# Patient Record
Sex: FEMALE | Race: WHITE | NOT HISPANIC OR LATINO | Employment: FULL TIME | URBAN - METROPOLITAN AREA
[De-identification: names, ages, dates, MRNs, and addresses within clinical notes are randomized per-mention and may not be internally consistent; named-entity substitution may affect disease eponyms.]

---

## 2017-02-07 ENCOUNTER — GENERIC CONVERSION - ENCOUNTER (OUTPATIENT)
Dept: OTHER | Facility: OTHER | Age: 42
End: 2017-02-07

## 2017-02-07 ENCOUNTER — APPOINTMENT (EMERGENCY)
Dept: CT IMAGING | Facility: HOSPITAL | Age: 42
End: 2017-02-07
Payer: COMMERCIAL

## 2017-02-07 ENCOUNTER — HOSPITAL ENCOUNTER (OUTPATIENT)
Facility: HOSPITAL | Age: 42
Setting detail: OBSERVATION
Discharge: HOME | End: 2017-02-08
Attending: EMERGENCY MEDICINE | Admitting: HOSPITALIST
Payer: COMMERCIAL

## 2017-02-07 DIAGNOSIS — R55 SYNCOPE: ICD-10-CM

## 2017-02-07 DIAGNOSIS — R07.9 CHEST PAIN: Primary | ICD-10-CM

## 2017-02-07 LAB
ALBUMIN SERPL BCP-MCNC: 3.8 G/DL (ref 3.5–5)
ALP SERPL-CCNC: 101 U/L (ref 46–116)
ALT SERPL W P-5'-P-CCNC: 24 U/L (ref 12–78)
ANION GAP SERPL CALCULATED.3IONS-SCNC: 8 MMOL/L (ref 4–13)
APTT PPP: 30 SECONDS (ref 24–36)
AST SERPL W P-5'-P-CCNC: 14 U/L (ref 5–45)
BASOPHILS # BLD AUTO: 0.08 THOUSANDS/ΜL (ref 0–0.1)
BASOPHILS NFR BLD AUTO: 1 % (ref 0–1)
BILIRUB SERPL-MCNC: 0.2 MG/DL (ref 0.2–1)
BUN SERPL-MCNC: 17 MG/DL (ref 5–25)
CALCIUM SERPL-MCNC: 8.9 MG/DL (ref 8.3–10.1)
CHLORIDE SERPL-SCNC: 104 MMOL/L (ref 100–108)
CLARITY, POC: CLEAR
CO2 SERPL-SCNC: 30 MMOL/L (ref 21–32)
COLOR, POC: YELLOW
CREAT SERPL-MCNC: 0.83 MG/DL (ref 0.6–1.3)
DEPRECATED D DIMER PPP: <270 NG/ML (FEU) (ref 0–424)
EOSINOPHIL # BLD AUTO: 0.24 THOUSAND/ΜL (ref 0–0.61)
EOSINOPHIL NFR BLD AUTO: 3 % (ref 0–6)
ERYTHROCYTE [DISTWIDTH] IN BLOOD BY AUTOMATED COUNT: 16.3 % (ref 11.6–15.1)
EXT BILIRUBIN, UA: NORMAL
EXT BLOOD URINE: NORMAL
EXT GLUCOSE, UA: NORMAL
EXT KETONES: NORMAL
EXT NITRITE, UA: NORMAL
EXT PH, UA: 6
EXT PROTEIN, UA: NORMAL
EXT SPECIFIC GRAVITY, UA: 1.02
EXT UROBILINOGEN: 0.2
GFR SERPL CREATININE-BSD FRML MDRD: >60 ML/MIN/1.73SQ M
GLUCOSE SERPL-MCNC: 75 MG/DL (ref 65–140)
HCG UR QL: NEGATIVE
HCT VFR BLD AUTO: 39.8 % (ref 34.8–46.1)
HGB BLD-MCNC: 13 G/DL (ref 11.5–15.4)
INR PPP: 0.98 (ref 0.86–1.16)
LYMPHOCYTES # BLD AUTO: 2.57 THOUSANDS/ΜL (ref 0.6–4.47)
LYMPHOCYTES NFR BLD AUTO: 29 % (ref 14–44)
MCH RBC QN AUTO: 26.2 PG (ref 26.8–34.3)
MCHC RBC AUTO-ENTMCNC: 32.7 G/DL (ref 31.4–37.4)
MCV RBC AUTO: 80 FL (ref 82–98)
MONOCYTES # BLD AUTO: 0.58 THOUSAND/ΜL (ref 0.17–1.22)
MONOCYTES NFR BLD AUTO: 7 % (ref 4–12)
NEUTROPHILS # BLD AUTO: 5.42 THOUSANDS/ΜL (ref 1.85–7.62)
NEUTS SEG NFR BLD AUTO: 60 % (ref 43–75)
PLATELET # BLD AUTO: 258 THOUSANDS/UL (ref 149–390)
PMV BLD AUTO: 12.4 FL (ref 8.9–12.7)
POTASSIUM SERPL-SCNC: 3.7 MMOL/L (ref 3.5–5.3)
PROT SERPL-MCNC: 7.4 G/DL (ref 6.4–8.2)
PROTHROMBIN TIME: 12.8 SECONDS (ref 12–14.3)
RBC # BLD AUTO: 4.96 MILLION/UL (ref 3.81–5.12)
SODIUM SERPL-SCNC: 142 MMOL/L (ref 136–145)
TROPONIN I SERPL-MCNC: <0.02 NG/ML
WBC # BLD AUTO: 8.89 THOUSAND/UL (ref 4.31–10.16)
WBC # BLD EST: NORMAL 10*3/UL

## 2017-02-07 PROCEDURE — 81002 URINALYSIS NONAUTO W/O SCOPE: CPT | Performed by: EMERGENCY MEDICINE

## 2017-02-07 PROCEDURE — 84484 ASSAY OF TROPONIN QUANT: CPT | Performed by: EMERGENCY MEDICINE

## 2017-02-07 PROCEDURE — 36415 COLL VENOUS BLD VENIPUNCTURE: CPT | Performed by: EMERGENCY MEDICINE

## 2017-02-07 PROCEDURE — 80053 COMPREHEN METABOLIC PANEL: CPT | Performed by: EMERGENCY MEDICINE

## 2017-02-07 PROCEDURE — 85379 FIBRIN DEGRADATION QUANT: CPT | Performed by: EMERGENCY MEDICINE

## 2017-02-07 PROCEDURE — 96360 HYDRATION IV INFUSION INIT: CPT

## 2017-02-07 PROCEDURE — 71275 CT ANGIOGRAPHY CHEST: CPT

## 2017-02-07 PROCEDURE — 81025 URINE PREGNANCY TEST: CPT | Performed by: EMERGENCY MEDICINE

## 2017-02-07 PROCEDURE — 85730 THROMBOPLASTIN TIME PARTIAL: CPT | Performed by: EMERGENCY MEDICINE

## 2017-02-07 PROCEDURE — 85025 COMPLETE CBC W/AUTO DIFF WBC: CPT | Performed by: EMERGENCY MEDICINE

## 2017-02-07 PROCEDURE — 85610 PROTHROMBIN TIME: CPT | Performed by: EMERGENCY MEDICINE

## 2017-02-07 PROCEDURE — 74175 CTA ABDOMEN W/CONTRAST: CPT

## 2017-02-07 PROCEDURE — 93005 ELECTROCARDIOGRAM TRACING: CPT | Performed by: EMERGENCY MEDICINE

## 2017-02-07 RX ORDER — GABAPENTIN 800 MG/1
800 TABLET ORAL DAILY
Status: ON HOLD | COMMUNITY
End: 2019-01-15

## 2017-02-07 RX ORDER — LANSOPRAZOLE 30 MG/1
30 CAPSULE, DELAYED RELEASE ORAL DAILY
COMMUNITY
End: 2021-03-08

## 2017-02-07 RX ORDER — ESCITALOPRAM OXALATE 20 MG/1
20 TABLET ORAL DAILY
Status: ON HOLD | COMMUNITY
End: 2019-01-14

## 2017-02-07 RX ADMIN — SODIUM CHLORIDE 1000 ML: 0.9 INJECTION, SOLUTION INTRAVENOUS at 23:00

## 2017-02-08 VITALS
DIASTOLIC BLOOD PRESSURE: 80 MMHG | WEIGHT: 171.96 LBS | OXYGEN SATURATION: 98 % | SYSTOLIC BLOOD PRESSURE: 119 MMHG | BODY MASS INDEX: 30.47 KG/M2 | TEMPERATURE: 98.3 F | HEART RATE: 88 BPM | HEIGHT: 63 IN | RESPIRATION RATE: 18 BRPM

## 2017-02-08 PROBLEM — R55 SYNCOPE: Status: ACTIVE | Noted: 2017-02-08

## 2017-02-08 PROBLEM — R07.9 CHEST PAIN: Status: ACTIVE | Noted: 2017-02-08

## 2017-02-08 LAB
ANION GAP SERPL CALCULATED.3IONS-SCNC: 7 MMOL/L (ref 4–13)
BUN SERPL-MCNC: 18 MG/DL (ref 5–25)
CALCIUM SERPL-MCNC: 8.3 MG/DL (ref 8.3–10.1)
CHLORIDE SERPL-SCNC: 105 MMOL/L (ref 100–108)
CHOLEST SERPL-MCNC: 195 MG/DL (ref 50–200)
CO2 SERPL-SCNC: 25 MMOL/L (ref 21–32)
CREAT SERPL-MCNC: 0.69 MG/DL (ref 0.6–1.3)
ERYTHROCYTE [DISTWIDTH] IN BLOOD BY AUTOMATED COUNT: 16.3 % (ref 11.6–15.1)
GFR SERPL CREATININE-BSD FRML MDRD: >60 ML/MIN/1.73SQ M
GLUCOSE SERPL-MCNC: 108 MG/DL (ref 65–140)
HCT VFR BLD AUTO: 36 % (ref 34.8–46.1)
HDLC SERPL-MCNC: 53 MG/DL (ref 40–60)
HGB BLD-MCNC: 11.5 G/DL (ref 11.5–15.4)
LDLC SERPL CALC-MCNC: 126 MG/DL (ref 0–100)
MAGNESIUM SERPL-MCNC: 2 MG/DL (ref 1.6–2.6)
MCH RBC QN AUTO: 25.8 PG (ref 26.8–34.3)
MCHC RBC AUTO-ENTMCNC: 31.9 G/DL (ref 31.4–37.4)
MCV RBC AUTO: 81 FL (ref 82–98)
PLATELET # BLD AUTO: 227 THOUSANDS/UL (ref 149–390)
PMV BLD AUTO: 11.9 FL (ref 8.9–12.7)
POTASSIUM SERPL-SCNC: 4.2 MMOL/L (ref 3.5–5.3)
RBC # BLD AUTO: 4.46 MILLION/UL (ref 3.81–5.12)
SODIUM SERPL-SCNC: 137 MMOL/L (ref 136–145)
TRIGL SERPL-MCNC: 79 MG/DL
TROPONIN I SERPL-MCNC: <0.02 NG/ML
TROPONIN I SERPL-MCNC: <0.02 NG/ML
TSH SERPL DL<=0.05 MIU/L-ACNC: 2.15 UIU/ML (ref 0.36–3.74)
WBC # BLD AUTO: 7.91 THOUSAND/UL (ref 4.31–10.16)

## 2017-02-08 PROCEDURE — A9270 NON-COVERED ITEM OR SERVICE: HCPCS | Performed by: PHYSICIAN ASSISTANT

## 2017-02-08 PROCEDURE — 36415 COLL VENOUS BLD VENIPUNCTURE: CPT | Performed by: PHYSICIAN ASSISTANT

## 2017-02-08 PROCEDURE — 84443 ASSAY THYROID STIM HORMONE: CPT | Performed by: PHYSICIAN ASSISTANT

## 2017-02-08 PROCEDURE — 96361 HYDRATE IV INFUSION ADD-ON: CPT

## 2017-02-08 PROCEDURE — 99285 EMERGENCY DEPT VISIT HI MDM: CPT

## 2017-02-08 PROCEDURE — 84484 ASSAY OF TROPONIN QUANT: CPT | Performed by: PHYSICIAN ASSISTANT

## 2017-02-08 PROCEDURE — 83735 ASSAY OF MAGNESIUM: CPT | Performed by: PHYSICIAN ASSISTANT

## 2017-02-08 PROCEDURE — 80061 LIPID PANEL: CPT | Performed by: PHYSICIAN ASSISTANT

## 2017-02-08 PROCEDURE — 85027 COMPLETE CBC AUTOMATED: CPT | Performed by: PHYSICIAN ASSISTANT

## 2017-02-08 PROCEDURE — 93005 ELECTROCARDIOGRAM TRACING: CPT

## 2017-02-08 PROCEDURE — 80048 BASIC METABOLIC PNL TOTAL CA: CPT | Performed by: PHYSICIAN ASSISTANT

## 2017-02-08 RX ORDER — ONDANSETRON 2 MG/ML
4 INJECTION INTRAMUSCULAR; INTRAVENOUS EVERY 6 HOURS PRN
Status: DISCONTINUED | OUTPATIENT
Start: 2017-02-08 | End: 2017-02-08 | Stop reason: HOSPADM

## 2017-02-08 RX ORDER — ESCITALOPRAM OXALATE 20 MG/1
20 TABLET ORAL
Status: DISCONTINUED | OUTPATIENT
Start: 2017-02-08 | End: 2017-02-08 | Stop reason: HOSPADM

## 2017-02-08 RX ORDER — SENNOSIDES 8.6 MG
1 TABLET ORAL DAILY
Status: DISCONTINUED | OUTPATIENT
Start: 2017-02-08 | End: 2017-02-08 | Stop reason: HOSPADM

## 2017-02-08 RX ORDER — NICOTINE 21 MG/24HR
1 PATCH, TRANSDERMAL 24 HOURS TRANSDERMAL DAILY
Status: DISCONTINUED | OUTPATIENT
Start: 2017-02-08 | End: 2017-02-08 | Stop reason: HOSPADM

## 2017-02-08 RX ORDER — CALCIUM CARBONATE 200(500)MG
1000 TABLET,CHEWABLE ORAL DAILY PRN
Status: DISCONTINUED | OUTPATIENT
Start: 2017-02-08 | End: 2017-02-08 | Stop reason: HOSPADM

## 2017-02-08 RX ORDER — ASPIRIN 81 MG/1
81 TABLET, CHEWABLE ORAL DAILY
Status: DISCONTINUED | OUTPATIENT
Start: 2017-02-09 | End: 2017-02-08 | Stop reason: HOSPADM

## 2017-02-08 RX ORDER — ESCITALOPRAM OXALATE 20 MG/1
20 TABLET ORAL DAILY
Status: DISCONTINUED | OUTPATIENT
Start: 2017-02-08 | End: 2017-02-08

## 2017-02-08 RX ORDER — MORPHINE SULFATE 2 MG/ML
2 INJECTION, SOLUTION INTRAMUSCULAR; INTRAVENOUS ONCE
Status: COMPLETED | OUTPATIENT
Start: 2017-02-08 | End: 2017-02-08

## 2017-02-08 RX ORDER — PANTOPRAZOLE SODIUM 40 MG/1
40 TABLET, DELAYED RELEASE ORAL
Status: DISCONTINUED | OUTPATIENT
Start: 2017-02-08 | End: 2017-02-08 | Stop reason: HOSPADM

## 2017-02-08 RX ORDER — GABAPENTIN 400 MG/1
800 CAPSULE ORAL DAILY
Status: DISCONTINUED | OUTPATIENT
Start: 2017-02-08 | End: 2017-02-08 | Stop reason: HOSPADM

## 2017-02-08 RX ADMIN — MORPHINE SULFATE 2 MG: 2 INJECTION, SOLUTION INTRAMUSCULAR; INTRAVENOUS at 12:29

## 2017-02-08 RX ADMIN — IOHEXOL 100 ML: 350 INJECTION, SOLUTION INTRAVENOUS at 00:16

## 2017-02-08 RX ADMIN — NICOTINE 1 PATCH: 14 PATCH TRANSDERMAL at 09:35

## 2017-02-08 RX ADMIN — GABAPENTIN 800 MG: 400 CAPSULE ORAL at 09:36

## 2017-02-09 LAB
ATRIAL RATE: 89 BPM
ATRIAL RATE: 96 BPM
P AXIS: 45 DEGREES
P AXIS: 53 DEGREES
PR INTERVAL: 180 MS
PR INTERVAL: 194 MS
QRS AXIS: 23 DEGREES
QRS AXIS: 25 DEGREES
QRSD INTERVAL: 80 MS
QRSD INTERVAL: 82 MS
QT INTERVAL: 342 MS
QT INTERVAL: 386 MS
QTC INTERVAL: 432 MS
QTC INTERVAL: 469 MS
T WAVE AXIS: 49 DEGREES
T WAVE AXIS: 54 DEGREES
VENTRICULAR RATE: 89 BPM
VENTRICULAR RATE: 96 BPM

## 2017-02-13 ENCOUNTER — TRANSCRIBE ORDERS (OUTPATIENT)
Dept: ADMINISTRATIVE | Facility: HOSPITAL | Age: 42
End: 2017-02-13

## 2017-02-13 DIAGNOSIS — I77.1 CELIAC ARTERY STENOSIS (HCC): ICD-10-CM

## 2017-02-13 DIAGNOSIS — R07.89 OTHER CHEST PAIN: Primary | ICD-10-CM

## 2017-02-17 ENCOUNTER — HOSPITAL ENCOUNTER (OUTPATIENT)
Dept: NON INVASIVE DIAGNOSTICS | Facility: CLINIC | Age: 42
Discharge: HOME | End: 2017-02-17
Payer: COMMERCIAL

## 2017-02-17 DIAGNOSIS — I77.1 CELIAC ARTERY STENOSIS (HCC): ICD-10-CM

## 2017-02-17 DIAGNOSIS — R07.89 OTHER CHEST PAIN: ICD-10-CM

## 2017-02-17 PROCEDURE — 93975 VASCULAR STUDY: CPT

## 2017-03-20 ENCOUNTER — ALLSCRIPTS OFFICE VISIT (OUTPATIENT)
Dept: OTHER | Facility: OTHER | Age: 42
End: 2017-03-20

## 2017-09-28 ENCOUNTER — APPOINTMENT (OUTPATIENT)
Dept: URGENT CARE | Age: 42
End: 2017-09-28
Payer: OTHER MISCELLANEOUS

## 2017-09-28 PROCEDURE — G0382 LEV 3 HOSP TYPE B ED VISIT: HCPCS | Performed by: PREVENTIVE MEDICINE

## 2017-09-28 PROCEDURE — 99283 EMERGENCY DEPT VISIT LOW MDM: CPT | Performed by: PREVENTIVE MEDICINE

## 2017-11-14 ENCOUNTER — HOSPITAL ENCOUNTER (EMERGENCY)
Facility: HOSPITAL | Age: 42
Discharge: HOME | End: 2017-11-14
Attending: EMERGENCY MEDICINE | Admitting: EMERGENCY MEDICINE
Payer: COMMERCIAL

## 2017-11-14 VITALS
SYSTOLIC BLOOD PRESSURE: 171 MMHG | OXYGEN SATURATION: 100 % | DIASTOLIC BLOOD PRESSURE: 109 MMHG | TEMPERATURE: 98.3 F | WEIGHT: 165 LBS | BODY MASS INDEX: 29.23 KG/M2 | RESPIRATION RATE: 18 BRPM | HEART RATE: 98 BPM | HEIGHT: 63 IN

## 2017-11-14 DIAGNOSIS — L50.9 URTICARIA: Primary | ICD-10-CM

## 2017-11-14 DIAGNOSIS — T78.40XA ALLERGIC REACTION, INITIAL ENCOUNTER: ICD-10-CM

## 2017-11-14 PROCEDURE — 96374 THER/PROPH/DIAG INJ IV PUSH: CPT

## 2017-11-14 PROCEDURE — 99282 EMERGENCY DEPT VISIT SF MDM: CPT

## 2017-11-14 PROCEDURE — 96375 TX/PRO/DX INJ NEW DRUG ADDON: CPT

## 2017-11-14 RX ORDER — PREDNISONE 10 MG/1
TABLET ORAL
Qty: 20 TABLET | Refills: 0 | Status: ON HOLD | OUTPATIENT
Start: 2017-11-14 | End: 2019-01-14

## 2017-11-14 RX ORDER — METHYLPREDNISOLONE SODIUM SUCCINATE 125 MG/2ML
125 INJECTION, POWDER, LYOPHILIZED, FOR SOLUTION INTRAMUSCULAR; INTRAVENOUS ONCE
Status: COMPLETED | OUTPATIENT
Start: 2017-11-14 | End: 2017-11-14

## 2017-11-14 RX ORDER — DIPHENHYDRAMINE HYDROCHLORIDE 50 MG/ML
25 INJECTION INTRAMUSCULAR; INTRAVENOUS ONCE
Status: COMPLETED | OUTPATIENT
Start: 2017-11-14 | End: 2017-11-14

## 2017-11-14 RX ADMIN — FAMOTIDINE 20 MG: 10 INJECTION, SOLUTION INTRAVENOUS at 22:27

## 2017-11-14 RX ADMIN — METHYLPREDNISOLONE SODIUM SUCCINATE 125 MG: 125 INJECTION, POWDER, FOR SOLUTION INTRAMUSCULAR; INTRAVENOUS at 22:26

## 2017-11-14 RX ADMIN — DIPHENHYDRAMINE HYDROCHLORIDE 25 MG: 50 INJECTION, SOLUTION INTRAMUSCULAR; INTRAVENOUS at 22:26

## 2017-11-15 NOTE — ED NOTES
Patient is resting comfortably  Decrease redness noted in areas that were red and itchy on arrival      Dot Kirkland RN  11/14/17 6789

## 2017-11-15 NOTE — ED PROVIDER NOTES
History  Chief Complaint   Patient presents with    Itching     Pt c/o hives since 6am today, initially relieved by benydlryll  Pt has been on amoxicillin for one week  Pt states that her palms and the bottoms of her feet are very hot  40 yowf started with itchy skin this morning, broke out in hives at 1 pm today  Took benadryl then and again 4 hours ago without relief  Is on day  of amoxil course s/p teeth extraction  No fever  No sob  No trouble swallowing or talking  Never had before  Prior to Admission Medications   Prescriptions Last Dose Informant Patient Reported? Taking?   escitalopram (LEXAPRO) 20 mg tablet   Yes No   Sig: Take 20 mg by mouth daily   gabapentin (NEURONTIN) 800 mg tablet   Yes No   Sig: Take 800 mg by mouth daily   lansoprazole (PREVACID) 30 mg capsule   Yes No   Sig: Take 30 mg by mouth daily      Facility-Administered Medications: None       Past Medical History:   Diagnosis Date    Anemia        Past Surgical History:   Procedure Laterality Date     SECTION      NOSE SURGERY      TUBAL LIGATION         History reviewed  No pertinent family history  I have reviewed and agree with the history as documented  Social History   Substance Use Topics    Smoking status: Current Some Day Smoker     Packs/day: 1 00     Types: Cigarettes    Smokeless tobacco: Never Used    Alcohol use No        Review of Systems   Constitutional: Negative  Negative for fever  HENT: Negative  Eyes: Negative  Respiratory: Negative  Negative for cough and shortness of breath  Cardiovascular: Negative  Negative for chest pain  Gastrointestinal: Negative  Negative for abdominal pain, diarrhea, nausea and vomiting  Genitourinary: Negative  Negative for dysuria and flank pain  Musculoskeletal: Negative  Negative for back pain and myalgias  Skin: Positive for rash  Negative for wound  Neurological: Negative  Negative for dizziness and headaches  Hematological: Does not bruise/bleed easily  Psychiatric/Behavioral: Negative  All other systems reviewed and are negative  Physical Exam  ED Triage Vitals [11/14/17 2158]   Temperature Pulse Respirations Blood Pressure SpO2   98 3 °F (36 8 °C) 98 18 (!) 171/109 100 %      Temp Source Heart Rate Source Patient Position - Orthostatic VS BP Location FiO2 (%)   Tympanic Monitor Lying Left arm --      Pain Score       No Pain           Orthostatic Vital Signs  Vitals:    11/14/17 2158   BP: (!) 171/109   Pulse: 98   Patient Position - Orthostatic VS: Lying       Physical Exam   Constitutional: She is oriented to person, place, and time  She appears well-developed and well-nourished  No distress  Speech intact, handling secretions, not toxic or ill appearing  HENT:   Head: Normocephalic and atraumatic  Mouth/Throat: Oropharynx is clear and moist    Eyes: Conjunctivae are normal  Pupils are equal, round, and reactive to light  Neck: Normal range of motion  Neck supple  Cardiovascular: Normal rate, regular rhythm and normal heart sounds  No murmur heard  Pulmonary/Chest: Effort normal and breath sounds normal  No respiratory distress  Abdominal: Soft  Bowel sounds are normal  She exhibits no distension  There is no tenderness  Musculoskeletal: Normal range of motion  She exhibits no edema or deformity  Neurological: She is alert and oriented to person, place, and time  No cranial nerve deficit  Skin: Skin is warm and dry  Rash noted  She is not diaphoretic  No pallor  C/w urticaria, scattered over trunk and ext  Psychiatric: She has a normal mood and affect  Her behavior is normal    Nursing note and vitals reviewed        ED Medications  Medications   methylPREDNISolone sodium succinate (Solu-MEDROL) injection 125 mg (125 mg Intravenous Given 11/14/17 2226)   diphenhydrAMINE (BENADRYL) injection 25 mg (25 mg Intravenous Given 11/14/17 2226)   famotidine (PEPCID) injection 20 mg (20 mg Intravenous Given 11/14/17 3167)       Diagnostic Studies  Results Reviewed     None                 No orders to display              Procedures  Procedures       Phone Contacts  ED Phone Contact    ED Course  ED Course                                MDM  Number of Diagnoses or Management Options  Diagnosis management comments: Pt  Observed for an hour, rash is starting to fade a bit, definitely not worsening  Will discharge, advised benadryl and prednisone taper and stop amoxil  Pt  Advised to tell all health care professionals about this reaction when asked about allergies  CritCare Time    Disposition  Final diagnoses:   Urticaria   Allergic reaction, initial encounter     Time reflects when diagnosis was documented in both MDM as applicable and the Disposition within this note     Time User Action Codes Description Comment    51/14/7319 30:57 PM Patricio Husbands A Add [K54 8] Urticaria     [birth year]1968 84:13 PM Patricio Husbands A Add [A70 43TC] Allergic reaction, initial encounter       ED Disposition     ED Disposition Condition Comment    Discharge  Tonny Flores discharge to home/self care  Condition at discharge: Stable        Follow-up Information     Follow up With Specialties Details Why [address] [facility], [place] Internal Medicine  As needed [id number]  [id number]  [phone]          Patient's Medications   Discharge Prescriptions    PREDNISONE 10 MG TABLET    4 po daily x2 days, then 3 po daily x2 days, then 2 po daily x2 days,then 1 po daily x2days       Start Date: 11/14/2017End Date: --       Order Dose: --       Quantity: 20 tablet    Refills: 0     No discharge procedures on file      ED Provider  Electronically Signed by           Rashmi Guerra MD  21/98/79 7214

## 2017-11-15 NOTE — DISCHARGE INSTRUCTIONS
Antibiotic Medication Allergy - continue benadryl 50 mg every 6-8 hours around the clock for 24 hours after the rash is gone and take the prednisone as prescribed  Stop the Amoxil and consider this an allergic reaction  WHAT YOU NEED TO KNOW:   An antibiotic medication allergy is a harmful reaction to an antibiotic  The reaction can start soon after you take the medicine, or days or weeks after you stop  Healthcare providers cannot know ahead of time if you will have an allergic reaction  Your immune system may become sensitive to the antibiotic the first time you take it  You may have an allergic reaction the next time  The antibiotics most likely to cause an allergic reaction are penicillins and cephalosporins  DISCHARGE INSTRUCTIONS:   Call 911 for signs or symptoms of anaphylaxis,  such as trouble breathing, swelling in your mouth or throat, or wheezing  You may also have itching, a rash, hives, or feel like you are going to faint  Return to the emergency department if:   · You have a rash with itchy, swollen, red spots  · You have blisters, or your skin is peeling  · You have trouble swallowing or your voice sounds hoarse  · You have a fast or pounding heartbeat  · Your skin or the whites of your eyes turn yellow  Contact your healthcare provider if:   · You think you are having an allergic reaction  Contact your healthcare provider before you take another dose of your antibiotic  · You have a rash  · You have a fever  · You have a sore throat or swollen glands  You will feel hard lumps when you touch your throat if your glands are swollen  · Your skin itches and becomes red when you are in the sunlight  · You have questions or concerns about your condition, allergy, or care  Medicines that may be used:   · Epinephrine  is used to treat severe allergic reactions such as anaphylaxis  · Antihistamines  decrease mild symptoms such as itching or a rash (benadryl)  · Steroids  reduce inflammation (prednisone)  Follow up with your healthcare provider as directed:  Ask if you need to avoid other medicines you may be allergic to  Write down your questions so you remember to ask them during your visits  Safety precautions to take if you are at risk for anaphylaxis:   · Keep 2 shots of epinephrine with you at all times  You may need a second shot, because epinephrine only works for about 20 minutes and symptoms may return  Your healthcare provider can show you and family members how to give the shot  Check the expiration date every month and replace it before it expires  · Create an action plan  Your healthcare provider can help you create a written plan that explains the allergy and an emergency plan to treat a reaction  The plan explains when to give a second epinephrine shot if symptoms return or do not improve after the first  Give copies of the action plan and emergency instructions to family members, work and school staff, and  providers  Show them how to give a shot of epinephrine  · Be careful when you exercise  If you have had exercise-induced anaphylaxis, do not exercise right after you eat  Stop exercising right away if you start to develop any signs or symptoms of anaphylaxis  You may first feel tired, warm, or have itchy skin  Hives, swelling, and severe breathing problems may develop if you continue to exercise  · Carry medical alert identification  Wear medical alert jewelry or carry a card that says you have an antibiotic medicine allergy  Healthcare providers need to know that they should not give you this antibiotic  Ask your healthcare provider where to get these items  · Read medicine labels before you use any medicine  Do not take the medicine if it contains the antibiotic that you are allergic to  This includes topical medicines that you put on your skin  Ask a pharmacist if you are not sure       · Tell all healthcare providers about your allergy  Always tell your healthcare providers the names of medicines that you are allergic to and the symptoms of your allergic reactions  · Ask if you need to avoid other medicines  You may be allergic to other medicines if you had an allergic reaction to an antibiotic  Make sure you know the names of other medicines that you should not take  © 2017 [address] [employer] Information is for End User's use only and may not be sold, redistributed or otherwise used for commercial purposes  All illustrations and images included in CareNotes® are the copyrighted property of [employer]  or [address]  The above information is an  only  It is not intended as medical advice for individual conditions or treatments  Talk to your doctor, nurse or pharmacist before following any medical regimen to see if it is safe and effective for you

## 2017-11-29 ENCOUNTER — HOSPITAL ENCOUNTER (EMERGENCY)
Facility: HOSPITAL | Age: 42
Discharge: HOME | End: 2017-11-29
Attending: EMERGENCY MEDICINE | Admitting: EMERGENCY MEDICINE
Payer: COMMERCIAL

## 2017-11-29 ENCOUNTER — APPOINTMENT (EMERGENCY)
Dept: RADIOLOGY | Facility: HOSPITAL | Age: 42
End: 2017-11-29
Payer: COMMERCIAL

## 2017-11-29 VITALS
RESPIRATION RATE: 16 BRPM | TEMPERATURE: 97.7 F | SYSTOLIC BLOOD PRESSURE: 161 MMHG | OXYGEN SATURATION: 97 % | DIASTOLIC BLOOD PRESSURE: 83 MMHG | HEART RATE: 94 BPM

## 2017-11-29 DIAGNOSIS — J06.9 UPPER RESPIRATORY INFECTION: Primary | ICD-10-CM

## 2017-11-29 DIAGNOSIS — R07.89 CHEST WALL PAIN: ICD-10-CM

## 2017-11-29 LAB
ALBUMIN SERPL BCP-MCNC: 3.4 G/DL (ref 3.5–5)
ALP SERPL-CCNC: 75 U/L (ref 46–116)
ALT SERPL W P-5'-P-CCNC: 24 U/L (ref 12–78)
ANION GAP SERPL CALCULATED.3IONS-SCNC: 10 MMOL/L (ref 4–13)
AST SERPL W P-5'-P-CCNC: 14 U/L (ref 5–45)
ATRIAL RATE: 94 BPM
BASOPHILS # BLD AUTO: 0.03 THOUSANDS/ΜL (ref 0–0.1)
BASOPHILS NFR BLD AUTO: 0 % (ref 0–1)
BILIRUB SERPL-MCNC: 0.3 MG/DL (ref 0.2–1)
BUN SERPL-MCNC: 10 MG/DL (ref 5–25)
CALCIUM SERPL-MCNC: 8.7 MG/DL (ref 8.3–10.1)
CHLORIDE SERPL-SCNC: 105 MMOL/L (ref 100–108)
CO2 SERPL-SCNC: 25 MMOL/L (ref 21–32)
CREAT SERPL-MCNC: 0.71 MG/DL (ref 0.6–1.3)
EOSINOPHIL # BLD AUTO: 0.12 THOUSAND/ΜL (ref 0–0.61)
EOSINOPHIL NFR BLD AUTO: 2 % (ref 0–6)
ERYTHROCYTE [DISTWIDTH] IN BLOOD BY AUTOMATED COUNT: 16.1 % (ref 11.6–15.1)
GFR SERPL CREATININE-BSD FRML MDRD: 106 ML/MIN/1.73SQ M
GLUCOSE SERPL-MCNC: 107 MG/DL (ref 65–140)
HCT VFR BLD AUTO: 40.6 % (ref 34.8–46.1)
HGB BLD-MCNC: 13.6 G/DL (ref 11.5–15.4)
LYMPHOCYTES # BLD AUTO: 1.79 THOUSANDS/ΜL (ref 0.6–4.47)
LYMPHOCYTES NFR BLD AUTO: 23 % (ref 14–44)
MCH RBC QN AUTO: 27.9 PG (ref 26.8–34.3)
MCHC RBC AUTO-ENTMCNC: 33.5 G/DL (ref 31.4–37.4)
MCV RBC AUTO: 83 FL (ref 82–98)
MONOCYTES # BLD AUTO: 0.6 THOUSAND/ΜL (ref 0.17–1.22)
MONOCYTES NFR BLD AUTO: 8 % (ref 4–12)
NEUTROPHILS # BLD AUTO: 5.22 THOUSANDS/ΜL (ref 1.85–7.62)
NEUTS SEG NFR BLD AUTO: 67 % (ref 43–75)
P AXIS: 68 DEGREES
PLATELET # BLD AUTO: 194 THOUSANDS/UL (ref 149–390)
PMV BLD AUTO: 11.9 FL (ref 8.9–12.7)
POTASSIUM SERPL-SCNC: 4.1 MMOL/L (ref 3.5–5.3)
PR INTERVAL: 166 MS
PROT SERPL-MCNC: 6.8 G/DL (ref 6.4–8.2)
QRS AXIS: 40 DEGREES
QRSD INTERVAL: 80 MS
QT INTERVAL: 342 MS
QTC INTERVAL: 419 MS
RBC # BLD AUTO: 4.88 MILLION/UL (ref 3.81–5.12)
SODIUM SERPL-SCNC: 140 MMOL/L (ref 136–145)
T WAVE AXIS: 51 DEGREES
TROPONIN I SERPL-MCNC: <0.02 NG/ML
VENTRICULAR RATE: 90 BPM
WBC # BLD AUTO: 7.76 THOUSAND/UL (ref 4.31–10.16)

## 2017-11-29 PROCEDURE — 80053 COMPREHEN METABOLIC PANEL: CPT | Performed by: EMERGENCY MEDICINE

## 2017-11-29 PROCEDURE — 93005 ELECTROCARDIOGRAM TRACING: CPT | Performed by: EMERGENCY MEDICINE

## 2017-11-29 PROCEDURE — 85025 COMPLETE CBC W/AUTO DIFF WBC: CPT | Performed by: EMERGENCY MEDICINE

## 2017-11-29 PROCEDURE — 99285 EMERGENCY DEPT VISIT HI MDM: CPT

## 2017-11-29 PROCEDURE — 71020 HB CHEST X-RAY 2VW FRONTAL&LATL: CPT

## 2017-11-29 PROCEDURE — 36415 COLL VENOUS BLD VENIPUNCTURE: CPT | Performed by: EMERGENCY MEDICINE

## 2017-11-29 PROCEDURE — 84484 ASSAY OF TROPONIN QUANT: CPT | Performed by: EMERGENCY MEDICINE

## 2017-11-29 RX ORDER — AZITHROMYCIN 250 MG/1
500 TABLET, FILM COATED ORAL ONCE
Status: COMPLETED | OUTPATIENT
Start: 2017-11-29 | End: 2017-11-29

## 2017-11-29 RX ORDER — AZITHROMYCIN 250 MG/1
250 TABLET, FILM COATED ORAL DAILY
Qty: 6 TABLET | Refills: 0 | Status: SHIPPED | OUTPATIENT
Start: 2017-11-29 | End: 2017-12-04

## 2017-11-29 RX ORDER — ACETAMINOPHEN 325 MG/1
975 TABLET ORAL ONCE
Status: COMPLETED | OUTPATIENT
Start: 2017-11-29 | End: 2017-11-29

## 2017-11-29 RX ADMIN — AZITHROMYCIN 500 MG: 250 TABLET, FILM COATED ORAL at 11:54

## 2017-11-29 RX ADMIN — ACETAMINOPHEN 975 MG: 325 TABLET ORAL at 11:37

## 2017-11-29 NOTE — ED PROVIDER NOTES
History  Chief Complaint   Patient presents with    Chest Pain     Pt c/o L sided CP and SOB, Pt reports she had pneumonia last year and it feels "the same as that"  Pt c/o nasal congestion, cough, generalized weakness, L sided CP and BERGMAN  Patient presents to the emergency department for evaluation of central chest pain began 3 days ago  She states it feels like the discomfort she had when she had a pneumonia  She has had a nonproductive cough for 3 days without fever chills  She denies back or belly pain  Denies jaw arm or neck pain  Has been eating and drinking normally  Prior to Admission Medications   Prescriptions Last Dose Informant Patient Reported? Taking?   escitalopram (LEXAPRO) 20 mg tablet   Yes No   Sig: Take 20 mg by mouth daily   gabapentin (NEURONTIN) 800 mg tablet   Yes No   Sig: Take 800 mg by mouth daily   lansoprazole (PREVACID) 30 mg capsule   Yes No   Sig: Take 30 mg by mouth daily   predniSONE 10 mg tablet   No No   Si po daily x2 days, then 3 po daily x2 days, then 2 po daily x2 days,then 1 po daily x2days      Facility-Administered Medications: None       Past Medical History:   Diagnosis Date    Anemia        Past Surgical History:   Procedure Laterality Date     SECTION      NOSE SURGERY      TUBAL LIGATION         History reviewed  No pertinent family history  I have reviewed and agree with the history as documented  Social History   Substance Use Topics    Smoking status: Current Some Day Smoker     Packs/day: 1 00     Types: Cigarettes    Smokeless tobacco: Never Used    Alcohol use No        Review of Systems   Constitutional: Negative  Negative for activity change, appetite change, chills, diaphoresis, fatigue, fever and unexpected weight change  HENT: Negative  Eyes: Negative  Negative for photophobia and visual disturbance  Respiratory: Positive for cough  Negative for chest tightness, shortness of breath, wheezing and stridor  Cardiovascular: Positive for chest pain  Negative for palpitations and leg swelling  Gastrointestinal: Negative  Negative for abdominal pain, diarrhea, nausea and vomiting  Endocrine: Negative  Genitourinary: Negative  Negative for dysuria, frequency, hematuria, urgency, vaginal bleeding, vaginal discharge and vaginal pain  Musculoskeletal: Negative  Negative for back pain, neck pain and neck stiffness  Skin: Negative  Negative for rash and wound  Allergic/Immunologic: Negative  Neurological: Negative  Negative for dizziness, tremors, seizures, syncope, facial asymmetry, speech difficulty, weakness, light-headedness, numbness and headaches  Hematological: Negative  Does not bruise/bleed easily  Psychiatric/Behavioral: Negative  Physical Exam  ED Triage Vitals [11/29/17 1019]   Temperature Pulse Respirations Blood Pressure SpO2   97 7 °F (36 5 °C) 94 16 161/83 97 %      Temp Source Heart Rate Source Patient Position - Orthostatic VS BP Location FiO2 (%)   Oral Monitor Sitting Right arm --      Pain Score       6           Orthostatic Vital Signs  Vitals:    11/29/17 1019   BP: 161/83   Pulse: 94   Patient Position - Orthostatic VS: Sitting       Physical Exam   Constitutional: She is oriented to person, place, and time  She appears well-developed and well-nourished  HENT:   Head: Normocephalic and atraumatic  Right Ear: External ear normal    Left Ear: External ear normal    Mouth/Throat: Oropharynx is clear and moist    Eyes: Conjunctivae and EOM are normal  Pupils are equal, round, and reactive to light  Neck: Normal range of motion  Neck supple  Cardiovascular: Normal rate, regular rhythm, normal heart sounds and intact distal pulses  Pulmonary/Chest: Effort normal and breath sounds normal  No respiratory distress  She has no wheezes  She has no rales  She exhibits no tenderness  Abdominal: Soft  Bowel sounds are normal  She exhibits no distension and no mass   There is no tenderness  There is no rebound and no guarding  No hernia  Musculoskeletal: Normal range of motion  She exhibits no edema, tenderness or deformity  Neurological: She is alert and oriented to person, place, and time  She has normal reflexes  She displays normal reflexes  No cranial nerve deficit or sensory deficit  She exhibits normal muscle tone  Coordination normal    Skin: Skin is warm and dry  No rash noted  No erythema  No pallor  Psychiatric: She has a normal mood and affect  Her behavior is normal  Judgment and thought content normal    Nursing note and vitals reviewed  ED Medications  Medications   acetaminophen (TYLENOL) tablet 975 mg (975 mg Oral Given 11/29/17 1137)   azithromycin (ZITHROMAX) tablet 500 mg (500 mg Oral Given 11/29/17 1154)       Diagnostic Studies  Results Reviewed     Procedure Component Value Units Date/Time    Troponin I [[id number]]  (Normal) Collected:  11/29/17 1101    Lab Status:  Final result Specimen:  Blood from Arm, Right Updated:  11/29/17 1130     Troponin I <0 02 ng/mL     Narrative:         [employer] Chemistry analyzer 99% cutoff is > 0 04 ng/mL in network labs    o cTnI 99% cutoff is useful only when applied to patients in the clinical setting of myocardial ischemia  o cTnI 99% cutoff should be interpreted in the context of clinical history, ECG findings and possibly cardiac imaging to establish correct diagnosis  o cTnI 99% cutoff may be suggestive but clearly not indicative of a coronary event without the clinical setting of myocardial ischemia      Comprehensive metabolic panel [[id number]]  (Abnormal) Collected:  11/29/17 1100    Lab Status:  Final result Specimen:  Blood from Arm, Right Updated:  11/29/17 1127     Sodium 140 mmol/L      Potassium 4 1 mmol/L      Chloride 105 mmol/L      CO2 25 mmol/L      Anion Gap 10 mmol/L      BUN 10 mg/dL      Creatinine 0 71 mg/dL      Glucose 107 mg/dL      Calcium 8 7 mg/dL      AST 14 U/L      ALT 24 U/L Alkaline Phosphatase 75 U/L      Total Protein 6 8 g/dL      Albumin 3 4 (L) g/dL      Total Bilirubin 0 30 mg/dL      eGFR 106 ml/min/1 73sq m     Narrative:         National Kidney Disease Education Program recommendations are as follows:  GFR calculation is accurate only with a steady state creatinine  Chronic Kidney disease less than 60 ml/min/1 73 sq  meters  Kidney failure less than 15 ml/min/1 73 sq  meters  CBC and differential [[id number]]  (Abnormal) Collected:  11/29/17 1101    Lab Status:  Final result Specimen:  Blood from Arm, Right Updated:  11/29/17 1109     WBC 7 76 Thousand/uL      RBC 4 88 Million/uL      Hemoglobin 13 6 g/dL      Hematocrit 40 6 %      MCV 83 fL      MCH 27 9 pg      MCHC 33 5 g/dL      RDW 16 1 (H) %      MPV 11 9 fL      Platelets 452 Thousands/uL      Neutrophils Relative 67 %      Lymphocytes Relative 23 %      Monocytes Relative 8 %      Eosinophils Relative 2 %      Basophils Relative 0 %      Neutrophils Absolute 5 22 Thousands/µL      Lymphocytes Absolute 1 79 Thousands/µL      Monocytes Absolute 0 60 Thousand/µL      Eosinophils Absolute 0 12 Thousand/µL      Basophils Absolute 0 03 Thousands/µL                  X-ray chest 2 views   ED Interpretation by Melba Holter, MD (11/29 1141)   NAD      Final Result by Christin Jernigan MD (11/29 1305)      No radiographic evidence of acute intrathoracic process  Findings concur with the preliminary report by the referring clinician already in PACS and/or our electronic record EPIC            Workstation performed: [id number]                    Procedures  ECG 12 Lead Documentation  Date/Time: 11/29/2017 11:18 AM  Performed by: Ruby Orlando  Authorized by: Ruby Orlando     ECG reviewed by me, the ED Provider: yes    Patient location:  ED  Previous ECG:     Previous ECG:  Compared to current    Similarity:  No change  Interpretation:     Interpretation: normal    Rate:     ECG rate:  90    ECG rate assessment: normal    Rhythm:     Rhythm: sinus rhythm    Ectopy:     Ectopy: none    QRS:     QRS axis:  Normal    QRS intervals:  Normal  Conduction:     Conduction: normal    ST segments:     ST segments:  Normal  T waves:     T waves: normal             Phone Contacts  ED Phone Contact    ED Course  ED Course as of Nov 29 1658 Wed Nov 29, 2017   1141 Will treat patient with upper respiratory infection with an antibiotic  I clinically suspect the chest discomfort is chest wall pain secondary to cough  MDM  CritCare Time    Disposition  Final diagnoses:   Upper respiratory infection   Chest wall pain     Time reflects when diagnosis was documented in both MDM as applicable and the Disposition within this note     Time User Action Codes Description Comment    11/29/2017 11:43 AM Phillip Meliza Add [J06 9] Upper respiratory infection     11/29/2017 11:43 AM Phillip Meliza Add [R07 89] Chest wall pain       ED Disposition     ED Disposition Condition Comment    Discharge  [place] discharge to home/self care      Condition at discharge: Stable        Follow-up Information     Follow up With Specialties Details Why Contact Info    Private physician  Schedule an appointment as soon as possible for a visit          Discharge Medication List as of 11/29/2017 11:46 AM      START taking these medications    Details   azithromycin (ZITHROMAX) 250 mg tablet Take 1 tablet by mouth daily for 5 days Take first 2 tablets together, then 1 every day until finished , Starting Wed 11/29/2017, Until Mon 12/4/2017, Print         CONTINUE these medications which have NOT CHANGED    Details   escitalopram (LEXAPRO) 20 mg tablet Take 20 mg by mouth daily, Until Discontinued, Historical Med      gabapentin (NEURONTIN) 800 mg tablet Take 800 mg by mouth daily, Until Discontinued, Historical Med      lansoprazole (PREVACID) 30 mg capsule Take 30 mg by mouth daily, Until Discontinued, Historical Med predniSONE 10 mg tablet 4 po daily x2 days, then 3 po daily x2 days, then 2 po daily x2 days,then 1 po daily x2days, Print           No discharge procedures on file      ED Provider  Electronically Signed by           Earl Baca MD  11/29/17 Esther Clay MD  11/29/17 2215

## 2017-11-29 NOTE — DISCHARGE INSTRUCTIONS
Chest Wall Pain, Ambulatory Care   GENERAL INFORMATION:   Chest wall pain  may be caused by problems with the muscles, cartilage, or bones of the chest wall  Chest wall pain may also be caused by pain that spreads to your chest from another part of your body  The pain may be aching, severe, dull, or sharp  It may come and go, or it may be constant  The pain may be worse when you move in certain ways, breathe deeply, or cough  Seek immediate care for the following symptoms:   · Severe pain    · You have any of the following signs of a heart attack:      ¨ Squeezing, pressure, or pain in your chest that lasts longer than 5 minutes or returns    ¨ Discomfort or pain in your back, neck, jaw, stomach, or arm     ¨ Trouble breathing    ¨ Nausea or vomiting    ¨ Lightheadedness or a sudden cold sweat, especially with chest pain or trouble breathing  Treatment  depends on the cause of your chest wall pain  You may need any of the following:  · NSAIDs  help decrease swelling and pain or fever  This medicine is available with or without a doctor's order  NSAIDs can cause stomach bleeding or kidney problems in certain people  If you take blood thinner medicine, always ask your healthcare provider if NSAIDs are safe for you  Always read the medicine label and follow directions  · Acetaminophen  decreases pain  It is available without a doctor's order  Ask how much to take and how often to take it  Follow directions  Acetaminophen can cause liver damage if not taken correctly  · Apply heat  on your chest for 20 to 30 minutes every 2 hours for as many days as directed  Heat helps decrease pain and muscle spasms  · Apply ice  on your chest for 15 to 20 minutes every hour or as directed  Use an ice pack, or put crushed ice in a plastic bag  Cover it with a towel  Ice helps prevent tissue damage and decreases swelling and pain    Follow up with your healthcare provider as directed:  Write down your questions so you remember to ask them during your visits  CARE AGREEMENT:   You have the right to help plan your care  Learn about your health condition and how it may be treated  Discuss treatment options with your caregivers to decide what care you want to receive  You always have the right to refuse treatment  The above information is an  only  It is not intended as medical advice for individual conditions or treatments  Talk to your doctor, nurse or pharmacist before following any medical regimen to see if it is safe and effective for you  © 2014 [address] is for End User's use only and may not be sold, redistributed or otherwise used for commercial purposes  All illustrations and images included in CareNotes® are the copyrighted property of [employer]  or Karel Lucas  Upper Respiratory Infection, Ambulatory Care   [facility], Leslee Marques, and Torin Barbosa[de-identified] Complete Guide to Symptoms, Illness, and Surgery, 4th ed  Evangelista Borrego, [place], [place], , 2000  Jana MCLAUGHLIN, Noel YOUNGBLOOD[de-identified] The common cold  Juana Slater , 2003; 504(6783): 51-59  [place] for Clinical Systems Improvement (ICSI):: Viral upper respiratory infection (VURI) in adults and children    December 2002  [facility]): [place] for Clinical Systems Improvement (ICSI)  Available at: [employer] cy  aspx? view_id=1&doc_id=3658 , (cited 11/17/03)  Medline Plus Health Information[de-identified] Colds    August 7, 2002  Available at: Niraj vogel , (cited 11/17/03)  Nighat Ross : The [place] Manual of Nursing Practice, 7th ed  KELSEY Cheng , 2001  Annie Heller & Carmelita SEALS: Upper Respiratory Infection  In: Ganesh FJ, ed  The 5-Minute Clinical Consult 2012, 20th ed  [address] [facility] [place], [place], 2012 © 2014 [address] is for End User's use only and may not be sold, redistributed or otherwise used for commercial purposes  All illustrations and images included in CareNotes® are the copyrighted property of A D A M , Inc  or Karel Lucas  The above information is an  only  It is not intended as medical advice for individual conditions or treatments  Talk to your doctor, nurse or pharmacist before following any medical regimen to see if it is safe and effective for you

## 2018-01-13 VITALS
WEIGHT: 165 LBS | RESPIRATION RATE: 18 BRPM | HEIGHT: 63 IN | HEART RATE: 70 BPM | SYSTOLIC BLOOD PRESSURE: 136 MMHG | DIASTOLIC BLOOD PRESSURE: 82 MMHG | BODY MASS INDEX: 29.23 KG/M2

## 2019-01-13 ENCOUNTER — HOSPITAL ENCOUNTER (EMERGENCY)
Facility: HOSPITAL | Age: 44
Discharge: HOME | End: 2019-01-13
Attending: EMERGENCY MEDICINE
Payer: COMMERCIAL

## 2019-01-13 VITALS
WEIGHT: 177 LBS | RESPIRATION RATE: 17 BRPM | OXYGEN SATURATION: 98 % | SYSTOLIC BLOOD PRESSURE: 142 MMHG | DIASTOLIC BLOOD PRESSURE: 98 MMHG | HEART RATE: 97 BPM | TEMPERATURE: 98.8 F | BODY MASS INDEX: 31.35 KG/M2

## 2019-01-13 DIAGNOSIS — T14.8XXA HEMATOMA: Primary | ICD-10-CM

## 2019-01-13 DIAGNOSIS — L03.90 CELLULITIS: ICD-10-CM

## 2019-01-13 PROCEDURE — 99282 EMERGENCY DEPT VISIT SF MDM: CPT

## 2019-01-13 RX ORDER — DOXYCYCLINE 100 MG/1
100 TABLET ORAL 2 TIMES DAILY
Qty: 14 TABLET | Refills: 0 | Status: ON HOLD | OUTPATIENT
Start: 2019-01-13 | End: 2019-01-15

## 2019-01-13 NOTE — ED PROVIDER NOTES
History  Chief Complaint   Patient presents with    Finger Laceration     cut left middle finger with steak knife on Monday; woke up this a m  and knuckle is swollen & painful     Patient cut her rash left long finger approximately a week ago  She has Steri-Strips herself  She presents today because of increased redness and swelling over the dorsal aspect of the PIP joint  No fever chills home  No nausea or vomiting or diarrhea  She denies any upper respiratory symptoms  She denies urinary frequency, urgency, hematuria  History provided by:  Patient  Finger Laceration   Location:  Finger  Finger laceration location:  L middle finger  Length:  1 cm  Depth:  Cutaneous  Quality: straight    Pain details:     Quality:  Aching    Severity:  Mild  Worsened by: Movement  Ineffective treatments:  None tried  Tetanus status:  Up to date  Associated symptoms: redness and swelling    Associated symptoms: no fever, no focal weakness, no numbness, no rash and no streaking        Prior to Admission Medications   Prescriptions Last Dose Informant Patient Reported? Taking?   escitalopram (LEXAPRO) 20 mg tablet   Yes No   Sig: Take 20 mg by mouth daily   gabapentin (NEURONTIN) 800 mg tablet   Yes No   Sig: Take 800 mg by mouth daily   lansoprazole (PREVACID) 30 mg capsule   Yes No   Sig: Take 30 mg by mouth daily   predniSONE 10 mg tablet   No No   Si po daily x2 days, then 3 po daily x2 days, then 2 po daily x2 days,then 1 po daily x2days      Facility-Administered Medications: None       Past Medical History:   Diagnosis Date    Anemia        Past Surgical History:   Procedure Laterality Date     SECTION      NOSE SURGERY      TUBAL LIGATION         History reviewed  No pertinent family history  I have reviewed and agree with the history as documented      Social History   Substance Use Topics    Smoking status: Current Some Day Smoker     Packs/day: 1 00     Types: Cigarettes    Smokeless tobacco: Never Used    Alcohol use No        Review of Systems   Constitutional: Negative for activity change, appetite change, chills, fatigue and fever  Skin: Positive for color change and wound  Negative for rash  Neurological: Negative for focal weakness  All other systems reviewed and are negative  Physical Exam  Physical Exam   Constitutional: She is oriented to person, place, and time  She appears well-developed and well-nourished  No distress  HENT:   Head: Normocephalic and atraumatic  Right Ear: External ear normal    Left Ear: External ear normal    Nose: Nose normal    Eyes: Conjunctivae are normal  Right eye exhibits no discharge  Left eye exhibits no discharge  Pulmonary/Chest: Effort normal    Musculoskeletal:   Inspection of the left hand-there is swelling present over the dorsal aspect of the PIP joint  There is associated erythema  The area is tender palpation  There is decreased range of motion secondary to pain and guarding  There is no tenderness palpated over the flexor tendon  I am concerned there might be a central slip injury  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  Neurological: She is alert and oriented to person, place, and time  Skin: Capillary refill takes less than 2 seconds  She is not diaphoretic  There is erythema  Psychiatric: She has a normal mood and affect  Her behavior is normal  Judgment and thought content normal    Nursing note and vitals reviewed        Vital Signs  ED Triage Vitals [01/13/19 1149]   Temperature Pulse Respirations Blood Pressure SpO2   98 8 °F (37 1 °C) 97 17 142/98 98 %      Temp Source Heart Rate Source Patient Position - Orthostatic VS BP Location FiO2 (%)   Oral Monitor -- Right arm --      Pain Score       5           Vitals:    01/13/19 1149   BP: 142/98   Pulse: 97       Visual Acuity      ED Medications  Medications - No data to display    Diagnostic Studies  Results Reviewed     None                 No orders to display Procedures  Incision/Drainage  Date/Time: 1/13/2019 12:00 PM  Performed by: Candice Alvarado  Authorized by: Candice Alvarado     Patient location:  ED  Consent:     Consent obtained:  Verbal    Consent given by:  Patient    Risks discussed:  Bleeding, incomplete drainage, pain and infection    Alternatives discussed:  No treatment  Universal protocol:     Procedure explained and questions answered to patient or proxy's satisfaction: yes      Site/side marked: yes      Immediately prior to procedure a time out was called: yes      Patient identity confirmed:  Verbally with patient  Location:     Type:  Hematoma    Size:  1 cm    Location: left middle finger  Pre-procedure details:     Skin preparation:  Antiseptic wash  Anesthesia (see MAR for exact dosages): Anesthesia method:  None  Procedure details:     Complexity:  Simple    Needle aspiration: yes      Needle size:  18 G    Approach:  Puncture    Incision depth:  Skin and subcutaneous    Wound management:  Irrigated with saline    Irrigation with saline:  Copiously    Drainage:  Bloody    Drainage amount: 2 ml  Packing materials:  None  Post-procedure details:     Patient tolerance of procedure: Tolerated well, no immediate complications  Comments:      Bandaide applied           Phone Contacts  ED Phone Contact    ED Course                               MDM  Number of Diagnoses or Management Options  Cellulitis: new and does not require workup  Hematoma: new and does not require workup  Risk of Complications, Morbidity, and/or Mortality  Presenting problems: moderate  Diagnostic procedures: low  Management options: low  General comments: Patient presents to emergency room after a lacerating her left middle finger a week ago  She presents because she is complaining of swelling and increased redness over the dorsal aspect upper finger  She was seen and examined incision and drainage was performed which demonstrated a small hematoma    I am concerned that 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  She was given a prescription for doxycycline  She was given him referral for further evaluation and management  She was placed in a dressing and full extension  Patient Progress  Patient progress: stable    CritCare Time    Disposition  Final diagnoses:   Hematoma - left middle finger   Cellulitis     Time reflects when diagnosis was documented in both MDM as applicable and the Disposition within this note     Time User Action Codes Description Comment    1/13/2019 11:58 AM Reece Naqvi Add Leontine Raymundo  8XXA] Hematoma     1/13/2019 11:58 AM Perfecto Hoguet Modify Leontine Raymundo  8XXA] Hematoma left middle finger    1/13/2019 11:58 AM Perfecto Hoguet Add [J34 30] Cellulitis       ED Disposition     ED Disposition Condition Comment    Discharge  Crys Olivarez discharge to home/self care  Condition at discharge: Good        Follow-up Information     Follow up With Specialties Details Why [address], [place] Plastic Surgery, Hand Surgery   [address]  [address]            Discharge Medication List as of 1/13/2019 12:01 PM      CONTINUE these medications which have NOT CHANGED    Details   escitalopram (LEXAPRO) 20 mg tablet Take 20 mg by mouth daily, Until Discontinued, Historical Med      gabapentin (NEURONTIN) 800 mg tablet Take 800 mg by mouth daily, Until Discontinued, Historical Med      lansoprazole (PREVACID) 30 mg capsule Take 30 mg by mouth daily, Until Discontinued, Historical Med      predniSONE 10 mg tablet 4 po daily x2 days, then 3 po daily x2 days, then 2 po daily x2 days,then 1 po daily x2days, Print           No discharge procedures on file      ED Provider  Electronically Signed by           Kieran Carrera PA-C  01/13/19 9673

## 2019-01-13 NOTE — ED NOTES
Finger lanced and irrigated by LUCAS; bandage applied       Birtha Altamontkaterina Mcrae RN  01/13/19 5142

## 2019-01-13 NOTE — DISCHARGE INSTRUCTIONS
Cellulitis, Ambulatory Care   GENERAL INFORMATION:   Cellulitis  is a skin infection caused by bacteria  Common symptoms include the following:   · Fever    · A red, warm, swollen area on your skin    · Pain when the area is touched    · Bumps or blisters (abscess) that may drain pus    · Bumpy, raised skin that feels like an orange peel  Seek immediate care for the following symptoms:   · An increase in pain, redness, warmth, and size    · Red streaks coming from the infected area    · A thin, gray-brown discharge coming from your infected skin area    · A crackling under your skin when you touch it    · Purple dots or bumps on your skin, or bleeding under your skin    · New swelling and pain in your legs    · Sudden trouble breathing or chest pain  Treatment for cellulitis  may include medicines to treat the bacterial infection or decrease pain  The infection may need to be cleaned out  Damaged, dead, or infected tissue may need to be cut away to help your wound heal   Manage your symptoms:   · Elevate your wound above the level of your heart  as often as you can  This will help decrease swelling and pain  Prop your wound on pillows or blankets to keep it elevated comfortably  · Clean your wound as directed  You may need to wash the wound with soap and water  Look for signs of infection  · Wear pressure stockings as directed  The stockings are tight and put pressure on your legs  This improves blood flow and decreases swelling  Prevent cellulitis:   · Wash your hands often  Use soap and water  Wash your hands after you use the bathroom, change a child's diapers, or sneeze  Wash your hands before you prepare or eat food  Use lotion to prevent dry, cracked skin  · Do not share personal items, such as towels, clothing, and razors  · Clean exercise equipment  with germ-killing  before and after you use it    Follow up with your healthcare provider as directed:  Write down your questions so you remember to ask them during your visits  CARE AGREEMENT:   You have the right to help plan your care  Learn about your health condition and how it may be treated  Discuss treatment options with your caregivers to decide what care you want to receive  You always have the right to refuse treatment  The above information is an  only  It is not intended as medical advice for individual conditions or treatments  Talk to your doctor, nurse or pharmacist before following any medical regimen to see if it is safe and effective for you  © 2014 [address] is for End User's use only and may not be sold, redistributed or otherwise used for commercial purposes  All illustrations and images included in CareNotes® are the copyrighted property of A D A M , Inc  or [facility]  Cellulitis   WHAT YOU NEED TO KNOW:   Cellulitis is a skin infection caused by bacteria  Cellulitis may go away on its own or you may need treatment  Your healthcare provider may draw a Jena around the outside edges of your cellulitis  If your cellulitis spreads, your healthcare provider will see it outside of the Jena  DISCHARGE INSTRUCTIONS:   Call 911 if:   · You have sudden trouble breathing or chest pain  Return to the emergency department if:   · Your wound gets larger and more painful  · You feel a crackling under your skin when you touch it  · You have purple dots or bumps on your skin, or you see bleeding under your skin  · You have new swelling and pain in your legs  · The red, warm, swollen area gets larger  · You see red streaks coming from the infected area  Contact your healthcare provider if:   · You have a fever  · Your fever or pain does not go away or gets worse  · The area does not get smaller after 2 days of antibiotics  · Your skin is flaking or peeling off      · You have questions or concerns about your condition or care   Medicines:   · Antibiotics  help treat the bacterial infection  · NSAIDs , such as ibuprofen, help decrease swelling, pain, and fever  NSAIDs can cause stomach bleeding or kidney problems in certain people  If you take blood thinner medicine, always ask if NSAIDs are safe for you  Always read the medicine label and follow directions  Do not give these medicines to children under 10months of age without direction from your child's healthcare provider  · Acetaminophen  decreases pain and fever  It is available without a doctor's order  Ask how much to take and how often to take it  Follow directions  Read the labels of all other medicines you are using to see if they also contain acetaminophen, or ask your doctor or pharmacist  Acetaminophen can cause liver damage if not taken correctly  Do not use more than 4 grams (4,000 milligrams) total of acetaminophen in one day  · Take your medicine as directed  Contact your healthcare provider if you think your medicine is not helping or if you have side effects  Tell him or her if you are allergic to any medicine  Keep a list of the medicines, vitamins, and herbs you take  Include the amounts, and when and why you take them  Bring the list or the pill bottles to follow-up visits  Carry your medicine list with you in case of an emergency  Self-care:   · Elevate the area above the level of your heart  as often as you can  This will help decrease swelling and pain  Prop the area on pillows or blankets to keep it elevated comfortably  · Clean the area daily until the wound scabs over  Gently wash the area with soap and water  Pat dry  Use dressings as directed  · Place cool or warm, wet cloths on the area as directed  Use clean cloths and clean water  Leave it on the area until the cloth is room temperature  Pat the area dry with a clean, dry cloth  The cloths may help decrease pain    Prevent cellulitis:   · Do not scratch bug bites or areas of injury  You increase your risk for cellulitis by scratching these areas  · Do not share personal items, such as towels, clothing, and razors  · Clean exercise equipment  with germ-killing  before and after you use it  · Wash your hands often  Use soap and water  Wash your hands after you use the bathroom, change a child's diapers, or sneeze  Wash your hands before you prepare or eat food  Use lotion to prevent dry, cracked skin  · Wear pressure stockings as directed  You may be told to wear the stockings if you have peripheral edema  The stockings improve blood flow and decrease swelling  · Treat athlete's foot  This can help prevent the spread of a bacterial skin infection  Follow up with your healthcare provider within 3 days, or as directed: Your healthcare provider will check if your cellulitis is getting better  You may need different medicine  Write down your questions so you remember to ask them during your visits  © 2017 [address]  Information is for End User's use only and may not be sold, redistributed or otherwise used for commercial purposes  All illustrations and images included in CareNotes® are the copyrighted property of [employer] , Inc  or Karel Lucas  The above information is an  only  It is not intended as medical advice for individual conditions or treatments  Talk to your doctor, nurse or pharmacist before following any medical regimen to see if it is safe and effective for you  Hematoma   WHAT YOU NEED TO KNOW:   A hematoma is a collection of blood  A bruise is a type of hematoma  A hematoma may form in a muscle or in the tissues just under the skin  A hematoma that forms under the skin will feel like a bump or hard mass  Hematomas can happen anywhere in your body, including in your brain  Your body may break down and absorb a mild hematoma on its own  A more serious hematoma may need treatment     DISCHARGE INSTRUCTIONS:   Medicines: You may need any of the following:  · Prescription pain medicine  may be given  Ask how to take this medicine safely  · NSAIDs , such as ibuprofen, help decrease swelling, pain, and fever  This medicine is available with or without a doctor's order  NSAIDs can cause stomach bleeding or kidney problems in certain people  If you take blood thinner medicine, always ask your healthcare provider if NSAIDs are safe for you  Always read the medicine label and follow directions  · Antibiotics  prevent or treat a bacterial infection  · Take your medicine as directed  Contact your healthcare provider if you think your medicine is not helping or if you have side effects  Tell him of her if you are allergic to any medicine  Keep a list of the medicines, vitamins, and herbs you take  Include the amounts, and when and why you take them  Bring the list or the pill bottles to follow-up visits  Carry your medicine list with you in case of an emergency  Return to the emergency department if:   · You have new or worsening pain, or pain that does not get better with medicine  · You have a fever  · You have trouble moving the body part that has the hematoma  Contact your healthcare provider if:   · You have questions or concerns about your condition or care  Follow up with your healthcare provider as directed: You may need to have surgery if your hematoma is severe  You may also need other tests to make sure there is no other damage that needs to be treated  Write down your questions so you remember to ask them during your visits  Self-care:   · Rest the area  Rest will help your body heal and will also help prevent more damage  · Apply ice as directed  Ice helps reduce swelling  Ice may also help prevent tissue damage  Use an ice pack, or put crushed ice in a bag  Cover it with a towel  Place it on your hematoma for 20 minutes every hour, or as directed   Ask how many times each day to apply ice, and for how many days  · Compress the injury if possible  Lightly wrap the injury with an elastic or soft bandage  This may help control swelling  Ask your healthcare provider how to wrap your injury properly  · Elevate the area as directed  If possible, raise the area above the level of your heart as often as you can  This will help decrease swelling  · Keep the hematoma covered with a bandage  This will help protect the area while it heals  © 2017 [facility] Information is for End User's use only and may not be sold, redistributed or otherwise used for commercial purposes  All illustrations and images included in CareNotes® are the copyrighted property of A D A M , Inc  or Karel Lucas  The above information is an  only  It is not intended as medical advice for individual conditions or treatments  Talk to your doctor, nurse or pharmacist before following any medical regimen to see if it is safe and effective for you

## 2019-01-14 ENCOUNTER — HOSPITAL ENCOUNTER (INPATIENT)
Facility: HOSPITAL | Age: 44
LOS: 1 days | Discharge: HOME | DRG: 603 | End: 2019-01-15
Attending: EMERGENCY MEDICINE | Admitting: INTERNAL MEDICINE
Payer: COMMERCIAL

## 2019-01-14 DIAGNOSIS — R07.9 CHEST PAIN, UNSPECIFIED TYPE: ICD-10-CM

## 2019-01-14 DIAGNOSIS — L03.90 CELLULITIS: Primary | ICD-10-CM

## 2019-01-14 DIAGNOSIS — L03.90 CELLULITIS: ICD-10-CM

## 2019-01-14 PROBLEM — Z72.0 TOBACCO ABUSE: Status: ACTIVE | Noted: 2019-01-14

## 2019-01-14 PROBLEM — L03.114 CELLULITIS OF LEFT HAND: Status: ACTIVE | Noted: 2019-01-14

## 2019-01-14 LAB
ALBUMIN SERPL BCP-MCNC: 3.6 G/DL (ref 3.5–5)
ALP SERPL-CCNC: 94 U/L (ref 46–116)
ALT SERPL W P-5'-P-CCNC: 33 U/L (ref 12–78)
ANION GAP SERPL CALCULATED.3IONS-SCNC: 11 MMOL/L (ref 4–13)
APTT PPP: 31 SECONDS (ref 26–38)
AST SERPL W P-5'-P-CCNC: 16 U/L (ref 5–45)
BASOPHILS # BLD AUTO: 0.06 THOUSANDS/ΜL (ref 0–0.1)
BASOPHILS NFR BLD AUTO: 1 % (ref 0–1)
BILIRUB SERPL-MCNC: 0.3 MG/DL (ref 0.2–1)
BUN SERPL-MCNC: 13 MG/DL (ref 5–25)
CALCIUM SERPL-MCNC: 9 MG/DL (ref 8.3–10.1)
CHLORIDE SERPL-SCNC: 103 MMOL/L (ref 100–108)
CO2 SERPL-SCNC: 24 MMOL/L (ref 21–32)
CREAT SERPL-MCNC: 0.77 MG/DL (ref 0.6–1.3)
EOSINOPHIL # BLD AUTO: 0.1 THOUSAND/ΜL (ref 0–0.61)
EOSINOPHIL NFR BLD AUTO: 1 % (ref 0–6)
ERYTHROCYTE [DISTWIDTH] IN BLOOD BY AUTOMATED COUNT: 13.4 % (ref 11.6–15.1)
GFR SERPL CREATININE-BSD FRML MDRD: 96 ML/MIN/1.73SQ M
GLUCOSE SERPL-MCNC: 109 MG/DL (ref 65–140)
HCT VFR BLD AUTO: 43.3 % (ref 34.8–46.1)
HGB BLD-MCNC: 14.4 G/DL (ref 11.5–15.4)
IMM GRANULOCYTES # BLD AUTO: 0.07 THOUSAND/UL (ref 0–0.2)
IMM GRANULOCYTES NFR BLD AUTO: 1 % (ref 0–2)
INR PPP: 0.99 (ref 0.86–1.17)
LACTATE SERPL-SCNC: 1.4 MMOL/L (ref 0.5–2)
LYMPHOCYTES # BLD AUTO: 1.8 THOUSANDS/ΜL (ref 0.6–4.47)
LYMPHOCYTES NFR BLD AUTO: 21 % (ref 14–44)
MCH RBC QN AUTO: 28.8 PG (ref 26.8–34.3)
MCHC RBC AUTO-ENTMCNC: 33.3 G/DL (ref 31.4–37.4)
MCV RBC AUTO: 87 FL (ref 82–98)
MONOCYTES # BLD AUTO: 0.56 THOUSAND/ΜL (ref 0.17–1.22)
MONOCYTES NFR BLD AUTO: 6 % (ref 4–12)
NEUTROPHILS # BLD AUTO: 6.15 THOUSANDS/ΜL (ref 1.85–7.62)
NEUTS SEG NFR BLD AUTO: 70 % (ref 43–75)
NRBC BLD AUTO-RTO: 0 /100 WBCS
PLATELET # BLD AUTO: 279 THOUSANDS/UL (ref 149–390)
PMV BLD AUTO: 11.3 FL (ref 8.9–12.7)
POTASSIUM SERPL-SCNC: 3.6 MMOL/L (ref 3.5–5.3)
PROT SERPL-MCNC: 7.5 G/DL (ref 6.4–8.2)
PROTHROMBIN TIME: 12.8 SECONDS (ref 11.8–14.2)
RBC # BLD AUTO: 5 MILLION/UL (ref 3.81–5.12)
SODIUM SERPL-SCNC: 138 MMOL/L (ref 136–145)
WBC # BLD AUTO: 8.74 THOUSAND/UL (ref 4.31–10.16)

## 2019-01-14 PROCEDURE — 85730 THROMBOPLASTIN TIME PARTIAL: CPT | Performed by: EMERGENCY MEDICINE

## 2019-01-14 PROCEDURE — 85025 COMPLETE CBC W/AUTO DIFF WBC: CPT | Performed by: EMERGENCY MEDICINE

## 2019-01-14 PROCEDURE — 85610 PROTHROMBIN TIME: CPT | Performed by: EMERGENCY MEDICINE

## 2019-01-14 PROCEDURE — 80053 COMPREHEN METABOLIC PANEL: CPT | Performed by: EMERGENCY MEDICINE

## 2019-01-14 PROCEDURE — 99284 EMERGENCY DEPT VISIT MOD MDM: CPT

## 2019-01-14 PROCEDURE — 87040 BLOOD CULTURE FOR BACTERIA: CPT | Performed by: EMERGENCY MEDICINE

## 2019-01-14 PROCEDURE — 36415 COLL VENOUS BLD VENIPUNCTURE: CPT | Performed by: EMERGENCY MEDICINE

## 2019-01-14 PROCEDURE — 99223 1ST HOSP IP/OBS HIGH 75: CPT | Performed by: INTERNAL MEDICINE

## 2019-01-14 PROCEDURE — 83605 ASSAY OF LACTIC ACID: CPT | Performed by: EMERGENCY MEDICINE

## 2019-01-14 PROCEDURE — 96365 THER/PROPH/DIAG IV INF INIT: CPT

## 2019-01-14 RX ORDER — IBUPROFEN 400 MG/1
800 TABLET ORAL EVERY 6 HOURS PRN
Status: DISCONTINUED | OUTPATIENT
Start: 2019-01-14 | End: 2019-01-15 | Stop reason: HOSPADM

## 2019-01-14 RX ORDER — ACETAMINOPHEN 325 MG/1
650 TABLET ORAL EVERY 6 HOURS PRN
Status: DISCONTINUED | OUTPATIENT
Start: 2019-01-14 | End: 2019-01-15 | Stop reason: HOSPADM

## 2019-01-14 RX ORDER — NICOTINE 21 MG/24HR
1 PATCH, TRANSDERMAL 24 HOURS TRANSDERMAL DAILY
Status: DISCONTINUED | OUTPATIENT
Start: 2019-01-15 | End: 2019-01-14

## 2019-01-14 RX ORDER — NICOTINE 21 MG/24HR
1 PATCH, TRANSDERMAL 24 HOURS TRANSDERMAL DAILY
Status: DISCONTINUED | OUTPATIENT
Start: 2019-01-14 | End: 2019-01-15 | Stop reason: HOSPADM

## 2019-01-14 RX ORDER — ONDANSETRON 2 MG/ML
4 INJECTION INTRAMUSCULAR; INTRAVENOUS EVERY 6 HOURS PRN
Status: DISCONTINUED | OUTPATIENT
Start: 2019-01-14 | End: 2019-01-15 | Stop reason: HOSPADM

## 2019-01-14 RX ORDER — GABAPENTIN 400 MG/1
1600 CAPSULE ORAL DAILY
Status: DISCONTINUED | OUTPATIENT
Start: 2019-01-15 | End: 2019-01-15 | Stop reason: HOSPADM

## 2019-01-14 RX ADMIN — CEFTRIAXONE SODIUM 1000 MG: 10 INJECTION, POWDER, FOR SOLUTION INTRAVENOUS at 09:54

## 2019-01-14 RX ADMIN — NICOTINE 1 PATCH: 21 PATCH, EXTENDED RELEASE TRANSDERMAL at 17:55

## 2019-01-14 RX ADMIN — IBUPROFEN 800 MG: 400 TABLET ORAL at 19:49

## 2019-01-14 RX ADMIN — VANCOMYCIN HYDROCHLORIDE 1250 MG: 1 INJECTION, POWDER, LYOPHILIZED, FOR SOLUTION INTRAVENOUS at 10:51

## 2019-01-14 NOTE — H&P
H&P- Rayna Ramesh 1975, 44 y o  female MRN: [id number]    Unit/Bed#: -01 Encounter: [id number]    Primary Care Provider: Kim Vera MD   Date and time admitted to hospital: 1/14/2019  9:12 AM        * Cellulitis of left hand   Assessment & Plan    · Was seen in ED yesterday for left middle finger pain and swelling after accidently cutting it with a knife, and was sent home with doxycycline  Took 2 doses  Redness and swelling started to spread down into the hand, and patient returned to ED  · Patient was given Ceftriaxone and Vancomycin in ED  · Clinically improving since this morning  · Continue with IV Ancef and anticipate transition back to oral doxycyline in next 24-48 hours     Tobacco abuse   Assessment & Plan    · Nicotine patch       VTE Prophylaxis: Enoxaparin (Lovenox)  / sequential compression device   Code Status: Full code  POLST: POLST is not applicable to this patient  Discussion with family:     Anticipated Length of Stay:  Patient will be admitted on an Inpatient basis with an anticipated length of stay of  Greater than 2 midnights  Justification for Hospital Stay: IV antibiotics    Total Time for Visit, including Counseling / Coordination of Care: 1 hour  Greater than 50% of this total time spent on direct patient counseling and coordination of care  Chief Complaint:   Worsening left hand swelling and redness    History of Present Illness:    Rayna Ramesh is a 44 y o  female who presents with worsening left hand redness and swelling  Patient cut her finger with a knife  She developed redness in the finger and came to the ED yesterday  She was sent home on doxycycline, took 2 doses, and the redness and swelling started to spread into her hand  She returned to the ED again today  Patient recieved Ceftriaxone and Vancomycin in the ED  Patient feels her hand is looking better than this morning  Denies any fever      Review of Systems:    Review of Systems   Constitutional: Negative for activity change, appetite change, chills, diaphoresis, fatigue and fever  HENT: Negative for rhinorrhea, sinus pressure, sneezing and sore throat  Eyes: Negative for photophobia, redness and visual disturbance  Respiratory: Negative for cough, chest tightness, shortness of breath and wheezing  Cardiovascular: Negative for chest pain, palpitations and leg swelling  Gastrointestinal: Negative for abdominal distention, abdominal pain, anal bleeding, blood in stool, constipation, diarrhea, nausea and vomiting  Genitourinary: Negative for difficulty urinating, dysuria, flank pain, frequency, hematuria and urgency  Musculoskeletal: Negative for back pain and gait problem  Skin: Positive for wound (swollen, red left middle finger extending down into hand)  Negative for rash  Allergic/Immunologic: Negative for immunocompromised state  Neurological: Negative for dizziness, tremors, seizures, syncope, facial asymmetry, speech difficulty, weakness, light-headedness, numbness and headaches  Hematological: Negative for adenopathy  Psychiatric/Behavioral: Negative for agitation, confusion and hallucinations  The patient is not nervous/anxious  Past Medical and Surgical History:     Past Medical History:   Diagnosis Date    Anemia        Past Surgical History:   Procedure Laterality Date     SECTION      NOSE SURGERY      TUBAL LIGATION         Meds/Allergies:    Prior to Admission medications    Medication Sig Start Date End Date Taking?  Authorizing Provider   doxycycline (ADOXA) 100 MG tablet Take 1 tablet (100 mg total) by mouth 2 (two) times a day for 7 days 19 Yes Fernanda Moore PA-C   gabapentin (NEURONTIN) 800 mg tablet Take 800 mg by mouth daily   Yes Historical Provider, MD   lansoprazole (PREVACID) 30 mg capsule Take 30 mg by mouth daily   Yes Historical Provider, MD   escitalopram (LEXAPRO) 20 mg tablet Take 20 mg by mouth daily    Historical Provider, MD   predniSONE 10 mg tablet 4 po daily x2 days, then 3 po daily x2 days, then 2 po daily x2 days,then 1 po daily x2days  Patient not taking: Reported on 1/14/2019 41/05/68   Ike Burch MD     I have reviewed home medications with patient personally  Allergies: Allergies   Allergen Reactions    Amoxicillin     Ciprofloxacin        Social History:     Marital Status: /Civil Union   Occupation: Works at [employer]    Substance Use History:   History   Alcohol Use No     History   Smoking Status    Current Some Day Smoker    Packs/day: 1 00    Types: Cigarettes   Smokeless Tobacco    Never Used     History   Drug Use No       Family History:    History reviewed  No pertinent family history  Physical Exam:     Vitals:   Blood Pressure: 131/79 (01/14/19 1400)  Pulse: 92 (01/14/19 1400)  Temperature: 99 1 °F (37 3 °C) (01/14/19 1400)  Temp Source: Oral (01/14/19 1400)  Respirations: 18 (01/14/19 1400)  Height: 5' 3" (160 cm) (01/14/19 1400)  Weight - Scale: 81 1 kg (178 lb 12 7 oz) (01/14/19 1400)  SpO2: 98 % (01/14/19 1400)    Physical Exam   Constitutional: She is oriented to person, place, and time  She appears well-developed and well-nourished  No distress  HENT:   Head: Normocephalic and atraumatic  Cardiovascular: Normal rate and regular rhythm  Exam reveals no friction rub  No murmur heard  Pulmonary/Chest: Effort normal and breath sounds normal  No respiratory distress  She has no wheezes  Abdominal: Soft  Bowel sounds are normal  She exhibits no distension  There is no tenderness  There is no rebound and no guarding  Musculoskeletal: She exhibits no edema  Neurological: She is alert and oriented to person, place, and time  No cranial nerve deficit  Skin: Skin is warm and dry  No rash noted  There is erythema (left middle finger erythema and swelling worse over DIP  Small laceration over DIP noted without drainage   Erythema then extends into the mid palm and dorsum of hand  )  Psychiatric: She has a normal mood and affect  Nursing note and vitals reviewed  Additional Data:     Lab Results: I have personally reviewed pertinent reports  Results from last 7 days  Lab Units 01/14/19  0936   WBC Thousand/uL 8 74   HEMOGLOBIN g/dL 14 4   HEMATOCRIT % 43 3   PLATELETS Thousands/uL 279   NEUTROS PCT % 70   LYMPHS PCT % 21   MONOS PCT % 6   EOS PCT % 1       Results from last 7 days  Lab Units 01/14/19  0936   SODIUM mmol/L 138   POTASSIUM mmol/L 3 6   CHLORIDE mmol/L 103   CO2 mmol/L 24   BUN mg/dL 13   CREATININE mg/dL 0 77   ANION GAP mmol/L 11   CALCIUM mg/dL 9 0   ALBUMIN g/dL 3 6   TOTAL BILIRUBIN mg/dL 0 30   ALK PHOS U/L 94   ALT U/L 33   AST U/L 16   GLUCOSE RANDOM mg/dL 109       Results from last 7 days  Lab Units 01/14/19  0936   INR  0 99               Results from last 7 days  Lab Units 01/14/19  0936   LACTIC ACID mmol/L 1 4       Imaging: I have personally reviewed pertinent reports  No orders to display       EKG, Pathology, and Other Studies Reviewed on Admission:     [facility] Yes     ** Please Note: This note has been constructed using a voice recognition system   **

## 2019-01-14 NOTE — ASSESSMENT & PLAN NOTE
· Was seen in ED yesterday for left middle finger pain and swelling after accidently cutting it with a knife, and was sent home with doxycycline  Took 2 doses   Redness and swelling started to spread down into the hand, and patient returned to ED  · Patient was given Ceftriaxone and Vancomycin in ED  · Clinically improving since this morning  · Continue with IV Ancef and anticipate transition back to oral doxycyline in next 24-48 hours

## 2019-01-14 NOTE — LETTER
[facility] FLOOR MED SURG UNIT  [facility] [address]  Dept: [phone]    January 15, 2019     Patient: Gregorio Erazo   YOB: 1975   Date of Visit: 1/14/2019       To Whom it May Concern:    Francisco J Sanchez is under my professional care  She was seen in the hospital from 1/14/2019   to 01/15/19  She may return to work on 1/16/19  If you have any questions or concerns, please don't hesitate to call           Sincerely,          Elizabeth Osgood, PA-C none

## 2019-01-14 NOTE — ED PROVIDER NOTES
History  Chief Complaint   Patient presents with    Follow-Up Cellulitis     seen yesterday,dx with cellulitis L hand  Worse today     43-year-old female returns to the emergency department with worsening swelling and erythema to her left hand  Patient was seen here yesterday for pain and swelling over the left middle finger and patient reports this has worsened  Patient injured her finger while attempting to scrape some adhesive off of a mirror using a steak knife  Patient repaired laceration was [employer]i-[employer]ri[employer] at home  No fevers or chills  Patient does state that she feels a little bit off  Patient took her 1st dose of doxycycline last night and another dose this morning as prescribed yesterday  History provided by:  Patient   used: No    Medical Problem   Location:  Left middler finger and dorsum of hand  Quality:  Swelling, pain, erythema  Severity:  Moderate  Onset quality:  Gradual  Duration:  1 week  Timing:  Constant  Progression:  Worsening  Chronicity:  New  Context:  Cut finger with steak knife one week ago  Relieved by:  Nothing  Worsened by:  Nothing  Ineffective treatments:  Doxycycline (2 doses)  Associated symptoms: fatigue    Associated symptoms: no chest pain, no diarrhea, no fever, no nausea, no rash, no shortness of breath and no vomiting        Prior to Admission Medications   Prescriptions Last Dose Informant Patient Reported?  Taking?   doxycycline (ADOXA) 100 MG tablet   No Yes   Sig: Take 1 tablet (100 mg total) by mouth 2 (two) times a day for 7 days   escitalopram (LEXAPRO) 20 mg tablet   Yes Yes   Sig: Take 20 mg by mouth daily   gabapentin (NEURONTIN) 800 mg tablet   Yes Yes   Sig: Take 800 mg by mouth daily   lansoprazole (PREVACID) 30 mg capsule   Yes Yes   Sig: Take 30 mg by mouth daily   predniSONE 10 mg tablet   No Yes   Si po daily x2 days, then 3 po daily x2 days, then 2 po daily x2 days,then 1 po daily x2days      Facility-Administered Medications: None       Past Medical History:   Diagnosis Date    Anemia        Past Surgical History:   Procedure Laterality Date     SECTION      NOSE SURGERY      TUBAL LIGATION         History reviewed  No pertinent family history  I have reviewed and agree with the history as documented  Social History   Substance Use Topics    Smoking status: Current Some Day Smoker     Packs/day: 1 00     Types: Cigarettes    Smokeless tobacco: Never Used    Alcohol use No        Review of Systems   Constitutional: Positive for fatigue  Negative for chills, diaphoresis and fever  Respiratory: Negative for shortness of breath  Cardiovascular: Negative for chest pain and palpitations  Gastrointestinal: Negative for diarrhea, nausea and vomiting  Genitourinary: Negative for dysuria and frequency  Musculoskeletal: Positive for arthralgias (left hand)  Skin: Positive for color change (erythema to left middle finger and dorsum of hand) and wound (healing laceration to dorsum of left middle finger)  Negative for rash  All other systems reviewed and are negative  Physical Exam  Physical Exam   Constitutional: She is oriented to person, place, and time  She appears well-developed and well-nourished  She appears distressed  HENT:   Head: Normocephalic and atraumatic  Eyes: Pupils are equal, round, and reactive to light  EOM are normal    Neck: Normal range of motion  No JVD present  Cardiovascular: Normal rate, regular rhythm, normal heart sounds and intact distal pulses  Exam reveals no gallop and no friction rub  No murmur heard  Pulmonary/Chest: Effort normal and breath sounds normal  No respiratory distress  She has no wheezes  She has no rales  She exhibits no tenderness  Musculoskeletal: Normal range of motion  She exhibits no tenderness  Neurological: She is alert and oriented to person, place, and time  Skin: Skin is warm and dry   There is erythema (left middle finger and dorsum of hand)  Psychiatric: She has a normal mood and affect  Her behavior is normal  Judgment and thought content normal    Nursing note and vitals reviewed  Vital Signs  ED Triage Vitals [01/14/19 0913]   Temperature Pulse Respirations Blood Pressure SpO2   97 6 °F (36 4 °C) (!) 118 16 163/99 100 %      Temp Source Heart Rate Source Patient Position - Orthostatic VS BP Location FiO2 (%)   Oral Monitor Sitting Left arm --      Pain Score       Worst Possible Pain           Vitals:    01/14/19 0913 01/14/19 0930   BP: 163/99 163/99   Pulse: (!) 118    Patient Position - Orthostatic VS: Sitting        Visual Acuity      ED Medications  Medications   vancomycin (VANCOCIN) 1,250 mg in sodium chloride 0 9 % 250 mL IVPB (not administered)   ceftriaxone (ROCEPHIN) 1 g/50 mL in dextrose IVPB (1,000 mg Intravenous New Bag 1/14/19 0954)       Diagnostic Studies  Results Reviewed     Procedure Component Value Units Date/Time    Comprehensive metabolic panel [[id number]] Collected:  01/14/19 0936    Lab Status:  Final result Specimen:  Blood from Arm, Right Updated:  01/14/19 1010     Sodium 138 mmol/L      Potassium 3 6 mmol/L      Chloride 103 mmol/L      CO2 24 mmol/L      ANION GAP 11 mmol/L      BUN 13 mg/dL      Creatinine 0 77 mg/dL      Glucose 109 mg/dL      Calcium 9 0 mg/dL      AST 16 U/L      ALT 33 U/L      Alkaline Phosphatase 94 U/L      Total Protein 7 5 g/dL      Albumin 3 6 g/dL      Total Bilirubin 0 30 mg/dL      eGFR 96 ml/min/1 73sq m     Narrative:         National Kidney Disease Education Program recommendations are as follows:  GFR calculation is accurate only with a steady state creatinine  Chronic Kidney disease less than 60 ml/min/1 73 sq  meters  Kidney failure less than 15 ml/min/1 73 sq  meters      Lactic acid, plasma [[id number]]  (Normal) Collected:  01/14/19 0936    Lab Status:  Final result Specimen:  Blood from Arm, Right Updated:  01/14/19 1004     LACTIC ACID 1 4 mmol/L Narrative:         Result may be elevated if tourniquet was used during collection  Willard Gifford [[id number]]  (Normal) Collected:  01/14/19 0936    Lab Status:  Final result Specimen:  Blood from Arm, Right Updated:  01/14/19 0956     Protime 12 8 seconds      INR 0 99    APTT [[id number]]  (Normal) Collected:  01/14/19 0936    Lab Status:  Final result Specimen:  Blood from Arm, Right Updated:  01/14/19 0956     PTT 31 seconds     Blood culture #2 [[id number]] Collected:  01/14/19 0951    Lab Status: In process Specimen:  Blood from Arm, Left Updated:  01/14/19 0955    CBC and differential [[id number]] Collected:  01/14/19 0936    Lab Status:  Final result Specimen:  Blood from Arm, Right Updated:  01/14/19 0945     WBC 8 74 Thousand/uL      RBC 5 00 Million/uL      Hemoglobin 14 4 g/dL      Hematocrit 43 3 %      MCV 87 fL      MCH 28 8 pg      MCHC 33 3 g/dL      RDW 13 4 %      MPV 11 3 fL      Platelets 479 Thousands/uL      nRBC 0 /100 WBCs      Neutrophils Relative 70 %      Immat GRANS % 1 %      Lymphocytes Relative 21 %      Monocytes Relative 6 %      Eosinophils Relative 1 %      Basophils Relative 1 %      Neutrophils Absolute 6 15 Thousands/µL      Immature Grans Absolute 0 07 Thousand/uL      Lymphocytes Absolute 1 80 Thousands/µL      Monocytes Absolute 0 56 Thousand/µL      Eosinophils Absolute 0 10 Thousand/µL      Basophils Absolute 0 06 Thousands/µL     Blood culture #1 [[id number]] Collected:  01/14/19 0936    Lab Status:   In process Specimen:  Blood from Arm, Right Updated:  01/14/19 0941                 No orders to display              Procedures  Procedures       Phone Contacts  ED Phone Contact    ED Course                               MDM  Number of Diagnoses or Management Options  Cellulitis: new and requires workup  Diagnosis management comments: Background: 44 y o  female presents with s/s of cellulitis to left hand    Assessment: Cellulitis There is still very minimal tenderness over flexor tendon and the patient is able to tolerate both active extension and passive extension without significant distress  I believe that flexor tenosynovitis is unlikely  Plan: IV abx, workup including cultures, lactate, cbc, cmp, admission - possible         Amount and/or Complexity of Data Reviewed  Clinical lab tests: ordered and reviewed    Risk of Complications, Morbidity, and/or Mortality  Presenting problems: high  Diagnostic procedures: high  Management options: high  General comments: I spoke with Elise [facility] who saw the patient yesterday and she confirms that symptoms are worse today  Patient Progress  Patient progress: stable    CritCare Time    Disposition  Final diagnoses:   Cellulitis - acue with outpatient failure     Time reflects when diagnosis was documented in both MDM as applicable and the Disposition within this note     Time User Action Codes Description Comment    1/14/2019 10:29 AM Dariela PATE Add [L03 90] Cellulitis     1/14/2019 10:29 AM Dariela PATE Modify [L03 90] Cellulitis acue with outpatient failure      ED Disposition     ED Disposition Condition Comment    Admit  Case was discussed with Dr Jennifer Mckeon and the patient's admission status was agreed to be Admission Status: inpatient status to the service of Dr Dudley Jaeger   Follow-up Information    None         Patient's Medications   Discharge Prescriptions    No medications on file     No discharge procedures on file      ED Provider  Electronically Signed by           Devika Jolly MD  01/14/19 0041

## 2019-01-15 VITALS
RESPIRATION RATE: 18 BRPM | BODY MASS INDEX: 31.68 KG/M2 | SYSTOLIC BLOOD PRESSURE: 134 MMHG | TEMPERATURE: 97.9 F | DIASTOLIC BLOOD PRESSURE: 73 MMHG | OXYGEN SATURATION: 97 % | HEART RATE: 83 BPM | HEIGHT: 63 IN | WEIGHT: 178.79 LBS

## 2019-01-15 LAB
ERYTHROCYTE [DISTWIDTH] IN BLOOD BY AUTOMATED COUNT: 13.6 % (ref 11.6–15.1)
HCT VFR BLD AUTO: 41.9 % (ref 34.8–46.1)
HGB BLD-MCNC: 13.2 G/DL (ref 11.5–15.4)
MCH RBC QN AUTO: 28.4 PG (ref 26.8–34.3)
MCHC RBC AUTO-ENTMCNC: 31.5 G/DL (ref 31.4–37.4)
MCV RBC AUTO: 90 FL (ref 82–98)
PLATELET # BLD AUTO: 259 THOUSANDS/UL (ref 149–390)
PMV BLD AUTO: 11.6 FL (ref 8.9–12.7)
RBC # BLD AUTO: 4.65 MILLION/UL (ref 3.81–5.12)
WBC # BLD AUTO: 6.93 THOUSAND/UL (ref 4.31–10.16)

## 2019-01-15 PROCEDURE — 85027 COMPLETE CBC AUTOMATED: CPT | Performed by: PHYSICIAN ASSISTANT

## 2019-01-15 PROCEDURE — 99239 HOSP IP/OBS DSCHRG MGMT >30: CPT | Performed by: PHYSICIAN ASSISTANT

## 2019-01-15 RX ORDER — DOXYCYCLINE 100 MG/1
100 TABLET ORAL 2 TIMES DAILY
Refills: 0
Start: 2019-01-15 | End: 2019-01-20

## 2019-01-15 RX ORDER — GABAPENTIN 800 MG/1
1600 TABLET ORAL DAILY
Refills: 0
Start: 2019-01-15 | End: 2021-12-03 | Stop reason: SDUPTHER

## 2019-01-15 RX ADMIN — GABAPENTIN 1600 MG: 400 CAPSULE ORAL at 08:08

## 2019-01-15 RX ADMIN — CEFAZOLIN SODIUM 1000 MG: 10 INJECTION, POWDER, FOR SOLUTION INTRAVENOUS at 08:09

## 2019-01-15 RX ADMIN — NICOTINE 1 PATCH: 21 PATCH, EXTENDED RELEASE TRANSDERMAL at 08:09

## 2019-01-15 RX ADMIN — IBUPROFEN 800 MG: 400 TABLET ORAL at 08:16

## 2019-01-15 NOTE — UTILIZATION REVIEW
Initial Clinical Review    Admission: Date/Time/Statement: 1/14/19 @ 1030     Orders Placed This Encounter   Procedures    Inpatient Admission (expected length of stay for this patient is greater than two midnights)     Standing Status:   Standing     Number of Occurrences:   1     Order Specific Question:   Admitting Physician     Answer:   Damián Encarnacion [[id number]]     Order Specific Question:   Level of Care     Answer:   Med Surg [[id number]]     Order Specific Question:   Estimated length of stay     Answer:   More than 2 Midnights     Order Specific Question:   Certification     Answer:   I certify that inpatient services are medically necessary for this patient for a duration of greater than two midnights  See H&P and MD Progress Notes for additional information about the patient's course of treatment  ED: Date/Time/Mode of Arrival:   ED Arrival Information     Expected Arrival Acuity Means of Arrival Escorted By Service Admission Type    - 1/14/2019 09:07 Urgent Walk-In Family Member Hospitalist Urgent    Arrival Complaint    hand swelling          Chief Complaint:   Chief Complaint   Patient presents with    Follow-Up Cellulitis     seen yesterday,dx with cellulitis L hand  Worse today       History of Illness: 44 y o  female who presents with worsening left hand redness and swelling  Patient cut her finger with a knife  She developed redness in the finger and came to the ED yesterday  She was sent home on doxycycline, took 2 doses, and the redness and swelling started to spread into her hand  She returned to the ED again today  Patient recieved Ceftriaxone and Vancomycin in the ED      ED Vital Signs:   ED Triage Vitals [01/14/19 0913]   Temperature Pulse Respirations Blood Pressure SpO2   97 6 °F (36 4 °C) (!) 118 16 163/99 100 %      Temp Source Heart Rate Source Patient Position - Orthostatic VS BP Location FiO2 (%)   Oral Monitor Sitting Left arm --      Pain Score       Worst Possible Pain        Wt Readings from Last 1 Encounters:   01/14/19 81 1 kg (178 lb 12 7 oz)       Vital Signs (abnormal):   01/14/19 1400  99 1 °F (37 3 °C)  92  18  131/79  --  98 %  None (     Exam - erythema left middle finger and dorsum of hand    Abnormal Labs/Diagnostic Test Results:  Wbc 8 74    ED Treatment: blood cultures  Medication Administration from 01/14/2019 0907 to 01/14/2019 1338       Date/Time Order Dose Route Action Comments     01/14/2019 1051 ceftriaxone (ROCEPHIN) 1 g/50 mL in dextrose IVPB 0 mg Intravenous Stopped      01/14/2019 0954 ceftriaxone (ROCEPHIN) 1 g/50 mL in dextrose IVPB 1,000 mg Intravenous New Bag      01/14/2019 1051 vancomycin (VANCOCIN) 1,250 mg in sodium chloride 0 9 % 250 mL IVPB 1,250 mg Intravenous New Bag           Past Medical/Surgical History:   Past Medical History:   Diagnosis Date    Anemia        Admitting Diagnosis: Cellulitis [L03 90]    Age/Sex: 44 y o  female    Assessment/Plan: Left hand cellulitis - following a knife injury 1 week ago  Developed redness 2 days ago which worsened despite Doxycycline  Received Ceftriaxone 1 gm IV and Vancomycin 1,250 mg IV in the ED with improvement  Ct Cefazolin  Monitor local response  2  Tobacco abuse - nicotine patch    Admission Orders: 1/14/2019  1030 INPATIENT   Scheduled Meds:   Current Facility-Administered Medications:  acetaminophen 650 mg Oral Q6H PRN    cefazolin 1,000 mg Intravenous Q8H Last Rate: 1,000 mg (01/15/19 0809)   enoxaparin 40 mg Subcutaneous Daily    gabapentin 1,600 mg Oral Daily    ibuprofen 800 mg Oral Q6H PRN    nicotine 1 patch Transdermal Daily    ondansetron 4 mg Intravenous Q6H PRN      Continuous Infusions:    PRN Meds:     Ibuprofen - used x 1  [address] Utilization Review Department  Phone: [phone]; Fax [phone]  [email or web address]  ATTENTION: Please call with any questions or concerns to [phone]  and carefully listen to the prompts so that you are directed to the right person  Send all requests for admission clinical reviews, approved or denied determinations and any other requests to fax [phone]   All voicemails are confidential

## 2019-01-15 NOTE — DISCHARGE SUMMARY
Discharge- Rosales Aguilar 1975, 44 y o  female MRN: [id number]    Unit/Bed#: -01 Encounter: [id number]    Primary Care Provider: Gus Pacheco MD   Date and time admitted to hospital: 1/14/2019  9:12 AM        * Cellulitis of left hand   Assessment & Plan    · Was seen in ED 1/13 for left middle finger pain and swelling after accidently cutting it with a knife, and was sent home with doxycycline  Took 2 doses  Redness and swelling started to spread down into the hand, and patient returned to ED  · Patient was given Ceftriaxone and Vancomycin in ED 1/14  · Clinically- patient with rapid improvement  · Had 1 dose of IV Ancef this am, transition back to oral doxycyline  · Outpatient follow up with PCP     Tobacco abuse   Assessment & Plan    · Nicotine patch         Discharging Physician / Practitioner: Yinka Boucher PA-C  PCP: Gus Pacheco MD  Admission Date:   Admission Orders     Ordered        01/14/19 1030  Inpatient Admission (expected length of stay for this patient is greater than two midnights)  Once             Discharge Date: 01/15/19    Resolved Problems  Date Reviewed: 1/15/2019    None          Consultations During Hospital Stay:  · none    Procedures Performed:   · none    Significant Findings / Test Results:   · none    Incidental Findings:   · none     Test Results Pending at Discharge (will require follow up): · Blood cultures     Outpatient Tests Requested:  · none    Complications:  none    Reason for Admission: redness and erythema of left middle finger and hand    Hospital Course:     Rosales Aguilar is a 44 y o  female patient who originally presented to the hospital on 1/14/2019 due to worsening left middle finger and hand redness  Patient had accidentally cut her finger with a knife earlier in the week  She developed worsening redness and swelling in the finger and presented to the ED  She was discharged home with doxycycline    She took 2 doses, but the redness and swelling worsened extending down into her hand, therefore she returned to the ED  Patient was given IV ceftriaxone and vancomycin in the ED  The redness and swelling continued to improve over the course of the day  On exam today, the redness and swelling has all but improved except for over the D IP joint where the original cut has occurred  This appears to be more of a hematoma  Will discharge the patient home to complete her course of doxycycline  Suggested ice for any additional swelling  Patient was instructed to follow up with her family doctor as an outpatient  Patient remained afebrile, nontoxic appearing, without any leukocytosis during the entire course of her stay  Patient improved more rapidly than anticipated and did not require greater than 2 midnight stay  Please see above list of diagnoses and related plan for additional information  Condition at Discharge: good     Discharge Day Visit / Exam:     Subjective:  Redness and swelling improved  Vitals: Blood Pressure: 134/73 (01/15/19 0729)  Pulse: 83 (01/15/19 0729)  Temperature: 97 9 °F (36 6 °C) (01/15/19 0729)  Temp Source: Oral (01/15/19 0729)  Respirations: 18 (01/15/19 0729)  Height: 5' 3" (160 cm) (01/14/19 1400)  Weight - Scale: 81 1 kg (178 lb 12 7 oz) (01/14/19 1400)  SpO2: 97 % (01/15/19 0729)  Exam:   Physical Exam   Constitutional: She is oriented to person, place, and time  She appears well-developed and well-nourished  No distress  HENT:   Head: Normocephalic and atraumatic  Cardiovascular: Normal rate and regular rhythm  Exam reveals no friction rub  No murmur heard  Pulmonary/Chest: Effort normal and breath sounds normal  No respiratory distress  She has no wheezes  Abdominal: Soft  Bowel sounds are normal  She exhibits no distension  There is no tenderness  There is no rebound and no guarding  Musculoskeletal: She exhibits no edema  Neurological: She is alert and oriented to person, place, and time   No cranial nerve deficit  Skin: Skin is warm and dry  No rash noted  Swelling and ecchymosis over left middle finger DIP  Small superficial laceration noted without drainage  Psychiatric: She has a normal mood and affect  Nursing note and vitals reviewed  Discussion with Family:     Discharge instructions/Information to patient and family:   See after visit summary for information provided to patient and family  Provisions for Follow-Up Care:  See after visit summary for information related to follow-up care and any pertinent home health orders  Disposition:     Home    For Discharges to   [address] [facility]:   · Not Applicable to this Patient - Not Applicable to this Patient    Planned Readmission: none     Discharge Statement:  I spent 40 minutes discharging the patient  This time was spent on the day of discharge  I had direct contact with the patient on the day of discharge  Greater than 50% of the total time was spent examining patient, answering all patient questions, arranging and discussing plan of care with patient as well as directly providing post-discharge instructions  Additional time then spent on discharge activities  Discharge Medications:  See after visit summary for reconciled discharge medications provided to patient and family        ** Please Note: This note has been constructed using a voice recognition system **

## 2019-01-15 NOTE — ASSESSMENT & PLAN NOTE
· Was seen in ED 1/13 for left middle finger pain and swelling after accidently cutting it with a knife, and was sent home with doxycycline  Took 2 doses   Redness and swelling started to spread down into the hand, and patient returned to ED  · Patient was given Ceftriaxone and Vancomycin in ED 1/14  · Clinically- patient with rapid improvement  · Had 1 dose of IV Ancef this am, transition back to oral doxycyline  · Outpatient follow up with PCP

## 2019-01-19 LAB
BACTERIA BLD CULT: NORMAL
BACTERIA BLD CULT: NORMAL

## 2019-03-05 ENCOUNTER — RX ONLY (RX ONLY)
Age: 44
End: 2019-03-05

## 2019-03-05 ENCOUNTER — OPTICAL OFFICE (OUTPATIENT)
Dept: URBAN - METROPOLITAN AREA CLINIC 146 | Facility: CLINIC | Age: 44
Setting detail: OPHTHALMOLOGY
End: 2019-03-05
Payer: COMMERCIAL

## 2019-03-05 ENCOUNTER — DOCTOR'S OFFICE (OUTPATIENT)
Dept: URBAN - METROPOLITAN AREA CLINIC 137 | Facility: CLINIC | Age: 44
Setting detail: OPHTHALMOLOGY
End: 2019-03-05
Payer: COMMERCIAL

## 2019-03-05 DIAGNOSIS — H52.03: ICD-10-CM

## 2019-03-05 DIAGNOSIS — H52.4: ICD-10-CM

## 2019-03-05 PROCEDURE — V2020 VISION SVCS FRAMES PURCHASES: HCPCS | Performed by: OPTOMETRIST

## 2019-03-05 PROCEDURE — V2799 MISC VISION ITEM OR SERVICE: HCPCS | Performed by: OPTOMETRIST

## 2019-03-05 PROCEDURE — V2744 TINT PHOTOCHROMATIC LENS/ES: HCPCS | Performed by: OPTOMETRIST

## 2019-03-05 PROCEDURE — V2200 LENS SPHER BIFOC PLANO 4.00D: HCPCS | Performed by: OPTOMETRIST

## 2019-03-05 PROCEDURE — 92004 COMPRE OPH EXAM NEW PT 1/>: CPT | Performed by: OPTOMETRIST

## 2019-03-05 PROCEDURE — V2781 PROGRESSIVE LENS PER LENS: HCPCS | Performed by: OPTOMETRIST

## 2019-03-05 ASSESSMENT — CONFRONTATIONAL VISUAL FIELD TEST (CVF)
OS_FINDINGS: FULL
OD_FINDINGS: FULL

## 2019-03-05 ASSESSMENT — REFRACTION_AUTOREFRACTION
OD_AXIS: 175
OS_AXIS: 004
OD_CYLINDER: -0.50
OD_SPHERE: +1.25
OS_SPHERE: +1.25
OS_CYLINDER: -0.25

## 2019-03-05 ASSESSMENT — REFRACTION_MANIFEST
OS_VA3: 20/
OS_SPHERE: +1.00
OD_VA2: 20/
OS_CYLINDER: SPH
OD_CYLINDER: SPH
OD_VA3: 20/
OD_VA1: 20/20
OD_SPHERE: +1.00
OS_VA1: 20/
OD_VA2: 20/
OU_VA: 20/
OD_VA1: 20/
OS_VA1: 20/20
OU_VA: 20/
OS_VA2: 20/
OD_VA3: 20/
OS_VA3: 20/
OS_ADD: +1.50
OD_ADD: +1.50
OS_VA2: 20/

## 2019-03-05 ASSESSMENT — VISUAL ACUITY
OD_BCVA: 20/25+1
OS_BCVA: 20/25

## 2019-03-05 ASSESSMENT — SPHEQUIV_DERIVED
OS_SPHEQUIV: 1.125
OD_SPHEQUIV: 1

## 2019-03-05 ASSESSMENT — REFRACTION_CURRENTRX
OD_OVR_VA: 20/
OS_OVR_VA: 20/
OD_OVR_VA: 20/
OD_OVR_VA: 20/

## 2019-04-02 ENCOUNTER — TRANSCRIBE ORDERS (OUTPATIENT)
Dept: ADMINISTRATIVE | Facility: HOSPITAL | Age: 44
End: 2019-04-02

## 2019-04-02 ENCOUNTER — HOSPITAL ENCOUNTER (OUTPATIENT)
Dept: RADIOLOGY | Facility: HOSPITAL | Age: 44
Discharge: HOME | End: 2019-04-02
Payer: COMMERCIAL

## 2019-04-02 DIAGNOSIS — M25.551 BILATERAL HIP PAIN: ICD-10-CM

## 2019-04-02 DIAGNOSIS — M25.552 BILATERAL HIP PAIN: Primary | ICD-10-CM

## 2019-04-02 DIAGNOSIS — M25.552 BILATERAL HIP PAIN: ICD-10-CM

## 2019-04-02 DIAGNOSIS — M25.551 BILATERAL HIP PAIN: Primary | ICD-10-CM

## 2019-04-02 PROCEDURE — 73523 X-RAY EXAM HIPS BI 5/> VIEWS: CPT

## 2019-10-28 ENCOUNTER — TRANSCRIBE ORDERS (OUTPATIENT)
Dept: ADMINISTRATIVE | Facility: HOSPITAL | Age: 44
End: 2019-10-28

## 2019-10-28 DIAGNOSIS — G35 MULTIPLE SCLEROSIS (HCC): Primary | ICD-10-CM

## 2019-11-26 ENCOUNTER — HOSPITAL ENCOUNTER (OUTPATIENT)
Dept: MRI IMAGING | Facility: HOSPITAL | Age: 44
Discharge: HOME | End: 2019-11-26
Payer: COMMERCIAL

## 2019-11-26 DIAGNOSIS — G35 MULTIPLE SCLEROSIS (HCC): ICD-10-CM

## 2019-11-26 PROCEDURE — 72157 MRI CHEST SPINE W/O & W/DYE: CPT

## 2019-11-26 PROCEDURE — A9585 GADOBUTROL INJECTION: HCPCS | Performed by: PSYCHIATRY & NEUROLOGY

## 2019-11-26 RX ADMIN — GADOBUTROL 8 ML: 604.72 INJECTION INTRAVENOUS at 10:18

## 2019-12-04 ENCOUNTER — CONSULT (OUTPATIENT)
Dept: PAIN MEDICINE | Facility: CLINIC | Age: 44
End: 2019-12-04
Payer: COMMERCIAL

## 2019-12-04 VITALS
BODY MASS INDEX: 28.52 KG/M2 | DIASTOLIC BLOOD PRESSURE: 96 MMHG | TEMPERATURE: 98.8 F | SYSTOLIC BLOOD PRESSURE: 129 MMHG | HEART RATE: 91 BPM | WEIGHT: 161 LBS

## 2019-12-04 DIAGNOSIS — M50.120 CERVICAL DISC DISORDER WITH RADICULOPATHY OF MID-CERVICAL REGION: ICD-10-CM

## 2019-12-04 DIAGNOSIS — M79.18 MYOFASCIAL PAIN SYNDROME: ICD-10-CM

## 2019-12-04 DIAGNOSIS — G89.4 CHRONIC PAIN SYNDROME: Primary | ICD-10-CM

## 2019-12-04 DIAGNOSIS — M47.816 LUMBAR SPONDYLOSIS: ICD-10-CM

## 2019-12-04 PROCEDURE — 99204 OFFICE O/P NEW MOD 45 MIN: CPT | Performed by: ANESTHESIOLOGY

## 2019-12-04 RX ORDER — METHOCARBAMOL 500 MG/1
500 TABLET, FILM COATED ORAL
Qty: 30 TABLET | Refills: 1 | Status: SHIPPED | OUTPATIENT
Start: 2019-12-04 | End: 2020-01-27 | Stop reason: SDUPTHER

## 2019-12-04 NOTE — PROGRESS NOTES
Assessment  1  Chronic pain syndrome    2  Cervical disc disorder with radiculopathy of mid-cervical region    3  Myofascial pain syndrome    4  Lumbar spondylosis        Plan  The patient's symptoms, history/physical are consistent with a chronic pain syndrome secondary to underlying C6-7 disc herniation underlying lumbar spondylosis as well as widespread muscle spasms  At this time I discussed treatment that will be multimodal approach  Due to ongoing pain despite physical therapy and worsening left arm weakness, I will order an updated MRI of the cervical spine to evaluate for progression of the C6-7 disc herniation  I advised her I will call with the results and discuss treatment moving forward  In the past she has responded well to cervical medial branch radiofrequency and lumbar medial branch radiofrequency ablation  We will obtain the records from Dr Jen Osuna office to possibly repeat these procedures  She has significant spasm on exam so I will start her on methocarbamol 500 mg at bedtime to help with pain  She was apprised of the most common side effects including sleepiness and dizziness  My impressions and treatment recommendations were discussed in detail with the patient who verbalized understanding and had no further questions  Discharge instructions were provided  I personally saw and examined the patient and I agree with the above discussed plan of care  Orders Placed This Encounter   Procedures    MRI cervical spine without contrast     Standing Status:   Future     Standing Expiration Date:   12/4/2023     Scheduling Instructions: There is no preparation for this test  Please leave your jewelry and valuables at home, wedding rings are the exception  Please bring your insurance cards, a form of photo ID and a list of your medications with you  Arrive 15 minutes prior to your appointment time in order to register   Please bring any prior CT or MRI studies of this area that were not performed at a [address]  To schedule this appointment, please contact Central Scheduling at [phone]  Order Specific Question:   Is the patient pregnant? Answer:   No     Order Specific Question:   What is the patient's sedation requirement? Answer:   No Sedation     New Medications Ordered This Visit   Medications    methocarbamol (ROBAXIN) 500 mg tablet     Sig: Take 1 tablet (500 mg total) by mouth daily at bedtime as needed for muscle spasms     Dispense:  30 tablet     Refill:  1       History of Present Illness    Manoj Khan is a 38 y o  female who presents for consultation in regards to neck and low back pain as well as right shoulder pain and bilateral leg pain  Symptoms have been present for chronically for many years but have gotten worse in the last year  She denies any precipitating injury or trauma  Pain is moderate rated 6/10 on numeric rating scale and felt nearly constantly  Pain is described to be cramping, dull, aching with pins and needles felt in her legs and arm  She feels weakness of the arms and legs  Pain symptoms are aggravated with turning her head, standing, bending, sitting, walking  There is no change with coughing, sneezing or bowel movements  Treatment history has included injections performed by Dr Mary Alba including cervical epidural steroid injections and lumbar facet injections which have provided moderate relief  She reports undergoing radiofrequency ablation in the cervical and lumbar spine with him 8 years ago which provided significant relief and would like to have that repeated  Physical therapy and exercise on her own has provided no relief  Heat/ice and use of a TENS unit has provided moderate relief  I have personally reviewed and/or updated the patient's past medical history, past surgical history, family history, social history, current medications, allergies, and vital signs today       Review of Systems Constitutional: Negative for fever and unexpected weight change  HENT: Negative for trouble swallowing  Eyes: Negative for visual disturbance  Respiratory: Negative for shortness of breath and wheezing  Cardiovascular: Negative for chest pain and palpitations  Gastrointestinal: Positive for abdominal pain  Negative for constipation, diarrhea, nausea and vomiting  Endocrine: Negative for cold intolerance, heat intolerance and polydipsia  Polyphagia:      Genitourinary: Positive for frequency  Negative for difficulty urinating  Musculoskeletal: Positive for joint swelling  Negative for arthralgias, gait problem and myalgias  Skin: Negative for rash  Neurological: Negative for dizziness, seizures, syncope, weakness and headaches  Hematological: Does not bruise/bleed easily  Psychiatric/Behavioral: Negative for dysphoric mood  Confusion:      All other systems reviewed and are negative  Patient Active Problem List   Diagnosis    Chest pain    Syncope    Cellulitis of left hand    Tobacco abuse       Past Medical History:   Diagnosis Date    Anemia        Past Surgical History:   Procedure Laterality Date     SECTION      NOSE SURGERY      TUBAL LIGATION         No family history on file  Social History     Occupational History    Not on file   Tobacco Use    Smoking status: Current Some Day Smoker     Packs/day: 1 00     Types: Cigarettes    Smokeless tobacco: Never Used   Substance and Sexual Activity    Alcohol use: No    Drug use: No    Sexual activity: Not on file       Current Outpatient Medications on File Prior to Visit   Medication Sig    gabapentin (NEURONTIN) 800 mg tablet Take 2 tablets (1,600 mg total) by mouth daily    lansoprazole (PREVACID) 30 mg capsule Take 30 mg by mouth daily     No current facility-administered medications on file prior to visit          Allergies   Allergen Reactions    Amoxicillin     Ciprofloxacin        Physical Exam    /96   Pulse 91   Temp 98 8 °F (37 1 °C) (Oral)   Wt 73 kg (161 lb)   BMI 28 52 kg/m²     Constitutional: normal, well developed, well nourished, alert, in no distress and non-toxic and no overt pain behavior    Eyes: anicteric  HEENT: grossly intact  Neck: supple, symmetric, trachea midline and no masses   Pulmonary:even and unlabored  Cardiovascular:No edema or pitting edema present  Skin:Normal without rashes or lesions and well hydrated  Psychiatric:Mood and affect appropriate  Neurologic:Cranial Nerves II-XII grossly intact  Musculoskeletal:normal     Cervical Spine Exam  Appearance:  Normal lordosis  Palpation/Tenderness:  left cervical paraspinal tenderness  right cervical paraspinal tenderness  left trapezium tenderness  right trapezium tenderness  Bilateral cervical facet tenderness at C3-4, C4-5, C5-6 with positive facet loading  Sensory:  no sensory deficits noted  Range of Motion:  Flexion:  Minimally limited  with pain  Extension:  Moderately limited  with pain  Lateral Flexion - Left:  Moderately limited  with pain  Lateral Flexion - Right:  Moderately limited  with pain  Rotation - Left:  Moderately limited  with pain  Rotation - Right:  Moderately limited  with pain  Motor Strength:  Left Arm Flexion  4/5  Left Arm Extension  4/5  Right Arm Flexion  5/5  Right Arm Extension  5/5  Left Wrist Flexion  4/5  Left Wrist Extension  4/5  Left Finger Abduction  5/5  Right Finger Abduction  5/5  Reflexes:  Left Biceps:  2+   Right Biceps:  2+   Left Triceps:  2+   Right Triceps:  2+   Special Tests:  Left Spurlings:  positive  Right Spurlings  negative      Lumbar Spine Exam    Appearance:  Normal lordosis  Palpation/Tenderness:  left lumbar paraspinal tenderness  right lumbar paraspinal tenderness  Bilateral lumbar facet tenderness at L4-5, L5-S1 with positive facet loading  Sensory:  no sensory deficits noted  Range of Motion:  Flexion:  Minimally limited  with pain  Extension:  Minimally limited  with pain  Lateral Flexion - Left:  Minimally limited  with pain  Lateral Flexion - Right:  Minimally limited  with pain  Rotation - Left:  Minimally limited  with pain  Rotation - Right:  Minimally limited  with pain  Motor Strength:  Left hip flexion:  5/5  Left hip extension:  5/5  Right hip flexion:  5/5  Right hip extension:  5/5  Left knee flexion:  5/5  Left knee extension:  5/5  Right knee flexion:  5/5  Right knee extension:  5/5  Left foot dorsiflexion:  5/5  Left foot plantar flexion:  5/5  Right foot dorsiflexion:  5/5  Right foot plantar flexion:  5/5  Reflexes:  Left Patellar:  1+   Right Patellar:  1+   Left Achilles:  1+   Right Achilles:  1+   Special Tests:  Left Straight Leg Test:  negative  Right Straight Leg Test:  negative      Imaging    MRI C-spine (5/1/2019)    C6-7:  Broad-based disc protrusion with marginal osteophytosis eccentric towards the right

## 2019-12-05 ENCOUNTER — TELEPHONE (OUTPATIENT)
Dept: PAIN MEDICINE | Facility: MEDICAL CENTER | Age: 44
End: 2019-12-05

## 2019-12-05 NOTE — TELEPHONE ENCOUNTER
Pt called stating the  from the mri place told pt to call and let us know when the mri is     12/24/2019 @11:30 @ [facility]  That was the first appt he could give her      Pt can be reached at [phone]

## 2019-12-06 NOTE — TELEPHONE ENCOUNTER
FYI only:  Pt said his MRI is scheduled for 12/24 at [facility], they were booked and couldn't get him any sooner  I told pt that he will be called with his MRI results once FQ receives them  Pt said he also has an ov for 1/8/20 w/ FQ  I told pt he will be called with results and then FQ can review the images at the ov with him  Told pt I would make FQ aware when MRI is scheduled for

## 2019-12-18 ENCOUNTER — HOSPITAL ENCOUNTER (OUTPATIENT)
Dept: MRI IMAGING | Facility: HOSPITAL | Age: 44
Discharge: HOME | End: 2019-12-18
Attending: ANESTHESIOLOGY
Payer: COMMERCIAL

## 2019-12-18 DIAGNOSIS — M50.120 CERVICAL DISC DISORDER WITH RADICULOPATHY OF MID-CERVICAL REGION: ICD-10-CM

## 2019-12-18 PROCEDURE — 72141 MRI NECK SPINE W/O DYE: CPT

## 2019-12-19 ENCOUNTER — TELEPHONE (OUTPATIENT)
Dept: PAIN MEDICINE | Facility: CLINIC | Age: 44
End: 2019-12-19

## 2019-12-19 NOTE — TELEPHONE ENCOUNTER
Left VM to go over MRI C-spine results which shows multilevel spondylosis and C6-7 HNP with severe right and moderate to severe left foraminal stenosis at that level      Also seen were some very small thyroid nodules on the left

## 2019-12-20 NOTE — TELEPHONE ENCOUNTER
[place]  Pt informed of cervical MRI results as per task  I told pt to f/u with her PCP regarding the small thyroid nodules  Pt thought she had another ov on 1/8/20 w/ FQ but I explained that was the [facility] consult appt she was given but we then brought her in sooner for consult earlier this month  Pt asked for another ov to review MRI and discuss treatment options  Pt given ov for 1/27/20 at 9:30 w/ FQ

## 2019-12-30 ENCOUNTER — TELEPHONE (OUTPATIENT)
Dept: PAIN MEDICINE | Facility: MEDICAL CENTER | Age: 44
End: 2019-12-30

## 2019-12-30 NOTE — TELEPHONE ENCOUNTER
Pt PCP left a vm requesting report of pt MRI of cervical spine  C/b# [phone]    No fax# was left

## 2020-01-02 ENCOUNTER — TELEPHONE (OUTPATIENT)
Dept: PAIN MEDICINE | Facility: CLINIC | Age: 45
End: 2020-01-02

## 2020-01-02 NOTE — TELEPHONE ENCOUNTER
Pt called on 1/2 at 1:26PM to see if any earlier appts opened up     Verified none were available at this time and left a vm message at 4:56PM

## 2020-01-27 ENCOUNTER — TELEPHONE (OUTPATIENT)
Dept: PAIN MEDICINE | Facility: CLINIC | Age: 45
End: 2020-01-27

## 2020-01-27 ENCOUNTER — TELEPHONE (OUTPATIENT)
Dept: RADIOLOGY | Facility: CLINIC | Age: 45
End: 2020-01-27

## 2020-01-27 DIAGNOSIS — M79.18 MYOFASCIAL PAIN SYNDROME: ICD-10-CM

## 2020-01-27 RX ORDER — METHOCARBAMOL 500 MG/1
500 TABLET, FILM COATED ORAL
Qty: 30 TABLET | Refills: 5 | Status: SHIPPED | OUTPATIENT
Start: 2020-01-27 | End: 2020-02-26 | Stop reason: SDUPTHER

## 2020-01-27 NOTE — TELEPHONE ENCOUNTER
----- Message from Amie Zee sent at 1/27/2020  9:03 AM EST -----  Patient's appointment was cancelled today, she was rescheduled for the end of February, she needed a morning appointment    She needs a refill of Robaxin called into [facility]

## 2020-02-26 ENCOUNTER — OFFICE VISIT (OUTPATIENT)
Dept: PAIN MEDICINE | Facility: CLINIC | Age: 45
End: 2020-02-26
Payer: COMMERCIAL

## 2020-02-26 VITALS
HEART RATE: 100 BPM | BODY MASS INDEX: 29.41 KG/M2 | SYSTOLIC BLOOD PRESSURE: 134 MMHG | WEIGHT: 166 LBS | TEMPERATURE: 98.1 F | DIASTOLIC BLOOD PRESSURE: 91 MMHG

## 2020-02-26 DIAGNOSIS — M79.18 MYOFASCIAL PAIN SYNDROME: ICD-10-CM

## 2020-02-26 DIAGNOSIS — M50.120 CERVICAL DISC DISORDER WITH RADICULOPATHY OF MID-CERVICAL REGION: Primary | ICD-10-CM

## 2020-02-26 DIAGNOSIS — M70.61 TROCHANTERIC BURSITIS OF BOTH HIPS: ICD-10-CM

## 2020-02-26 DIAGNOSIS — M70.62 TROCHANTERIC BURSITIS OF BOTH HIPS: ICD-10-CM

## 2020-02-26 PROCEDURE — 99214 OFFICE O/P EST MOD 30 MIN: CPT | Performed by: ANESTHESIOLOGY

## 2020-02-26 RX ORDER — METHOCARBAMOL 750 MG/1
750 TABLET, FILM COATED ORAL
Qty: 30 TABLET | Refills: 5 | Status: SHIPPED | OUTPATIENT
Start: 2020-02-26 | End: 2020-09-01 | Stop reason: SDUPTHER

## 2020-02-26 NOTE — PROGRESS NOTES
Assessment:  1  Cervical disc disorder with radiculopathy of mid-cervical region    2  Trochanteric bursitis of both hips    3  Myofascial pain syndrome        Plan:  The patient experiencing ongoing pain in the neck radiating into the left arm  At this time, I discussed performing a cervical epidural steroid injection to help reduce swelling and inflammation which is leading to her pain symptoms  She was apprised of the most common risks and would like to proceed  She will be scheduled for an upcoming Friday under fluoroscopic guidance  In addition, she experiences pain from trochanteric bursitis so I discussed performing bilateral trochanteric bursa injections about 2 weeks later to help reduce swelling inflammation in her hips  For now, since the methocarbamol is helping somewhat but tends to wear off, I will increase the dose to 750 mg at bedtime  She was apprised of this may make her initially more tired in the daytime  Complete risks and benefits including bleeding, infection, tissue reaction, nerve injury and allergic reaction were discussed  The approach was demonstrated using models and literature was provided  Verbal and written consent was obtained  My impressions and treatment recommendations were discussed in detail with the patient who verbalized understanding and had no further questions  Discharge instructions were provided  I personally saw and examined the patient and I agree with the above discussed plan of care  Orders Placed This Encounter   Procedures    FL spine and pain procedure     Standing Status:   Future     Standing Expiration Date:   2/26/2024     Order Specific Question:   Reason for Exam:     Answer:   TAYE     Order Specific Question:   Is the patient pregnant? Answer:   No     Order Specific Question:   Anticoagulant hold needed?      Answer:   No    FL spine and pain procedure     No  needed  2 weeks after cervical injection     Standing Status: Future     Standing Expiration Date:   2/26/2024     Order Specific Question:   Reason for Exam:     Answer:   Bilateral troch bursa injections     Order Specific Question:   Is the patient pregnant? Answer:   No     Order Specific Question:   Anticoagulant hold needed? Answer:   No     New Medications Ordered This Visit   Medications    methocarbamol (ROBAXIN) 750 mg tablet     Sig: Take 1 tablet (750 mg total) by mouth daily at bedtime as needed for muscle spasms     Dispense:  30 tablet     Refill:  5       History of Present Illness:  Moiz Ham is a 41 y o  female who presents for a follow up office visit in regards to Hip Pain; Knee Pain; and Neck Pain  The patient has history of chronic pain syndrome and returns for follow-up  She was last seen in December at which time I had ordered an updated MRI of the cervical spine  She reports ongoing pain in the neck on both sides that is constant described to be throbbing, cramping, pressure-like that radiates into the left arm with numbness and tingling  She also reports pain in her low back and hips that is constant described to be dull, aching, throbbing, cramping and pressure-like  She gets moderate relief with the methocarbamol but it tends to wear off later in afternoon and evening  I have personally reviewed and/or updated the patient's past medical history, past surgical history, family history, social history, current medications, allergies, and vital signs today  Review of Systems   Respiratory: Negative for shortness of breath  Cardiovascular: Negative for chest pain  Gastrointestinal: Negative for constipation, diarrhea, nausea and vomiting  Musculoskeletal: Positive for joint swelling  Negative for arthralgias, gait problem and myalgias  Skin: Negative for rash  Neurological: Positive for weakness  Negative for dizziness and seizures  All other systems reviewed and are negative        Patient Active Problem List Diagnosis    Chest pain    Syncope    Cellulitis of left hand    Tobacco abuse       Past Medical History:   Diagnosis Date    Anemia        Past Surgical History:   Procedure Laterality Date     SECTION      NOSE SURGERY      TUBAL LIGATION         No family history on file  Social History     Occupational History    Not on file   Tobacco Use    Smoking status: Current Some Day Smoker     Packs/day:  00     Types: Cigarettes    Smokeless tobacco: Never Used   Substance and Sexual Activity    Alcohol use: No    Drug use: No    Sexual activity: Not on file       Current Outpatient Medications on File Prior to Visit   Medication Sig    gabapentin (NEURONTIN) 800 mg tablet Take 2 tablets (1,600 mg total) by mouth daily    lansoprazole (PREVACID) 30 mg capsule Take 30 mg by mouth daily    [DISCONTINUED] methocarbamol (ROBAXIN) 500 mg tablet Take 1 tablet (500 mg total) by mouth daily at bedtime as needed for muscle spasms     No current facility-administered medications on file prior to visit  Allergies   Allergen Reactions    Amoxicillin     Ciprofloxacin        Physical Exam:    /91   Pulse 100   Temp 98 1 °F (36 7 °C) (Oral)   Wt 75 3 kg (166 lb)   BMI 29 41 kg/m²     Constitutional:normal, well developed, well nourished, alert, in no distress and non-toxic and no overt pain behavior    Eyes:anicteric  HEENT:grossly intact  Neck:supple, symmetric, trachea midline and no masses   Pulmonary:even and unlabored  Cardiovascular:No edema or pitting edema present  Skin:Normal without rashes or lesions and well hydrated  Psychiatric:Mood and affect appropriate  Neurologic:Cranial Nerves II-XII grossly intact  Musculoskeletal:normal and Bilateral trochanteric bursa tenderness     Cervical Spine Exam  Appearance:  Normal lordosis  Palpation/Tenderness:  left cervical paraspinal tenderness  right cervical paraspinal tenderness  Range of Motion:  Flexion:  Minimally limited  with pain  Extension:  No limitation  with pain  Lateral Flexion - Left:  Minimally limited  with pain  Lateral Flexion - Right:  No limitation  without pain  Rotation - Left:  Minimally limited  with pain  Rotation - Right:  No limitation  without pain  Motor Strength:  Left Arm Flexion  5/5  Left Arm Extension  4/5  Right Arm Flexion  5/5  Right Arm Extension  5/5  Left Wrist Flexion  5/5  Left Wrist Extension  4/5  Left Finger Abduction  5/5  Right Finger Abduction  5/5  Special Tests:  Left Spurlings:  positive  Right Spurlings  negative        Imaging    MRI CERVICAL SPINE WITHOUT CONTRAST (12/18/2019)     INDICATION: Neck pain radiating into the left arm  Evaluate for pinched nerve      COMPARISON: Outside MRI study of the cervical spine from May 1, 2019      TECHNIQUE:  Sagittal T1, sagittal T2, sagittal inversion recovery, axial T2, axial  2D merge       Imaging performed on 1 5T MRI       IMAGE QUALITY:  Diagnostic     FINDINGS:     ALIGNMENT:  A mild levoscoliotic curvature to the cervical spine is noted with a compensatory dextroscoliotic curvature in the visualized upper thoracic spine  There is congenital fusion of the C5 and C6 anterior and posterior vertebral elements   suggesting Klippel-Feil anomaly  The remaining cervical vertebral bodies demonstrate normal segmentation and preserved height      MARROW SIGNAL:  Normal marrow signal is identified within the visualized bony structures  No discrete marrow lesion      CERVICAL AND VISUALIZED THORACIC CORD:  Normal signal within the visualized cord      PREVERTEBRAL AND PARASPINAL SOFT TISSUES:  Subcentimeter T2 hyperintense nodules are identified in the left thyroid lobe      VISUALIZED POSTERIOR FOSSA:  Mild volume loss in the superior cerebellar vermis      CERVICAL DISC SPACES:  Partial fusion across the C5-6 disc space  Remaining disc spaces are preserved      C2-C3:  Left facet arthropathy with moderate left foraminal stenosis   Broad central protrusion at C2-C3 with mild spinal stenosis      C3-C4:  Mild right facet hypertrophy  No spinal canal or foraminal stenosis      C4-C5:  Right greater than left facet arthropathy with mild right foraminal stenosis  Patent spinal canal and left neural foramina      C5-C6:  Patent spinal canal and neural foramina      C6-C7:  Posterior disc osteophyte complex and right greater than left uncovertebral hypertrophy with mild spinal stenosis  Prominent foraminal disc herniations noted resulting in severe right and moderate left foraminal stenosis      C7-T1:  Normal      UPPER THORACIC DISC SPACES:  Normal      IMPRESSION:     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 with mild cervicothoracic scoliosis      Disc osteophyte complex at C6-C7 with prominent foraminal protrusions and right greater than left uncovertebral hypertrophy resulting in severe right and moderate left foraminal stenosis  There is mild spinal stenosis  These findings are unchanged when   compared to the prior study        Multilevel facet arthropathy as discussed above      No cord signal abnormality      Subcentimeter left thyroid nodules  According to guidelines published in the February 2015 white paper on incidental thyroid nodules in the Journal of the [employer] Maribel Mcadams), because the nodule(s) are less than 1 5 cm in size and   without suspicious feature, no further evaluation is recommended            MRI THORACIC SPINE WITH AND WITHOUT CONTRAST (11/26/2019)     INDICATION: G35: Multiple sclerosis      COMPARISON:  None      TECHNIQUE:  Sagittal T1, sagittal T2, sagittal inversion recovery, axial T2,  axial 2D MERGE  Sagittal and axial T1 postcontrast      IV Contrast:  8 mL of gadobutrol injection (MULTI-DOSE)      IMAGE QUALITY:  Diagnostic      FINDINGS:     ALIGNMENT:  Normal alignment of the thoracic spine  No compression fracture  No subluxation    No evidence of scoliosis      MARROW SIGNAL:  Normal marrow signal is identified within the visualized bony structures  No discrete marrow lesion      THORACIC CORD:  Normal signal within the thoracic cord      PREVERTEBRAL AND PARASPINAL SOFT TISSUES: 7 mm left lobe thyroid nodule  Incidental discovery of one or more thyroid nodule(s) measuring less than 1 5 cm and without suspicious features is noted in this patient who is above 28years old; according to   guidelines published in the February 2015 white paper on incidental thyroid nodules in the Journal of the [employer] [facility]), no further evaluation is recommended  Small bilateral renal cysts      THORACIC DEGENERATIVE CHANGE: Minimal multilevel degenerative spondylosis  No evidence of disc herniation, central canal or foraminal stenosis      POSTCONTRAST:  No abnormal enhancement      IMPRESSION:  No spinal cord pathology identified    Specifically, no evidence to suggest MS     Minimal multilevel degenerative spondylosis     No evidence of disc herniation, central canal or foraminal stenosis     No pathologic enhancement     Small incidentally noted left lobe thyroid nodule for which no further follow-up is recommended     Small bilateral renal cysts

## 2020-03-02 ENCOUNTER — TELEPHONE (OUTPATIENT)
Dept: PAIN MEDICINE | Facility: MEDICAL CENTER | Age: 45
End: 2020-03-02

## 2020-07-17 ENCOUNTER — HOSPITAL ENCOUNTER (OUTPATIENT)
Dept: RADIOLOGY | Facility: CLINIC | Age: 45
Discharge: HOME | End: 2020-07-17
Attending: ANESTHESIOLOGY | Admitting: ANESTHESIOLOGY
Payer: COMMERCIAL

## 2020-07-17 VITALS
DIASTOLIC BLOOD PRESSURE: 94 MMHG | SYSTOLIC BLOOD PRESSURE: 146 MMHG | HEART RATE: 91 BPM | TEMPERATURE: 98.5 F | RESPIRATION RATE: 16 BRPM | OXYGEN SATURATION: 99 %

## 2020-07-17 DIAGNOSIS — M50.120 CERVICAL DISC DISORDER WITH RADICULOPATHY OF MID-CERVICAL REGION: ICD-10-CM

## 2020-07-17 PROCEDURE — 62321 NJX INTERLAMINAR CRV/THRC: CPT | Performed by: ANESTHESIOLOGY

## 2020-07-17 RX ORDER — LIDOCAINE HYDROCHLORIDE 10 MG/ML
5 INJECTION, SOLUTION EPIDURAL; INFILTRATION; INTRACAUDAL; PERINEURAL ONCE
Status: COMPLETED | OUTPATIENT
Start: 2020-07-17 | End: 2020-07-17

## 2020-07-17 RX ORDER — METHYLPREDNISOLONE ACETATE 80 MG/ML
80 INJECTION, SUSPENSION INTRA-ARTICULAR; INTRALESIONAL; INTRAMUSCULAR; PARENTERAL; SOFT TISSUE ONCE
Status: COMPLETED | OUTPATIENT
Start: 2020-07-17 | End: 2020-07-17

## 2020-07-17 RX ADMIN — IOHEXOL 1 ML: 300 INJECTION, SOLUTION INTRAVENOUS at 11:50

## 2020-07-17 RX ADMIN — METHYLPREDNISOLONE ACETATE 80 MG: 80 INJECTION, SUSPENSION INTRA-ARTICULAR; INTRALESIONAL; INTRAMUSCULAR; PARENTERAL; SOFT TISSUE at 11:51

## 2020-07-17 RX ADMIN — LIDOCAINE HYDROCHLORIDE 4 ML: 10 INJECTION, SOLUTION EPIDURAL; INFILTRATION; INTRACAUDAL; PERINEURAL at 11:47

## 2020-07-17 NOTE — DISCHARGE INSTR - LAB
Epidural Steroid Injection   WHAT YOU NEED TO KNOW:   An epidural steroid injection (FIORELLA) is a procedure to inject steroid medicine into the epidural space  The epidural space is between your spinal cord and vertebrae  Steroids reduce inflammation and fluid buildup in your spine that may be causing pain  You may be given pain medicine along with the steroids  ACTIVITY  · Do not drive or operate machinery today  · No strenuous activity today - bending, lifting, etc   · You may resume normal activites starting tomorrow - start slowly and as tolerated  · You may shower today, but no tub baths or hot tubs  · You may have numbness for several hours from the local anesthetic  Please use caution and common sense, especially with weight-bearing activities  CARE OF THE INJECTION SITE  · If you have soreness or pain, apply ice to the area today (20 minutes on/20 minutes off)  · Starting tomorrow, you may use warm, moist heat or ice if needed  · You may have an increase or change in your discomfort for 36-48 hours after your treatment  · Apply ice and continue with any pain medication you have been prescribed  · [facility] if you have any of the following: redness, drainage, swelling, headache, stiff neck or fever above 100°F     SPECIAL INSTRUCTIONS  · Our office will contact you in approximately 7 days for a progress report  MEDICATIONS  · Continue to take all routine medications  · Our office may have instructed you to hold some medications  If you have a problem specifically related to your procedure, please call our office at [phone]  Problems not related to your procedure should be directed to your primary care physician

## 2020-07-17 NOTE — H&P
History of Present Illness: The patient is a 41 y o  female who presents with complaints of neck and arm pain secondary cervical disc bulging is here today for cervical epidural steroid injection  Patient Active Problem List   Diagnosis    Chest pain    Syncope    Cellulitis of left hand    Tobacco abuse       Past Medical History:   Diagnosis Date    Anemia        Past Surgical History:   Procedure Laterality Date     SECTION      NOSE SURGERY      TUBAL LIGATION           Current Outpatient Medications:     gabapentin (NEURONTIN) 800 mg tablet, Take 2 tablets (1,600 mg total) by mouth daily, Disp: , Rfl: 0    lansoprazole (PREVACID) 30 mg capsule, Take 30 mg by mouth daily, Disp: , Rfl:     methocarbamol (ROBAXIN) 750 mg tablet, Take 1 tablet (750 mg total) by mouth daily at bedtime as needed for muscle spasms, Disp: 30 tablet, Rfl: 5    Current Facility-Administered Medications:     iohexol (OMNIPAQUE) 300 mg/mL injection 50 mL, 50 mL, Epidural, Once, Taya Owens MD    lidocaine (PF) (XYLOCAINE-MPF) 1 % injection 5 mL, 5 mL, Infiltration, Once, Taya Owens MD    methylPREDNISolone acetate (DEPO-MEDROL) injection 80 mg, 80 mg, Epidural, Once, Taya Owens MD    Allergies   Allergen Reactions    Amoxicillin     Ciprofloxacin        Physical Exam:   Vitals:    20 1133   BP: 122/85   Pulse: 91   Resp: 16   Temp: 98 5 °F (36 9 °C)   SpO2: 98%     General: Awake, Alert, Oriented x 3, Mood and affect appropriate  Respiratory: Respirations even and unlabored  Cardiovascular: Peripheral pulses intact; no edema  Musculoskeletal Exam:   Neck and arm pain    ASA Score: 2    Patient/Chart Verification  Patient ID Verified: Verbal  ID Band Applied: No  Consents Confirmed: Procedural, To be obtained in the Pre-Procedure area  H&P( within 30 days) Verified: To be obtained in the Pre-Procedure area  Allergies Reviewed:  Yes  Anticoag/NSAID held?: No  Currently on antibiotics?: No  Pregnancy denied?: Yes    Assessment:   1   Cervical disc disorder with radiculopathy of mid-cervical region        Plan: TAYE

## 2020-07-24 ENCOUNTER — TELEPHONE (OUTPATIENT)
Dept: PAIN MEDICINE | Facility: CLINIC | Age: 45
End: 2020-07-24

## 2020-07-28 NOTE — TELEPHONE ENCOUNTER
Pt reports 40% improvement post inj   Pain level 4-5/10   She said she has better range of motion    Patient wants to know when she can schedule another TAYE

## 2020-07-30 DIAGNOSIS — M50.120 CERVICAL DISC DISORDER WITH RADICULOPATHY OF MID-CERVICAL REGION: Primary | ICD-10-CM

## 2020-07-30 NOTE — TELEPHONE ENCOUNTER
Patient returning the nurses  call  Please be advise thank you        Please call patient back @ [phone] before 2:30 pm

## 2020-07-30 NOTE — TELEPHONE ENCOUNTER
S/w pt, she would be interested in trying the repeat TAYE in 2-3 weeks (back injection is 7/31)  RN told pt FQ will place order then our  will call her to set up

## 2020-07-31 ENCOUNTER — HOSPITAL ENCOUNTER (OUTPATIENT)
Dept: RADIOLOGY | Facility: CLINIC | Age: 45
Discharge: HOME | End: 2020-07-31
Attending: ANESTHESIOLOGY | Admitting: ANESTHESIOLOGY
Payer: COMMERCIAL

## 2020-07-31 VITALS
DIASTOLIC BLOOD PRESSURE: 80 MMHG | RESPIRATION RATE: 16 BRPM | HEART RATE: 72 BPM | TEMPERATURE: 98.5 F | OXYGEN SATURATION: 100 % | SYSTOLIC BLOOD PRESSURE: 129 MMHG

## 2020-07-31 DIAGNOSIS — M70.62 GREATER TROCHANTERIC BURSITIS OF BOTH HIPS: ICD-10-CM

## 2020-07-31 DIAGNOSIS — M70.61 GREATER TROCHANTERIC BURSITIS OF BOTH HIPS: ICD-10-CM

## 2020-07-31 PROCEDURE — 77002 NEEDLE LOCALIZATION BY XRAY: CPT

## 2020-07-31 PROCEDURE — 20610 DRAIN/INJ JOINT/BURSA W/O US: CPT | Performed by: ANESTHESIOLOGY

## 2020-07-31 PROCEDURE — 77002 NEEDLE LOCALIZATION BY XRAY: CPT | Performed by: ANESTHESIOLOGY

## 2020-07-31 RX ORDER — 0.9 % SODIUM CHLORIDE 0.9 %
10 VIAL (ML) INJECTION ONCE
Status: COMPLETED | OUTPATIENT
Start: 2020-07-31 | End: 2020-07-31

## 2020-07-31 RX ORDER — METHYLPREDNISOLONE ACETATE 80 MG/ML
80 INJECTION, SUSPENSION INTRA-ARTICULAR; INTRALESIONAL; INTRAMUSCULAR; PARENTERAL; SOFT TISSUE ONCE
Status: COMPLETED | OUTPATIENT
Start: 2020-07-31 | End: 2020-07-31

## 2020-07-31 RX ORDER — BUPIVACAINE HCL/PF 2.5 MG/ML
10 VIAL (ML) INJECTION ONCE
Status: COMPLETED | OUTPATIENT
Start: 2020-07-31 | End: 2020-07-31

## 2020-07-31 RX ADMIN — Medication 5 ML: at 11:45

## 2020-07-31 RX ADMIN — BUPIVACAINE HYDROCHLORIDE 7 ML: 2.5 INJECTION, SOLUTION EPIDURAL; INFILTRATION; INTRACAUDAL at 11:46

## 2020-07-31 RX ADMIN — METHYLPREDNISOLONE ACETATE 80 MG: 80 INJECTION, SUSPENSION INTRA-ARTICULAR; INTRALESIONAL; INTRAMUSCULAR; PARENTERAL; SOFT TISSUE at 11:46

## 2020-07-31 RX ADMIN — SODIUM CHLORIDE 5 ML: 9 INJECTION, SOLUTION INTRAMUSCULAR; INTRAVENOUS; SUBCUTANEOUS at 11:45

## 2020-07-31 NOTE — DISCHARGE INSTR - LAB
1  Do not apply heat to any area that is numb  If you have discomfort or soreness at the injection site, you may apply ice today, 20 minutes on and 20 minutes off  Tomorrow you may use ice or warm, moist heat  Do not apply ice or heat directly to the skin  2  If you experience severe shortness of breath, go to the Emergency Room  3  You may have numbness for several hours from the local anesthetic  Please use caution and common sense, especially with weight-bearing activities  4  You may have an increase or change in the discomfort for 36-48 hours after your treatment  Apply ice and continue with any pain medicine you have been prescribed  5  Do not do anything strenuous today  You may shower, but no tub baths or hot tubs today  You may resume your normal activities tomorrow, but do not overdo it  Resume normal activities slowly when you are feeling better  6  If you experience redness, drainage or swelling at the injection site, or if you develop a fever above 100 degrees, please call [facility] at [phone] or go to the Emergency Room  7  Continue to take all routine medicines prescribed by your primary care physician unless otherwise instructed by our staff  Most blood thinners should be started again according to your regularly scheduled dosing  If you have any questions, please give our office a call  If you have a problem specifically related to your procedure, please call our office at [phone]  Problems not related to your procedure should be directed to your primary care physician

## 2020-07-31 NOTE — H&P
History of Present Illness: The patient is a 41 y o  female who presents with complaints of bilateral hip pain secondary trochanteric bursitis and is here today for bilateral trochanteric bursa injections      Patient Active Problem List   Diagnosis    Chest pain    Syncope    Cellulitis of left hand    Tobacco abuse    Cervical disc disorder with radiculopathy of mid-cervical region       Past Medical History:   Diagnosis Date    Anemia        Past Surgical History:   Procedure Laterality Date     SECTION      NOSE SURGERY      TUBAL LIGATION           Current Outpatient Medications:     gabapentin (NEURONTIN) 800 mg tablet, Take 2 tablets (1,600 mg total) by mouth daily, Disp: , Rfl: 0    lansoprazole (PREVACID) 30 mg capsule, Take 30 mg by mouth daily, Disp: , Rfl:     methocarbamol (ROBAXIN) 750 mg tablet, Take 1 tablet (750 mg total) by mouth daily at bedtime as needed for muscle spasms, Disp: 30 tablet, Rfl: 5    Current Facility-Administered Medications:     bupivacaine (PF) (MARCAINE) 0 25 % injection 10 mL, 10 mL, Intra-articular, Once, Howard Andres MD    lidocaine (PF) (XYLOCAINE-MPF) 2 % injection 5 mL, 5 mL, Infiltration, Once, Howard Andres MD    methylPREDNISolone acetate (DEPO-MEDROL) injection 80 mg, 80 mg, Intra-articular, Once, Howard Andres MD    sodium chloride (PF) 0 9 % injection 10 mL, 10 mL, Infiltration, Once, Howard Andres MD    Allergies   Allergen Reactions    Amoxicillin     Ciprofloxacin        Physical Exam:   Vitals:    20 1128   BP: 135/89   Pulse: 77   Resp: 16   Temp: 98 5 °F (36 9 °C)   SpO2: 99%     General: Awake, Alert, Oriented x 3, Mood and affect appropriate  Respiratory: Respirations even and unlabored  Cardiovascular: Peripheral pulses intact; no edema  Musculoskeletal Exam:   Bilateral hip pain    ASA Score: 2    Patient/Chart Verification  Patient ID Verified: Verbal  ID Band Applied: No  Consents Confirmed: Procedural, To be obtained in the Pre-Procedure area  H&P( within 30 days) Verified: To be obtained in the Pre-Procedure area  Allergies Reviewed: Yes  Anticoag/NSAID held?: No  Currently on antibiotics?: No    Assessment:   1   Greater trochanteric bursitis of both hips        Plan: B/L Trochanteric Bursa Injections

## 2020-08-07 ENCOUNTER — TELEPHONE (OUTPATIENT)
Dept: PAIN MEDICINE | Facility: CLINIC | Age: 45
End: 2020-08-07

## 2020-08-12 ENCOUNTER — TRANSCRIBE ORDERS (OUTPATIENT)
Dept: PAIN MEDICINE | Facility: CLINIC | Age: 45
End: 2020-08-12

## 2020-08-28 ENCOUNTER — HOSPITAL ENCOUNTER (OUTPATIENT)
Dept: RADIOLOGY | Facility: CLINIC | Age: 45
Discharge: HOME | End: 2020-08-28
Attending: ANESTHESIOLOGY | Admitting: ANESTHESIOLOGY
Payer: COMMERCIAL

## 2020-08-28 VITALS
HEART RATE: 84 BPM | TEMPERATURE: 98.6 F | OXYGEN SATURATION: 98 % | DIASTOLIC BLOOD PRESSURE: 87 MMHG | RESPIRATION RATE: 16 BRPM | SYSTOLIC BLOOD PRESSURE: 124 MMHG

## 2020-08-28 DIAGNOSIS — M50.120 CERVICAL DISC DISORDER WITH RADICULOPATHY OF MID-CERVICAL REGION: ICD-10-CM

## 2020-08-28 PROCEDURE — 62321 NJX INTERLAMINAR CRV/THRC: CPT | Performed by: ANESTHESIOLOGY

## 2020-08-28 RX ORDER — LIDOCAINE HYDROCHLORIDE 10 MG/ML
5 INJECTION, SOLUTION EPIDURAL; INFILTRATION; INTRACAUDAL; PERINEURAL ONCE
Status: COMPLETED | OUTPATIENT
Start: 2020-08-28 | End: 2020-08-28

## 2020-08-28 RX ORDER — METHYLPREDNISOLONE ACETATE 80 MG/ML
80 INJECTION, SUSPENSION INTRA-ARTICULAR; INTRALESIONAL; INTRAMUSCULAR; PARENTERAL; SOFT TISSUE ONCE
Status: COMPLETED | OUTPATIENT
Start: 2020-08-28 | End: 2020-08-28

## 2020-08-28 RX ADMIN — METHYLPREDNISOLONE ACETATE 80 MG: 80 INJECTION, SUSPENSION INTRA-ARTICULAR; INTRALESIONAL; INTRAMUSCULAR; PARENTERAL; SOFT TISSUE at 11:57

## 2020-08-28 RX ADMIN — LIDOCAINE HYDROCHLORIDE 5 ML: 10 INJECTION, SOLUTION EPIDURAL; INFILTRATION; INTRACAUDAL; PERINEURAL at 11:51

## 2020-08-28 RX ADMIN — IOHEXOL 1 ML: 300 INJECTION, SOLUTION INTRAVENOUS at 11:57

## 2020-08-28 NOTE — H&P
History of Present Illness: The patient is a 41 y o  female who presents with complaints of neck and arm pain secondary cervical disc bulging is here today for cervical epidural steroid injection  Patient Active Problem List   Diagnosis    Chest pain    Syncope    Cellulitis of left hand    Tobacco abuse    Cervical disc disorder with radiculopathy of mid-cervical region    Greater trochanteric bursitis of both hips       Past Medical History:   Diagnosis Date    Anemia        Past Surgical History:   Procedure Laterality Date     SECTION      NOSE SURGERY      TUBAL LIGATION           Current Outpatient Medications:     gabapentin (NEURONTIN) 800 mg tablet, Take 2 tablets (1,600 mg total) by mouth daily, Disp: , Rfl: 0    lansoprazole (PREVACID) 30 mg capsule, Take 30 mg by mouth daily, Disp: , Rfl:     methocarbamol (ROBAXIN) 750 mg tablet, Take 1 tablet (750 mg total) by mouth daily at bedtime as needed for muscle spasms, Disp: 30 tablet, Rfl: 5    Current Facility-Administered Medications:     iohexol (OMNIPAQUE) 300 mg/mL injection 50 mL, 50 mL, Epidural, Once, Maco Davis MD    lidocaine (PF) (XYLOCAINE-MPF) 1 % injection 5 mL, 5 mL, Infiltration, Once, Maco Davis MD    methylPREDNISolone acetate (DEPO-MEDROL) injection 80 mg, 80 mg, Epidural, Once, Maco Davis MD    Allergies   Allergen Reactions    Amoxicillin     Ciprofloxacin        Physical Exam:   Vitals:    20 1131   BP: 122/87   Pulse: 87   Resp: 16   Temp: 98 6 °F (37 °C)   SpO2: 97%     General: Awake, Alert, Oriented x 3, Mood and affect appropriate  Respiratory: Respirations even and unlabored  Cardiovascular: Peripheral pulses intact; no edema  Musculoskeletal Exam:   Neck and arm pain    ASA Score: 2    Patient/Chart Verification  Patient ID Verified: Verbal  ID Band Applied: No  Consents Confirmed: Procedural, To be obtained in the Pre-Procedure area  H&P( within 30 days) Verified:  To be obtained in the Pre-Procedure area  Allergies Reviewed: Yes  Anticoag/NSAID held?: No  Currently on antibiotics?: No  Pregnancy denied?: Yes    Assessment:   1   Cervical disc disorder with radiculopathy of mid-cervical region        Plan: TAYE

## 2020-08-28 NOTE — DISCHARGE INSTR - LAB
Epidural Steroid Injection   WHAT YOU NEED TO KNOW:   An epidural steroid injection (FIORELLA) is a procedure to inject steroid medicine into the epidural space  The epidural space is between your spinal cord and vertebrae  Steroids reduce inflammation and fluid buildup in your spine that may be causing pain  You may be given pain medicine along with the steroids  ACTIVITY  · Do not drive or operate machinery today  · No strenuous activity today - bending, lifting, etc   · You may resume normal activites starting tomorrow - start slowly and as tolerated  · You may shower today, but no tub baths or hot tubs  · You may have numbness for several hours from the local anesthetic  Please use caution and common sense, especially with weight-bearing activities  CARE OF THE INJECTION SITE  · If you have soreness or pain, apply ice to the area today (20 minutes on/20 minutes off)  · Starting tomorrow, you may use warm, moist heat or ice if needed  · You may have an increase or change in your discomfort for 36-48 hours after your treatment  · Apply ice and continue with any pain medication you have been prescribed  · [facility]ify [facility] if you have any of the following: redness, drainage, swelling, headache, stiff neck or fever above 100°F     SPECIAL INSTRUCTIONS  · Our office will contact you in approximately 7 days for a progress report  MEDICATIONS  · Continue to take all routine medications  · Our office may have instructed you to hold some medications  If you have a problem specifically related to your procedure, please call our office at [phone]  Problems not related to your procedure should be directed to your primary care physician  Zunilda Edmond

## 2020-08-29 DIAGNOSIS — M79.18 MYOFASCIAL PAIN SYNDROME: ICD-10-CM

## 2020-08-31 RX ORDER — METHOCARBAMOL 750 MG/1
TABLET, FILM COATED ORAL
Qty: 30 TABLET | Refills: 5 | OUTPATIENT
Start: 2020-08-31

## 2020-09-01 RX ORDER — METHOCARBAMOL 750 MG/1
TABLET, FILM COATED ORAL
Qty: 60 TABLET | Refills: 5 | Status: SHIPPED | OUTPATIENT
Start: 2020-09-01 | End: 2022-06-28

## 2020-09-01 NOTE — TELEPHONE ENCOUNTER
S/w pt regarding refill request  Pt confirmed she is taking one tablet (750mg) at bedtime as needed for muscle spasms and this dosage is working well, denies s/e  Pt asked if she could try breaking the tablet in half and taking half in the morning and half in the evening as she gets muscle cramps throughout the day  Pt would like refill sent to pharmacy on file  Last prescribed 2/26/2020 #30 with 5 refills  Please advise, thank you

## 2020-09-01 NOTE — TELEPHONE ENCOUNTER
Pt contacted Call Center requested refill of their medication          Medication Name:  METHOCARBAMOL    Dosage of Med:  750mg     Frequency of Med:  1 tab at HS PRN    Remaining Medication:  4 tabs     Pharmacy and Location:    [facility]

## 2020-09-04 ENCOUNTER — TELEPHONE (OUTPATIENT)
Dept: PAIN MEDICINE | Facility: CLINIC | Age: 45
End: 2020-09-04

## 2020-10-01 ENCOUNTER — OFFICE VISIT (OUTPATIENT)
Dept: PAIN MEDICINE | Facility: CLINIC | Age: 45
End: 2020-10-01
Payer: COMMERCIAL

## 2020-10-01 ENCOUNTER — TELEPHONE (OUTPATIENT)
Dept: PAIN MEDICINE | Facility: CLINIC | Age: 45
End: 2020-10-01

## 2020-10-01 ENCOUNTER — TRANSCRIBE ORDERS (OUTPATIENT)
Dept: PAIN MEDICINE | Facility: CLINIC | Age: 45
End: 2020-10-01

## 2020-10-01 VITALS
BODY MASS INDEX: 28.35 KG/M2 | WEIGHT: 160 LBS | TEMPERATURE: 98.7 F | HEIGHT: 63 IN | DIASTOLIC BLOOD PRESSURE: 99 MMHG | HEART RATE: 87 BPM | RESPIRATION RATE: 16 BRPM | SYSTOLIC BLOOD PRESSURE: 142 MMHG

## 2020-10-01 DIAGNOSIS — M47.812 CERVICAL SPONDYLOSIS: ICD-10-CM

## 2020-10-01 DIAGNOSIS — M54.2 NECK PAIN: ICD-10-CM

## 2020-10-01 DIAGNOSIS — G89.29 CHRONIC PAIN OF BOTH HIPS: ICD-10-CM

## 2020-10-01 DIAGNOSIS — M25.552 CHRONIC PAIN OF BOTH HIPS: ICD-10-CM

## 2020-10-01 DIAGNOSIS — M47.812 ARTHROPATHY OF CERVICAL FACET JOINT: Primary | ICD-10-CM

## 2020-10-01 DIAGNOSIS — G89.4 CHRONIC PAIN SYNDROME: ICD-10-CM

## 2020-10-01 DIAGNOSIS — M70.62 TROCHANTERIC BURSITIS OF BOTH HIPS: ICD-10-CM

## 2020-10-01 DIAGNOSIS — M70.61 TROCHANTERIC BURSITIS OF BOTH HIPS: ICD-10-CM

## 2020-10-01 DIAGNOSIS — M25.551 CHRONIC PAIN OF BOTH HIPS: ICD-10-CM

## 2020-10-01 DIAGNOSIS — M50.120 CERVICAL DISC DISORDER WITH RADICULOPATHY OF MID-CERVICAL REGION: ICD-10-CM

## 2020-10-01 PROCEDURE — 99214 OFFICE O/P EST MOD 30 MIN: CPT | Performed by: NURSE PRACTITIONER

## 2020-10-01 RX ORDER — DICLOFENAC SODIUM 75 MG/1
75 TABLET, DELAYED RELEASE ORAL 2 TIMES DAILY
Qty: 60 TABLET | Refills: 1 | Status: SHIPPED | OUTPATIENT
Start: 2020-10-01 | End: 2021-05-26 | Stop reason: HOSPADM

## 2020-10-05 ENCOUNTER — TELEPHONE (OUTPATIENT)
Dept: RADIOLOGY | Facility: CLINIC | Age: 45
End: 2020-10-05

## 2020-10-23 ENCOUNTER — HOSPITAL ENCOUNTER (OUTPATIENT)
Dept: RADIOLOGY | Facility: CLINIC | Age: 45
Discharge: HOME | End: 2020-10-23
Payer: COMMERCIAL

## 2020-10-23 VITALS
DIASTOLIC BLOOD PRESSURE: 93 MMHG | HEART RATE: 79 BPM | RESPIRATION RATE: 18 BRPM | TEMPERATURE: 97.5 F | SYSTOLIC BLOOD PRESSURE: 134 MMHG | OXYGEN SATURATION: 98 %

## 2020-10-23 DIAGNOSIS — M47.812 ARTHROPATHY OF CERVICAL FACET JOINT: ICD-10-CM

## 2020-10-23 PROCEDURE — 64492 INJ PARAVERT F JNT C/T 3 LEV: CPT | Performed by: ANESTHESIOLOGY

## 2020-10-23 PROCEDURE — 64491 INJ PARAVERT F JNT C/T 2 LEV: CPT | Performed by: ANESTHESIOLOGY

## 2020-10-23 PROCEDURE — 64490 INJ PARAVERT F JNT C/T 1 LEV: CPT | Performed by: ANESTHESIOLOGY

## 2020-10-23 RX ORDER — BUPIVACAINE HCL/PF 2.5 MG/ML
10 VIAL (ML) INJECTION ONCE
Status: COMPLETED | OUTPATIENT
Start: 2020-10-23 | End: 2020-10-23

## 2020-10-23 RX ADMIN — BUPIVACAINE HYDROCHLORIDE 2 ML: 2.5 INJECTION, SOLUTION EPIDURAL; INFILTRATION; INTRACAUDAL at 10:45

## 2020-11-04 ENCOUNTER — TELEPHONE (OUTPATIENT)
Dept: PAIN MEDICINE | Facility: CLINIC | Age: 45
End: 2020-11-04

## 2020-11-04 ENCOUNTER — TRANSCRIBE ORDERS (OUTPATIENT)
Dept: PAIN MEDICINE | Facility: CLINIC | Age: 45
End: 2020-11-04

## 2020-11-16 ENCOUNTER — APPOINTMENT (EMERGENCY)
Dept: RADIOLOGY | Facility: HOSPITAL | Age: 45
End: 2020-11-16
Payer: COMMERCIAL

## 2020-11-16 ENCOUNTER — HOSPITAL ENCOUNTER (EMERGENCY)
Facility: HOSPITAL | Age: 45
Discharge: HOME | End: 2020-11-16
Attending: EMERGENCY MEDICINE | Admitting: EMERGENCY MEDICINE
Payer: COMMERCIAL

## 2020-11-16 VITALS
OXYGEN SATURATION: 98 % | DIASTOLIC BLOOD PRESSURE: 85 MMHG | SYSTOLIC BLOOD PRESSURE: 152 MMHG | TEMPERATURE: 97.9 F | RESPIRATION RATE: 18 BRPM | HEART RATE: 88 BPM

## 2020-11-16 DIAGNOSIS — R05.9 COUGH: ICD-10-CM

## 2020-11-16 DIAGNOSIS — U07.1 COVID-19: Primary | ICD-10-CM

## 2020-11-16 DIAGNOSIS — J40 BRONCHITIS: ICD-10-CM

## 2020-11-16 PROCEDURE — 99285 EMERGENCY DEPT VISIT HI MDM: CPT

## 2020-11-16 PROCEDURE — 99284 EMERGENCY DEPT VISIT MOD MDM: CPT | Performed by: EMERGENCY MEDICINE

## 2020-11-16 PROCEDURE — 71045 X-RAY EXAM CHEST 1 VIEW: CPT

## 2020-11-16 RX ORDER — ALBUTEROL SULFATE 90 UG/1
2 AEROSOL, METERED RESPIRATORY (INHALATION) EVERY 4 HOURS PRN
Qty: 1 INHALER | Refills: 0 | Status: SHIPPED | OUTPATIENT
Start: 2020-11-16 | End: 2020-11-23

## 2020-11-16 RX ORDER — DOXYCYCLINE HYCLATE 100 MG/1
100 CAPSULE ORAL 2 TIMES DAILY
Qty: 14 CAPSULE | Refills: 0 | Status: SHIPPED | OUTPATIENT
Start: 2020-11-16 | End: 2020-11-23

## 2020-11-16 RX ORDER — ALBUTEROL SULFATE 90 UG/1
2 AEROSOL, METERED RESPIRATORY (INHALATION) ONCE
Status: COMPLETED | OUTPATIENT
Start: 2020-11-16 | End: 2020-11-16

## 2020-11-16 RX ORDER — DOXYCYCLINE HYCLATE 100 MG/1
100 CAPSULE ORAL ONCE
Status: COMPLETED | OUTPATIENT
Start: 2020-11-16 | End: 2020-11-16

## 2020-11-16 RX ADMIN — DOXYCYCLINE 100 MG: 100 CAPSULE ORAL at 15:44

## 2020-11-16 RX ADMIN — ALBUTEROL SULFATE 2 PUFF: 90 AEROSOL, METERED RESPIRATORY (INHALATION) at 15:45

## 2020-12-19 DIAGNOSIS — M47.812 CERVICAL SPONDYLOSIS: ICD-10-CM

## 2020-12-21 RX ORDER — DICLOFENAC SODIUM 75 MG/1
75 TABLET, DELAYED RELEASE ORAL 2 TIMES DAILY
Qty: 60 TABLET | Refills: 1 | OUTPATIENT
Start: 2020-12-21

## 2021-01-05 DIAGNOSIS — M47.812 CERVICAL SPONDYLOSIS: ICD-10-CM

## 2021-01-07 RX ORDER — DICLOFENAC SODIUM 75 MG/1
75 TABLET, DELAYED RELEASE ORAL 2 TIMES DAILY
Qty: 60 TABLET | Refills: 1 | OUTPATIENT
Start: 2021-01-07

## 2021-02-23 ENCOUNTER — TRANSCRIBE ORDERS (OUTPATIENT)
Dept: ADMINISTRATIVE | Facility: HOSPITAL | Age: 46
End: 2021-02-23

## 2021-02-23 DIAGNOSIS — E04.1 NONTOXIC UNINODULAR GOITER: Primary | ICD-10-CM

## 2021-03-01 ENCOUNTER — HOSPITAL ENCOUNTER (OUTPATIENT)
Dept: ULTRASOUND IMAGING | Facility: HOSPITAL | Age: 46
Discharge: HOME | End: 2021-03-01
Payer: COMMERCIAL

## 2021-03-01 DIAGNOSIS — E04.1 NONTOXIC UNINODULAR GOITER: ICD-10-CM

## 2021-03-01 PROCEDURE — 76536 US EXAM OF HEAD AND NECK: CPT

## 2021-03-08 DIAGNOSIS — K21.9 GASTROESOPHAGEAL REFLUX DISEASE WITHOUT ESOPHAGITIS: Primary | ICD-10-CM

## 2021-03-08 RX ORDER — LANSOPRAZOLE 30 MG/1
CAPSULE, DELAYED RELEASE ORAL
Qty: 90 CAPSULE | Refills: 2 | Status: SHIPPED | OUTPATIENT
Start: 2021-03-08 | End: 2021-03-09 | Stop reason: SDUPTHER

## 2021-03-09 ENCOUNTER — TELEPHONE (OUTPATIENT)
Dept: GASTROENTEROLOGY | Facility: CLINIC | Age: 46
End: 2021-03-09

## 2021-03-09 DIAGNOSIS — K21.9 GASTROESOPHAGEAL REFLUX DISEASE WITHOUT ESOPHAGITIS: ICD-10-CM

## 2021-03-09 RX ORDER — LANSOPRAZOLE 30 MG/1
30 CAPSULE, DELAYED RELEASE ORAL DAILY
Qty: 90 CAPSULE | Refills: 2 | Status: SHIPPED | OUTPATIENT
Start: 2021-03-09 | End: 2022-01-05 | Stop reason: SDUPTHER

## 2021-03-09 NOTE — TELEPHONE ENCOUNTER
Pt called asking for a refill on her lansoprazole 30mg  She is scheduled for a f/u lynne with Dr Farhad Dee  Please and thank you

## 2021-05-09 PROCEDURE — 99284 EMERGENCY DEPT VISIT MOD MDM: CPT

## 2021-05-10 ENCOUNTER — HOSPITAL ENCOUNTER (EMERGENCY)
Facility: HOSPITAL | Age: 46
Discharge: HOME | End: 2021-05-10
Attending: EMERGENCY MEDICINE
Payer: COMMERCIAL

## 2021-05-10 ENCOUNTER — APPOINTMENT (EMERGENCY)
Dept: RADIOLOGY | Facility: HOSPITAL | Age: 46
End: 2021-05-10
Payer: COMMERCIAL

## 2021-05-10 VITALS
HEIGHT: 63 IN | OXYGEN SATURATION: 98 % | DIASTOLIC BLOOD PRESSURE: 88 MMHG | BODY MASS INDEX: 26.93 KG/M2 | SYSTOLIC BLOOD PRESSURE: 176 MMHG | HEART RATE: 88 BPM | RESPIRATION RATE: 16 BRPM | WEIGHT: 152 LBS | TEMPERATURE: 97.2 F

## 2021-05-10 DIAGNOSIS — R09.1 PLEURISY: Primary | ICD-10-CM

## 2021-05-10 DIAGNOSIS — R07.89 CHEST WALL PAIN: ICD-10-CM

## 2021-05-10 DIAGNOSIS — J18.9 PNEUMONIA OF RIGHT MIDDLE LOBE DUE TO INFECTIOUS ORGANISM: ICD-10-CM

## 2021-05-10 LAB
ALBUMIN SERPL BCP-MCNC: 3.9 G/DL (ref 3.5–5)
ALP SERPL-CCNC: 100 U/L (ref 46–116)
ALT SERPL W P-5'-P-CCNC: 18 U/L (ref 12–78)
ANION GAP SERPL CALCULATED.3IONS-SCNC: 7 MMOL/L (ref 4–13)
AST SERPL W P-5'-P-CCNC: 11 U/L (ref 5–45)
BASOPHILS # BLD AUTO: 0.08 THOUSANDS/ΜL (ref 0–0.1)
BASOPHILS NFR BLD AUTO: 1 % (ref 0–1)
BILIRUB SERPL-MCNC: 0.14 MG/DL (ref 0.2–1)
BUN SERPL-MCNC: 10 MG/DL (ref 5–25)
CALCIUM SERPL-MCNC: 8.9 MG/DL (ref 8.3–10.1)
CHLORIDE SERPL-SCNC: 103 MMOL/L (ref 100–108)
CO2 SERPL-SCNC: 28 MMOL/L (ref 21–32)
CREAT SERPL-MCNC: 0.76 MG/DL (ref 0.6–1.3)
D DIMER PPP FEU-MCNC: 0.27 UG/ML FEU
EOSINOPHIL # BLD AUTO: 0.12 THOUSAND/ΜL (ref 0–0.61)
EOSINOPHIL NFR BLD AUTO: 1 % (ref 0–6)
ERYTHROCYTE [DISTWIDTH] IN BLOOD BY AUTOMATED COUNT: 18.6 % (ref 11.6–15.1)
GFR SERPL CREATININE-BSD FRML MDRD: 95 ML/MIN/1.73SQ M
GLUCOSE SERPL-MCNC: 106 MG/DL (ref 65–140)
HCG SERPL QL: NEGATIVE
HCT VFR BLD AUTO: 38 % (ref 34.8–46.1)
HGB BLD-MCNC: 12.1 G/DL (ref 11.5–15.4)
IMM GRANULOCYTES # BLD AUTO: 0.03 THOUSAND/UL (ref 0–0.2)
IMM GRANULOCYTES NFR BLD AUTO: 0 % (ref 0–2)
LYMPHOCYTES # BLD AUTO: 2.59 THOUSANDS/ΜL (ref 0.6–4.47)
LYMPHOCYTES NFR BLD AUTO: 30 % (ref 14–44)
MCH RBC QN AUTO: 24.6 PG (ref 26.8–34.3)
MCHC RBC AUTO-ENTMCNC: 31.8 G/DL (ref 31.4–37.4)
MCV RBC AUTO: 77 FL (ref 82–98)
MONOCYTES # BLD AUTO: 0.63 THOUSAND/ΜL (ref 0.17–1.22)
MONOCYTES NFR BLD AUTO: 7 % (ref 4–12)
NEUTROPHILS # BLD AUTO: 5.06 THOUSANDS/ΜL (ref 1.85–7.62)
NEUTS SEG NFR BLD AUTO: 61 % (ref 43–75)
NRBC BLD AUTO-RTO: 0 /100 WBCS
NT-PROBNP SERPL-MCNC: 81 PG/ML
PLATELET # BLD AUTO: 228 THOUSANDS/UL (ref 149–390)
PMV BLD AUTO: 11.6 FL (ref 8.9–12.7)
POTASSIUM SERPL-SCNC: 3.9 MMOL/L (ref 3.5–5.3)
PROT SERPL-MCNC: 7.5 G/DL (ref 6.4–8.2)
RBC # BLD AUTO: 4.91 MILLION/UL (ref 3.81–5.12)
SODIUM SERPL-SCNC: 138 MMOL/L (ref 136–145)
TROPONIN I SERPL-MCNC: <0.02 NG/ML
WBC # BLD AUTO: 8.51 THOUSAND/UL (ref 4.31–10.16)

## 2021-05-10 PROCEDURE — 85025 COMPLETE CBC W/AUTO DIFF WBC: CPT | Performed by: PHYSICIAN ASSISTANT

## 2021-05-10 PROCEDURE — 71046 X-RAY EXAM CHEST 2 VIEWS: CPT

## 2021-05-10 PROCEDURE — 93005 ELECTROCARDIOGRAM TRACING: CPT

## 2021-05-10 PROCEDURE — 99285 EMERGENCY DEPT VISIT HI MDM: CPT | Performed by: PHYSICIAN ASSISTANT

## 2021-05-10 PROCEDURE — 80053 COMPREHEN METABOLIC PANEL: CPT | Performed by: PHYSICIAN ASSISTANT

## 2021-05-10 PROCEDURE — 84703 CHORIONIC GONADOTROPIN ASSAY: CPT | Performed by: PHYSICIAN ASSISTANT

## 2021-05-10 PROCEDURE — 83880 ASSAY OF NATRIURETIC PEPTIDE: CPT | Performed by: PHYSICIAN ASSISTANT

## 2021-05-10 PROCEDURE — 36415 COLL VENOUS BLD VENIPUNCTURE: CPT | Performed by: PHYSICIAN ASSISTANT

## 2021-05-10 PROCEDURE — 85379 FIBRIN DEGRADATION QUANT: CPT | Performed by: PHYSICIAN ASSISTANT

## 2021-05-10 PROCEDURE — 84484 ASSAY OF TROPONIN QUANT: CPT | Performed by: PHYSICIAN ASSISTANT

## 2021-05-10 RX ORDER — ALBUTEROL SULFATE 90 UG/1
2 AEROSOL, METERED RESPIRATORY (INHALATION) ONCE
Status: COMPLETED | OUTPATIENT
Start: 2021-05-10 | End: 2021-05-10

## 2021-05-10 RX ORDER — PREDNISONE 20 MG/1
60 TABLET ORAL ONCE
Status: COMPLETED | OUTPATIENT
Start: 2021-05-10 | End: 2021-05-10

## 2021-05-10 RX ORDER — CAPSAICIN 0.07 G/100G
CREAM TOPICAL 3 TIMES DAILY
Qty: 28.3 G | Refills: 0 | Status: SHIPPED | OUTPATIENT
Start: 2021-05-10 | End: 2021-05-26 | Stop reason: HOSPADM

## 2021-05-10 RX ORDER — LIDOCAINE 50 MG/G
1 PATCH TOPICAL ONCE
Status: DISCONTINUED | OUTPATIENT
Start: 2021-05-10 | End: 2021-05-10 | Stop reason: HOSPADM

## 2021-05-10 RX ORDER — PREDNISONE 10 MG/1
TABLET ORAL
Qty: 21 TABLET | Refills: 0 | Status: SHIPPED | OUTPATIENT
Start: 2021-05-10 | End: 2021-05-26 | Stop reason: HOSPADM

## 2021-05-10 RX ORDER — ACETAMINOPHEN 325 MG/1
650 TABLET ORAL ONCE
Status: COMPLETED | OUTPATIENT
Start: 2021-05-10 | End: 2021-05-10

## 2021-05-10 RX ADMIN — ACETAMINOPHEN 650 MG: 325 TABLET, FILM COATED ORAL at 02:24

## 2021-05-10 RX ADMIN — LIDOCAINE 1 PATCH: 50 PATCH CUTANEOUS at 02:24

## 2021-05-10 RX ADMIN — PREDNISONE 60 MG: 20 TABLET ORAL at 02:24

## 2021-05-10 RX ADMIN — ALBUTEROL SULFATE 2 PUFF: 90 AEROSOL, METERED RESPIRATORY (INHALATION) at 02:24

## 2021-05-10 NOTE — ED NOTES
Pt in hallway bed, therefore continuous pulse ox not possible at this time  Will spot check as needed       Etienne Ray RN  05/10/21 [id number]

## 2021-05-11 LAB
ATRIAL RATE: 81 BPM
P AXIS: 48 DEGREES
PR INTERVAL: 176 MS
QRS AXIS: 6 DEGREES
QRSD INTERVAL: 72 MS
QT INTERVAL: 374 MS
QTC INTERVAL: 426 MS
T WAVE AXIS: 43 DEGREES
VENTRICULAR RATE: 78 BPM

## 2021-05-11 PROCEDURE — 93010 ELECTROCARDIOGRAM REPORT: CPT | Performed by: INTERNAL MEDICINE

## 2021-05-11 RX ORDER — DOXYCYCLINE HYCLATE 100 MG/1
100 CAPSULE ORAL 2 TIMES DAILY
Qty: 20 CAPSULE | Refills: 0 | Status: SHIPPED | OUTPATIENT
Start: 2021-05-11 | End: 2021-05-21

## 2021-05-16 NOTE — ED PROVIDER NOTES
EMERGENCY MEDICINE NOTE        PATIENT IDENTIFICATION PHYSICIAN/SERVICE INFORMATION   Name: Rosales Aguilar  MRN: [id number]  YOB: 1975  Age/Sex: 40 y o  female  Preferred Language: English  Code Status: Prior  Encounter Date: 2021  Attending Physician: Naveed Varela MD  Admitting Physician: No admitting provider for patient encounter  Primary Care Physician: Gus Pacheco MD         Primary Care Phone: Sirena Pack [phone]     Chief Complaint   Patient presents with    Back Pain     left inferior scapula pain  pain started this morning and is now radiating around to abd  HISTORY OF PRESENT ILLNESS     Rosales Aguilar is a 40 y o  female who presents due to Left chest/back pain  Pt reports left sided chest/back pain onset earlier this AM with sharp, reproducible pain with compression, deep breaths  Denies specific injury/trauma, hx DVT/PE, abdominal pain, n/v/d, hx kidney stones, hematuria, dysuria, cough, congestion, and no other complaints at this time  History provided by:  Patient   used: No          PAST MEDICAL AND SURGICAL HISTORY     Past Medical History:   Diagnosis Date    Anemia     Asthma     Ear problems     GERD (gastroesophageal reflux disease)     Neck pain        Past Surgical History:   Procedure Laterality Date     SECTION      NOSE SURGERY      SINUS SURGERY      TUBAL LIGATION         History reviewed  No pertinent family history      E-Cigarette/Vaping    E-Cigarette Use Never User      E-Cigarette/Vaping Substances    Nicotine No     THC No     CBD No     Flavoring No     Other No     Unknown No      Social History     Tobacco Use    Smoking status: Current Some Day Smoker     Packs/day: 1 00     Types: Cigarettes    Smokeless tobacco: Never Used   Substance Use Topics    Alcohol use: No    Drug use: No         ALLERGIES     Allergies   Allergen Reactions    Amoxicillin     Ciprofloxacin          HOME MEDICATIONS     Prior to Admission Medications   Prescriptions Last Dose Informant Patient Reported? Taking? ALPRAZolam (XANAX) 0 25 mg tablet   Yes No   Sig: alprazolam 0 25 mg tablet   Antipyrine-Benzocaine (AURALGAN OT)   Yes No   Sig: Auralgan ear drops   Instill by otic route 4 drops t i d  in both ears     DULoxetine (Cymbalta) 30 mg delayed release capsule   Yes No   Sig: Cymbalta 30 mg capsule,delayed release   PARoxetine (PAXIL-CR) 12 5 mg 24 hr tablet   Yes No   Sig: paroxetine ER 12 5 mg tablet,extended release 24 hr   RABEprazole (Aciphex) 20 MG tablet   Yes No   Sig: AcipHex 20 mg tablet,delayed release   albuterol (PROVENTIL HFA,VENTOLIN HFA) 90 mcg/act inhaler   Yes No   Sig: albuterol sulfate HFA 90 mcg/actuation aerosol inhaler   INHALE 2 PUFFS EVERY 4 HOURS AS NEEDED FOR WHEEZING OR SHORTNESS OF BREATH   cefdinir (OMNICEF) 300 mg capsule   Yes No   Sig: cefdinir 300 mg capsule   ciprofloxacin (CIPRO) 500 mg tablet   Yes No   Sig: ciprofloxacin 500 mg tablet   darifenacin (Enablex) 15 MG 24 hr tablet   Yes No   Sig: Enablex 15 mg tablet,extended release   diclofenac (VOLTAREN) 75 mg EC tablet   No No   Sig: Take 1 tablet (75 mg total) by mouth 2 (two) times a day Take with Food   Patient not taking: Reported on 2021   econazole nitrate 1 % cream   Yes No   Sig: econazole 1 % topical cream   ergocalciferol (VITAMIN D2) 50,000 units   Yes No   Sig: Take 50,000 Units by mouth once a week   ferrous sulfate 324 (65 Fe) mg   Yes No   Sig: Take 324 mg by mouth daily   fluocinolone acetonide (DERMOTIC) 0 01 % otic oil   No No   Sig: Administer 5 drops into both ears 2 (two) times a day for 7 days Then prn   fluticasone (FLONASE) 50 mcg/act nasal spray   No No   Si sprays into each nostril daily   gabapentin (NEURONTIN) 300 mg capsule   Yes No   Sig: gabapentin 300 mg capsule   gabapentin (NEURONTIN) 800 mg tablet  Self No No   Sig: Take 2 tablets (1,600 mg total) by mouth daily hydrocodone-chlorpheniramine polistirex (TUSSIONEX) 10-8 mg/5 mL ER suspension   Yes No   Sig: hydrocodone 10 mg-chlorpheniramine 8 mg/5 mL oral susp extend  rel 12hr   lansoprazole (PREVACID) 30 mg capsule   No No   Sig: Take 1 capsule (30 mg total) by mouth daily   methocarbamol (ROBAXIN) 750 mg tablet  Self No No   Sig: Take 0 5 tablet in the AM, 0 5 tablet in PM and full tablet at bedtime as needed for spasms   naproxen (NAPROSYN) 500 mg tablet   Yes No   Sig: naproxen 500 mg tablet   ondansetron (ZOFRAN-ODT) 4 mg disintegrating tablet   Yes No   Sig: ondansetron 4 mg disintegrating tablet   sulfamethoxazole-trimethoprim (BACTRIM DS) 800-160 mg per tablet   Yes No   Sig: Take 1 tablet by mouth 2 (two) times a day      Facility-Administered Medications: None         REVIEW OF SYSTEMS     Review of Systems   Constitutional: Negative for activity change, appetite change, chills, diaphoresis, fatigue and fever  HENT: Negative for sore throat and trouble swallowing  Eyes: Negative for visual disturbance  Respiratory: Negative for apnea, cough, choking, chest tightness, shortness of breath, wheezing and stridor  Cardiovascular: Positive for chest pain  Negative for palpitations and leg swelling  Gastrointestinal: Negative for abdominal distention, abdominal pain, constipation, diarrhea, nausea and vomiting  Genitourinary: Negative for decreased urine volume, difficulty urinating, dysuria, flank pain, frequency, genital sores, hematuria and urgency  Musculoskeletal: Negative for neck pain  Skin: Negative for rash and wound  Neurological: Negative for dizziness, weakness, light-headedness, numbness and headaches  Hematological: Negative for adenopathy  Psychiatric/Behavioral: The patient is not nervous/anxious            PHYSICAL EXAMINATION     ED Triage Vitals [05/09/21 2356]   Temperature Pulse Respirations Blood Pressure SpO2   (!) 97 2 °F (36 2 °C) 93 16 (!) 196/95 98 %      Temp Source Heart Rate Source Patient Position - Orthostatic VS BP Location FiO2 (%)   Oral Monitor Sitting Left arm --      Pain Score       --         Wt Readings from Last 3 Encounters:   05/09/21 68 9 kg (152 lb)   04/26/21 68 9 kg (152 lb)   04/13/21 68 9 kg (152 lb)         Physical Exam  Vitals signs and nursing note reviewed  Constitutional:       General: She is not in acute distress  Appearance: She is well-developed  She is not ill-appearing, toxic-appearing or diaphoretic  HENT:      Head: Normocephalic and atraumatic  Jaw: No trismus  Right Ear: Hearing, tympanic membrane, ear canal and external ear normal  No decreased hearing noted  No laceration, drainage, swelling or tenderness  No middle ear effusion  No foreign body  No mastoid tenderness  No hemotympanum  Tympanic membrane is not injected, scarred, perforated, erythematous, retracted or bulging  Left Ear: Hearing, tympanic membrane, ear canal and external ear normal  No decreased hearing noted  No laceration, drainage, swelling or tenderness  No middle ear effusion  No foreign body  No mastoid tenderness  No hemotympanum  Tympanic membrane is not injected, scarred, perforated, erythematous, retracted or bulging  Nose: Nose normal       Right Sinus: No maxillary sinus tenderness or frontal sinus tenderness  Left Sinus: No maxillary sinus tenderness or frontal sinus tenderness  Mouth/Throat:      Pharynx: Uvula midline  No oropharyngeal exudate, posterior oropharyngeal erythema or uvula swelling  Tonsils: No tonsillar exudate or tonsillar abscesses  1+ on the right  1+ on the left  Eyes:      General:         Right eye: No discharge  Left eye: No discharge  Conjunctiva/sclera: Conjunctivae normal       Pupils: Pupils are equal, round, and reactive to light  Neck:      Musculoskeletal: Normal range of motion and neck supple  No muscular tenderness        Vascular: No carotid bruit, hepatojugular reflux or JVD  Cardiovascular:      Rate and Rhythm: Normal rate and regular rhythm  No extrasystoles are present  Chest Wall: PMI is not displaced  Pulses: Normal pulses  Radial pulses are 2+ on the right side and 2+ on the left side  Dorsalis pedis pulses are 2+ on the right side and 2+ on the left side  Posterior tibial pulses are 2+ on the right side and 2+ on the left side  Heart sounds: Normal heart sounds  No murmur  No friction rub  No gallop  Pulmonary:      Effort: Pulmonary effort is normal  No respiratory distress  Breath sounds: Normal breath sounds  No stridor  No wheezing, rhonchi or rales  Chest:      Chest wall: Tenderness (also reproduced with deep breaths) present  Abdominal:      General: Bowel sounds are normal  There is no distension  Palpations: Abdomen is soft  Abdomen is not rigid  There is no mass  Tenderness: There is no abdominal tenderness  There is no right CVA tenderness, left CVA tenderness, guarding or rebound  Negative signs include Garcia's sign and McBurney's sign  Hernia: No hernia is present  Comments: Negative Garcia's  Negative Appendiceal signs (Psoas, Rovsing's, Obturator)  Negative Peritoneal Signs   Musculoskeletal: Normal range of motion  Lymphadenopathy:      Cervical: No cervical adenopathy  Skin:     General: Skin is warm and dry  Capillary Refill: Capillary refill takes less than 2 seconds  Neurological:      Mental Status: She is alert and oriented to person, place, and time             DIAGNOSTIC RESULTS     Laboratory results:    Labs Reviewed   CBC AND DIFFERENTIAL - Abnormal       Result Value Ref Range Status    WBC 8 51  4 31 - 10 16 Thousand/uL Final    RBC 4 91  3 81 - 5 12 Million/uL Final    Hemoglobin 12 1  11 5 - 15 4 g/dL Final    Hematocrit 38 0  34 8 - 46 1 % Final    MCV 77 (*) 82 - 98 fL Final    MCH 24 6 (*) 26 8 - 34 3 pg Final    MCHC 31 8  31 4 - 37 4 g/dL Final    RDW 18 6 (*) 11 6 - 15 1 % Final    MPV 11 6  8 9 - 12 7 fL Final    Platelets 962  354 - 390 Thousands/uL Final    nRBC 0  /100 WBCs Final    Neutrophils Relative 61  43 - 75 % Final    Immat GRANS % 0  0 - 2 % Final    Lymphocytes Relative 30  14 - 44 % Final    Monocytes Relative 7  4 - 12 % Final    Eosinophils Relative 1  0 - 6 % Final    Basophils Relative 1  0 - 1 % Final    Neutrophils Absolute 5 06  1 85 - 7 62 Thousands/µL Final    Immature Grans Absolute 0 03  0 00 - 0 20 Thousand/uL Final    Lymphocytes Absolute 2 59  0 60 - 4 47 Thousands/µL Final    Monocytes Absolute 0 63  0 17 - 1 22 Thousand/µL Final    Eosinophils Absolute 0 12  0 00 - 0 61 Thousand/µL Final    Basophils Absolute 0 08  0 00 - 0 10 Thousands/µL Final   COMPREHENSIVE METABOLIC PANEL - Abnormal    Sodium 138  136 - 145 mmol/L Final    Potassium 3 9  3 5 - 5 3 mmol/L Final    Chloride 103  100 - 108 mmol/L Final    CO2 28  21 - 32 mmol/L Final    ANION GAP 7  4 - 13 mmol/L Final    BUN 10  5 - 25 mg/dL Final    Creatinine 0 76  0 60 - 1 30 mg/dL Final    Comment: Standardized to IDMS reference method    Glucose 106  65 - 140 mg/dL Final    Comment: If the patient is fasting, the ADA then defines impaired fasting glucose as > 100 mg/dL and diabetes as > or equal to 123 mg/dL  Specimen collection should occur prior to Sulfasalazine administration due to the potential for falsely depressed results  Specimen collection should occur prior to Sulfapyridine administration due to the potential for falsely elevated results  Calcium 8 9  8 3 - 10 1 mg/dL Final    AST 11  5 - 45 U/L Final    Comment: Specimen collection should occur prior to Sulfasalazine administration due to the potential for falsely depressed results  ALT 18  12 - 78 U/L Final    Comment: Specimen collection should occur prior to Sulfasalazine administration due to the potential for falsely depressed results       Alkaline Phosphatase 100  46 - 116 U/L Final    Total Protein 7 5  6 4 - 8 2 g/dL Final    Albumin 3 9  3 5 - 5 0 g/dL Final    Total Bilirubin 0 14 (*) 0 20 - 1 00 mg/dL Final    Comment: Use of this assay is not recommended for patients undergoing treatment with eltrombopag due to the potential for falsely elevated results  eGFR 95  ml/min/1 73sq m Final    Narrative:     National Kidney Disease Foundation guidelines for Chronic Kidney Disease (CKD):     Stage 1 with normal or high GFR (GFR > 90 mL/min/1 73 square meters)    Stage 2 Mild CKD (GFR = 60-89 mL/min/1 73 square meters)    Stage 3A Moderate CKD (GFR = 45-59 mL/min/1 73 square meters)    Stage 3B Moderate CKD (GFR = 30-44 mL/min/1 73 square meters)    Stage 4 Severe CKD (GFR = 15-29 mL/min/1 73 square meters)    Stage 5 End Stage CKD (GFR <15 mL/min/1 73 square meters)  Note: GFR calculation is accurate only with a steady state creatinine   TROPONIN I - Normal    Troponin I <0 02  <=0 04 ng/mL Final    Comment: Siemens Chemistry analyzer 99% cutoff is > 0 04 ng/mL in network labs     o cTnI 99% cutoff is useful only when applied to patients in the clinical setting of myocardial ischemia   o cTnI 99% cutoff should be interpreted in the context of clinical history, ECG findings and possibly cardiac imaging to establish correct diagnosis  o cTnI 99% cutoff may be suggestive but clearly not indicative of a coronary event without the clinical setting of myocardial ischemia  NT-BNP PRO (BRAIN NATRIURETIC PEPTIDE) - Normal    NT-proBNP 81  <125 pg/mL Final   D-DIMER, QUANTITATIVE - Normal    D-Dimer, Quant 0 27  <0 50 ug/ml FEU Final    Comment: Reference and upper limits to exclude DVT and PE are the same  Do not use to exclude if clinical symptoms are present    Pregnant women:  1st trimester:  <0 22 - 1 06 ug/ml FEU  2nd trimester:  <0 22 - 1 88 ug/ml FEU  3rd trimester:   0 24 - 3 28 ug/ml FEU    Note: Normal ranges may not apply to patients who are transgender, non-binary, or whose legal sex, sex at birth, and gender identity differ  PREGNANCY TEST (HCG QUALITATIVE) - Normal    Preg, Serum Negative  Negative Final       All labs reviewed and utilized in the medical decision making process    Radiology results:    XR chest pa & lateral   ED Interpretation   No acute cardiopulmonary disease      Final Result      Patchy right middle lobe airspace opacity may represent atelectasis versus pneumonia  Clinical and laboratory correlation are recommended  The study was marked in EPIC for significant notification  Workstation performed: [id number]             All radiology studies independently viewed by me and interpreted by the radiologist       PROCEDURES     ECG 12 Lead Documentation Only    Date/Time: 5/10/2021 2:34 AM  Performed by: Alondra East PA-C  Authorized by:  Alondra East PA-C     Indications / Diagnosis:  Chest wall pain  ECG reviewed by me, the ED Provider: yes    Patient location:  ED  Previous ECG:     Previous ECG:  Compared to current    Comparison ECG info:  PACs    Similarity:  Changes noted    Comparison to cardiac monitor: Yes    Interpretation:     Interpretation: non-specific    Rate:     ECG rate assessment: normal    Rhythm:     Rhythm: sinus rhythm    Ectopy:     Ectopy: PAC    QRS:     QRS axis:  Normal    QRS intervals:  Normal  Conduction:     Conduction: normal    ST segments:     ST segments:  Normal  T waves:     T waves: normal            Invasive Devices     None                 ASSESSMENT AND PLAN     MDM  Number of Diagnoses or Management Options  Chest wall pain: new, needed workup  Pleurisy: new, needed workup  Pneumonia of right middle lobe due to infectious organism:      Amount and/or Complexity of Data Reviewed  Clinical lab tests: ordered and reviewed  Tests in the radiology section of CPT®: ordered and reviewed  Tests in the medicine section of CPT®: reviewed and ordered  Review and summarize past medical records: yes  Independent visualization of images, tracings, or specimens: yes    Risk of Complications, Morbidity, and/or Mortality  Presenting problems: moderate  Diagnostic procedures: moderate  Management options: moderate    Patient Progress  Patient progress: improved      Initial ED assessment:  Michelle Hfufman is a 40 y o  female who presents with chest pain  Vitals signs reviewed  Physical examination is remarkable for reproducible chest wall pain    Initial Ddx  includes but is not limited to:   chest wall pain, costochondritis, pleurisy, PTX, pneumonia, doubt pericarditis, myocarditis, GI etiology;ACS or MI or dissection or PE or rhabdomyolysis  Initial ED plan:   Plan will be to perform diagnostic testing of Blood labs, Urinalysis, EKG and XR of chest and treat symptomatically   Final ED summary/disposition: Discussed results of diagnostic testing with Patient in detail  Greater than 10 minutes of education regarding diagnosis, testing, and ample opportunity for questions  Home care recommendations given with discharge paperwork  Return to ED instructions given if new/worsening sxs  Verbalized understanding      MDM  Reviewed: previous chart, nursing note and vitals  Interpretation: x-ray and labs          ED COURSE OF CARE AND REASSESSMENT                                          Medications   predniSONE tablet 60 mg (60 mg Oral Given 5/10/21 0224)   acetaminophen (TYLENOL) tablet 650 mg (650 mg Oral Given 5/10/21 0224)   albuterol (PROVENTIL HFA,VENTOLIN HFA) inhaler 2 puff (2 puffs Inhalation Given 5/10/21 0224)         FINAL IMPRESSION     Final diagnoses:   Pleurisy   Chest wall pain   Pneumonia of right middle lobe due to infectious organism         DISPOSITION AND PLANNING     Time reflects when diagnosis was documented in both MDM as applicable and the Disposition within this note     Time User Action Codes Description Comment    5/10/2021  1:56 AM Emelia Whitney Add [R09 1] Pleurisy     5/10/2021  1:57 AM Torin Nima Add [R07 89] Chest wall pain     5/11/2021 12:28 PM Brian Kaur Add [J18 9] Pneumonia of right middle lobe due to infectious organism       ED Disposition     ED Disposition Condition Date/Time Comment    Discharge Stable Mon May 10, 2021  1:56 AM Dolores Fraire discharge to home/self care  Follow-up Information     Follow up With Specialties Details Why Contact Info Additional Information    Ya Andrade MD Internal Medicine Call in 1 day For follow up [address] [facility] Emergency Department Emergency Medicine Go to  If symptoms worsen [address] [facility] [address] [facility] Emergency Department, [address], [place], [place], [address] [facility] [address] [place] [address]  [phone]    Call in 1 day  For follow up    [facility] Emergency [facility] [address] [id number]  [phone]  Go to   If symptoms worsen        DISCHARGE MEDICATIONS     Discharge Medication List as of 5/10/2021  2:12 AM      START taking these medications    Details   capsicum (ZOSTRIX) 0 075 % topical cream Apply topically 3 (three) times a day, Starting Mon 5/10/2021, Normal      predniSONE 10 mg tablet 2 tab twice a day for 3 days, 1 tab twice a day for 3 days, 1 tab a day for three days, Normal         CONTINUE these medications which have NOT CHANGED    Details   albuterol (PROVENTIL HFA,VENTOLIN HFA) 90 mcg/act inhaler albuterol sulfate HFA 90 mcg/actuation aerosol inhaler   INHALE 2 PUFFS EVERY 4 HOURS AS NEEDED FOR WHEEZING OR SHORTNESS OF BREATH, Historical Med      ALPRAZolam (XANAX) 0 25 mg tablet alprazolam 0 25 mg tablet, Historical Med      Antipyrine-Benzocaine (AURALGAN OT) Auralgan ear drops   Instill by otic route 4 drops t i d  in both ears  , Historical Med cefdinir (OMNICEF) 300 mg capsule cefdinir 300 mg capsule, Historical Med      ciprofloxacin (CIPRO) 500 mg tablet ciprofloxacin 500 mg tablet, Historical Med      darifenacin (Enablex) 15 MG 24 hr tablet Enablex 15 mg tablet,extended release, Historical Med      diclofenac (VOLTAREN) 75 mg EC tablet Take 1 tablet (75 mg total) by mouth 2 (two) times a day Take with Food, Starting Thu 10/1/2020, Normal      DULoxetine (Cymbalta) 30 mg delayed release capsule Cymbalta 30 mg capsule,delayed release, Historical Med      econazole nitrate 1 % cream econazole 1 % topical cream, Historical Med      ergocalciferol (VITAMIN D2) 50,000 units Take 50,000 Units by mouth once a week, Starting Fri 2/26/2021, Historical Med      ferrous sulfate 324 (65 Fe) mg Take 324 mg by mouth daily, Starting Fri 2/26/2021, Historical Med      fluocinolone acetonide (DERMOTIC) 0 01 % otic oil Administer 5 drops into both ears 2 (two) times a day for 7 days Then prn, Starting Mon 4/26/2021, Until Mon 5/3/2021, Normal      fluticasone (FLONASE) 50 mcg/act nasal spray 2 sprays into each nostril daily, Starting Tue 4/13/2021, Normal      gabapentin (NEURONTIN) 300 mg capsule gabapentin 300 mg capsule, Historical Med      gabapentin (NEURONTIN) 800 mg tablet Take 2 tablets (1,600 mg total) by mouth daily, Starting Tue 1/15/2019, No Print      hydrocodone-chlorpheniramine polistirex (TUSSIONEX) 10-8 mg/5 mL ER suspension hydrocodone 10 mg-chlorpheniramine 8 mg/5 mL oral susp extend  rel 12hr, Historical Med      lansoprazole (PREVACID) 30 mg capsule Take 1 capsule (30 mg total) by mouth daily, Starting Tue 3/9/2021, Normal      methocarbamol (ROBAXIN) 750 mg tablet Take 0 5 tablet in the AM, 0 5 tablet in PM and full tablet at bedtime as needed for spasms, Normal      naproxen (NAPROSYN) 500 mg tablet naproxen 500 mg tablet, Historical Med      ondansetron (ZOFRAN-ODT) 4 mg disintegrating tablet ondansetron 4 mg disintegrating tablet, Historical Med      PARoxetine (PAXIL-CR) 12 5 mg 24 hr tablet paroxetine ER 12 5 mg tablet,extended release 24 hr, Historical Med      RABEprazole (Aciphex) 20 MG tablet AcipHex 20 mg tablet,delayed release, Historical Med      sulfamethoxazole-trimethoprim (BACTRIM DS) 800-160 mg per tablet Take 1 tablet by mouth 2 (two) times a day, Starting Fri 2/26/2021, Historical Med             No discharge procedures on file      PDMP Review       Value Time User    PDMP Reviewed  Yes 12/4/2019  1:33 PM MD Luis Miguel Charles PA-C Veleria Gerald, PA-C  05/16/21 1145

## 2021-05-21 ENCOUNTER — TRANSCRIBE ORDERS (OUTPATIENT)
Dept: ADMINISTRATIVE | Facility: HOSPITAL | Age: 46
End: 2021-05-21

## 2021-05-21 ENCOUNTER — HOSPITAL ENCOUNTER (OUTPATIENT)
Dept: RADIOLOGY | Facility: HOSPITAL | Age: 46
Discharge: HOME | End: 2021-05-21
Payer: COMMERCIAL

## 2021-05-21 DIAGNOSIS — J18.9 UNRESOLVED PNEUMONIA: Primary | ICD-10-CM

## 2021-05-21 DIAGNOSIS — J18.9 UNRESOLVED PNEUMONIA: ICD-10-CM

## 2021-05-21 DIAGNOSIS — R05.9 COUGH: ICD-10-CM

## 2021-05-21 PROCEDURE — 71046 X-RAY EXAM CHEST 2 VIEWS: CPT

## 2021-05-25 ENCOUNTER — APPOINTMENT (EMERGENCY)
Dept: CT IMAGING | Facility: HOSPITAL | Age: 46
End: 2021-05-25
Payer: COMMERCIAL

## 2021-05-25 ENCOUNTER — HOSPITAL ENCOUNTER (OUTPATIENT)
Facility: HOSPITAL | Age: 46
Setting detail: OBSERVATION
Discharge: HOME | End: 2021-05-26
Attending: EMERGENCY MEDICINE | Admitting: INTERNAL MEDICINE
Payer: COMMERCIAL

## 2021-05-25 DIAGNOSIS — R07.9 CHEST PAIN: Primary | ICD-10-CM

## 2021-05-25 DIAGNOSIS — I10 HYPERTENSION: ICD-10-CM

## 2021-05-25 PROBLEM — M54.9 BACK PAIN: Status: ACTIVE | Noted: 2021-05-25

## 2021-05-25 PROBLEM — D72.829 LEUKOCYTOSIS: Status: ACTIVE | Noted: 2021-05-25

## 2021-05-25 LAB
ANION GAP SERPL CALCULATED.3IONS-SCNC: 8 MMOL/L (ref 4–13)
APTT PPP: 29 SECONDS (ref 23–37)
ATRIAL RATE: 120 BPM
BASOPHILS # BLD AUTO: 0.06 THOUSANDS/ΜL (ref 0–0.1)
BASOPHILS NFR BLD AUTO: 1 % (ref 0–1)
BUN SERPL-MCNC: 9 MG/DL (ref 5–25)
CALCIUM SERPL-MCNC: 8.5 MG/DL (ref 8.3–10.1)
CHLORIDE SERPL-SCNC: 106 MMOL/L (ref 100–108)
CK SERPL-CCNC: 91 U/L (ref 26–192)
CO2 SERPL-SCNC: 26 MMOL/L (ref 21–32)
CREAT SERPL-MCNC: 0.72 MG/DL (ref 0.6–1.3)
CRP SERPL QL: 6.7 MG/L
EOSINOPHIL # BLD AUTO: 0.06 THOUSAND/ΜL (ref 0–0.61)
EOSINOPHIL NFR BLD AUTO: 1 % (ref 0–6)
ERYTHROCYTE [DISTWIDTH] IN BLOOD BY AUTOMATED COUNT: 19.8 % (ref 11.6–15.1)
EXT PREG TEST URINE: NEGATIVE
EXT. CONTROL ED NAV: NORMAL
GFR SERPL CREATININE-BSD FRML MDRD: 101 ML/MIN/1.73SQ M
GLUCOSE SERPL-MCNC: 103 MG/DL (ref 65–140)
HCT VFR BLD AUTO: 40.7 % (ref 34.8–46.1)
HGB BLD-MCNC: 13.1 G/DL (ref 11.5–15.4)
IMM GRANULOCYTES # BLD AUTO: 0.06 THOUSAND/UL (ref 0–0.2)
IMM GRANULOCYTES NFR BLD AUTO: 1 % (ref 0–2)
INR PPP: 0.96 (ref 0.84–1.19)
LACTATE SERPL-SCNC: 1 MMOL/L (ref 0.5–2)
LYMPHOCYTES # BLD AUTO: 1.8 THOUSANDS/ΜL (ref 0.6–4.47)
LYMPHOCYTES NFR BLD AUTO: 16 % (ref 14–44)
MCH RBC QN AUTO: 25.2 PG (ref 26.8–34.3)
MCHC RBC AUTO-ENTMCNC: 32.2 G/DL (ref 31.4–37.4)
MCV RBC AUTO: 78 FL (ref 82–98)
MONOCYTES # BLD AUTO: 0.82 THOUSAND/ΜL (ref 0.17–1.22)
MONOCYTES NFR BLD AUTO: 7 % (ref 4–12)
NEUTROPHILS # BLD AUTO: 8.55 THOUSANDS/ΜL (ref 1.85–7.62)
NEUTS SEG NFR BLD AUTO: 74 % (ref 43–75)
NRBC BLD AUTO-RTO: 0 /100 WBCS
P AXIS: 49 DEGREES
PLATELET # BLD AUTO: 260 THOUSANDS/UL (ref 149–390)
PMV BLD AUTO: 11.4 FL (ref 8.9–12.7)
POTASSIUM SERPL-SCNC: 4.1 MMOL/L (ref 3.5–5.3)
PR INTERVAL: 162 MS
PROTHROMBIN TIME: 12.8 SECONDS (ref 11.6–14.5)
QRS AXIS: 16 DEGREES
QRSD INTERVAL: 76 MS
QT INTERVAL: 310 MS
QTC INTERVAL: 427 MS
RBC # BLD AUTO: 5.2 MILLION/UL (ref 3.81–5.12)
SODIUM SERPL-SCNC: 140 MMOL/L (ref 136–145)
T WAVE AXIS: 52 DEGREES
TROPONIN I SERPL-MCNC: <0.02 NG/ML
VENTRICULAR RATE: 114 BPM
WBC # BLD AUTO: 11.35 THOUSAND/UL (ref 4.31–10.16)

## 2021-05-25 PROCEDURE — 99285 EMERGENCY DEPT VISIT HI MDM: CPT | Performed by: EMERGENCY MEDICINE

## 2021-05-25 PROCEDURE — 96375 TX/PRO/DX INJ NEW DRUG ADDON: CPT

## 2021-05-25 PROCEDURE — 83605 ASSAY OF LACTIC ACID: CPT | Performed by: EMERGENCY MEDICINE

## 2021-05-25 PROCEDURE — G1004 CDSM NDSC: HCPCS

## 2021-05-25 PROCEDURE — 71275 CT ANGIOGRAPHY CHEST: CPT

## 2021-05-25 PROCEDURE — 84484 ASSAY OF TROPONIN QUANT: CPT | Performed by: EMERGENCY MEDICINE

## 2021-05-25 PROCEDURE — 93010 ELECTROCARDIOGRAM REPORT: CPT | Performed by: INTERNAL MEDICINE

## 2021-05-25 PROCEDURE — 81025 URINE PREGNANCY TEST: CPT | Performed by: EMERGENCY MEDICINE

## 2021-05-25 PROCEDURE — 85610 PROTHROMBIN TIME: CPT | Performed by: EMERGENCY MEDICINE

## 2021-05-25 PROCEDURE — 82550 ASSAY OF CK (CPK): CPT | Performed by: EMERGENCY MEDICINE

## 2021-05-25 PROCEDURE — 99285 EMERGENCY DEPT VISIT HI MDM: CPT

## 2021-05-25 PROCEDURE — 80048 BASIC METABOLIC PNL TOTAL CA: CPT | Performed by: EMERGENCY MEDICINE

## 2021-05-25 PROCEDURE — 85730 THROMBOPLASTIN TIME PARTIAL: CPT | Performed by: EMERGENCY MEDICINE

## 2021-05-25 PROCEDURE — 36415 COLL VENOUS BLD VENIPUNCTURE: CPT | Performed by: EMERGENCY MEDICINE

## 2021-05-25 PROCEDURE — 96374 THER/PROPH/DIAG INJ IV PUSH: CPT

## 2021-05-25 PROCEDURE — 85025 COMPLETE CBC W/AUTO DIFF WBC: CPT | Performed by: EMERGENCY MEDICINE

## 2021-05-25 PROCEDURE — 86140 C-REACTIVE PROTEIN: CPT | Performed by: EMERGENCY MEDICINE

## 2021-05-25 PROCEDURE — 96361 HYDRATE IV INFUSION ADD-ON: CPT

## 2021-05-25 PROCEDURE — 87040 BLOOD CULTURE FOR BACTERIA: CPT | Performed by: EMERGENCY MEDICINE

## 2021-05-25 PROCEDURE — 93005 ELECTROCARDIOGRAM TRACING: CPT

## 2021-05-25 PROCEDURE — 84484 ASSAY OF TROPONIN QUANT: CPT | Performed by: INTERNAL MEDICINE

## 2021-05-25 PROCEDURE — 99220 PR INITIAL OBSERVATION CARE/DAY 70 MINUTES: CPT | Performed by: INTERNAL MEDICINE

## 2021-05-25 RX ORDER — DULOXETIN HYDROCHLORIDE 30 MG/1
30 CAPSULE, DELAYED RELEASE ORAL DAILY
Status: DISCONTINUED | OUTPATIENT
Start: 2021-05-26 | End: 2021-05-26

## 2021-05-25 RX ORDER — NITROGLYCERIN 0.4 MG/1
0.4 TABLET SUBLINGUAL
Status: DISCONTINUED | OUTPATIENT
Start: 2021-05-25 | End: 2021-05-26 | Stop reason: HOSPADM

## 2021-05-25 RX ORDER — ASPIRIN 325 MG
325 TABLET ORAL ONCE
Status: COMPLETED | OUTPATIENT
Start: 2021-05-25 | End: 2021-05-25

## 2021-05-25 RX ORDER — NITROGLYCERIN 0.4 MG/1
0.4 TABLET SUBLINGUAL ONCE
Status: COMPLETED | OUTPATIENT
Start: 2021-05-25 | End: 2021-05-25

## 2021-05-25 RX ORDER — GABAPENTIN 800 MG/1
800 TABLET ORAL DAILY
Status: DISCONTINUED | OUTPATIENT
Start: 2021-05-26 | End: 2021-05-26

## 2021-05-25 RX ORDER — ALPRAZOLAM 0.25 MG/1
0.25 TABLET ORAL 3 TIMES DAILY PRN
Status: DISCONTINUED | OUTPATIENT
Start: 2021-05-25 | End: 2021-05-26 | Stop reason: HOSPADM

## 2021-05-25 RX ORDER — FERROUS SULFATE 325(65) MG
325 TABLET ORAL
Status: DISCONTINUED | OUTPATIENT
Start: 2021-05-26 | End: 2021-05-26 | Stop reason: HOSPADM

## 2021-05-25 RX ORDER — ALBUTEROL SULFATE 90 UG/1
1 AEROSOL, METERED RESPIRATORY (INHALATION) EVERY 4 HOURS PRN
Status: DISCONTINUED | OUTPATIENT
Start: 2021-05-25 | End: 2021-05-26 | Stop reason: HOSPADM

## 2021-05-25 RX ORDER — PAROXETINE HYDROCHLORIDE 12.5 MG/1
12.5 TABLET, FILM COATED, EXTENDED RELEASE ORAL DAILY
Status: DISCONTINUED | OUTPATIENT
Start: 2021-05-26 | End: 2021-05-26

## 2021-05-25 RX ORDER — ONDANSETRON 4 MG/1
4 TABLET, ORALLY DISINTEGRATING ORAL EVERY 8 HOURS PRN
Status: DISCONTINUED | OUTPATIENT
Start: 2021-05-25 | End: 2021-05-26 | Stop reason: HOSPADM

## 2021-05-25 RX ORDER — PANTOPRAZOLE SODIUM 40 MG/1
40 TABLET, DELAYED RELEASE ORAL
Status: DISCONTINUED | OUTPATIENT
Start: 2021-05-26 | End: 2021-05-25

## 2021-05-25 RX ORDER — FLUTICASONE PROPIONATE 50 MCG
2 SPRAY, SUSPENSION (ML) NASAL DAILY
Status: DISCONTINUED | OUTPATIENT
Start: 2021-05-26 | End: 2021-05-26 | Stop reason: HOSPADM

## 2021-05-25 RX ORDER — SODIUM CHLORIDE 9 MG/ML
3 INJECTION INTRAVENOUS
Status: DISCONTINUED | OUTPATIENT
Start: 2021-05-25 | End: 2021-05-26 | Stop reason: HOSPADM

## 2021-05-25 RX ORDER — METHOCARBAMOL 500 MG/1
500 TABLET, FILM COATED ORAL EVERY 6 HOURS PRN
Status: DISCONTINUED | OUTPATIENT
Start: 2021-05-25 | End: 2021-05-26 | Stop reason: HOSPADM

## 2021-05-25 RX ORDER — DULOXETIN HYDROCHLORIDE 20 MG/1
20 CAPSULE, DELAYED RELEASE ORAL DAILY
Status: DISCONTINUED | OUTPATIENT
Start: 2021-05-26 | End: 2021-05-25

## 2021-05-25 RX ORDER — ERGOCALCIFEROL 1.25 MG/1
50000 CAPSULE ORAL WEEKLY
Status: DISCONTINUED | OUTPATIENT
Start: 2021-05-25 | End: 2021-05-26 | Stop reason: HOSPADM

## 2021-05-25 RX ORDER — GABAPENTIN 300 MG/1
300 CAPSULE ORAL
Status: DISCONTINUED | OUTPATIENT
Start: 2021-05-25 | End: 2021-05-26

## 2021-05-25 RX ORDER — NAPROXEN 250 MG/1
250 TABLET ORAL 2 TIMES DAILY WITH MEALS
Status: DISCONTINUED | OUTPATIENT
Start: 2021-05-25 | End: 2021-05-25

## 2021-05-25 RX ORDER — HEPARIN SODIUM 5000 [USP'U]/ML
5000 INJECTION, SOLUTION INTRAVENOUS; SUBCUTANEOUS EVERY 8 HOURS SCHEDULED
Status: DISCONTINUED | OUTPATIENT
Start: 2021-05-25 | End: 2021-05-26 | Stop reason: HOSPADM

## 2021-05-25 RX ORDER — PANTOPRAZOLE SODIUM 40 MG/1
40 TABLET, DELAYED RELEASE ORAL
Status: DISCONTINUED | OUTPATIENT
Start: 2021-05-26 | End: 2021-05-26 | Stop reason: HOSPADM

## 2021-05-25 RX ORDER — KETOROLAC TROMETHAMINE 30 MG/ML
15 INJECTION, SOLUTION INTRAMUSCULAR; INTRAVENOUS ONCE
Status: COMPLETED | OUTPATIENT
Start: 2021-05-25 | End: 2021-05-25

## 2021-05-25 RX ADMIN — KETOROLAC TROMETHAMINE 15 MG: 30 INJECTION, SOLUTION INTRAMUSCULAR at 09:50

## 2021-05-25 RX ADMIN — ASPIRIN 325 MG ORAL TABLET 325 MG: 325 PILL ORAL at 11:35

## 2021-05-25 RX ADMIN — NITROGLYCERIN 0.4 MG: 0.4 TABLET SUBLINGUAL at 16:54

## 2021-05-25 RX ADMIN — IOHEXOL 90 ML: 350 INJECTION, SOLUTION INTRAVENOUS at 11:04

## 2021-05-25 RX ADMIN — NITROGLYCERIN 0.4 MG: 0.4 TABLET SUBLINGUAL at 16:46

## 2021-05-25 RX ADMIN — NITROGLYCERIN 0.4 MG: 0.4 TABLET SUBLINGUAL at 11:35

## 2021-05-25 RX ADMIN — SODIUM CHLORIDE 1000 ML: 0.9 INJECTION, SOLUTION INTRAVENOUS at 09:35

## 2021-05-25 RX ADMIN — MORPHINE SULFATE 2 MG: 2 INJECTION, SOLUTION INTRAMUSCULAR; INTRAVENOUS at 18:03

## 2021-05-25 RX ADMIN — MORPHINE SULFATE 2 MG: 2 INJECTION, SOLUTION INTRAMUSCULAR; INTRAVENOUS at 09:55

## 2021-05-25 RX ADMIN — NITROGLYCERIN 0.4 MG: 0.4 TABLET SUBLINGUAL at 19:42

## 2021-05-25 RX ADMIN — NITROGLYCERIN 0.4 MG: 0.4 TABLET SUBLINGUAL at 17:01

## 2021-05-25 NOTE — ASSESSMENT & PLAN NOTE
Patient complaining of chest pain initially 10/10 radiating to her back  Now pain is back again and 5/10  It is relieved by nitroglycerin and morphine  CONCHA score is 3  Stress test tomorrow

## 2021-05-25 NOTE — LETTER
[address] [facility] [address]  Dept: [phone]    May 26, 2021     Patient: Moisés Guy   YOB: 1975   Date of Visit: 5/25/2021       To Whom it May Concern:    Eleazar Nestorfabian is under my professional care  She was seen in the hospital from 5/25/2021   to 05/26/21  She may return to work on 5/28/2021 without limitations  If you have any questions or concerns, please don't hesitate to call           Sincerely,          Mikal Sharma MD

## 2021-05-25 NOTE — ASSESSMENT & PLAN NOTE
White cell count is 11 35  Also with tachycardia  Technically not SIRS criteria  No clear signs of infection

## 2021-05-25 NOTE — ASSESSMENT & PLAN NOTE
No back pain as such more described as chest pain radiating to the back dissection study negative  Morphine and nitroglycerin for pain

## 2021-05-25 NOTE — ED PROVIDER NOTES
History  Chief Complaint   Patient presents with    Back Pain     c/o mid back pain, right lower neck pain, RUE pain, and B/L lower anterior rib pain  Pt seen for similar symptoms in ER 2 weeks ago  Pt denies SOB     This is a 56-year-old female with a history of hypertension and tobacco use presenting to the ED for evaluation of pain in her chest and upper back with associated cough  She states is worse when she breathes  She came to the hospital about 2 weeks ago was diagnosed with pneumonia, started on antibiotics in the form of doxycycline, which he took twice a day for 10 days  She states that initially she felt she was getting better, but over the past 24 hours she feels like she is getting worse again  She denies any leg swelling or discomfort  His she complains of pain additionally to her right shoulder area  No abdominal pain, no vomiting or diarrhea  She had COVID-19 about 6 months ago and that was I vaccinated against it about 1 month ago  History provided by:  Patient   used: No    Back Pain  Associated symptoms: chest pain        Prior to Admission Medications   Prescriptions Last Dose Informant Patient Reported? Taking? ALPRAZolam (XANAX) 0 25 mg tablet Not Taking at Unknown time  Yes No   Sig: alprazolam 0 25 mg tablet   Antipyrine-Benzocaine (AURALGAN OT) Not Taking at Unknown time  Yes No   Sig: Auralgan ear drops   Instill by otic route 4 drops t i d  in both ears     DULoxetine (Cymbalta) 30 mg delayed release capsule Not Taking at Unknown time  Yes No   Sig: Cymbalta 30 mg capsule,delayed release   PARoxetine (PAXIL-CR) 12 5 mg 24 hr tablet Not Taking at Unknown time  Yes No   Sig: paroxetine ER 12 5 mg tablet,extended release 24 hr   RABEprazole (Aciphex) 20 MG tablet Not Taking at Unknown time  Yes No   Sig: AcipHex 20 mg tablet,delayed release   albuterol (PROVENTIL HFA,VENTOLIN HFA) 90 mcg/act inhaler Past Month at Unknown time  Yes Yes   Sig: albuterol sulfate HFA 90 mcg/actuation aerosol inhaler   INHALE 2 PUFFS EVERY 4 HOURS AS NEEDED FOR WHEEZING OR SHORTNESS OF BREATH   capsicum (ZOSTRIX) 0 075 % topical cream Not Taking at Unknown time  No No   Sig: Apply topically 3 (three) times a day   Patient not taking: Reported on 2021   cefdinir (OMNICEF) 300 mg capsule Not Taking at Unknown time  Yes No   Sig: cefdinir 300 mg capsule   ciprofloxacin (CIPRO) 500 mg tablet Not Taking at Unknown time  Yes No   Sig: ciprofloxacin 500 mg tablet   darifenacin (Enablex) 15 MG 24 hr tablet Not Taking at Unknown time  Yes No   Sig: Enablex 15 mg tablet,extended release   diclofenac (VOLTAREN) 75 mg EC tablet   No No   Sig: Take 1 tablet (75 mg total) by mouth 2 (two) times a day Take with Food   Patient not taking: Reported on 2021   econazole nitrate 1 % cream Not Taking at Unknown time  Yes No   Sig: econazole 1 % topical cream   ergocalciferol (VITAMIN D2) 50,000 units Past Week at Unknown time  Yes Yes   Sig: Take 50,000 Units by mouth once a week   ferrous sulfate 324 (65 Fe) mg 2021 at Unknown time  Yes Yes   Sig: Take 324 mg by mouth daily   fluocinolone acetonide (DERMOTIC) 0 01 % otic oil   No No   Sig: Administer 5 drops into both ears 2 (two) times a day for 7 days Then prn   fluticasone (FLONASE) 50 mcg/act nasal spray 2021 at Unknown time  No Yes   Si sprays into each nostril daily   gabapentin (NEURONTIN) 300 mg capsule Not Taking at Unknown time  Yes No   Sig: gabapentin 300 mg capsule   gabapentin (NEURONTIN) 800 mg tablet 2021 at Unknown time Self No Yes   Sig: Take 2 tablets (1,600 mg total) by mouth daily   hydrocodone-chlorpheniramine polistirex (TUSSIONEX) 10-8 mg/5 mL ER suspension Not Taking at Unknown time  Yes No   Sig: hydrocodone 10 mg-chlorpheniramine 8 mg/5 mL oral susp extend  rel 12hr   lansoprazole (PREVACID) 30 mg capsule 2021 at Unknown time  No Yes   Sig: Take 1 capsule (30 mg total) by mouth daily methocarbamol (ROBAXIN) 750 mg tablet 2021 at Unknown time Self No Yes   Sig: Take 0 5 tablet in the AM, 0 5 tablet in PM and full tablet at bedtime as needed for spasms   naproxen (NAPROSYN) 500 mg tablet Not Taking at Unknown time  Yes No   Sig: naproxen 500 mg tablet   ondansetron (ZOFRAN-ODT) 4 mg disintegrating tablet Not Taking at Unknown time  Yes No   Sig: ondansetron 4 mg disintegrating tablet   predniSONE 10 mg tablet Not Taking at Unknown time  No No   Si tab twice a day for 3 days, 1 tab twice a day for 3 days, 1 tab a day for three days   Patient not taking: Reported on 2021   sulfamethoxazole-trimethoprim (BACTRIM DS) 800-160 mg per tablet Not Taking at Unknown time  Yes No   Sig: Take 1 tablet by mouth 2 (two) times a day      Facility-Administered Medications: None       Past Medical History:   Diagnosis Date    Anemia     Asthma     Ear problems     GERD (gastroesophageal reflux disease)     Neck pain        Past Surgical History:   Procedure Laterality Date     SECTION      NOSE SURGERY      SINUS SURGERY      TUBAL LIGATION         History reviewed  No pertinent family history  I have reviewed and agree with the history as documented  E-Cigarette/Vaping    E-Cigarette Use Never User      E-Cigarette/Vaping Substances    Nicotine No     THC No     CBD No     Flavoring No     Other No     Unknown No      Social History     Tobacco Use    Smoking status: Current Some Day Smoker     Packs/day: 1 00     Types: Cigarettes    Smokeless tobacco: Never Used   Substance Use Topics    Alcohol use: No    Drug use: No       Review of Systems   Cardiovascular: Positive for chest pain  Musculoskeletal: Positive for back pain  All other systems reviewed and are negative  Physical Exam  Physical Exam  Vitals signs and nursing note reviewed  Constitutional:       Appearance: Normal appearance  She is well-developed and normal weight     HENT:      Head: Normocephalic and atraumatic  Right Ear: External ear normal       Left Ear: External ear normal       Nose: Nose normal       Mouth/Throat:      Mouth: Mucous membranes are moist       Pharynx: Oropharynx is clear  Eyes:      Extraocular Movements: Extraocular movements intact  Conjunctiva/sclera: Conjunctivae normal       Pupils: Pupils are equal, round, and reactive to light  Neck:      Musculoskeletal: Normal range of motion and neck supple  Cardiovascular:      Rate and Rhythm: Regular rhythm  Tachycardia present  Pulses: Normal pulses  Heart sounds: Normal heart sounds  Pulmonary:      Effort: Pulmonary effort is normal  No respiratory distress  Breath sounds: Normal breath sounds  No wheezing, rhonchi or rales  Abdominal:      General: Abdomen is flat  Bowel sounds are normal  There is no distension  Palpations: Abdomen is soft  Tenderness: There is no abdominal tenderness  Musculoskeletal: Normal range of motion  General: No swelling or tenderness  Right lower leg: No edema  Left lower leg: No edema  Skin:     General: Skin is warm and dry  Capillary Refill: Capillary refill takes less than 2 seconds  Neurological:      General: No focal deficit present  Mental Status: She is alert and oriented to person, place, and time  Mental status is at baseline     Psychiatric:         Mood and Affect: Mood normal          Behavior: Behavior normal          Vital Signs  ED Triage Vitals [05/25/21 0830]   Temperature Pulse Respirations Blood Pressure SpO2   98 7 °F (37 1 °C) (!) 116 20 (!) 171/108 98 %      Temp Source Heart Rate Source Patient Position - Orthostatic VS BP Location FiO2 (%)   Oral Monitor Lying Left arm --      Pain Score       8           Vitals:    05/25/21 1556 05/25/21 1633 05/25/21 1653 05/25/21 1701   BP: 165/97 139/79 129/84 132/79   Pulse: 80 95 (!) 106 98   Patient Position - Orthostatic VS: Sitting Sitting Sitting Sitting         Visual Acuity  Visual Acuity      Most Recent Value   L Pupil Size (mm)  3   R Pupil Size (mm)  3          ED Medications  Medications   sodium chloride (PF) 0 9 % injection 3 mL (has no administration in time range)   albuterol (PROVENTIL HFA,VENTOLIN HFA) inhaler 1 puff (has no administration in time range)   ALPRAZolam (XANAX) tablet 0 25 mg (has no administration in time range)   DULoxetine (CYMBALTA) delayed release capsule 20 mg (has no administration in time range)   ergocalciferol (VITAMIN D2) capsule 50,000 Units (50,000 Units Oral Not Given 5/25/21 1650)   ferrous sulfate tablet 325 mg (has no administration in time range)   fluticasone (FLONASE) 50 mcg/act nasal spray 2 spray (has no administration in time range)   gabapentin (NEURONTIN) capsule 300 mg (has no administration in time range)   gabapentin (NEURONTIN) tablet 800 mg (has no administration in time range)   pantoprazole (PROTONIX) EC tablet 40 mg (has no administration in time range)   methocarbamol (ROBAXIN) tablet 500 mg (has no administration in time range)   naproxen (NAPROSYN) tablet 250 mg (250 mg Oral Refused 5/25/21 1651)   ondansetron (ZOFRAN-ODT) dispersible tablet 4 mg (has no administration in time range)   PARoxetine (PAXIL-CR) 24 hr tablet 12 5 mg (has no administration in time range)   nicotine (NICODERM CQ) 7 mg/24hr TD 24 hr patch 1 patch (has no administration in time range)   heparin (porcine) subcutaneous injection 5,000 Units ( Subcutaneous Canceled Entry 5/25/21 1650)   morphine injection 2 mg (has no administration in time range)   nitroglycerin (NITROSTAT) SL tablet 0 4 mg (0 4 mg Sublingual Given 5/25/21 1701)   sodium chloride 0 9 % bolus 1,000 mL (0 mL Intravenous Stopped 5/25/21 1137)   ketorolac (TORADOL) injection 15 mg (15 mg Intravenous Given 5/25/21 0950)   morphine injection 2 mg (2 mg Intravenous Given 5/25/21 0955)   iohexol (OMNIPAQUE) 350 MG/ML injection (SINGLE-DOSE) 90 mL (90 mL Intravenous Given 5/25/21 1104)   aspirin tablet 325 mg (325 mg Oral Given 5/25/21 1135)   nitroglycerin (NITROSTAT) SL tablet 0 4 mg (0 4 mg Sublingual Given 5/25/21 1135)       Diagnostic Studies  Results Reviewed     Procedure Component Value Units Date/Time    Troponin I [[id number]]  (Normal) Collected: 05/25/21 1639    Lab Status: Final result Specimen: Blood from Arm, Left Updated: 05/25/21 1715     Troponin I <0 02 ng/mL     Blood culture #2 [[id number]] Collected: 05/25/21 1000    Lab Status: Preliminary result Specimen: Blood from Arm, Left Updated: 05/25/21 1603     Blood Culture Received in Microbiology Lab  Culture in Progress  Troponin I [[id number]]     Lab Status: No result Specimen: Blood     Blood culture #1 [[id number]] Collected: 05/25/21 0920    Lab Status: Preliminary result Specimen: Blood from Arm, Right Updated: 05/25/21 1301     Blood Culture Received in Microbiology Lab  Culture in Progress  Lactic acid [[id number]]  (Normal) Collected: 05/25/21 0920    Lab Status: Final result Specimen: Blood from Arm, Right Updated: 05/25/21 1011     LACTIC ACID 1 0 mmol/L     Narrative:      Result may be elevated if tourniquet was used during collection      POCT pregnancy, urine [[id number]]  (Normal) Resulted: 05/25/21 0950    Lab Status: Final result Updated: 05/25/21 1004     EXT PREG TEST UR (Ref: Negative) negative     Control valid    Basic metabolic panel [[id number]] Collected: 05/25/21 0920    Lab Status: Final result Specimen: Blood from Arm, Right Updated: 05/25/21 1004     Sodium 140 mmol/L      Potassium 4 1 mmol/L      Chloride 106 mmol/L      CO2 26 mmol/L      ANION GAP 8 mmol/L      BUN 9 mg/dL      Creatinine 0 72 mg/dL      Glucose 103 mg/dL      Calcium 8 5 mg/dL      eGFR 101 ml/min/1 73sq m     Narrative:      [place] guidelines for Chronic Kidney Disease (CKD):     Stage 1 with normal or high GFR (GFR > 90 mL/min/1 73 square meters)    Stage 2 Mild CKD (GFR = 60-89 mL/min/1 73 square meters)    Stage 3A Moderate CKD (GFR = 45-59 mL/min/1 73 square meters)    Stage 3B Moderate CKD (GFR = 30-44 mL/min/1 73 square meters)    Stage 4 Severe CKD (GFR = 15-29 mL/min/1 73 square meters)    Stage 5 End Stage CKD (GFR <15 mL/min/1 73 square meters)  Note: GFR calculation is accurate only with a steady state creatinine    C-reactive protein [[id number]]  (Abnormal) Collected: 05/25/21 0920    Lab Status: Final result Specimen: Blood from Arm, Right Updated: 05/25/21 1004     CRP 6 7 mg/L     CK Total with Reflex CKMB [[id number]]  (Normal) Collected: 05/25/21 0920    Lab Status: Final result Specimen: Blood from Arm, Right Updated: 05/25/21 1001     Total CK 91 U/L     Troponin I [[id number]]  (Normal) Collected: 05/25/21 0920    Lab Status: Final result Specimen: Blood from Arm, Right Updated: 05/25/21 0958     Troponin I <0 02 ng/mL     Protime-INR [[id number]]  (Normal) Collected: 05/25/21 0920    Lab Status: Final result Specimen: Blood from Arm, Right Updated: 05/25/21 0949     Protime 12 8 seconds      INR 0 96    APTT [[id number]]  (Normal) Collected: 05/25/21 0920    Lab Status: Final result Specimen: Blood from Arm, Right Updated: 05/25/21 0949     PTT 29 seconds     CBC and differential [[id number]]  (Abnormal) Collected: 05/25/21 0920    Lab Status: Final result Specimen: Blood from Arm, Right Updated: 05/25/21 0939     WBC 11 35 Thousand/uL      RBC 5 20 Million/uL      Hemoglobin 13 1 g/dL      Hematocrit 40 7 %      MCV 78 fL      MCH 25 2 pg      MCHC 32 2 g/dL      RDW 19 8 %      MPV 11 4 fL      Platelets 830 Thousands/uL      nRBC 0 /100 WBCs      Neutrophils Relative 74 %      Immat GRANS % 1 %      Lymphocytes Relative 16 %      Monocytes Relative 7 %      Eosinophils Relative 1 %      Basophils Relative 1 %      Neutrophils Absolute 8 55 Thousands/µL      Immature Grans Absolute 0 06 Thousand/uL      Lymphocytes Absolute 1 80 Thousands/µL      Monocytes Absolute 0 82 Thousand/µL      Eosinophils Absolute 0 06 Thousand/µL      Basophils Absolute 0 06 Thousands/µL                  CTA ED chest PE study   Final Result by Jolie Tolbert DO (05/25 1120)      No pulmonary embolus or other acute abnormality identified  Workstation performed: [id number]                    Procedures  ECG 12 Lead Documentation Only    Date/Time: 5/25/2021 9:05 AM  Performed by: Diego Wright DO  Authorized by: Diego Wright DO     Indications / Diagnosis:  Chest pain  ECG reviewed by me, the ED Provider: yes    Patient location:  ED  Previous ECG:     Previous ECG:  Compared to current    Similarity:  No change    Comparison to cardiac monitor: Yes    Interpretation:     Interpretation: non-specific    Rate:     ECG rate:  114    ECG rate assessment: tachycardic    Rhythm:     Rhythm: sinus tachycardia    Ectopy:     Ectopy: none    QRS:     QRS axis:  Normal    QRS intervals:  Normal  Conduction:     Conduction: normal    ST segments:     ST segments:  Normal  T waves:     T waves: normal               ED Course  ED Course as of May 25 1739   Tue May 25, 2021   1128 Workup negative including normal troponin, ekg, CTA chest for PE  However still hypertensive and having chest pain, will give nitro/aspirin and reeval       1233 Symptoms improving w/ nitro/asa  Will admit for obs, d/w Dr Mulu Mendoza who accepts to service obs/tele  HEART Risk Score      Most Recent Value   Heart Score Risk Calculator   History  2 Filed at: 05/25/2021 1130   ECG  0 Filed at: 05/25/2021 1130   Age  0 Filed at: 05/25/2021 1130   Risk Factors  2 Filed at: 05/25/2021 1130   Troponin  0 Filed at: 05/25/2021 1130   HEART Score  4 Filed at: 05/25/2021 1130                  Initial Sepsis Screening     Row Name 05/25/21 4590                Is the patient's history suggestive of a new or worsening infection?   No  -EP        Suspected source of infection  --        Are two or more of the following signs & symptoms of infection both present and new to the patient? No  -EP        Indicate SIRS criteria  --        If the answer is yes to both questions, suspicion of sepsis is present  --        If severe sepsis is present AND tissue hypoperfusion perists in the hour after fluid resuscitation or lactate > 4, the patient meets criteria for SEPTIC SHOCK  --        Are any of the following organ dysfunction criteria present within 6 hours of suspected infection and SIRS criteria that are NOT considered to be chronic conditions?   --        Organ dysfunction  --        Date of presentation of severe sepsis  --        Time of presentation of severe sepsis  --        Tissue hypoperfusion persists in the hour after crystalloid fluid administration, evidenced, by either:  --        Was hypotension present within one hour of the conclusion of crystalloid fluid administration?  --        Date of presentation of septic shock  --        Time of presentation of septic shock  --          User Key  (r) = Recorded By, (t) = Taken By, (c) = Cosigned By    [address] Name Provider Type    EP Charlie Lozano MD Physician            CONCHA Risk Score      Most Recent Value   Age >= 72  0 Filed at: 05/25/2021 1645   Known CAD (stenosis >= 50%)  0 Filed at: 05/25/2021 1645   Recent (<=24 hrs) Service Angina  1 Filed at: 05/25/2021 1645   ST Deviation >= 0 5 mm  0 Filed at: 05/25/2021 1645   3+ CAD Risk Factors (FHx, HTN, HLP, DM, Smoker)  1 Filed at: 05/25/2021 1645   Aspirin Use Past 7 Days  1 Filed at: 05/25/2021 1645   Elevated Cardiac Markers  0 Filed at: 05/25/2021 1645   CONCHA Risk Score (Calculated)  3 Filed at: 05/25/2021 1645        Earl' Criteria for PE      Most Recent Value   Wells' Criteria for PE   Clinical signs and symptoms of DVT  0 Filed at: 05/25/2021 7668   PE is primary diagnosis or equally likely  3 Filed at: 05/25/2021 0916   HR >100  1 5 Filed at: 05/25/2021 0916   Immobilization at least 3 days or Surgery in the previous 4 weeks  0 Filed at: 05/25/2021 9365   Previous, objectively diagnosed PE or DVT  0 Filed at: 05/25/2021 0916   Hemoptysis  0 Filed at: 05/25/2021 8219   Malignancy with treatment within 6 months or palliative  0 Filed at: 05/25/2021 2874   Wells' Criteria Total  4 5 Filed at: 05/25/2021 2121                [facility]  Number of Diagnoses or Management Options  Chest pain: new and requires workup  Diagnosis management comments: Heart score 4, required admission to hospital for further evaluation of chest pain rule out ACS  Initial workup negative patient did have relief of pain with nitro and aspirin  Amount and/or Complexity of Data Reviewed  Clinical lab tests: ordered and reviewed  Tests in the radiology section of CPT®: ordered and reviewed  Tests in the medicine section of CPT®: ordered and reviewed  Decide to obtain previous medical records or to obtain history from someone other than the patient: yes  Discuss the patient with other providers: yes    Risk of Complications, Morbidity, and/or Mortality  Presenting problems: moderate  Diagnostic procedures: moderate  Management options: moderate    Patient Progress  Patient progress: stable      Disposition  Final diagnoses:   Chest pain     Time reflects when diagnosis was documented in both MDM as applicable and the Disposition within this note     Time User Action Codes Description Comment    5/25/2021 12:34 PM Bang Norborne Add [R07 9] Chest pain       ED Disposition     ED Disposition Condition Date/Time Comment    Admit Stable Tue May 25, 2021 12:34 PM Case was discussed with Dr Lindsey Adam and the patient's admission status was agreed to be Admission Status: observation status to the service of Dr Natalee Vasquez           Follow-up Information    None         Current Discharge Medication List      CONTINUE these medications which have NOT CHANGED    Details   albuterol (PROVENTIL HFA,VENTOLIN HFA) 90 mcg/act inhaler albuterol sulfate HFA 90 mcg/actuation aerosol inhaler   INHALE 2 PUFFS EVERY 4 HOURS AS NEEDED FOR WHEEZING OR SHORTNESS OF BREATH    Comments: Substitution to a formulary equivalent within the same pharmaceutical class is authorized       ergocalciferol (VITAMIN D2) 50,000 units Take 50,000 Units by mouth once a week      ferrous sulfate 324 (65 Fe) mg Take 324 mg by mouth daily      fluticasone (FLONASE) 50 mcg/act nasal spray 2 sprays into each nostril daily  Qty: 16 g, Refills: 6    Associated Diagnoses: Allergic rhinitis, unspecified seasonality, unspecified trigger      gabapentin (NEURONTIN) 800 mg tablet Take 2 tablets (1,600 mg total) by mouth daily  Refills: 0    Associated Diagnoses: Chest pain, unspecified type      lansoprazole (PREVACID) 30 mg capsule Take 1 capsule (30 mg total) by mouth daily  Qty: 90 capsule, Refills: 2    Associated Diagnoses: Gastroesophageal reflux disease without esophagitis      methocarbamol (ROBAXIN) 750 mg tablet Take 0 5 tablet in the AM, 0 5 tablet in PM and full tablet at bedtime as needed for spasms  Qty: 60 tablet, Refills: 5    Associated Diagnoses: Myofascial pain syndrome      ALPRAZolam (XANAX) 0 25 mg tablet alprazolam 0 25 mg tablet      Antipyrine-Benzocaine (AURALGAN OT) Auralgan ear drops   Instill by otic route 4 drops t i d  in both ears        capsicum (ZOSTRIX) 0 075 % topical cream Apply topically 3 (three) times a day  Qty: 28 3 g, Refills: 0    Associated Diagnoses: Pleurisy      cefdinir (OMNICEF) 300 mg capsule cefdinir 300 mg capsule      ciprofloxacin (CIPRO) 500 mg tablet ciprofloxacin 500 mg tablet      darifenacin (Enablex) 15 MG 24 hr tablet Enablex 15 mg tablet,extended release      diclofenac (VOLTAREN) 75 mg EC tablet Take 1 tablet (75 mg total) by mouth 2 (two) times a day Take with Food  Qty: 60 tablet, Refills: 1    Associated Diagnoses: Cervical spondylosis      DULoxetine (Cymbalta) 30 mg delayed release capsule Cymbalta 30 mg capsule,delayed release econazole nitrate 1 % cream econazole 1 % topical cream      fluocinolone acetonide (DERMOTIC) 0 01 % otic oil Administer 5 drops into both ears 2 (two) times a day for 7 days Then prn  Qty: 1 Bottle, Refills: 2    Associated Diagnoses: Dermatitis of both ear canals      gabapentin (NEURONTIN) 300 mg capsule gabapentin 300 mg capsule      hydrocodone-chlorpheniramine polistirex (TUSSIONEX) 10-8 mg/5 mL ER suspension hydrocodone 10 mg-chlorpheniramine 8 mg/5 mL oral susp extend  rel 12hr      naproxen (NAPROSYN) 500 mg tablet naproxen 500 mg tablet      ondansetron (ZOFRAN-ODT) 4 mg disintegrating tablet ondansetron 4 mg disintegrating tablet      PARoxetine (PAXIL-CR) 12 5 mg 24 hr tablet paroxetine ER 12 5 mg tablet,extended release 24 hr      predniSONE 10 mg tablet 2 tab twice a day for 3 days, 1 tab twice a day for 3 days, 1 tab a day for three days  Qty: 21 tablet, Refills: 0    Associated Diagnoses: Pleurisy      RABEprazole (Aciphex) 20 MG tablet AcipHex 20 mg tablet,delayed release      sulfamethoxazole-trimethoprim (BACTRIM DS) 800-160 mg per tablet Take 1 tablet by mouth 2 (two) times a day           No discharge procedures on file      PDMP Review       Value Time User    PDMP Reviewed  Yes 12/4/2019  1:33 PM Arelis Huerta MD          ED Provider  Electronically Signed by           Marta Almeida DO  05/25/21 6595

## 2021-05-25 NOTE — H&P
[facility]  H&P- Celinda Riedel 1975, 40 y o  female MRN: [id number]  Unit/Bed#: S -01 Encounter: [id number]  Primary Care Provider: Chadwick Acuña MD   Date and time admitted to hospital: 5/25/2021  8:25 AM    Back pain  Assessment & Plan  No back pain as such more described as chest pain radiating to the back dissection study negative  Morphine and nitroglycerin for pain    Leukocytosis  Assessment & Plan  White cell count is 11 35  Also with tachycardia  Technically not SIRS criteria  No clear signs of infection      Chest pain  Assessment & Plan  Patient complaining of chest pain initially 10/10 radiating to her back  Now pain is back again and 5/10  It is relieved by nitroglycerin and morphine  CONCHA score is 3  Stress test tomorrow      VTE Prophylaxis: Heparin  / sequential compression device   Code Status:  Full code  POLST: There is no POLST form on file for this patient (pre-hospital)  Discussion with family:  Discussed with the patient she will update family    Anticipated Length of Stay:  Patient will be admitted on an Observation basis with an anticipated length of stay of  chest pain less than 2 midnights  Justification for Hospital Stay:  Chest pain    Total Time for Visit, including Counseling / Coordination of Care: 45 minutes  Greater than 50% of this total time spent on direct patient counseling and coordination of care  Chief Complaint:   Chest pain    History of Present Illness:    Celinda Riedel is a 40 y o  female, current smoker, who presents with chest pain which came on this morning at 6:30 a m  Radiating to her back it then subsided and then started again later in the morning radiating to her right arm and up to the jaw associated with nausea diaphoresis and shortness of breath  Patient's initial workup is negative although her chest pain did resolve with nitroglycerin  EKGs negative for any acute ischemia as well      Patient notes that she did have pneumonia approximately 2 weeks ago and was prescribed doxycycline and steroids for this as she was suffering from some pleuritic chest pain  Initially she felt better up until this morning when again she has chest pain which is different than the pleuritic chest pain that she had 2 weeks ago  CT with contrast in the ED is negative for PE or dissection  Review of Systems:    Review of Systems    Past Medical and Surgical History:     Past Medical History:   Diagnosis Date    Anemia     Asthma     Ear problems     GERD (gastroesophageal reflux disease)     Neck pain        Past Surgical History:   Procedure Laterality Date     SECTION      NOSE SURGERY      SINUS SURGERY      TUBAL LIGATION         Meds/Allergies:    Prior to Admission medications    Medication Sig Start Date End Date Taking?  Authorizing Provider   albuterol (PROVENTIL HFA,VENTOLIN HFA) 90 mcg/act inhaler albuterol sulfate HFA 90 mcg/actuation aerosol inhaler   INHALE 2 PUFFS EVERY 4 HOURS AS NEEDED FOR WHEEZING OR SHORTNESS OF BREATH   Yes Historical Provider, MD   ergocalciferol (VITAMIN D2) 50,000 units Take 50,000 Units by mouth once a week 21  Yes Historical Provider, MD   ferrous sulfate 324 (65 Fe) mg Take 324 mg by mouth daily 21  Yes Historical Provider, MD   fluticasone (FLONASE) 50 mcg/act nasal spray 2 sprays into each nostril daily 21  Yes Veronica Mcfarland MD   gabapentin (NEURONTIN) 800 mg tablet Take 2 tablets (1,600 mg total) by mouth daily 1/15/19  Yes Иван Panchal PA-C   lansoprazole (PREVACID) 30 mg capsule Take 1 capsule (30 mg total) by mouth daily 3/9/21  Yes Libia Singer PA-C   methocarbamol (ROBAXIN) 750 mg tablet Take 0 5 tablet in the AM, 0 5 tablet in PM and full tablet at bedtime as needed for spasms 20  Yes Taya Owens MD   ALPRAZolam Dalbert Nini) 0 25 mg tablet alprazolam 0 25 mg tablet    Historical Provider, MD   Antipyrine-Benzocaine Chetna Morgan OT) Auralgan ear drops   Instill by otic route 4 drops t i d  in both ears  Historical Provider, MD   capsicum (ZOSTRIX) 0 075 % topical cream Apply topically 3 (three) times a day  Patient not taking: Reported on 5/25/2021 5/10/21   Sondra Borrego PA-C   cefdinir (OMNICEF) 300 mg capsule cefdinir 300 mg capsule    Historical Provider, MD   ciprofloxacin (CIPRO) 500 mg tablet ciprofloxacin 500 mg tablet    Historical Provider, MD   darifenacin (Enablex) 15 MG 24 hr tablet Enablex 15 mg tablet,extended release    Historical Provider, MD   diclofenac (VOLTAREN) 75 mg EC tablet Take 1 tablet (75 mg total) by mouth 2 (two) times a day Take with Food  Patient not taking: Reported on 4/13/2021 10/1/20   KEVIN Hernandez   DULoxetine (Cymbalta) 30 mg delayed release capsule Cymbalta 30 mg capsule,delayed release    Historical Provider, MD   econazole nitrate 1 % cream econazole 1 % topical cream    Historical Provider, MD   fluocinolone acetonide (DERMOTIC) 0 01 % otic oil Administer 5 drops into both ears 2 (two) times a day for 7 days Then prn 4/26/21 5/3/21  Akin Medina MD   gabapentin (NEURONTIN) 300 mg capsule gabapentin 300 mg capsule    Historical Provider, MD   hydrocodone-chlorpheniramine polistirex (TUSSIONEX) 10-8 mg/5 mL ER suspension hydrocodone 10 mg-chlorpheniramine 8 mg/5 mL oral susp extend  rel 12hr    Historical Provider, MD   naproxen (NAPROSYN) 500 mg tablet naproxen 500 mg tablet    Historical Provider, MD   ondansetron (ZOFRAN-ODT) 4 mg disintegrating tablet ondansetron 4 mg disintegrating tablet    Historical Provider, MD   PARoxetine (PAXIL-CR) 12 5 mg 24 hr tablet paroxetine ER 12 5 mg tablet,extended release 24 hr    Historical Provider, MD   predniSONE 10 mg tablet 2 tab twice a day for 3 days, 1 tab twice a day for 3 days, 1 tab a day for three days  Patient not taking: Reported on 5/25/2021 5/10/21   Crys Harkins PA-C   RABEprazole (Aciphex) 20 MG tablet AcipHex 20 mg tablet,delayed release Historical Provider, MD   sulfamethoxazole-trimethoprim (BACTRIM DS) 800-160 mg per tablet Take 1 tablet by mouth 2 (two) times a day 2/26/21   Historical Provider, MD     I have reviewed home medications with patient personally  Allergies: Allergies   Allergen Reactions    Amoxicillin     Ciprofloxacin        Social History:     Marital Status: /Civil Union   Occupation:  Works full-time  Patient Pre-hospital Living Situation:  Lives with   Patient Pre-hospital Level of Mobility:  Fully mobile  Patient Pre-hospital Diet Restrictions:  None  Substance Use History:   Social History     Substance and Sexual Activity   Alcohol Use No     Social History     Tobacco Use   Smoking Status Current Some Day Smoker    Packs/day: 1 00    Types: Cigarettes   Smokeless Tobacco Never Used     Social History     Substance and Sexual Activity   Drug Use No       Family History:    History reviewed  No pertinent family history  Physical Exam:     Vitals:   Blood Pressure: 132/79 (05/25/21 1701)  Pulse: 98 (05/25/21 1701)  Temperature: 98 3 °F (36 8 °C) (05/25/21 1633)  Temp Source: Oral (05/25/21 1633)  Respirations: 15 (05/25/21 1633)  Height: 5' 3" (160 cm) (05/25/21 1633)  Weight - Scale: 69 9 kg (154 lb) (05/25/21 1633)  SpO2: 96 % (05/25/21 1633)    Physical Exam        Additional Data:     Lab Results: I have personally reviewed pertinent reports        Results from last 7 days   Lab Units 05/25/21  0920   WBC Thousand/uL 11 35*   HEMOGLOBIN g/dL 13 1   HEMATOCRIT % 40 7   PLATELETS Thousands/uL 260   NEUTROS PCT % 74   LYMPHS PCT % 16   MONOS PCT % 7   EOS PCT % 1     Results from last 7 days   Lab Units 05/25/21  0920   SODIUM mmol/L 140   POTASSIUM mmol/L 4 1   CHLORIDE mmol/L 106   CO2 mmol/L 26   BUN mg/dL 9   CREATININE mg/dL 0 72   ANION GAP mmol/L 8   CALCIUM mg/dL 8 5   GLUCOSE RANDOM mg/dL 103     Results from last 7 days   Lab Units 05/25/21  0920   INR  0 96             Results from last 7 days   Lab Units 05/25/21  0920   LACTIC ACID mmol/L 1 0       Imaging: I have personally reviewed pertinent reports  CTA ED chest PE study   Final Result by Joann Ramirez DO (05/25 1120)      No pulmonary embolus or other acute abnormality identified  Workstation performed: [id number]             EKG, Pathology, and Other Studies Reviewed on Admission:   EKG:  Sinus tachycardia  Allscripts / Epic Records Reviewed: Yes     ** Please Note: This note has been constructed using a voice recognition system   **

## 2021-05-25 NOTE — PLAN OF CARE
Problem: Nutrition/Hydration-ADULT  Goal: Nutrient/Hydration intake appropriate for improving, restoring or maintaining nutritional needs  Description: Monitor and assess patient's nutrition/hydration status for malnutrition  Collaborate with interdisciplinary team and initiate plan and interventions as ordered  Monitor patient's weight and dietary intake as ordered or per policy  Utilize nutrition screening tool and intervene as necessary  Determine patient's food preferences and provide high-protein, high-caloric foods as appropriate       INTERVENTIONS:  - Monitor oral intake, urinary output, labs, and treatment plans  - Assess nutrition and hydration status and recommend course of action  - Evaluate amount of meals eaten  - Assist patient with eating if necessary   - Allow adequate time for meals  - Recommend/ encourage appropriate diets, oral nutritional supplements, and vitamin/mineral supplements  - Order, calculate, and assess calorie counts as needed  - Recommend, monitor, and adjust tube feedings and TPN/PPN based on assessed needs  - Assess need for intravenous fluids  - Provide specific nutrition/hydration education as appropriate  - Include patient/family/caregiver in decisions related to nutrition  Outcome: Progressing     Problem: CARDIOVASCULAR - ADULT  Goal: Maintains optimal cardiac output and hemodynamic stability  Description: INTERVENTIONS:  - Monitor I/O, vital signs and rhythm  - Monitor for S/S and trends of decreased cardiac output  - Administer and titrate ordered vasoactive medications to optimize hemodynamic stability  - Assess quality of pulses, skin color and temperature  - Assess for signs of decreased coronary artery perfusion  - Instruct patient to report change in severity of symptoms  Outcome: Progressing  Goal: Absence of cardiac dysrhythmias or at baseline rhythm  Description: INTERVENTIONS:  - Continuous cardiac monitoring, vital signs, obtain 12 lead EKG if ordered  - Administer antiarrhythmic and heart rate control medications as ordered  - Monitor electrolytes and administer replacement therapy as ordered  Outcome: Progressing     Problem: RESPIRATORY - ADULT  Goal: Achieves optimal ventilation and oxygenation  Description: INTERVENTIONS:  - Assess for changes in respiratory status  - Assess for changes in mentation and behavior  - Position to facilitate oxygenation and minimize respiratory effort  - Oxygen administered by appropriate delivery if ordered  - Initiate smoking cessation education as indicated  - Encourage broncho-pulmonary hygiene including cough, deep breathe, Incentive Spirometry  - Assess the need for suctioning and aspirate as needed  - Assess and instruct to report SOB or any respiratory difficulty  - Respiratory Therapy support as indicated  Outcome: Progressing     Problem: METABOLIC, FLUID AND ELECTROLYTES - ADULT  Goal: Electrolytes maintained within normal limits  Description: INTERVENTIONS:  - Monitor labs and assess patient for signs and symptoms of electrolyte imbalances  - Administer electrolyte replacement as ordered  - Monitor response to electrolyte replacements, including repeat lab results as appropriate  - Instruct patient on fluid and nutrition as appropriate  Outcome: Progressing  Goal: Fluid balance maintained  Description: INTERVENTIONS:  - Monitor labs   - Monitor I/O and WT  - Instruct patient on fluid and nutrition as appropriate  - Assess for signs & symptoms of volume excess or deficit  Outcome: Progressing  Goal: Glucose maintained within target range  Description: INTERVENTIONS:  - Monitor Blood Glucose as ordered  - Assess for signs and symptoms of hyperglycemia and hypoglycemia  - Administer ordered medications to maintain glucose within target range  - Assess nutritional intake and initiate nutrition service referral as needed  Outcome: Progressing     Problem: PAIN - ADULT  Goal: Verbalizes/displays adequate comfort level or baseline comfort level  Description: Interventions:  - Encourage patient to monitor pain and request assistance  - Assess pain using appropriate pain scale  - Administer analgesics based on type and severity of pain and evaluate response  - Implement non-pharmacological measures as appropriate and evaluate response  - Consider cultural and social influences on pain and pain management  - Notify physician/advanced practitioner if interventions unsuccessful or patient reports new pain  Outcome: Progressing     Problem: INFECTION - ADULT  Goal: Absence or prevention of progression during hospitalization  Description: INTERVENTIONS:  - Assess and monitor for signs and symptoms of infection  - Monitor lab/diagnostic results  - Monitor all insertion sites, i e  indwelling lines, tubes, and drains  - Monitor endotracheal if appropriate and nasal secretions for changes in amount and color  - [place] appropriate cooling/warming therapies per order  - Administer medications as ordered  - Instruct and encourage patient and family to use good hand hygiene technique  - Identify and instruct in appropriate isolation precautions for identified infection/condition  Outcome: Progressing     Problem: SAFETY ADULT  Goal: Patient will remain free of falls  Description: INTERVENTIONS:  - Assess patient frequently for physical needs  -  Identify cognitive and physical deficits and behaviors that affect risk of falls    -  [place] fall precautions as indicated by assessment   - Educate patient/family on patient safety including physical limitations  - Instruct patient to call for assistance with activity based on assessment  - Modify environment to reduce risk of injury  - Consider OT/PT consult to assist with strengthening/mobility  Outcome: Progressing  Goal: Maintain or return to baseline ADL function  Description: INTERVENTIONS:  -  Assess patient's ability to carry out ADLs; assess patient's baseline for ADL function and identify physical deficits which impact ability to perform ADLs (bathing, care of mouth/teeth, toileting, grooming, dressing, etc )  - Assess/evaluate cause of self-care deficits   - Assess range of motion  - Assess patient's mobility; develop plan if impaired  - Assess patient's need for assistive devices and provide as appropriate  - Encourage maximum independence but intervene and supervise when necessary  - Involve family in performance of ADLs  - Assess for home care needs following discharge   - Consider OT consult to assist with ADL evaluation and planning for discharge  - Provide patient education as appropriate  Outcome: Progressing  Goal: Maintain or return mobility status to optimal level  Description: INTERVENTIONS:  - Assess patient's baseline mobility status (ambulation, transfers, stairs, etc )    - Identify cognitive and physical deficits and behaviors that affect mobility  - Identify mobility aids required to assist with transfers and/or ambulation (gait belt, sit-to-stand, lift, walker, cane, etc )  - [place] fall precautions as indicated by assessment  - Record patient progress and toleration of activity level on Mobility SBAR; progress patient to next Phase/Stage  - Instruct patient to call for assistance with activity based on assessment  - Consider rehabilitation consult to assist with strengthening/weightbearing, etc   Outcome: Progressing     Problem: DISCHARGE PLANNING  Goal: Discharge to home or other facility with appropriate resources  Description: INTERVENTIONS:  - Identify barriers to discharge w/patient and caregiver  - Arrange for needed discharge resources and transportation as appropriate  - Identify discharge learning needs (meds, wound care, etc )  - Arrange for interpretive services to assist at discharge as needed  - Refer to Case Management Department for coordinating discharge planning if the patient needs post-hospital services based on physician/advanced practitioner order or complex needs related to functional status, cognitive ability, or social support system  Outcome: Progressing     Problem: Knowledge Deficit  Goal: Patient/family/caregiver demonstrates understanding of disease process, treatment plan, medications, and discharge instructions  Description: Complete learning assessment and assess knowledge base    Interventions:  - Provide teaching at level of understanding  - Provide teaching via preferred learning methods  Outcome: Progressing

## 2021-05-26 ENCOUNTER — APPOINTMENT (OUTPATIENT)
Dept: NUCLEAR MEDICINE | Facility: HOSPITAL | Age: 46
End: 2021-05-26
Payer: COMMERCIAL

## 2021-05-26 ENCOUNTER — APPOINTMENT (OUTPATIENT)
Dept: NON INVASIVE DIAGNOSTICS | Facility: HOSPITAL | Age: 46
End: 2021-05-26
Payer: COMMERCIAL

## 2021-05-26 VITALS
WEIGHT: 155.87 LBS | HEART RATE: 90 BPM | RESPIRATION RATE: 16 BRPM | BODY MASS INDEX: 27.62 KG/M2 | OXYGEN SATURATION: 96 % | DIASTOLIC BLOOD PRESSURE: 79 MMHG | SYSTOLIC BLOOD PRESSURE: 122 MMHG | TEMPERATURE: 97.8 F | HEIGHT: 63 IN

## 2021-05-26 PROBLEM — R07.9 CHEST PAIN: Status: RESOLVED | Noted: 2017-02-08 | Resolved: 2021-05-26

## 2021-05-26 PROCEDURE — 93016 CV STRESS TEST SUPVJ ONLY: CPT | Performed by: INTERNAL MEDICINE

## 2021-05-26 PROCEDURE — 93017 CV STRESS TEST TRACING ONLY: CPT

## 2021-05-26 PROCEDURE — A9502 TC99M TETROFOSMIN: HCPCS

## 2021-05-26 PROCEDURE — 93018 CV STRESS TEST I&R ONLY: CPT | Performed by: INTERNAL MEDICINE

## 2021-05-26 PROCEDURE — 78452 HT MUSCLE IMAGE SPECT MULT: CPT | Performed by: INTERNAL MEDICINE

## 2021-05-26 PROCEDURE — 99217 PR OBSERVATION CARE DISCHARGE MANAGEMENT: CPT | Performed by: FAMILY MEDICINE

## 2021-05-26 PROCEDURE — 78452 HT MUSCLE IMAGE SPECT MULT: CPT

## 2021-05-26 PROCEDURE — G1004 CDSM NDSC: HCPCS

## 2021-05-26 RX ORDER — GABAPENTIN 400 MG/1
1600 CAPSULE ORAL DAILY
Status: DISCONTINUED | OUTPATIENT
Start: 2021-05-27 | End: 2021-05-26

## 2021-05-26 RX ORDER — AMLODIPINE BESYLATE 5 MG/1
5 TABLET ORAL DAILY
Qty: 30 TABLET | Refills: 0 | Status: SHIPPED | OUTPATIENT
Start: 2021-05-27 | End: 2021-06-08 | Stop reason: CLARIF

## 2021-05-26 RX ORDER — GABAPENTIN 400 MG/1
1600 CAPSULE ORAL DAILY
Status: DISCONTINUED | OUTPATIENT
Start: 2021-05-26 | End: 2021-05-26 | Stop reason: HOSPADM

## 2021-05-26 RX ORDER — AMLODIPINE BESYLATE 5 MG/1
5 TABLET ORAL DAILY
Status: DISCONTINUED | OUTPATIENT
Start: 2021-05-26 | End: 2021-05-26 | Stop reason: HOSPADM

## 2021-05-26 RX ADMIN — FLUTICASONE PROPIONATE 2 SPRAY: 50 SPRAY, METERED NASAL at 10:02

## 2021-05-26 RX ADMIN — MORPHINE SULFATE 2 MG: 2 INJECTION, SOLUTION INTRAMUSCULAR; INTRAVENOUS at 06:20

## 2021-05-26 RX ADMIN — FERROUS SULFATE TAB 325 MG (65 MG ELEMENTAL FE) 325 MG: 325 (65 FE) TAB at 10:02

## 2021-05-26 RX ADMIN — PANTOPRAZOLE SODIUM 40 MG: 40 TABLET, DELAYED RELEASE ORAL at 05:37

## 2021-05-26 RX ADMIN — METHOCARBAMOL 500 MG: 500 TABLET ORAL at 13:37

## 2021-05-26 RX ADMIN — AMLODIPINE BESYLATE 5 MG: 5 TABLET ORAL at 10:49

## 2021-05-26 RX ADMIN — GABAPENTIN 1600 MG: 400 CAPSULE ORAL at 10:49

## 2021-05-26 RX ADMIN — NICOTINE 1 PATCH: 7 PATCH, EXTENDED RELEASE TRANSDERMAL at 10:02

## 2021-05-26 RX ADMIN — REGADENOSON 0.4 MG: 0.08 INJECTION, SOLUTION INTRAVENOUS at 08:54

## 2021-05-26 NOTE — PLAN OF CARE
Problem: Nutrition/Hydration-ADULT  Goal: Nutrient/Hydration intake appropriate for improving, restoring or maintaining nutritional needs  Description: Monitor and assess patient's nutrition/hydration status for malnutrition  Collaborate with interdisciplinary team and initiate plan and interventions as ordered  Monitor patient's weight and dietary intake as ordered or per policy  Utilize nutrition screening tool and intervene as necessary  Determine patient's food preferences and provide high-protein, high-caloric foods as appropriate       INTERVENTIONS:  - Monitor oral intake, urinary output, labs, and treatment plans  - Assess nutrition and hydration status and recommend course of action  - Evaluate amount of meals eaten  - Assist patient with eating if necessary   - Allow adequate time for meals  - Recommend/ encourage appropriate diets, oral nutritional supplements, and vitamin/mineral supplements  - Order, calculate, and assess calorie counts as needed  - Recommend, monitor, and adjust tube feedings and TPN/PPN based on assessed needs  - Assess need for intravenous fluids  - Provide specific nutrition/hydration education as appropriate  - Include patient/family/caregiver in decisions related to nutrition  Outcome: Progressing     Problem: CARDIOVASCULAR - ADULT  Goal: Maintains optimal cardiac output and hemodynamic stability  Description: INTERVENTIONS:  - Monitor I/O, vital signs and rhythm  - Monitor for S/S and trends of decreased cardiac output  - Administer and titrate ordered vasoactive medications to optimize hemodynamic stability  - Assess quality of pulses, skin color and temperature  - Assess for signs of decreased coronary artery perfusion  - Instruct patient to report change in severity of symptoms  Outcome: Progressing  Goal: Absence of cardiac dysrhythmias or at baseline rhythm  Description: INTERVENTIONS:  - Continuous cardiac monitoring, vital signs, obtain 12 lead EKG if ordered  - Administer antiarrhythmic and heart rate control medications as ordered  - Monitor electrolytes and administer replacement therapy as ordered  Outcome: Progressing     Problem: RESPIRATORY - ADULT  Goal: Achieves optimal ventilation and oxygenation  Description: INTERVENTIONS:  - Assess for changes in respiratory status  - Assess for changes in mentation and behavior  - Position to facilitate oxygenation and minimize respiratory effort  - Oxygen administered by appropriate delivery if ordered  - Initiate smoking cessation education as indicated  - Encourage broncho-pulmonary hygiene including cough, deep breathe, Incentive Spirometry  - Assess the need for suctioning and aspirate as needed  - Assess and instruct to report SOB or any respiratory difficulty  - Respiratory Therapy support as indicated  Outcome: Progressing     Problem: METABOLIC, FLUID AND ELECTROLYTES - ADULT  Goal: Electrolytes maintained within normal limits  Description: INTERVENTIONS:  - Monitor labs and assess patient for signs and symptoms of electrolyte imbalances  - Administer electrolyte replacement as ordered  - Monitor response to electrolyte replacements, including repeat lab results as appropriate  - Instruct patient on fluid and nutrition as appropriate  Outcome: Progressing  Goal: Fluid balance maintained  Description: INTERVENTIONS:  - Monitor labs   - Monitor I/O and WT  - Instruct patient on fluid and nutrition as appropriate  - Assess for signs & symptoms of volume excess or deficit  Outcome: Progressing  Goal: Glucose maintained within target range  Description: INTERVENTIONS:  - Monitor Blood Glucose as ordered  - Assess for signs and symptoms of hyperglycemia and hypoglycemia  - Administer ordered medications to maintain glucose within target range  - Assess nutritional intake and initiate nutrition service referral as needed  Outcome: Progressing     Problem: PAIN - ADULT  Goal: Verbalizes/displays adequate comfort level or baseline comfort level  Description: Interventions:  - Encourage patient to monitor pain and request assistance  - Assess pain using appropriate pain scale  - Administer analgesics based on type and severity of pain and evaluate response  - Implement non-pharmacological measures as appropriate and evaluate response  - Consider cultural and social influences on pain and pain management  - Notify physician/advanced practitioner if interventions unsuccessful or patient reports new pain  Outcome: Progressing     Problem: INFECTION - ADULT  Goal: Absence or prevention of progression during hospitalization  Description: INTERVENTIONS:  - Assess and monitor for signs and symptoms of infection  - Monitor lab/diagnostic results  - Monitor all insertion sites, i e  indwelling lines, tubes, and drains  - Monitor endotracheal if appropriate and nasal secretions for changes in amount and color  - [place] appropriate cooling/warming therapies per order  - Administer medications as ordered  - Instruct and encourage patient and family to use good hand hygiene technique  - Identify and instruct in appropriate isolation precautions for identified infection/condition  Outcome: Progressing     Problem: SAFETY ADULT  Goal: Patient will remain free of falls  Description: INTERVENTIONS:  - Assess patient frequently for physical needs  -  Identify cognitive and physical deficits and behaviors that affect risk of falls    -  [place] fall precautions as indicated by assessment   - Educate patient/family on patient safety including physical limitations  - Instruct patient to call for assistance with activity based on assessment  - Modify environment to reduce risk of injury  - Consider OT/PT consult to assist with strengthening/mobility  Outcome: Progressing  Goal: Maintain or return to baseline ADL function  Description: INTERVENTIONS:  -  Assess patient's ability to carry out ADLs; assess patient's baseline for ADL function and identify physical deficits which impact ability to perform ADLs (bathing, care of mouth/teeth, toileting, grooming, dressing, etc )  - Assess/evaluate cause of self-care deficits   - Assess range of motion  - Assess patient's mobility; develop plan if impaired  - Assess patient's need for assistive devices and provide as appropriate  - Encourage maximum independence but intervene and supervise when necessary  - Involve family in performance of ADLs  - Assess for home care needs following discharge   - Consider OT consult to assist with ADL evaluation and planning for discharge  - Provide patient education as appropriate  Outcome: Progressing  Goal: Maintain or return mobility status to optimal level  Description: INTERVENTIONS:  - Assess patient's baseline mobility status (ambulation, transfers, stairs, etc )    - Identify cognitive and physical deficits and behaviors that affect mobility  - Identify mobility aids required to assist with transfers and/or ambulation (gait belt, sit-to-stand, lift, walker, cane, etc )  - [place] fall precautions as indicated by assessment  - Record patient progress and toleration of activity level on Mobility SBAR; progress patient to next Phase/Stage  - Instruct patient to call for assistance with activity based on assessment  - Consider rehabilitation consult to assist with strengthening/weightbearing, etc   Outcome: Progressing     Problem: DISCHARGE PLANNING  Goal: Discharge to home or other facility with appropriate resources  Description: INTERVENTIONS:  - Identify barriers to discharge w/patient and caregiver  - Arrange for needed discharge resources and transportation as appropriate  - Identify discharge learning needs (meds, wound care, etc )  - Arrange for interpretive services to assist at discharge as needed  - Refer to Case Management Department for coordinating discharge planning if the patient needs post-hospital services based on physician/advanced practitioner order or complex needs related to functional status, cognitive ability, or social support system  Outcome: Progressing     Problem: Knowledge Deficit  Goal: Patient/family/caregiver demonstrates understanding of disease process, treatment plan, medications, and discharge instructions  Description: Complete learning assessment and assess knowledge base    Interventions:  - Provide teaching at level of understanding  - Provide teaching via preferred learning methods  Outcome: Progressing

## 2021-05-26 NOTE — DISCHARGE SUMMARY
[facility]  Discharge- Natasha Motta 1975, 40 y o  female MRN: [id number]  Unit/Bed#: S -01 Encounter: [id number]  Primary Care Provider: Portia Contreras MD   Date and time admitted to hospital: 5/25/2021  8:25 AM    Back pain  Assessment & Plan  No back pain as such more described as chest pain radiating to the back dissection study negative  This appears to be more musculoskeletal in nature  Leukocytosis  Assessment & Plan  White cell count is 11 35  Also with tachycardia  Technically not SIRS criteria  No clear signs of infection  Patient was recently on steroids  She could follow up with family doctor and get a CBC in 1 week      * Chest pain-resolved as of 5/26/2021  Assessment & Plan  Patient complaining of chest pain initially 10/10 radiating to her back  Now pain is back again and 5/10  It is relieved by nitroglycerin and morphine  Stress test is negative  Okay for discharge  Appears to be more musculoskeletal pain  It could been secondary to hypertensive crisis but that is improved with Norvasc      Discharging Physician / Practitioner: Evonne Ellis MD  PCP: Portia Contreras MD  Admission Date:   Admission Orders (From admission, onward)     Ordered        05/25/21 1236  Place in Observation  Once                   Discharge Date: 05/26/21    Resolved Problems  Date Reviewed: 5/26/2021          Resolved    * (Principal) Chest pain 5/26/2021     Resolved by  Evonne Ellis MD          Consultations During Hospital Stay:  · None    Procedures Performed:   · Normal stress test  · CT scan of the chest showing no evidence of pulmonary embolism    Significant Findings / Test Results:   · None    Incidental Findings:   · None     Test Results Pending at Discharge (will require follow up):    · None     Outpatient Tests Requested:  · May need to follow-up with GI as an outpatient for an EGD    Complications:  None    Reason for Admission:  Chest    Hospital Course:     Anahi Madrid Jay Carlson is a 40 y o  female patient who originally presented to the hospital on 5/25/2021 due to chest pain    History presenting Nicho Tan is a 40 y o  female, current smoker, who presents with chest pain which came on this morning at 6:30 a m  Radiating to her back it then subsided and then started again later in the morning radiating to her right arm and up to the jaw associated with nausea diaphoresis and shortness of breath      Patient's initial workup is negative although her chest pain did resolve with nitroglycerin      EKGs negative for any acute ischemia as well      Patient notes that she did have pneumonia approximately 2 weeks ago and was prescribed doxycycline and steroids for this as she was suffering from some pleuritic chest pain      Initially she felt better up until this morning when again she has chest pain which is different than the pleuritic chest pain that she had 2 weeks ago      CT with contrast in the ED is negative for PE or dissection      Hospital course:  Patient was admitted for above reasons  The patient over here did not have any chest pain  Got better with her blood pressure improving  Patient was put on Norvasc and doing okay from that standpoint  The patient recently on steroids and antibiotics for an upper respiratory/pneumonia  Patient had no evidence of acute coronary syndrome here with normal stress test   EKG was otherwise unremarkable  The back pain appeared to be more musculoskeletal   The patient had no urinary complaints  Patient okay to go home  The patient does have a history GI issues for which she gets an EGD and colonoscopy every 3 years  Patient is okay for discharge from my standpoint with close outpatient follow-up      Please see above list of diagnoses and related plan for additional information  Condition at Discharge: fair     Discharge Day Visit / Exam:     Subjective:  Patient seen examined    No acute events reported  Vitals: Blood Pressure: 122/79 (05/26/21 0700)  Pulse: 90 (05/26/21 0700)  Temperature: 97 8 °F (36 6 °C) (05/26/21 0700)  Temp Source: Oral (05/26/21 0700)  Respirations: 16 (05/26/21 0700)  Height: 5' 3" (160 cm) (05/25/21 1633)  Weight - Scale: 70 7 kg (155 lb 13 8 oz) (05/26/21 0600)  SpO2: 96 % (05/26/21 0700)  Exam:   Physical Exam  (   General Appearance:    Alert, cooperative, no distress, appears stated age                               Lungs:     Clear to auscultation bilaterally, respirations unlabored       Heart:    Regular rate and rhythm, S1 and S2 normal, no murmur, rub    or gallop   Abdomen:     Soft, non-tender, bowel sounds active all four quadrants,     no masses, no organomegaly           Extremities:   Extremities normal, atraumatic, no cyanosis or edema         Discharge instructions/Information to patient and family:   See after visit summary for information provided to patient and family  Provisions for Follow-Up Care:  See after visit summary for information related to follow-up care and any pertinent home health orders  Disposition:     Home    For Discharges to [facility] SNF:   · Not Applicable to this Patient - Not Applicable to this Patient    Planned Readmission:  Not anticipated     Discharge Statement:  I spent 25 minutes discharging the patient  This time was spent on the day of discharge  I had direct contact with the patient on the day of discharge  Greater than 50% of the total time was spent examining patient, answering all patient questions, arranging and discussing plan of care with patient as well as directly providing post-discharge instructions  Additional time then spent on discharge activities  Discharge Medications:  See after visit summary for reconciled discharge medications provided to patient and family        ** Please Note: This note has been constructed using a voice recognition system **

## 2021-05-26 NOTE — DISCHARGE INSTRUCTIONS
Chest Pain   WHAT YOU NEED TO KNOW:   Chest pain can be caused by a range of conditions, from not serious to life-threatening  Chest pain can be a symptom of a digestive problem, such as acid reflux or a stomach ulcer  An anxiety attack or a strong emotion, such as anger, can also cause chest pain  Infection, inflammation, or a fracture in the bones or cartilage in your chest can cause pain or discomfort  Sometimes chest pain or pressure is caused by poor blood flow to your heart (angina)  Chest pain may also be caused by life-threatening conditions such as a heart attack or blood clot in your lungs  DISCHARGE INSTRUCTIONS:   Call your local emergency number (911 in the [address] [facility]) or have someone call if:   · You have any of the following signs of a heart attack:      ? Squeezing, pressure, or pain in your chest    ? You may  also have any of the following:     § Discomfort or pain in your back, neck, jaw, stomach, or arm    § Shortness of breath    § Nausea or vomiting    § Lightheadedness or a sudden cold sweat      Seek care immediately if:   · You have chest discomfort that gets worse, even with medicine  · You cough or vomit blood  · Your bowel movements are black or bloody  · You cannot stop vomiting, or it hurts to swallow  Call your doctor if:   · You have questions or concerns about your condition or care  Medicines:   · Medicines  may be given to treat the cause of your chest pain  Examples include pain medicine, anxiety medicine, or medicines to increase blood flow to your heart  · Do not take certain medicines without asking your healthcare provider first   These include NSAIDs, herbal or vitamin supplements, or hormones (estrogen or progestin)  · Take your medicine as directed  Contact your healthcare provider if you think your medicine is not helping or if you have side effects  Tell him or her if you are allergic to any medicine   Keep a list of the medicines, vitamins, and herbs you take  Include the amounts, and when and why you take them  Bring the list or the pill bottles to follow-up visits  Carry your medicine list with you in case of an emergency  Healthy living tips: The following are general healthy guidelines  If the cause of your chest pain is known, your healthcare provider will give you specific guidelines to follow  · Do not smoke  Nicotine and other chemicals in cigarettes and cigars can cause lung and heart damage  Ask your healthcare provider for information if you currently smoke and need help to quit  E-cigarettes or smokeless tobacco still contain nicotine  Talk to your healthcare provider before you use these products  · Choose a variety of healthy foods as often as possible  Include fresh, frozen, or canned fruits and vegetables  Also include low-fat dairy products, fish, chicken (without skin), and lean meats  Your healthcare provider or a dietitian can help you create meal plans  You may need to avoid certain foods or drinks if your pain is caused by a digestion problem  · Lower your sodium (salt) intake  Limit foods that are high in sodium, such as canned foods, salty snacks, and cold cuts  If you add salt when you cook food, do not add more at the table  Choose low-sodium canned foods as much as possible  · Drink plenty of water every day  Water helps your body to control temperature and blood pressure  Ask your healthcare provider how much water you should drink every day  · Ask about activity  Your healthcare provider will tell you which activities to limit or avoid  Ask when you can drive, return to work, and have sex  Ask about the best exercise plan for you  · Maintain a healthy weight  Ask your healthcare provider what a healthy weight is for you  Ask him or her to help you create a safe weight loss plan if you are overweight  · Ask about vaccines you may need    Get the influenza (flu) vaccine every year as soon as recommended, usually in September or October  You may also need a pneumococcal vaccine to prevent pneumonia  The vaccine is usually given every 5 years, starting at age 72  Your healthcare provider can tell you if should get other vaccines, and when to get them  Follow up with your healthcare provider within 72 hours, or as directed: You may need to return for more tests to find the cause of your chest pain  You may be referred to a specialist, such as a cardiologist or gastroenterologist  Write down your questions so you remember to ask them during your visits  © Copyright [employer] 2021 Information is for End User's use only and may not be sold, redistributed or otherwise used for commercial purposes  All illustrations and images included in CareNotes® are the copyrighted property of [employer]  or [employer] Encarnacion  The above information is an  only  It is not intended as medical advice for individual conditions or treatments  Talk to your doctor, nurse or pharmacist before following any medical regimen to see if it is safe and effective for you

## 2021-05-26 NOTE — ASSESSMENT & PLAN NOTE
White cell count is 11 35  Also with tachycardia  Technically not SIRS criteria  No clear signs of infection  Patient was recently on steroids    She could follow up with family doctor and get a CBC in 1 week

## 2021-05-26 NOTE — ASSESSMENT & PLAN NOTE
Patient complaining of chest pain initially 10/10 radiating to her back  Now pain is back again and 5/10  It is relieved by nitroglycerin and morphine  Stress test is negative  Okay for discharge  Appears to be more musculoskeletal pain    It could been secondary to hypertensive crisis but that is improved with Norvasc

## 2021-05-26 NOTE — ASSESSMENT & PLAN NOTE
No back pain as such more described as chest pain radiating to the back dissection study negative  This appears to be more musculoskeletal in nature

## 2021-05-26 NOTE — UTILIZATION REVIEW
Initial Clinical Review    Admission: Date/Time/Statement:   Admission Orders (From admission, onward)     Ordered        05/25/21 1236  Place in Observation  Once                   Orders Placed This Encounter   Procedures    Place in Observation     Standing Status:   Standing     Number of Occurrences:   1     Order Specific Question:   Level of Care     Answer:   Med Surg [16]     Order Specific Question:   Bed request comments     Answer:   tele     ED Arrival Information     Expected Arrival Acuity Means of Arrival Escorted By Service Admission Type    - 5/25/2021 08:18 Urgent Walk-In Self General Medicine Urgent    Arrival Complaint    Back pain        Chief Complaint   Patient presents with    Back Pain     c/o mid back pain, right lower neck pain, RUE pain, and B/L lower anterior rib pain  Pt seen for similar symptoms in ER 2 weeks ago  Pt denies SOB       Initial Presentation: 41 yo PMHx current smoker to ED from home Observation admission due to chest pain   CP began this am radiating to back, subsiding & reoccuring in am to right arm up the jaw associa w diaphoresis & SOB  Chest pain  Assessment & Plan  Patient complaining of chest pain initially 10/10 radiating to her back  Now pain is back again and 5/10  It is relieved by nitroglycerin and morphine  CONCHA score is 3  Stress test tomorrow  Back pain  Assessment & Plan  No back pain as such more described as chest pain radiating to the back dissection study negative  Morphine and nitroglycerin for pain  Leukocytosis  Assessment & Plan  White cell count is 11 35  Also with tachycardia  Technically not SIRS criteria  No clear signs of infection           Date: 5/26   Day 2:     ED Triage Vitals [05/25/21 0830]   Temperature Pulse Respirations Blood Pressure SpO2   98 7 °F (37 1 °C) (!) 116 20 (!) 171/108 98 %      Temp Source Heart Rate Source Patient Position - Orthostatic VS BP Location FiO2 (%)   Oral Monitor Lying Left arm --      Pain Score       8 Wt Readings from Last 1 Encounters:   05/26/21 70 7 kg (155 lb 13 8 oz)     Additional Vital Signs:   Date/Time  Temp  Pulse  Resp  BP  MAP (mmHg)  SpO2  O2 Device  Patient Position - Orthostatic VS   05/26/21 0700  97 8 °F (36 6 °C)  90  16  122/79  --  96 %  None (Room air)  Lying   05/25/21 2155  98 1 °F (36 7 °C)  89  15  117/67  84  95 %  None (Room air)  Lying   05/25/21 1944  --  --  --  --  --  96 %  None (Room air)  --   05/25/21 1940  --  87  --  140/81  102  --  --  Lying   05/25/21 1701  --  98  --  132/79  --  --  --  Sitting   05/25/21 1653  --  106Abnormal   --  129/84  --  --  --  Sitting   05/25/21 1633  98 3 °F (36 8 °C)  95  15  139/79  102  96 %  None (Room air)  Sitting   05/25/21 1619  --  --  --  --  --  --  None (Room air)  --   05/25/21 1556  --  80  18  165/97  --  99 %  None (Room air)  Sitting   05/25/21 1345  --  84  18  140/96  114  99 %  --  --   05/25/21 1300  --  86  18  130/84  102  97 %  --  --   05/25/21 1141  --  92  18  142/87  107  98 %  None (Room air)  --   05/25/21 1130  --  88  18  184/87Abnormal   124  100 %  --  --   05/25/21 1015  --  94  18  157/95  120  98 %  None (Room air)  Lying   05/25/21 0945  --  98  19  158/96  --  99 %  None (Room air)  Lying   05/25/21 0900  --  106Abnormal   20  151/95  117  98 %  None (Room air)  Lying   05/25/21 0830  98 7 °F (37 1 °C)  116Abnormal   20  171/108Abnormal   132  98 %  None (Room air)  Lying      Weights (last 14 days)    Date/Time  Weight  Weight Method  Height   05/26/21 0600  70 7 kg (155 lb 13 8 oz)  Standing scale  --   05/25/21 1633  69 9 kg (154 lb)  Standing scale  5' 3" (1 6 m)       Pertinent Labs/Diagnostic Test Results:       Results from last 7 days   Lab Units 05/25/21  0920   WBC Thousand/uL 11 35*   HEMOGLOBIN g/dL 13 1   HEMATOCRIT % 40 7   PLATELETS Thousands/uL 260   NEUTROS ABS Thousands/µL 8 55*         Results from last 7 days   Lab Units 05/25/21  0920   SODIUM mmol/L 140   POTASSIUM mmol/L 4 1 CHLORIDE mmol/L 106   CO2 mmol/L 26   ANION GAP mmol/L 8   BUN mg/dL 9   CREATININE mg/dL 0 72   EGFR ml/min/1 73sq m 101   CALCIUM mg/dL 8 5             Results from last 7 days   Lab Units 05/25/21  0920   GLUCOSE RANDOM mg/dL 103             No results found for: BETA-HYDROXYBUTYRATE               Results from last 7 days   Lab Units 05/25/21  0920   CK TOTAL U/L 91     Results from last 7 days   Lab Units 05/25/21  1949 05/25/21  1639 05/25/21  0920   TROPONIN I ng/mL <0 02 <0 02 <0 02         Results from last 7 days   Lab Units 05/25/21  0920   PROTIME seconds 12 8   INR  0 96   PTT seconds 29             Results from last 7 days   Lab Units 05/25/21  0920   LACTIC ACID mmol/L 1 0                             Results from last 7 days   Lab Units 05/25/21  0920   CRP mg/L 6 7*                                         Results from last 7 days   Lab Units 05/25/21  1000 05/25/21  0920   BLOOD CULTURE  Received in Microbiology Lab  Culture in Progress  Received in Microbiology Lab  Culture in Progress  CTA ED chest PE study   Final Result by Joan Johnson DO (05/25 1120)      No pulmonary embolus or other acute abnormality identified       5/25 Sinus tachycardia  ED Treatment:   Medication Administration from 05/25/2021 0817 to 05/25/2021 1606       Date/Time Order Dose Route Action     05/25/2021 0935 sodium chloride 0 9 % bolus 1,000 mL 1,000 mL Intravenous New Bag     05/25/2021 0950 ketorolac (TORADOL) injection 15 mg 15 mg Intravenous Given     05/25/2021 0955 morphine injection 2 mg 2 mg Intravenous Given     05/25/2021 1135 aspirin tablet 325 mg 325 mg Oral Given     05/25/2021 1135 nitroglycerin (NITROSTAT) SL tablet 0 4 mg 0 4 mg Sublingual Given        Past Medical History:   Diagnosis Date    Anemia     Asthma     Ear problems     GERD (gastroesophageal reflux disease)     Neck pain      Present on Admission:   Chest pain      Admitting Diagnosis: Back pain [M54 9]  Chest pain [R07 9]  Age/Sex: 40 y o  female  Admission Orders:  Telemetry  NM stress test   scd  Scheduled Medications:  DULoxetine, 30 mg, Oral, Daily  ergocalciferol, 50,000 Units, Oral, Weekly  ferrous sulfate, 325 mg, Oral, Daily With Breakfast  fluticasone, 2 spray, Nasal, Daily  gabapentin, 300 mg, Oral, HS  gabapentin, 800 mg, Oral, Daily  heparin (porcine), 5,000 Units, Subcutaneous, Q8H SIMÓN  nicotine, 1 patch, Transdermal, Daily  pantoprazole, 40 mg, Oral, Early Morning  PARoxetine, 12 5 mg, Oral, Daily    Continuous IV Infusions:     PRN Meds:  albuterol, 1 puff, Inhalation, Q4H PRN  ALPRAZolam, 0 25 mg, Oral, TID PRN  methocarbamol, 500 mg, Oral, Q6H PRN  morphine injection, 2 mg, Intravenous, Q4H PRN  nitroglycerin, 0 4 mg, Sublingual, Q5 Min PRN  ondansetron, 4 mg, Oral, Q8H PRN  sodium chloride (PF), 3 mL, Intravenous, Q1H PRN  Network Utilization Review Department  ATTENTION: Please call with any questions or concerns to [phone] and carefully listen to the prompts so that you are directed to the right person  All voicemails are confidential   Almaz Luna all requests for admission clinical reviews, approved or denied determinations and any other requests to dedicated fax number below belonging to the campus where the patient is receiving treatment   List of dedicated fax numbers for the Facilities:  [address] [place] [address] DENIALS (Administrative/Medical Necessity) [phone]   [address] (Maternity/NICU/Pediatrics) [phone]   [address]   [address] [address] [facility] [address] [phone] [address]maribelSelect Specialty Hospital [facility] [phone]   [place] [address] [phone]

## 2021-05-27 LAB
CHEST PAIN STATEMENT: NORMAL
MAX DIASTOLIC BP: 78 MMHG
MAX HEART RATE: 130 BPM
MAX PREDICTED HEART RATE: 175 BPM
MAX. SYSTOLIC BP: 146 MMHG
PROTOCOL NAME: NORMAL
REASON FOR TERMINATION: NORMAL
TARGET HR FORMULA: NORMAL
TEST INDICATION: NORMAL
TIME IN EXERCISE PHASE: NORMAL

## 2021-05-30 LAB
BACTERIA BLD CULT: NORMAL
BACTERIA BLD CULT: NORMAL

## 2021-06-08 ENCOUNTER — CONSULT (OUTPATIENT)
Dept: CARDIOLOGY CLINIC | Facility: CLINIC | Age: 46
End: 2021-06-08
Payer: COMMERCIAL

## 2021-06-08 VITALS
DIASTOLIC BLOOD PRESSURE: 88 MMHG | TEMPERATURE: 97.4 F | HEIGHT: 63 IN | OXYGEN SATURATION: 97 % | SYSTOLIC BLOOD PRESSURE: 130 MMHG | WEIGHT: 156 LBS | BODY MASS INDEX: 27.64 KG/M2 | HEART RATE: 98 BPM

## 2021-06-08 DIAGNOSIS — I10 HYPERTENSION, UNSPECIFIED TYPE: Primary | ICD-10-CM

## 2021-06-08 PROCEDURE — 93000 ELECTROCARDIOGRAM COMPLETE: CPT | Performed by: INTERNAL MEDICINE

## 2021-06-08 PROCEDURE — 99243 OFF/OP CNSLTJ NEW/EST LOW 30: CPT | Performed by: INTERNAL MEDICINE

## 2021-06-08 RX ORDER — CARVEDILOL 3.12 MG/1
3.12 TABLET ORAL 2 TIMES DAILY WITH MEALS
Qty: 90 TABLET | Refills: 3 | Status: SHIPPED | OUTPATIENT
Start: 2021-06-08 | End: 2021-10-26

## 2021-06-08 NOTE — PROGRESS NOTES
Consultation - Cardiology Office  [facility]  Capo Ford 40 y o  female MRN: [id number]  : 1975  Unit/Bed#:  Encounter: [id number]      ASSESSMENT:  Chest pain  Was seen in [facility] and had a normal stress test, therefore seem to be musculoskeletal  Also had sinus tachycardia, and her resting heart rate currently is 98  Troponin< 0 02 X 2  , triglycerides 80, HDL 49  CTA was negative for PE    Pharmacologic stress, 2021:  Normal, EF 67%    Hypertension  BP today is 130/88 mmHg with a heart rate of 98/Min   On amlodipine 5 mg     History of celiac artery stenosis/compression  Abdominal ultrasound, 2017:  Impression  The aorta is patent and normal in caliber  The celiac, splenic and hepatic arteries are patent  There is evidence of celiac artery compression, velocities are within normal  range with deep inspiration (140cm/s) and are in stenotic range with expiration  (349 cm/s)  The superior and inferior mesenteric arteries are normal and patent in a  fasting state  RECOMMENDATIONS:  Stop amlodipine and start Coreg 3 125 mg b i d  for better control of blood pressure and heart rate  Echocardiogram to evaluate for hypertensive heart disease            Thank you for your consultation  If you have any question please call me at [phone]      Primary Care Physician Requesting Consult: Angélica Gómez MD      Reason for Consult / Principal Problem:  Tachycardia        HPI :     Capo Ford is a 40y o  year old female who was referred by primary care doctor for evaluation of hypertension and tachycardia  Apparently patient was in [facility] ED which chest pain and elevated blood pressure and sinus tachycardia  MI and PE were ruled out and she had a normal pharmacologic stress test   She also has frequent sinus tachycardia and a resting heart rate today is 98 per minute  She is currently on amlodipine for her blood pressure control    I discussed with her and advised her to switch to Coreg for better blood pressure and heart rate control and also advised her on low-salt diet and regular cardiovascular exercise    REVIEW OF SYSTEMS:      Constitutional: Negative for activity change, appetite change, chills, fatigue, fever and unexpected weight change  HENT: Negative for congestion, sore throat and trouble swallowing  Eyes: Negative for discharge and redness  Respiratory: Negative for apnea, cough, chest tightness, shortness of breath and wheezing  Cardiovascular: Negative for chest pain, shortness of breath, palpitations and leg swelling  Gastrointestinal: Negative for abdominal distention, abdominal pain, anal bleeding, blood in stool, constipation, diarrhea, nausea and vomiting  Endocrine: Negative for polydipsia, polyphagia and polyuria  Genitourinary: Negative for difficulty urinating, dysuria, flank pain and hematuria  Musculoskeletal: Negative for arthralgias, myalgias and neck stiffness  Skin: Negative for pallor and rash  Allergic/Immunologic: Negative for environmental allergies  Neurological: Negative for dizziness, syncope, light-headedness, numbness and headaches  Hematological: Negative for adenopathy  Does not bruise/bleed easily  Psychiatric/Behavioral: Negative for confusion and hallucinations  The patient is not nervous/anxious        Historical Information   Past Medical History:   Diagnosis Date    Anemia     Asthma     Ear problems     GERD (gastroesophageal reflux disease)     Neck pain      Past Surgical History:   Procedure Laterality Date     SECTION      NOSE SURGERY      SINUS SURGERY      TUBAL LIGATION       Social History     Substance and Sexual Activity   Alcohol Use No     Social History     Substance and Sexual Activity   Drug Use No     Social History     Tobacco Use   Smoking Status Current Some Day Smoker    Packs/day: 1 00    Types: Cigarettes   Smokeless Tobacco Never Used     Family History: No family history on file  Meds/Allergies     Allergies   Allergen Reactions    Amoxicillin     Ciprofloxacin        Current Outpatient Medications:     albuterol (PROVENTIL HFA,VENTOLIN HFA) 90 mcg/act inhaler, albuterol sulfate HFA 90 mcg/actuation aerosol inhaler  INHALE 2 PUFFS EVERY 4 HOURS AS NEEDED FOR WHEEZING OR SHORTNESS OF BREATH, Disp: , Rfl:     ALPRAZolam (XANAX) 0 25 mg tablet, alprazolam 0 25 mg tablet, Disp: , Rfl:     amLODIPine (NORVASC) 5 mg tablet, Take 1 tablet (5 mg total) by mouth daily, Disp: 30 tablet, Rfl: 0    Antipyrine-Benzocaine (AURALGAN OT), Auralgan ear drops  Instill by otic route 4 drops t i d  in both ears  , Disp: , Rfl:     darifenacin (Enablex) 15 MG 24 hr tablet, Enablex 15 mg tablet,extended release, Disp: , Rfl:     econazole nitrate 1 % cream, econazole 1 % topical cream, Disp: , Rfl:     ergocalciferol (VITAMIN D2) 50,000 units, Take 50,000 Units by mouth once a week, Disp: , Rfl:     ferrous sulfate 324 (65 Fe) mg, Take 324 mg by mouth daily, Disp: , Rfl:     fluticasone (FLONASE) 50 mcg/act nasal spray, 2 sprays into each nostril daily, Disp: 16 g, Rfl: 6    gabapentin (NEURONTIN) 800 mg tablet, Take 2 tablets (1,600 mg total) by mouth daily, Disp: , Rfl: 0    lansoprazole (PREVACID) 30 mg capsule, Take 1 capsule (30 mg total) by mouth daily, Disp: 90 capsule, Rfl: 2    methocarbamol (ROBAXIN) 750 mg tablet, Take 0 5 tablet in the AM, 0 5 tablet in PM and full tablet at bedtime as needed for spasms, Disp: 60 tablet, Rfl: 5    ondansetron (ZOFRAN-ODT) 4 mg disintegrating tablet, ondansetron 4 mg disintegrating tablet, Disp: , Rfl:     RABEprazole (Aciphex) 20 MG tablet, AcipHex 20 mg tablet,delayed release, Disp: , Rfl:     Vitals: Blood pressure 130/88, pulse 98, temperature (!) 97 4 °F (36 3 °C), height 5' 3" (1 6 m), weight 70 8 kg (156 lb), SpO2 97 %  Body mass index is 27 63 kg/m²    Vitals:    06/08/21 0803   Weight: 70 8 kg (156 lb)     BP Readings from Last 3 Encounters:   21 130/88   21 122/79   05/10/21 (!) 176/88         PHYSICAL EXAMINATION:  Neurologic:  Alert & oriented x 3, no new focal deficits, Not in any acute distress,  Constitutional:  Well developed, well nourished, non-toxic appearance   Eyes:  Pupil equal and reacting to light, conjunctiva normal, No JVP, No LNP   HENT:  Atraumatic, oropharynx moist, Neck- normal range of motion, no tenderness,  Neck supple   Respiratory:  Bilateral air entry, mostly clear to auscultation  Cardiovascular:  Loud S1-normal S2 regular with a I/VI systolic murmur   GI:  Soft, nondistended, normal bowel sounds, nontender, no hepatosplenomegaly appreciated  Musculoskeletal:  No edema, no tenderness, no deformities  Skin:  Well hydrated, no rash   Lymphatic:  No lymphadenopathy noted   Extremities:  No edema and distal pulses are present    Diagnostic Studies Review Cardio:      EKG:  Normal sinus rhythm, heart rate 98 per minute    Cardiac testing:   No results found for this or any previous visit      Results for orders placed during the hospital encounter of 21   NM Myocardial Perfusion Spect (Pharmacological Induced Stress and/or Rest)    Narrative [address] [place] [address]  [phone]    Rest/Stress Gated SPECT Myocardial Perfusion Imaging After Regadenoson    Patient: Marinda Cogan  MR number: [id number]  Account number: [de-identified]  : 1975  Age: 40 years  Gender: Female  Status: Outpatient  Location: Stress lab  Height: 63 in  Weight: 155 lb  BP: 130/ 82 mmHg    Allergies: AMOXICILLIN, CIPROFLOXACIN    Diagnosis: R07 9 - Chest pain, unspecified    Primary Physician:  Bridgett Robbins MD  RN:  Bayron Mcclain, RN  Interpreting Physician:  Patricia Sloan MD  Referring Physician:  Gail Gurrola MD  Technician:  Viet Dumont  Group:  [facility]  Report Prepared By[de-identified]  Bayron Mcclain, RN    INDICATIONS: Coronary artery disease  HISTORY: The patient is a 40year old  female  Chest pain status: chest pain  Coronary artery disease risk factors: smoking  Cardiovascular history: arrhythmia/tachycardia Co-morbidity: history of lung  disease/COVID,asthma,pneumonia  Medications: no cardiac drugs  Acute coronary syndrome: acute myocardial infarction recently ruled out  PHYSICAL EXAM: Baseline physical exam screening: no wheezes audible  REST ECG: Normal sinus rhythm  Normal baseline ECG  PROCEDURE: The study was performed in the the Stress lab  A regadenoson infusion pharmacologic stress test was performed  Gated SPECT myocardial perfusion imaging was performed after stress  Systolic blood pressure was 130 mmHg, at the  start of the study  Diastolic blood pressure was 82 mmHg, at the start of the study  The heart rate was 85 bpm, at the start of the study  IV double checked  Regadenoson protocol:  HR bpm SBP mmHg DBP mmHg Symptoms  Baseline 85 130 82 none  Immediate 130 146 78 mild dyspnea, dizziness, nausea  1 min 112 126 74 subsiding  2 min -- -- -- none  No medications or fluids given  The patient also performed low level exercise  STRESS SUMMARY: Duration of pharmacologic stress was 3 min  Maximal heart rate during stress was 130 bpm  The rate-pressure product for the peak heart rate and blood pressure was 93199  There was no chest pain during stress  The stress  test was terminated due to protocol completion  Pre oxygen saturation: 100 %  Peak oxygen saturation: 100 %  The stress ECG was negative for ischemia and normal  There were no stress arrhythmias or conduction abnormalities      ISOTOPE ADMINISTRATION:  Resting isotope administration Stress isotope administration  Agent Tetrofosmin Tetrofosmin  Dose 11 mCi 32 1 mCi  Date 05/26/2021 05/26/2021  Injection time 07:58 09:00  Imaging time 08:34 09:47  Injection-image interval 36 min 47 min    The radiopharmaceutical was injected at the peak effect of pharmacologic stress  MYOCARDIAL PERFUSION IMAGING:  The image quality was good  Left ventricular size was normal  The TID ratio was 0 98  PERFUSION DEFECTS:  -  There were no perfusion defects  GATED SPECT:  The calculated left ventricular ejection fraction was 67 %  Left ventricular ejection fraction was within normal limits by visual estimate  There was no left ventricular regional abnormality  SUMMARY:  -  Stress results: There was no chest pain during stress  -  ECG conclusions: The stress ECG was negative for ischemia and normal  There were no stress arrhythmias or conduction abnormalities  -  Perfusion imaging: There were no perfusion defects   -  Gated SPECT: The calculated left ventricular ejection fraction was 67 %  Left ventricular ejection fraction was within normal limits by visual estimate  There was no left ventricular regional abnormality  IMPRESSIONS: Normal study after vasodilation and low level exercise without reproduction of symptoms  The cardiovascular risk is low  Myocardial perfusion imaging was normal at rest and with stress  Prepared and signed by    Dayo Gamez MD  Signed 05/26/2021 13:32:39           Imaging:  Chest X-Ray:   Xr Chest Pa & Lateral    Result Date: 5/28/2021  Impression No acute cardiopulmonary disease  Workstation performed: [id number]     Xr Chest Pa & Lateral    Result Date: 5/10/2021  Impression Patchy right middle lobe airspace opacity may represent atelectasis versus pneumonia  Clinical and laboratory correlation are recommended  The study was marked in EPIC for significant notification  Workstation performed: [id number]       CT-scan of the chest:     No CTA results available for this patient    Lab Review   Lab Results   Component Value Date    WBC 11 35 (H) 05/25/2021    HGB 13 1 05/25/2021    HCT 40 7 05/25/2021    MCV 78 (L) 05/25/2021    RDW 19 8 (H) 05/25/2021     05/25/2021     BMP:  Lab Results Component Value Date    SODIUM 140 05/25/2021    K 4 1 05/25/2021     05/25/2021    CO2 26 05/25/2021    ANIONGAP 11 10/05/2015    BUN 9 05/25/2021    CREATININE 0 72 05/25/2021    GLUC 103 05/25/2021    CALCIUM 8 5 05/25/2021    EGFR 101 05/25/2021    MG 2 0 02/08/2017     LFT:  Lab Results   Component Value Date    AST 11 05/10/2021    ALT 18 05/10/2021    ALKPHOS 100 05/10/2021    TP 7 5 05/10/2021    ALB 3 9 05/10/2021      Lab Results   Component Value Date    HIA8TDKJJRLF 2 146 02/08/2017     No components found for: TSH3  No results found for: HGBA1C  Lipid Profile:   Lab Results   Component Value Date    CHOLESTEROL 195 02/08/2017    HDL 53 02/08/2017    LDLCALC 126 (H) 02/08/2017    TRIG 79 02/08/2017     Lab Results   Component Value Date    CHOLESTEROL 195 02/08/2017     Lab Results   Component Value Date    CKTOTAL 91 05/25/2021    TROPONINI <0 02 05/25/2021     Lab Results   Component Value Date    NTBNP 81 05/10/2021      Recent Results (from the past 672 hour(s))   ECG 12 lead    Collection Time: 05/25/21  8:38 AM   Result Value Ref Range    Ventricular Rate 114 BPM    Atrial Rate 120 BPM    TN Interval 162 ms    QRSD Interval 76 ms    QT Interval 310 ms    QTC Interval 427 ms    P Axis 49 degrees    QRS Axis 16 degrees    T Wave Axis 52 degrees   CBC and differential    Collection Time: 05/25/21  9:20 AM   Result Value Ref Range    WBC 11 35 (H) 4 31 - 10 16 Thousand/uL    RBC 5 20 (H) 3 81 - 5 12 Million/uL    Hemoglobin 13 1 11 5 - 15 4 g/dL    Hematocrit 40 7 34 8 - 46 1 %    MCV 78 (L) 82 - 98 fL    MCH 25 2 (L) 26 8 - 34 3 pg    MCHC 32 2 31 4 - 37 4 g/dL    RDW 19 8 (H) 11 6 - 15 1 %    MPV 11 4 8 9 - 12 7 fL    Platelets 088 663 - 519 Thousands/uL    nRBC 0 /100 WBCs    Neutrophils Relative 74 43 - 75 %    Immat GRANS % 1 0 - 2 %    Lymphocytes Relative 16 14 - 44 %    Monocytes Relative 7 4 - 12 %    Eosinophils Relative 1 0 - 6 %    Basophils Relative 1 0 - 1 %    Neutrophils Absolute 8 55 (H) 1 85 - 7 62 Thousands/µL    Immature Grans Absolute 0 06 0 00 - 0 20 Thousand/uL    Lymphocytes Absolute 1 80 0 60 - 4 47 Thousands/µL    Monocytes Absolute 0 82 0 17 - 1 22 Thousand/µL    Eosinophils Absolute 0 06 0 00 - 0 61 Thousand/µL    Basophils Absolute 0 06 0 00 - 0 10 Thousands/µL   Basic metabolic panel    Collection Time: 05/25/21  9:20 AM   Result Value Ref Range    Sodium 140 136 - 145 mmol/L    Potassium 4 1 3 5 - 5 3 mmol/L    Chloride 106 100 - 108 mmol/L    CO2 26 21 - 32 mmol/L    ANION GAP 8 4 - 13 mmol/L    BUN 9 5 - 25 mg/dL    Creatinine 0 72 0 60 - 1 30 mg/dL    Glucose 103 65 - 140 mg/dL    Calcium 8 5 8 3 - 10 1 mg/dL    eGFR 101 ml/min/1 73sq m   Troponin I    Collection Time: 05/25/21  9:20 AM   Result Value Ref Range    Troponin I <0 02 <=0 04 ng/mL   Protime-INR    Collection Time: 05/25/21  9:20 AM   Result Value Ref Range    Protime 12 8 11 6 - 14 5 seconds    INR 0 96 0 84 - 1 19   APTT    Collection Time: 05/25/21  9:20 AM   Result Value Ref Range    PTT 29 23 - 37 seconds   C-reactive protein    Collection Time: 05/25/21  9:20 AM   Result Value Ref Range    CRP 6 7 (H) <3 0 mg/L   CK Total with Reflex CKMB    Collection Time: 05/25/21  9:20 AM   Result Value Ref Range    Total CK 91 26 - 192 U/L   Lactic acid    Collection Time: 05/25/21  9:20 AM   Result Value Ref Range    LACTIC ACID 1 0 0 5 - 2 0 mmol/L   Blood culture #1    Collection Time: 05/25/21  9:20 AM    Specimen: Arm, Right; Blood   Result Value Ref Range    Blood Culture No Growth After 5 Days  POCT pregnancy, urine    Collection Time: 05/25/21  9:50 AM   Result Value Ref Range    EXT PREG TEST UR (Ref: Negative) negative     Control valid    Blood culture #2    Collection Time: 05/25/21 10:00 AM    Specimen: Arm, Left; Blood   Result Value Ref Range    Blood Culture No Growth After 5 Days      Troponin I    Collection Time: 05/25/21  4:39 PM   Result Value Ref Range    Troponin I <0 02 <=0 04 ng/mL Troponin I    Collection Time: 05/25/21  7:49 PM   Result Value Ref Range    Troponin I <0 02 <=0 04 ng/mL   Stress strip    Collection Time: 05/26/21  8:47 AM   Result Value Ref Range    Protocol Name [address]     Time In Exercise Phase 00:03:00     MAX  SYSTOLIC  mmHg    Max Diastolic Bp 78 mmHg    Max Heart Rate 130 BPM    Max Predicted Heart Rate 175 BPM    Reason for Termination Protocol Complete     Test Indication cp     Target Hr Formular (220 - Age)*100%     Arrhy During Ex      ECG Interp Before Ex      ECG Interp during Ex      Ex Summary Comment      Chest Pain Statement none     Overall Hr Response To Exercise      Overall BP Response To Exercise             Dr John Diamond MD, [facility]      "This note has been constructed using a voice recognition system  Therefore there may be syntax, spelling, and/or grammatical errors   Please call if you have any questions  "

## 2021-07-30 ENCOUNTER — HOSPITAL ENCOUNTER (OUTPATIENT)
Dept: CT IMAGING | Facility: HOSPITAL | Age: 46
Discharge: HOME | End: 2021-07-30
Attending: OTOLARYNGOLOGY
Payer: COMMERCIAL

## 2021-07-30 DIAGNOSIS — H92.03 OTALGIA OF BOTH EARS: ICD-10-CM

## 2021-07-30 PROCEDURE — G1004 CDSM NDSC: HCPCS

## 2021-07-30 PROCEDURE — 70480 CT ORBIT/EAR/FOSSA W/O DYE: CPT

## 2021-08-06 ENCOUNTER — TELEPHONE (OUTPATIENT)
Dept: CARDIOLOGY CLINIC | Facility: CLINIC | Age: 46
End: 2021-08-06

## 2021-08-06 ENCOUNTER — HOSPITAL ENCOUNTER (OUTPATIENT)
Dept: NON INVASIVE DIAGNOSTICS | Facility: HOSPITAL | Age: 46
Discharge: HOME | End: 2021-08-06
Attending: INTERNAL MEDICINE
Payer: COMMERCIAL

## 2021-08-06 DIAGNOSIS — I10 HYPERTENSION, UNSPECIFIED TYPE: ICD-10-CM

## 2021-08-06 PROCEDURE — 93306 TTE W/DOPPLER COMPLETE: CPT

## 2021-08-06 PROCEDURE — 93306 TTE W/DOPPLER COMPLETE: CPT | Performed by: INTERNAL MEDICINE

## 2021-08-06 NOTE — TELEPHONE ENCOUNTER
----- Message from Mariam Mendoza MD sent at 8/6/2021  2:18 PM EDT -----   Please call and inform patient that the Echocardiogram showed normal pumping function of the heart  No significant valve abnormality was seen

## 2021-09-01 ENCOUNTER — OFFICE VISIT (OUTPATIENT)
Dept: CARDIOLOGY CLINIC | Facility: CLINIC | Age: 46
End: 2021-09-01
Payer: COMMERCIAL

## 2021-09-01 VITALS
SYSTOLIC BLOOD PRESSURE: 124 MMHG | OXYGEN SATURATION: 97 % | TEMPERATURE: 98.9 F | DIASTOLIC BLOOD PRESSURE: 90 MMHG | WEIGHT: 160 LBS | RESPIRATION RATE: 18 BRPM | HEIGHT: 63 IN | HEART RATE: 88 BPM | BODY MASS INDEX: 28.35 KG/M2

## 2021-09-01 DIAGNOSIS — R06.02 SHORTNESS OF BREATH ON EXERTION: ICD-10-CM

## 2021-09-01 DIAGNOSIS — R07.9 CHEST PAIN IN ADULT: Primary | ICD-10-CM

## 2021-09-01 PROCEDURE — 3008F BODY MASS INDEX DOCD: CPT | Performed by: INTERNAL MEDICINE

## 2021-09-01 PROCEDURE — 99214 OFFICE O/P EST MOD 30 MIN: CPT | Performed by: INTERNAL MEDICINE

## 2021-09-01 RX ORDER — ROSUVASTATIN CALCIUM 5 MG/1
TABLET, COATED ORAL
COMMUNITY
Start: 2021-06-10 | End: 2021-12-01 | Stop reason: SDUPTHER

## 2021-09-01 NOTE — PROGRESS NOTES
Progress Note - Cardiology Office  [facility]    Lou Guerrier 40 y o  female MRN: [id number]  : 1975  Encounter: [id number]      ASSESSMENT:  History of Chest pain    Pharmacologic stress, 2021:  Normal, EF 67%     Hypertension  BP today is 124/9 mmHg with a heart rate of 88/Min    TTE, 2021  EF 50%, mild LVH, mild MR, trace TR     History of celiac artery stenosis/compression  Abdominal ultrasound, 2017:  Impression  The aorta is patent and normal in caliber  The celiac, splenic and hepatic arteries are patent  There is evidence of celiac artery compression, velocities are within normal  range with deep inspiration (140cm/s) and are in stenotic range with expiration  (349 cm/s)  The superior and inferior mesenteric arteries are normal and patent in a  fasting state       Hyperlipidemia  Currently on Crestor 5 mg daily     RECOMMENDATIONS:  Repeat lipid profile is scheduled for October, will review and advise  Continue Coreg and Crestor  Low-salt diet  Regular cardiovascular exercise         Please call [phone] if any questions  HPI :     Lou Guerrier is a 40y o  year old female who came for follow up  She was previously seen with symptoms of chest pain and dyspnea  She underwent cardiovascular testing with a pharmacologic stress test which was normal and an echocardiogram which showed normal LV systolic function with EF of about 50% and mild LVH  She is also being referred to a pulmonologist by her PCP        Review of Systems   Respiratory: Positive for shortness of breath  All other systems reviewed and are negative          Historical Information   Past Medical History:   Diagnosis Date    Anemia     Asthma     Ear problems     GERD (gastroesophageal reflux disease)     Neck pain      Past Surgical History:   Procedure Laterality Date     SECTION      NOSE SURGERY      SINUS SURGERY      TUBAL LIGATION       Social History     Substance and Sexual Activity   Alcohol Use No     Social History     Substance and Sexual Activity   Drug Use No     Social History     Tobacco Use   Smoking Status Current Some Day Smoker    Packs/day: 1 00    Types: Cigarettes   Smokeless Tobacco Never Used     Family History:   Family History   Problem Relation Age of Onset    Hypertension Father     Hypertension Maternal Aunt        Meds/Allergies     Allergies   Allergen Reactions    Amoxicillin     Ciprofloxacin        Current Outpatient Medications:     albuterol (PROVENTIL HFA,VENTOLIN HFA) 90 mcg/act inhaler, albuterol sulfate HFA 90 mcg/actuation aerosol inhaler  INHALE 2 PUFFS EVERY 4 HOURS AS NEEDED FOR WHEEZING OR SHORTNESS OF BREATH, Disp: , Rfl:     ALPRAZolam (XANAX) 0 25 mg tablet, alprazolam 0 25 mg tablet, Disp: , Rfl:     Antipyrine-Benzocaine (AURALGAN OT), Auralgan ear drops  Instill by otic route 4 drops t i d  in both ears  , Disp: , Rfl:     carvedilol (COREG) 3 125 mg tablet, Take 1 tablet (3 125 mg total) by mouth 2 (two) times a day with meals, Disp: 90 tablet, Rfl: 3    darifenacin (Enablex) 15 MG 24 hr tablet, Enablex 15 mg tablet,extended release, Disp: , Rfl:     econazole nitrate 1 % cream, econazole 1 % topical cream, Disp: , Rfl:     ergocalciferol (VITAMIN D2) 50,000 units, Take 50,000 Units by mouth once a week, Disp: , Rfl:     ferrous sulfate 324 (65 Fe) mg, Take 324 mg by mouth daily, Disp: , Rfl:     fluticasone (FLONASE) 50 mcg/act nasal spray, 2 sprays into each nostril daily, Disp: 16 g, Rfl: 6    gabapentin (NEURONTIN) 800 mg tablet, Take 2 tablets (1,600 mg total) by mouth daily (Patient taking differently: Take 800 mg by mouth 2 (two) times a day ), Disp: , Rfl: 0    lansoprazole (PREVACID) 30 mg capsule, Take 1 capsule (30 mg total) by mouth daily, Disp: 90 capsule, Rfl: 2    methocarbamol (ROBAXIN) 750 mg tablet, Take 0 5 tablet in the AM, 0 5 tablet in PM and full tablet at bedtime as needed for spasms, Disp: 60 tablet, Rfl: 5    ondansetron (ZOFRAN-ODT) 4 mg disintegrating tablet, ondansetron 4 mg disintegrating tablet, Disp: , Rfl:     RABEprazole (Aciphex) 20 MG tablet, AcipHex 20 mg tablet,delayed release, Disp: , Rfl:     Vitals:   /90 mmHg  HR 88/Min  BP Readings from Last 3 Encounters:   21 130/88   21 122/79   05/10/21 (!) 176/88       Physical Exam:  Physical Exam    Neurologic:  Alert & oriented x 3, no new focal deficits, Not in any acute distress,  Constitutional:  Well developed, well nourished, non-toxic appearance   Eyes:  Pupil equal and reacting to light, conjunctiva normal,   HENT:  Atraumatic, oropharynx moist, Neck- normal range of motion, no tenderness,  Neck supple, No JVP, No LNP   Respiratory:  Bilateral air entry, mostly clear to auscultation  Cardiovascular: S1-S2 regular with a I/VI ejection systolic murmur   GI:  Soft, nondistended, normal bowel sounds, nontender, no hepatosplenomegaly appreciated  Musculoskeletal:  No edema, no tenderness, no deformities     Skin:  Well hydrated, no rash   Lymphatic:  No lymphadenopathy noted   Extremities:  No edema and distal pulses are present        Cardiac testing:   Results for orders placed during the hospital encounter of 21    Echo complete with contrast if indicated    Nia Palma 40  [address] [address]  [phone]    Transthoracic Echocardiogram  2D, M-mode, Doppler, and Color Doppler    Study date:  06-Aug-2021    Patient: Benjamín Rice  MR number: [id number]  Account number: [de-identified]  : 1975  Age: 40 years  Gender: Female  Status: Outpatient  Location: Echo lab  Height: 63 in  Weight: 155 8 lb  BP: 130/ 88 mmHg    Indications: HTN    Diagnoses: I11 9 - Hypertensive heart disease without heart failure    Sonographer:  IRLANDA Orantes  Primary Physician:  Tucker Stewart MD  Referring Physician:  Aleksandr Heck MD  Group:    [facility]  Interpreting Physician:  Monica Olivas MD    SUMMARY    PROCEDURE INFORMATION:  This was a technically difficult study  Echocardiographic views were limited due to poor acoustic window availability, decreased penetration, and lung interference  LEFT VENTRICLE:  Systolic function was at the lower limits of normal  Ejection fraction was estimated to be around 50 %  This study was inadequate for the evaluation of regional wall motion  Wall thickness was mildly increased  There was mild concentric hypertrophy  MITRAL VALVE:  There was mild regurgitation  TRICUSPID VALVE:  There was trace regurgitation  HISTORY: PRIOR HISTORY: Tobacco abuse,HTN  PROCEDURE: The procedure was performed in the echo lab  This was a routine study  The transthoracic approach was used  The study included complete 2D imaging, M-mode, complete spectral Doppler, and color Doppler  The heart rate was 73 bpm,  at the start of the study  Images were obtained from the parasternal, apical, subcostal, and suprasternal notch acoustic windows  Echocardiographic views were limited due to poor acoustic window availability, decreased penetration, and  lung interference  This was a technically difficult study  LEFT VENTRICLE: Size was normal  Systolic function was at the lower limits of normal  Ejection fraction was estimated to be around 50 %  This study was inadequate for the evaluation of regional wall motion  Wall thickness was mildly  increased  There was mild concentric hypertrophy  DOPPLER: Left ventricular diastolic function parameters were normal for the patient's age  RIGHT VENTRICLE: The size was normal  Systolic function was normal  Wall thickness was normal     LEFT ATRIUM: Size was at the upper limits of normal     RIGHT ATRIUM: Size was normal     MITRAL VALVE: There was normal leaflet separation  DOPPLER: The transmitral velocity was within the normal range   There was no evidence for stenosis  There was mild regurgitation  AORTIC VALVE: The valve was not well visualized  DOPPLER: There was no evidence for stenosis  There was no significant regurgitation  TRICUSPID VALVE: DOPPLER: There was trace regurgitation  The tricuspid jet envelope definition was inadequate for estimation of RV systolic pressure  PULMONIC VALVE: DOPPLER: There was no significant regurgitation  PERICARDIUM: There was no thickening or calcification  There was no pericardial effusion  AORTA: The root exhibited normal size  SYSTEMIC VEINS: IVC: The inferior vena cava was not well visualized      SYSTEM MEASUREMENT TABLES    2D  EF (Teich): 48 51 %  %FS: 24 12 %  EDV(Teich): 72 77 ml  ESV(Teich): 37 47 ml  IVSd: 0 97 cm  LA Area: 18 06 cm2  LA Diam: 3 61 cm  LVEDV MOD A4C: 114 35 ml  LVEF MOD A4C: 43 41 %  LVESV MOD A4C: 64 71 ml  LVIDd: 4 07 cm  LVIDs: 3 08 cm  LVLd A4C: 8 63 cm  LVLs A4C: 7 27 cm  LVOT Diam: 1 95 cm  LVPWd: 0 95 cm  RA Area: 15 53 cm2  RVIDd: 3 21 cm  SV (Teich): 35 29 ml  SV MOD A4C: 49 63 ml    CW  AV Vmax: 0 81 m/s  AV maxP 63 mmHg    MM  TAPSE: 2 24 cm    PW  MV E/A Ratio: 1 23  E' Sept: 0 11 m/s  E/E' Sept: 5 88  LVOT Vmax: 0 63 m/s  LVOT maxP 57 mmHg  MV A Ortega: 0 53 m/s  MV [place]: 3 56 m/s2  MV DecT: 183 96 ms  MV E Ortega: 0 66 m/s  MV PHT: 53 35 ms  MVA By PHT: 4 12 cm2    [employer] Accredited Echocardiography Laboratory    Prepared and electronically signed by    Jostin Stubbs MD  Signed 06-Aug-2021 13:15:27      Results for orders placed during the hospital encounter of 21    NM Myocardial Perfusion Spect (Pharmacological Induced Stress and/or Rest)    Narrative  [address] [place] [address]  [phone]    Rest/Stress Gated SPECT Myocardial Perfusion Imaging After Regadenoson    Patient: Justo Bronson  MR number: [id number]  Account number: [de-identified]  : 1975  Age: 40 years  Gender: Female  Status: Outpatient  Location: Stress lab  Height: 63 in  Weight: 155 lb  BP: 130/ 82 mmHg    Allergies: AMOXICILLIN, CIPROFLOXACIN    Diagnosis: R07 9 - Chest pain, unspecified    Primary Physician:  Yesenia Farris MD  RN:  Jorje Frias RN  Interpreting Physician:  Kamryn Puri MD  Referring Physician:  Khoi Bowman MD  Technician:  Cristel Brice  Group:  [facility]  Report Prepared By[de-identified]  Jorje Frias RN    INDICATIONS: Coronary artery disease  HISTORY: The patient is a 40year old  female  Chest pain status: chest pain  Coronary artery disease risk factors: smoking  Cardiovascular history: arrhythmia/tachycardia Co-morbidity: history of lung  disease/COVID,asthma,pneumonia  Medications: no cardiac drugs  Acute coronary syndrome: acute myocardial infarction recently ruled out  PHYSICAL EXAM: Baseline physical exam screening: no wheezes audible  REST ECG: Normal sinus rhythm  Normal baseline ECG  PROCEDURE: The study was performed in the the Stress lab  A regadenoson infusion pharmacologic stress test was performed  Gated SPECT myocardial perfusion imaging was performed after stress  Systolic blood pressure was 130 mmHg, at the  start of the study  Diastolic blood pressure was 82 mmHg, at the start of the study  The heart rate was 85 bpm, at the start of the study  IV double checked  Regadenoson protocol:  HR bpm SBP mmHg DBP mmHg Symptoms  Baseline 85 130 82 none  Immediate 130 146 78 mild dyspnea, dizziness, nausea  1 min 112 126 74 subsiding  2 min -- -- -- none  No medications or fluids given  The patient also performed low level exercise  STRESS SUMMARY: Duration of pharmacologic stress was 3 min  Maximal heart rate during stress was 130 bpm  The rate-pressure product for the peak heart rate and blood pressure was 83370  There was no chest pain during stress  The stress  test was terminated due to protocol completion  Pre oxygen saturation: 100 %   Peak oxygen saturation: 100 %  The stress ECG was negative for ischemia and normal  There were no stress arrhythmias or conduction abnormalities  ISOTOPE ADMINISTRATION:  Resting isotope administration Stress isotope administration  Agent Tetrofosmin Tetrofosmin  Dose 11 mCi 32 1 mCi  Date 05/26/2021 05/26/2021  Injection time 07:58 09:00  Imaging time 08:34 09:47  Injection-image interval 36 min 47 min    The radiopharmaceutical was injected at the peak effect of pharmacologic stress  MYOCARDIAL PERFUSION IMAGING:  The image quality was good  Left ventricular size was normal  The TID ratio was 0 98  PERFUSION DEFECTS:  -  There were no perfusion defects  GATED SPECT:  The calculated left ventricular ejection fraction was 67 %  Left ventricular ejection fraction was within normal limits by visual estimate  There was no left ventricular regional abnormality  SUMMARY:  -  Stress results: There was no chest pain during stress  -  ECG conclusions: The stress ECG was negative for ischemia and normal  There were no stress arrhythmias or conduction abnormalities  -  Perfusion imaging: There were no perfusion defects   -  Gated SPECT: The calculated left ventricular ejection fraction was 67 %  Left ventricular ejection fraction was within normal limits by visual estimate  There was no left ventricular regional abnormality  IMPRESSIONS: Normal study after vasodilation and low level exercise without reproduction of symptoms  The cardiovascular risk is low  Myocardial perfusion imaging was normal at rest and with stress  Prepared and signed by    Angelica Kaplan MD  Signed 05/26/2021 13:32:39        Imaging:  Chest X-Ray:   XR chest pa & lateral    Result Date: 5/28/2021  Impression No acute cardiopulmonary disease  Workstation performed: [id number]     XR chest pa & lateral    Result Date: 5/10/2021  Impression Patchy right middle lobe airspace opacity may represent atelectasis versus pneumonia    Clinical and laboratory correlation are recommended  The study was marked in EPIC for significant notification  Workstation performed: [id number]       CT-scan of the chest:     No CTA results available for this patient  Lab Review   Lab Results   Component Value Date    WBC 11 35 (H) 05/25/2021    HGB 13 1 05/25/2021    HCT 40 7 05/25/2021    MCV 78 (L) 05/25/2021    RDW 19 8 (H) 05/25/2021     05/25/2021     BMP:  Lab Results   Component Value Date    SODIUM 140 05/25/2021    K 4 1 05/25/2021     05/25/2021    CO2 26 05/25/2021    ANIONGAP 11 10/05/2015    BUN 9 05/25/2021    CREATININE 0 72 05/25/2021    GLUC 103 05/25/2021    CALCIUM 8 5 05/25/2021    EGFR 101 05/25/2021    MG 2 0 02/08/2017     LFT:  Lab Results   Component Value Date    AST 11 05/10/2021    ALT 18 05/10/2021    ALKPHOS 100 05/10/2021    TP 7 5 05/10/2021    ALB 3 9 05/10/2021      No components found for: [employer] [facility]  Lab Results   Component Value Date    ROX0SLDQMVHR 2 146 02/08/2017     No results found for: HGBA1C  Lipid Profile:   Lab Results   Component Value Date    CHOLESTEROL 195 02/08/2017    HDL 53 02/08/2017    LDLCALC 126 (H) 02/08/2017    TRIG 79 02/08/2017     Lab Results   Component Value Date    CHOLESTEROL 195 02/08/2017     Lab Results   Component Value Date    CKTOTAL 91 05/25/2021    TROPONINI <0 02 05/25/2021     Lab Results   Component Value Date    NTBNP 81 05/10/2021      No results found for this or any previous visit (from the past 672 hour(s))  Dr Kaylyn Mckeon MD, [facility]      "This note has been constructed using a voice recognition system  Therefore there may be syntax, spelling, and/or grammatical errors   Please call if you have any questions  "

## 2021-09-24 ENCOUNTER — OFFICE VISIT (OUTPATIENT)
Dept: FAMILY MEDICINE CLINIC | Facility: CLINIC | Age: 46
End: 2021-09-24
Payer: COMMERCIAL

## 2021-09-24 VITALS
WEIGHT: 158 LBS | BODY MASS INDEX: 28 KG/M2 | OXYGEN SATURATION: 98 % | DIASTOLIC BLOOD PRESSURE: 90 MMHG | RESPIRATION RATE: 16 BRPM | SYSTOLIC BLOOD PRESSURE: 120 MMHG | HEART RATE: 84 BPM | HEIGHT: 63 IN

## 2021-09-24 DIAGNOSIS — Z00.00 ANNUAL PHYSICAL EXAM: ICD-10-CM

## 2021-09-24 DIAGNOSIS — Z12.31 BREAST CANCER SCREENING BY MAMMOGRAM: ICD-10-CM

## 2021-09-24 DIAGNOSIS — R73.01 ELEVATED FASTING BLOOD SUGAR: ICD-10-CM

## 2021-09-24 DIAGNOSIS — Z12.11 COLON CANCER SCREENING: ICD-10-CM

## 2021-09-24 DIAGNOSIS — F17.200 CURRENT SMOKER: ICD-10-CM

## 2021-09-24 DIAGNOSIS — Z76.89 ENCOUNTER TO ESTABLISH CARE: Primary | ICD-10-CM

## 2021-09-24 DIAGNOSIS — I10 ESSENTIAL HYPERTENSION: ICD-10-CM

## 2021-09-24 DIAGNOSIS — Z12.4 CERVICAL CANCER SCREENING: ICD-10-CM

## 2021-09-24 DIAGNOSIS — E78.2 MIXED HYPERLIPIDEMIA: ICD-10-CM

## 2021-09-24 DIAGNOSIS — E66.3 OVERWEIGHT (BMI 25.0-29.9): ICD-10-CM

## 2021-09-24 PROBLEM — R26.81 GENERAL UNSTEADINESS: Status: ACTIVE | Noted: 2019-10-28

## 2021-09-24 PROBLEM — D63.8 ANEMIA OF CHRONIC DISEASE: Status: ACTIVE | Noted: 2021-09-24

## 2021-09-24 PROBLEM — M51.369 DEGENERATION OF LUMBAR INTERVERTEBRAL DISC: Status: ACTIVE | Noted: 2021-09-24

## 2021-09-24 PROBLEM — E78.00 HYPERCHOLESTEROLEMIA: Status: ACTIVE | Noted: 2021-09-24

## 2021-09-24 PROBLEM — E55.9 VITAMIN D DEFICIENCY: Status: ACTIVE | Noted: 2017-03-07

## 2021-09-24 PROBLEM — K21.9 GASTROESOPHAGEAL REFLUX DISEASE: Status: ACTIVE | Noted: 2017-03-07

## 2021-09-24 PROBLEM — F11.20 OPIOID DEPENDENCE (HCC): Status: ACTIVE | Noted: 2019-10-28

## 2021-09-24 PROBLEM — F41.9 ANXIETY: Status: ACTIVE | Noted: 2021-09-24

## 2021-09-24 PROBLEM — M79.2 NEUROPATHIC PAIN: Status: ACTIVE | Noted: 2019-10-28

## 2021-09-24 PROBLEM — M51.36 DEGENERATION OF LUMBAR INTERVERTEBRAL DISC: Status: ACTIVE | Noted: 2021-09-24

## 2021-09-24 PROCEDURE — 3008F BODY MASS INDEX DOCD: CPT | Performed by: FAMILY MEDICINE

## 2021-09-24 PROCEDURE — 3725F SCREEN DEPRESSION PERFORMED: CPT | Performed by: FAMILY MEDICINE

## 2021-09-24 PROCEDURE — 99213 OFFICE O/P EST LOW 20 MIN: CPT | Performed by: FAMILY MEDICINE

## 2021-09-24 PROCEDURE — 4004F PT TOBACCO SCREEN RCVD TLK: CPT | Performed by: FAMILY MEDICINE

## 2021-09-24 PROCEDURE — 99396 PREV VISIT EST AGE 40-64: CPT | Performed by: FAMILY MEDICINE

## 2021-09-24 NOTE — PROGRESS NOTES
Assessment/Plan:   Diagnoses and all orders for this visit:    Encounter to establish care  Elevated fasting blood sugar  -     HEMOGLOBIN A1C W/ EAG ESTIMATION; Future  - noted to have FBS of 108 - will check A1c   Overweight (BMI 25 0-29 9)  - BMI 28  - discussed diet and encouraged exercise  - educated that it takes 3500 keanu to lose 1lb  - advised to cut down on carbs, 5 servings of fruits/veggies/day, increase water intake (65-80oz/water/day)   - appropriate weight loss goal for women = 0 5-1lb/week  - per the Heart Association - 150mins/exercise/week, but to lose and maintain weight 200-300mins/exercise/week   - RTO in 6months for f/u    Current smoker  -     Ambulatory referral to Smoking Cessation Program; Future  - smokes 1PPD/33yrs (contemplating quitting)  - has tried Nicotine Patch w/o relief   - due to start a new job at [employer] - for now, advised to cut back 1cig/week and referral given to Smoking Cessation Program     Essential hypertension  - BP stable in the office   - follows with Cardio and had an OV on 9/1/2021 - f/u in 6months   - cont Coreg     Mixed hyperlipidemia  - Lipids (9/2/2021): , TG 79, HDL 60, LDL 67  - cont Crestor         Subjective:    Patient ID: Júnior Carey is a 40 y o  female    Júnior Carey is a 40 y o  female who presents to the office to establish care and for an annual exam  - prior PCP: Dr Maldonado [facility]   - Specialists: Cardio, GI (last OV was 2/2020 - advised to schedule a Cscope), Rheum, Pain Management   - PMHx: GERD, colon polyps, DJD, Tobacco abuse, Anemia of chronic disease, HL, Vit D deficiency, Neuropathic pain  - allergies: Amox and Cipro   - Meds: see med rec   - PSHx: nose/sinus surgery, Csection and tubal ligation   - FHx: F (HTN), MA (Breast Ca), MGM (Bone Ca), PGM (Stomach Ca), denies FHx of Breast/Colon Ca   - Immunizations: J&J COVID IMM, Tdap in 2013, gets Flu vaccine at work ([employer])   - GYN Hx: does not have an Ob/Gyn  - Hm: last Mammo was in 2016, per pt, last Cscope was within the past 3yrs - last OV with GI was 2/2020 - advised to schedule a Cscope  - diet/exercise: walks, diet: balanced   - social: (+) tob - 1PPD/33yrs (contemplating quitting), denies tob/illicts   - sexual Hx: active with spouse, s/p tubal ligation, denied STD screening in the office today   - last vision: reading glasses, goes annually   - last dental: last OV was >3yrs ago   - ROS: today in the office pt denies F/C/N/V/HA/visual changes/CP/palpitations/SOB/wheezing/abd pain/D/LE edema      The following portions of the patient's history were reviewed and updated as appropriate: allergies, current medications, past family history, past medical history, past social history, past surgical history and problem list     Review of Systems  as per HPI    Objective:  /90   Pulse 84   Resp 16   Ht 5' 3" (1 6 m)   Wt 71 7 kg (158 lb)   SpO2 98%   BMI 27 99 kg/m²    Physical Exam  Vitals reviewed  Constitutional:       General: She is not in acute distress  Appearance: Normal appearance  She is not ill-appearing, toxic-appearing or diaphoretic  HENT:      Head: Normocephalic and atraumatic  Right Ear: External ear normal       Left Ear: External ear normal    Eyes:      General: No scleral icterus  Right eye: No discharge  Left eye: No discharge  Extraocular Movements: Extraocular movements intact  Conjunctiva/sclera: Conjunctivae normal    Cardiovascular:      Rate and Rhythm: Normal rate and regular rhythm  Heart sounds: Normal heart sounds  No murmur heard  No friction rub  No gallop  Pulmonary:      Effort: Pulmonary effort is normal       Breath sounds: Normal breath sounds  Abdominal:      General: Bowel sounds are normal       Palpations: Abdomen is soft  Musculoskeletal:         General: Normal range of motion  Cervical back: Normal range of motion and neck supple  Right lower leg: No edema  Left lower leg: No edema  Skin:     General: Skin is warm  Neurological:      General: No focal deficit present  Mental Status: She is alert and oriented to person, place, and time  Psychiatric:         Mood and Affect: Mood normal          Behavior: Behavior normal        BMI Counseling: Body mass index is 27 99 kg/m²  The BMI is above normal  Nutrition recommendations include reducing portion sizes and decreasing overall calorie intake  Exercise recommendations include moderate aerobic physical activity for 150 minutes/week, exercising 3-5 times per week, joining a gym and strength training exercises  Depression Screening Follow-up Plan: Patient's depression screening was positive with a PHQ-2 score of 0  Their PHQ-9 score was   Clinically patient does not have depression  No treatment is required

## 2021-09-24 NOTE — PROGRESS NOTES
Assessment/Plan:   Diagnoses and all orders for this visit:    Encounter to establish care  Annual physical exam  - reviewed PMHx, PSHx, meds, allergies, FHx, Soc Hx and Sexual Hx  - received J&J COVID IMM  - last Tdap was in 2013  - will get Flu vaccine at work - [employer] [facility]   - overdue for NoFlo and annual GYN exam - referral and script given   - follows with GI - h/o colon polyps -  last OV with GI was 2/2020 - advised to schedule a Cscope  - reviewed labs   - declined STD screening in the office today   - UTD with Optho   - overdue for Dental - advised to schedule   - RTO in 1yr for annual exam or sooner if needed - pt aware and agreeable     Breast cancer screening by mammogram  -     Mammo screening bilateral w 3d & cad; Future  Cervical cancer screening  -     Ambulatory referral to Gynecology; Future    Colon cancer screening  - follows with GI - h/o colon polyps -  last OV with GI was 2/2020 - advised to schedule a Cscope  - per pt, last Cscope was within the past 3yrs    Elevated fasting blood sugar  -     HEMOGLOBIN A1C W/ EAG ESTIMATION;  Future  - noted to have FBS of 108 - will check A1c   Overweight (BMI 25 0-29 9)  - BMI 28  - discussed diet and encouraged exercise  - educated that it takes 3500 keanu to lose 1lb  - advised to cut down on carbs, 5 servings of fruits/veggies/day, increase water intake (65-80oz/water/day)   - appropriate weight loss goal for women = 0 5-1lb/week  - per the Heart Association - 150mins/exercise/week, but to lose and maintain weight 200-300mins/exercise/week   - RTO in 6months for f/u    Current smoker  -     Ambulatory referral to Smoking Cessation Program; Future  - smokes 1PPD/33yrs (contemplating quitting)  - has tried Nicotine Patch w/o relief   - due to start a new job at [employer] - for now, advised to cut back 1cig/week and referral given to Smoking Cessation Program     Essential hypertension  - BP stable in the office   - follows with Cardio and had an OV on 9/1/2021 - f/u in 6months   - cont Coreg     Mixed hyperlipidemia  - Lipids (9/2/2021): , TG 79, HDL 60, LDL 67  - cont Crestor           Subjective:    Patient ID: Viky Aguiar is a 40 y o  female  Viky Aguiar is a 40 y o  female who presents to the office to establish care and for an annual exam  - prior PCP: Dr Yue Mo   - Specialists: Cardio, GI (last OV was 2/2020 - advised to schedule a Cscope), Rheum, Pain Management   - PMHx: GERD, colon polyps, DJD, Tobacco abuse, Anemia of chronic disease, HL, Vit D deficiency, Neuropathic pain  - allergies: Amox and Cipro   - Meds: see med rec   - PSHx: nose/sinus surgery, Csection and tubal ligation   - FHx: F (HTN), MA (Breast Ca), MGM (Bone Ca), PGM (Stomach Ca), denies FHx of Breast/Colon Ca   - Immunizations: J&J COVID IMM, Tdap in 2013, gets Flu vaccine at work ([facility])   - GYN Hx: does not have an Ob/Gyn  - Hm: last Mammo was in 2016, per pt, last Cscope was within the past 3yrs - last OV with GI was 2/2020 - advised to schedule a Cscope  - diet/exercise: walks, diet: balanced   - social: (+) tob - 1PPD/33yrs (contemplating quitting), denies tob/illicts   - sexual Hx: active with spouse, s/p tubal ligation, denied STD screening in the office today   - last vision: reading glasses, goes annually   - last dental: last OV was >3yrs ago   - ROS: today in the office pt denies F/C/N/V/HA/visual changes/CP/palpitations/SOB/wheezing/abd pain/D/LE edema      The following portions of the patient's history were reviewed and updated as appropriate: allergies, current medications, past family history, past medical history, past social history, past surgical history and problem list     Review of Systems  as per HPI    Objective:  /90   Pulse 84   Resp 16   Ht 5' 3" (1 6 m)   Wt 71 7 kg (158 lb)   SpO2 98%   BMI 27 99 kg/m²    Physical Exam  Vitals reviewed  Constitutional:       General: She is not in acute distress       Appearance: Normal appearance  She is not ill-appearing, toxic-appearing or diaphoretic  HENT:      Head: Normocephalic and atraumatic  Right Ear: External ear normal       Left Ear: External ear normal    Eyes:      General: No scleral icterus  Right eye: No discharge  Left eye: No discharge  Extraocular Movements: Extraocular movements intact  Conjunctiva/sclera: Conjunctivae normal    Cardiovascular:      Rate and Rhythm: Normal rate and regular rhythm  Heart sounds: Normal heart sounds  No murmur heard  No friction rub  No gallop  Pulmonary:      Effort: Pulmonary effort is normal       Breath sounds: Normal breath sounds  Abdominal:      General: Bowel sounds are normal       Palpations: Abdomen is soft  Musculoskeletal:         General: Normal range of motion  Cervical back: Normal range of motion and neck supple  Right lower leg: No edema  Left lower leg: No edema  Skin:     General: Skin is warm  Neurological:      General: No focal deficit present  Mental Status: She is alert and oriented to person, place, and time  Psychiatric:         Mood and Affect: Mood normal          Behavior: Behavior normal        BMI Counseling: Body mass index is 27 99 kg/m²  The BMI is above normal  Nutrition recommendations include reducing portion sizes and decreasing overall calorie intake  Exercise recommendations include moderate aerobic physical activity for 150 minutes/week, exercising 3-5 times per week, joining a gym and strength training exercises  Depression Screening Follow-up Plan: Patient's depression screening was positive with a PHQ-2 score of 0  Their PHQ-9 score was   Clinically patient does not have depression  No treatment is required

## 2021-09-24 NOTE — PATIENT INSTRUCTIONS

## 2021-10-08 ENCOUNTER — TELEPHONE (OUTPATIENT)
Dept: FAMILY MEDICINE CLINIC | Facility: CLINIC | Age: 46
End: 2021-10-08

## 2021-10-14 ENCOUNTER — PATIENT OUTREACH (OUTPATIENT)
Dept: OTHER | Facility: CLINIC | Age: 46
End: 2021-10-14

## 2021-10-23 DIAGNOSIS — I10 HYPERTENSION, UNSPECIFIED TYPE: ICD-10-CM

## 2021-10-26 RX ORDER — CARVEDILOL 3.12 MG/1
TABLET ORAL
Qty: 180 TABLET | Refills: 1 | Status: SHIPPED | OUTPATIENT
Start: 2021-10-26 | End: 2022-03-17 | Stop reason: SDUPTHER

## 2021-12-01 DIAGNOSIS — E78.00 HYPERCHOLESTEROLEMIA: Primary | ICD-10-CM

## 2021-12-01 DIAGNOSIS — R07.9 CHEST PAIN, UNSPECIFIED TYPE: ICD-10-CM

## 2021-12-01 RX ORDER — ROSUVASTATIN CALCIUM 5 MG/1
5 TABLET, COATED ORAL DAILY
Qty: 90 TABLET | Refills: 0 | Status: SHIPPED | OUTPATIENT
Start: 2021-12-01 | End: 2022-01-05 | Stop reason: SDUPTHER

## 2021-12-01 RX ORDER — GABAPENTIN 800 MG/1
800 TABLET ORAL 2 TIMES DAILY
Start: 2021-12-01

## 2021-12-03 DIAGNOSIS — R07.9 CHEST PAIN, UNSPECIFIED TYPE: ICD-10-CM

## 2021-12-03 RX ORDER — GABAPENTIN 800 MG/1
1600 TABLET ORAL DAILY
Qty: 60 TABLET | Refills: 0 | Status: SHIPPED | OUTPATIENT
Start: 2021-12-03 | End: 2022-01-03

## 2021-12-30 DIAGNOSIS — R07.9 CHEST PAIN, UNSPECIFIED TYPE: ICD-10-CM

## 2022-01-03 RX ORDER — GABAPENTIN 800 MG/1
TABLET ORAL
Qty: 60 TABLET | Refills: 0 | Status: SHIPPED | OUTPATIENT
Start: 2022-01-03 | End: 2022-01-05 | Stop reason: SDUPTHER

## 2022-01-04 RX ORDER — AMLODIPINE BESYLATE 5 MG/1
TABLET ORAL
COMMUNITY
End: 2022-01-05

## 2022-01-04 RX ORDER — NICOTINE 21 MG/24HR
PATCH, TRANSDERMAL 24 HOURS TRANSDERMAL
COMMUNITY
End: 2022-04-13

## 2022-01-04 RX ORDER — ERGOCALCIFEROL (VITAMIN D2) 1250 MCG
CAPSULE ORAL
COMMUNITY
End: 2022-04-13

## 2022-01-05 ENCOUNTER — OFFICE VISIT (OUTPATIENT)
Dept: FAMILY MEDICINE CLINIC | Facility: CLINIC | Age: 47
End: 2022-01-05
Payer: COMMERCIAL

## 2022-01-05 VITALS
HEIGHT: 63 IN | HEART RATE: 85 BPM | WEIGHT: 157 LBS | DIASTOLIC BLOOD PRESSURE: 70 MMHG | RESPIRATION RATE: 16 BRPM | SYSTOLIC BLOOD PRESSURE: 116 MMHG | BODY MASS INDEX: 27.82 KG/M2 | OXYGEN SATURATION: 98 %

## 2022-01-05 DIAGNOSIS — E66.3 OVERWEIGHT (BMI 25.0-29.9): ICD-10-CM

## 2022-01-05 DIAGNOSIS — Z71.6 ENCOUNTER FOR SMOKING CESSATION COUNSELING: ICD-10-CM

## 2022-01-05 DIAGNOSIS — F17.200 CURRENT SMOKER: ICD-10-CM

## 2022-01-05 DIAGNOSIS — K21.9 GASTROESOPHAGEAL REFLUX DISEASE WITHOUT ESOPHAGITIS: ICD-10-CM

## 2022-01-05 DIAGNOSIS — M79.2 NEUROPATHIC PAIN: ICD-10-CM

## 2022-01-05 DIAGNOSIS — E78.00 HYPERCHOLESTEROLEMIA: ICD-10-CM

## 2022-01-05 DIAGNOSIS — Z12.4 CERVICAL CANCER SCREENING: ICD-10-CM

## 2022-01-05 DIAGNOSIS — J30.9 ALLERGIC RHINITIS, UNSPECIFIED SEASONALITY, UNSPECIFIED TRIGGER: ICD-10-CM

## 2022-01-05 DIAGNOSIS — Z71.85 VACCINE COUNSELING: ICD-10-CM

## 2022-01-05 DIAGNOSIS — R73.03 PREDIABETES: Primary | ICD-10-CM

## 2022-01-05 PROCEDURE — 3008F BODY MASS INDEX DOCD: CPT | Performed by: FAMILY MEDICINE

## 2022-01-05 PROCEDURE — 99214 OFFICE O/P EST MOD 30 MIN: CPT | Performed by: FAMILY MEDICINE

## 2022-01-05 PROCEDURE — 3725F SCREEN DEPRESSION PERFORMED: CPT | Performed by: FAMILY MEDICINE

## 2022-01-05 PROCEDURE — 4004F PT TOBACCO SCREEN RCVD TLK: CPT | Performed by: FAMILY MEDICINE

## 2022-01-05 RX ORDER — FLUTICASONE PROPIONATE 50 MCG
2 SPRAY, SUSPENSION (ML) NASAL DAILY
Qty: 48 ML | Refills: 3 | Status: SHIPPED | OUTPATIENT
Start: 2022-01-05

## 2022-01-05 RX ORDER — GABAPENTIN 800 MG/1
1600 TABLET ORAL DAILY
Qty: 60 TABLET | Refills: 2 | Status: SHIPPED | OUTPATIENT
Start: 2022-01-05 | End: 2022-04-26

## 2022-01-05 RX ORDER — LANSOPRAZOLE 30 MG/1
30 CAPSULE, DELAYED RELEASE ORAL DAILY
Qty: 90 CAPSULE | Refills: 2 | Status: SHIPPED | OUTPATIENT
Start: 2022-01-05 | End: 2022-07-20 | Stop reason: SDUPTHER

## 2022-01-05 RX ORDER — ROSUVASTATIN CALCIUM 5 MG/1
5 TABLET, COATED ORAL DAILY
Qty: 90 TABLET | Refills: 0 | Status: SHIPPED | OUTPATIENT
Start: 2022-01-05 | End: 2022-01-25

## 2022-01-05 NOTE — PROGRESS NOTES
Assessment/Plan:   Diagnoses and all orders for this visit:    Prediabetes  -     Ambulatory referral to Nutrition Services; Future  - A1c (9/24/2021): 5 9  - discussed diet and encouraged exercise  - smoking cessation advised  - will refer to Fabio Encarnacion in 3months with labs (repeat A1c) for f/u - pt aware and agreeable  Overweight (BMI 25 0-29 9)  -     ergocalciferol (ERGOCALCIFEROL) 1 25 MG (55594 UT) capsule; ergocalciferol (vitamin D2) 1,250 mcg (50,000 unit) capsule   TAKE 1 CAPSULE BY MOUTH ONE TIME PER WEEK    Current smoker  -     nicotine (NICODERM CQ) 21 mg/24 hr TD 24 hr patch; nicotine 21 mg/24 hr daily transdermal patch  - smokes 1PPD/33yrs (contemplating quitting but not yet ready to do so)  - has tried Nicotine Patch   - has been referred to smoking cessation counseling   Encounter for smoking cessation counseling    Cervical cancer screening  -     Ambulatory referral to Gynecology; Future    Gastroesophageal reflux disease without esophagitis  -     lansoprazole (PREVACID) 30 mg capsule; Take 1 capsule (30 mg total) by mouth daily    Hypercholesterolemia  -     rosuvastatin (CRESTOR) 5 mg tablet; Take 1 tablet (5 mg total) by mouth daily    Allergic rhinitis, unspecified seasonality, unspecified trigger  -     fluticasone (FLONASE) 50 mcg/act nasal spray; 2 sprays into each nostril daily    Neuropathic pain  -     gabapentin (NEURONTIN) 800 mg tablet; Take 2 tablets (1,600 mg total) by mouth daily  - advised to f/u with Rheum - pt aware and will schedule     Vaccine counseling  - UTD with J&J COVID vaccine   - STRONGLY advised to get COVID Booster - pt aware and agreeable     Other orders  -     Discontinue: amLODIPine (NORVASC) 5 mg tablet; amlodipine 5 mg tablet          Subjective:    Patient ID: Ashleigh Velazquez is a 40 y o  female    HPI   - UTD with J&J but her not received Booster  - UTD with Flu vaccine at work - works at [employer]   - follows with Retina specialist - reviewed c/s note - annual f/u advised   - next Optho appt is 5/2/2022  - has an appt with Pulm on 1/13/2022  - last OV with Rheum was 2yrs ago - Dr Pola Darby - currently on Gabapentin 800mg BID for neuropathy - needs RFs  - still smoking 1PPD/34yrs - has patches and was referred to smoking cessation program at last OV 3months ago - not yet ready to quit   - follows with Cardio - on Coreg for HTN - BP well controlled   - denies F/C/N/V/CP/palpitations/SOB/wheezing/abd pain/D/LE edema       The following portions of the patient's history were reviewed and updated as appropriate: allergies, current medications, past family history, past medical history, past social history, past surgical history and problem list     Review of Systems  as per HPI    Objective:  /70   Pulse 85   Resp 16   Ht 5' 3" (1 6 m)   Wt 71 2 kg (157 lb)   SpO2 98%   BMI 27 81 kg/m²    Physical Exam  Vitals reviewed  Constitutional:       General: She is not in acute distress  Appearance: Normal appearance  She is not ill-appearing, toxic-appearing or diaphoretic  HENT:      Head: Normocephalic and atraumatic  Right Ear: External ear normal       Left Ear: External ear normal    Eyes:      General: No scleral icterus  Right eye: No discharge  Left eye: No discharge  Extraocular Movements: Extraocular movements intact  Conjunctiva/sclera: Conjunctivae normal    Cardiovascular:      Rate and Rhythm: Normal rate and regular rhythm  Heart sounds: Normal heart sounds  No murmur heard  No gallop  Pulmonary:      Effort: Pulmonary effort is normal  No respiratory distress  Breath sounds: Normal breath sounds  No stridor  No wheezing, rhonchi or rales  Abdominal:      General: Bowel sounds are normal       Palpations: Abdomen is soft  Musculoskeletal:         General: Normal range of motion  Cervical back: Normal range of motion and neck supple  Right lower leg: No edema        Left lower leg: No edema    Skin:     General: Skin is warm  Neurological:      General: No focal deficit present  Mental Status: She is alert and oriented to person, place, and time  Psychiatric:         Mood and Affect: Mood normal          Behavior: Behavior normal          Depression Screening Follow-up Plan: Patient's depression screening was positive with a PHQ-2 score of 0  Their PHQ-9 score was   Clinically patient does not have depression  No treatment is required  BMI Counseling: Body mass index is 27 81 kg/m²  The BMI is above normal  Nutrition recommendations include consuming healthier snacks and moderation in carbohydrate intake  Exercise recommendations include moderate aerobic physical activity for 150 minutes/week and exercising 3-5 times per week  Patient referred to nutritionist due to patient being overweight

## 2022-01-14 ENCOUNTER — TELEPHONE (OUTPATIENT)
Dept: FAMILY MEDICINE CLINIC | Facility: CLINIC | Age: 47
End: 2022-01-14

## 2022-01-14 NOTE — TELEPHONE ENCOUNTER
Please call pharmacy to have them fax results    I was unable to pull them from [facility]

## 2022-01-25 DIAGNOSIS — E78.00 HYPERCHOLESTEROLEMIA: ICD-10-CM

## 2022-01-25 RX ORDER — ROSUVASTATIN CALCIUM 5 MG/1
5 TABLET, COATED ORAL DAILY
Qty: 90 TABLET | Refills: 0 | Status: SHIPPED | OUTPATIENT
Start: 2022-01-25 | End: 2022-03-17 | Stop reason: SDUPTHER

## 2022-02-02 ENCOUNTER — OFFICE VISIT (OUTPATIENT)
Dept: FAMILY MEDICINE CLINIC | Facility: CLINIC | Age: 47
End: 2022-02-02
Payer: COMMERCIAL

## 2022-02-02 VITALS
TEMPERATURE: 98.4 F | DIASTOLIC BLOOD PRESSURE: 80 MMHG | HEART RATE: 92 BPM | WEIGHT: 157 LBS | HEIGHT: 63 IN | RESPIRATION RATE: 16 BRPM | BODY MASS INDEX: 27.82 KG/M2 | OXYGEN SATURATION: 98 % | SYSTOLIC BLOOD PRESSURE: 112 MMHG

## 2022-02-02 DIAGNOSIS — J40 BRONCHITIS: Primary | ICD-10-CM

## 2022-02-02 DIAGNOSIS — F17.200 CURRENT SMOKER: ICD-10-CM

## 2022-02-02 PROCEDURE — 4004F PT TOBACCO SCREEN RCVD TLK: CPT | Performed by: FAMILY MEDICINE

## 2022-02-02 PROCEDURE — 3008F BODY MASS INDEX DOCD: CPT | Performed by: FAMILY MEDICINE

## 2022-02-02 PROCEDURE — 99213 OFFICE O/P EST LOW 20 MIN: CPT | Performed by: FAMILY MEDICINE

## 2022-02-02 RX ORDER — AZITHROMYCIN 250 MG/1
TABLET, FILM COATED ORAL
Qty: 6 TABLET | Refills: 0 | Status: SHIPPED | OUTPATIENT
Start: 2022-02-02 | End: 2022-02-07

## 2022-02-02 RX ORDER — ALBUTEROL SULFATE 90 UG/1
2 AEROSOL, METERED RESPIRATORY (INHALATION) EVERY 4 HOURS PRN
Qty: 6.7 G | Refills: 2 | Status: SHIPPED | OUTPATIENT
Start: 2022-02-02

## 2022-02-02 NOTE — PROGRESS NOTES
Assessment/Plan:   Diagnoses and all orders for this visit:    Bronchitis  -     azithromycin (Zithromax) 250 mg tablet; Take 2 tablets (500 mg total) by mouth daily for 1 day, THEN 1 tablet (250 mg total) daily for 4 days  -     albuterol (PROVENTIL HFA,VENTOLIN HFA) 90 mcg/act inhaler; Inhale 2 puffs every 4 (four) hours as needed for wheezing  Current smoker  - advised cessation   - smokes 1PPD/33yrs (contemplating quitting but not yet ready to do so)  - has tried Nicotine Patch   - has been referred to smoking cessation counseling         Subjective:    Patient ID: Philomena Brewster is a 56 y o  female  HPI   52yo F presents to the office for eval of cough  - s/s started a few days ago - (+) congestion/PND, bronchitis cough productive of mucus, audible wheezing, hard time breathing in mask  - denies F/C/N/V/HA/earache/sore throat/CP/palpitations/SOB/abd pain/D  - had a NEG COVID test at work   - allergies to amox and cipro   - does not want this to get worse  - has not tried anything so far for this   - no change to smoking - close to 1pack/day       The following portions of the patient's history were reviewed and updated as appropriate: allergies, current medications, past family history, past medical history, past social history, past surgical history and problem list     Review of Systems  as per HPI    Objective:  /80   Pulse 92   Temp 98 4 °F (36 9 °C)   Resp 16   Ht 5' 3" (1 6 m)   Wt 71 2 kg (157 lb)   SpO2 98%   BMI 27 81 kg/m²    Physical Exam  Vitals reviewed  Constitutional:       General: She is not in acute distress  Appearance: Normal appearance  She is not ill-appearing, toxic-appearing or diaphoretic  HENT:      Head: Normocephalic and atraumatic  Right Ear: External ear normal       Left Ear: External ear normal    Eyes:      General: No scleral icterus  Right eye: No discharge  Left eye: No discharge        Extraocular Movements: Extraocular movements intact  Conjunctiva/sclera: Conjunctivae normal    Cardiovascular:      Rate and Rhythm: Normal rate and regular rhythm  Heart sounds: Normal heart sounds  No murmur heard  No friction rub  No gallop  Pulmonary:      Effort: Pulmonary effort is normal  No respiratory distress  Breath sounds: No stridor  Wheezing present  No rhonchi or rales  Comments: (+) dry cough  Abdominal:      General: Bowel sounds are normal       Palpations: Abdomen is soft  Musculoskeletal:         General: Normal range of motion  Cervical back: Normal range of motion and neck supple  Skin:     General: Skin is warm  Neurological:      General: No focal deficit present  Mental Status: She is alert and oriented to person, place, and time  Psychiatric:         Mood and Affect: Mood normal          Behavior: Behavior normal          BMI Counseling: Body mass index is 27 81 kg/m²  The BMI is above normal  Nutrition recommendations include reducing portion sizes  Exercise recommendations include exercising 3-5 times per week

## 2022-03-09 ENCOUNTER — RA CDI HCC (OUTPATIENT)
Dept: OTHER | Facility: HOSPITAL | Age: 47
End: 2022-03-09

## 2022-03-09 NOTE — PROGRESS NOTES
[address]  coding opportunities       Chart reviewed, no opportunity found: CHART REVIEWED, NO OPPORTUNITY FOUND                        Patients insurance company: [employer] (Medicare and Commercial for [employer] and [employer])           Please review/recertify the BPA diagnoses for 2022      If this is correct, please assess and document during your next visit, March 16

## 2022-03-16 ENCOUNTER — OFFICE VISIT (OUTPATIENT)
Dept: FAMILY MEDICINE CLINIC | Facility: CLINIC | Age: 47
End: 2022-03-16
Payer: COMMERCIAL

## 2022-03-16 VITALS
RESPIRATION RATE: 16 BRPM | WEIGHT: 160 LBS | SYSTOLIC BLOOD PRESSURE: 118 MMHG | OXYGEN SATURATION: 97 % | HEIGHT: 63 IN | DIASTOLIC BLOOD PRESSURE: 78 MMHG | BODY MASS INDEX: 28.35 KG/M2 | HEART RATE: 88 BPM

## 2022-03-16 DIAGNOSIS — Z71.6 ENCOUNTER FOR SMOKING CESSATION COUNSELING: ICD-10-CM

## 2022-03-16 DIAGNOSIS — I10 ESSENTIAL HYPERTENSION: ICD-10-CM

## 2022-03-16 DIAGNOSIS — E78.2 MIXED HYPERLIPIDEMIA: ICD-10-CM

## 2022-03-16 DIAGNOSIS — Z86.39 HISTORY OF THYROID NODULE: ICD-10-CM

## 2022-03-16 DIAGNOSIS — F17.200 CURRENT SMOKER: ICD-10-CM

## 2022-03-16 DIAGNOSIS — R53.83 FATIGUE, UNSPECIFIED TYPE: ICD-10-CM

## 2022-03-16 DIAGNOSIS — M79.2 NEUROPATHIC PAIN: ICD-10-CM

## 2022-03-16 DIAGNOSIS — Z12.4 CERVICAL CANCER SCREENING: ICD-10-CM

## 2022-03-16 DIAGNOSIS — E66.3 OVERWEIGHT (BMI 25.0-29.9): ICD-10-CM

## 2022-03-16 DIAGNOSIS — Z12.31 BREAST CANCER SCREENING BY MAMMOGRAM: ICD-10-CM

## 2022-03-16 DIAGNOSIS — R73.03 PREDIABETES: Primary | ICD-10-CM

## 2022-03-16 PROCEDURE — 99214 OFFICE O/P EST MOD 30 MIN: CPT | Performed by: FAMILY MEDICINE

## 2022-03-16 NOTE — PATIENT INSTRUCTIONS
DASH Eating Plan   WHAT YOU NEED TO KNOW:   What is the DASH Eating Plan? The DASH (Dietary Approaches to Stop Hypertension) Eating Plan is designed to help prevent or lower high blood pressure  It can also help to lower LDL (bad) cholesterol and decrease your risk for heart disease  The plan is low in sodium, sugar, unhealthy fats, and total fat  It is high in potassium, calcium, magnesium, and fiber  These nutrients are added when you eat more fruits, vegetables, and whole grains  With the DASH eating plan, you need to eat a certain number of servings from each food group  This will help you get enough of certain nutrients and limit others  The amount of servings you should eat depends on how many calories you need  Your dietitian can help you create meal plans with the right number of servings for each food group  What do I need to know about sodium? Your dietitian will tell you how much sodium is safe for you to have each day  People with high blood pressure should have no more than 1,500 to 2,300 mg of sodium in a day  A teaspoon (tsp) of salt has 2,300 mg of sodium  This may seem like a difficult goal, but small changes to the foods you eat can make a big difference  Your healthcare provider or dietitian can help you create a meal plan that follows your sodium limit  · Read food labels  Food labels can help you choose foods that are low in sodium  The amount of sodium is listed in milligrams (mg)  The % Daily Value (DV) column tells you how much of your daily needs are met by 1 serving of the food for each nutrient listed  Choose foods that have less than 5% of the DV of sodium  These foods are considered low in sodium  Foods that have 20% or more of the DV of sodium are considered high in sodium  Avoid foods that have more than 300 mg of sodium in each serving  Choose foods that say low-sodium, reduced-sodium, or no salt added on the food label  · Limit added salt    Do not salt food at the table if you add salt when you cook  Use herbs and spices, such as onions, garlic, and salt-free seasonings to add flavor  Try lemon or lime juice or vinegar to add a tart flavor  Use hot peppers or a small amount of hot pepper sauce to add a spicy flavor  Limit foods high in added salt, such as the following:    ? Seasonings made with salt, such as garlic salt, celery salt, onion salt, seasoned salt, meat tenderizers, and monosodium glutamate (MSG)    ? Miso soup and canned or dried soup mixes    ? Regular soy sauce, barbecue sauce, teriyaki sauce, steak sauce, Worcestershire sauce, and most flavored vinegars    ? Snack foods, such as salted chips, popcorn, pretzels, pork rinds, salted crackers, and salted nuts    ? Frozen foods, such as dinners, entrees, vegetables with sauces, and breaded meats    · Ask about salt substitutes  Ask your healthcare provider if you may use salt substitutes  Some salt substitutes have ingredients that can be harmful if you have certain health conditions  · Choose foods carefully at restaurants  Meals from restaurants, especially fast food restaurants, are often high in sodium  Some restaurants have nutrition information that tells you the amount of sodium in their foods  Ask to have your food prepared with less, or no salt  What do I need to know about fats? Healthy fats include unsaturated fats and omega-3 fatty acids  Unhealthy fats include saturated fats and trans fats  · Include healthy fats, such as the following:      ? Cooking oils, such as soybean, canola, olive, or sunflower    ? Fatty fish, such as salmon, tuna, mackerel, or sardines    ? Flaxseed oil or ground flaxseed    ? ½ cup of cooked beans, such as black beans, kidney beans, or evangelista beans    ? 1½ ounces of low-sodium nuts, such as almonds or walnuts    ? Low-sugar, low-sodium peanut butter    ?  Seeds such as myles seeds or sunflower seeds       · Limit or do not have unhealthy fats, such as the following: ? Foods that contain fat from animals, such as fatty meats, whole milk, butter, and cream    ? Shortening, stick margarine, palm oil, and coconut oil    ? Full-fat or creamy salad dressing    ? Creamy soup    ? Crackers, chips, and baked goods made with margarine or shortening    ? Foods that are fried in unhealthy fats    ? Gravy and sauces, such as Vishal or cheese sauces    What do I need to know about carbohydrates (carbs)? All carbs break down into sugar  Complex carbs contain more fiber than simple carbs  This means complex carbs go into the bloodstream more slowly and cause less of a blood sugar spike  Try to include more complex carbs and fewer simple carbs  · Include complex carbs, such as the following:      ? 1 slice of whole-grain bread    ? 1 ounce of dry cereal that does not contain added sugar    ? ½ cup of cooked oatmeal    ? 2 ounces of cooked whole-grain pasta    ? ½ cup of cooked brown rice    · Limit or do not have simple carbs, such as the following:      ? Baked goods, such as doughnuts, pastries, and cookies    ? Mixes for cornbread and biscuits    ? White rice and pasta mixes, such as boxed macaroni and cheese    ? Instant and cold cereals that contain sugar    ? Jelly, jam, and ice cream that contain sugar    ? Condiments such as ketchup    ? Drinks high in sugar, such as soft drinks, lemonade, and fruit juice    What do I need to know about vegetables and fruits? Vegetables and fruits can be fresh, frozen, or canned  If possible, try to choose low-sodium canned options  · Include a variety of vegetables and fruits, such as the following:      ? 1 medium apple, pear, or peach (about ½ cup chopped)    ? ½ small banana    ? ½ cup berries, such as blueberries, strawberries, or blackberries    ? 1 cup of raw leafy greens, such as lettuce, spinach, kale, or shell greens    ? ½ cup of frozen or canned (no added salt) vegetables, such as green beans    ?  ½ cup of fresh, frozen, or canned fruit (canned in light syrup or fruit juice)    ? ½ cup of vegetable or fruit juice    · Limit or do not have vegetables and fruits made in the following ways:      ? Frozen fruit such as cherries that have added sugar    ? Fruit in cream or butter sauce    ? Canned vegetables that are high in sodium    ? Sauerkraut, pickled vegetables, and other foods prepared in brine    ? Fried vegetables or vegetables in butter or high-fat sauces    What do I need to know about protein foods? · Include lean or low-fat protein foods, such as the following:      ? Poultry (chicken, turkey) with no skin    ? Fish (especially fatty fish, such as salmon, fresh tuna, or mackerel)    ? Lean beef and pork (loin, round, extra lean hamburger)    ? Egg whites and egg substitutes    ? 1 cup of nonfat (skim) or 1% milk    ? 1½ ounces of fat-free or low-fat cheese    ? 6 ounces of nonfat or low-fat yogurt    · Limit or do not have high-fat protein foods, such as the following:      ? Smoked or cured meat, such as corned beef, mueller, ham, hot dogs, and sausage    ? Canned beans and canned meats or spreads, such as potted meats, sardines, anchovies, and imitation seafood    ? Deli or lunch meats, such as bologna, ham, turkey, and roast beef    ? High-fat meat (T-bone steak, regular hamburger, and ribs)    ? Whole eggs and egg yolks    ? Whole milk, 2% milk, and cream    ? Regular cheese and processed cheese    What other guidelines should I follow? · Maintain a healthy weight  Your risk for heart disease is higher if you are overweight  Your healthcare provider may suggest that you lose weight if you are overweight  You can lose weight by eating fewer calories and foods that have added sugars and fat  The DASH meal plan can help you do this  Decrease calories by eating smaller portions at each meal and fewer snacks  Ask your healthcare provider for more information about how to lose weight  · Exercise regularly    Regular exercise can help you reach or maintain a healthy weight  Regular exercise can also help decrease your blood pressure and improve your cholesterol levels  Get 30 minutes or more of moderate exercise each day of the week  To lose weight, get at least 60 minutes of exercise  Talk to your healthcare provider about the best exercise program for you  · Limit alcohol  Women should limit alcohol to 1 drink a day  Men should limit alcohol to 2 drinks a day  A drink of alcohol is 12 ounces of beer, 5 ounces of wine, or 1½ ounces of liquor  Where can I find more information? · [facility] and [address]  [address] [address]  [place] [address]  Phone: 0- 731 - [phone]  Web Address: [facility] AGREEMENT:   You have the right to help plan your care  Discuss treatment options with your healthcare provider to decide what care you want to receive  You always have the right to refuse treatment  The above information is an  only  It is not intended as medical advice for individual conditions or treatments  Talk to your doctor, nurse or pharmacist before following any medical regimen to see if it is safe and effective for you  © Copyright [employer] 2022 Information is for End User's use only and may not be sold, redistributed or otherwise used for commercial purposes   All illustrations and images included in CareNotes® are the copyrighted property of A D A M , Inc  or [address] [place] [address] [facility]

## 2022-03-16 NOTE — PROGRESS NOTES
Assessment/Plan:   Diagnoses and all orders for this visit:    Prediabetes  -     HEMOGLOBIN A1C W/ EAG ESTIMATION; Future  - A1c (1/10/2022): 6 1 <-- 5 9   - on Gabapentin for Neuropathy   - insurance does not cover referral to Nutritionist or to DM education   - has been checking sugars at home - range: 110s-140s   - referred to Mihai Velez in 1month with labs for f/u - pt aware and agreeable   Neuropathic pain  Overweight (BMI 25 0-29 9)  - BMI 28    Essential hypertension  -     Microalbumin / creatinine urine ratio  - BP stable in the office   - cont Coreg 3 125mg BID   - has an appt with Cardio 3/17/2022    Mixed hyperlipidemia  -     Lipid panel; Future  - LDL (9/2/2021): 67  - cont statin   - RTO in 1month with repeat labs for f/u - pt aware and agreeable     Current smoker  - smokes 1PPD/33yrs (contemplating quitting but not yet ready to do so)  - has tried Nicotine Patch   - has been referred to smoking cessation counseling   Encounter for smoking cessation counseling    Cervical cancer screening  -     Ambulatory Referral to Gynecology; Future  Breast cancer screening by mammogram  - Mammo has been ordered    Fatigue, unspecified type  -     CBC and differential; Future  -     Iron Panel (Includes Ferritin, Iron Sat%, Iron, and TIBC); Future  -     Vitamin D 25 hydroxy; Future  -     TSH, 3rd generation with Free T4 reflex  -     Vitamin B12; Future  - RTO after labs for further eval and treatment - pt aware and agreeable     History of thyroid nodule          Subjective:    Patient ID: Michael Dent is a 56 y o  female    HPI   52yo F presents to the office for f/u   - had recent bought of Bronchitis - treated with Quince Juan   - still smoking 1PPD/33yrs - not ready to quit - has been referred to smoking cessation   - A1c (1/10/2022): 6 1 <-- 5 9   - on Gabapentin for Neuropathy   - insurance does not cover referral to Nutritionist or to DM education   - has been checking sugars at home - range: 110s-140s   - (+) feeling fatigued, no energy       The following portions of the patient's history were reviewed and updated as appropriate: allergies, current medications, past family history, past medical history, past social history, past surgical history and problem list     Review of Systems  as per HPI    Objective:  /78 (BP Location: Right arm, Patient Position: Sitting, Cuff Size: Standard)   Pulse 88   Resp 16   Ht 5' 3" (1 6 m)   Wt 72 6 kg (160 lb)   SpO2 97%   BMI 28 34 kg/m²    Physical Exam  Vitals reviewed  Constitutional:       General: She is not in acute distress  Appearance: Normal appearance  She is not ill-appearing, toxic-appearing or diaphoretic  HENT:      Head: Normocephalic and atraumatic  Right Ear: External ear normal       Left Ear: External ear normal    Eyes:      General: No scleral icterus  Right eye: No discharge  Left eye: No discharge  Extraocular Movements: Extraocular movements intact  Conjunctiva/sclera: Conjunctivae normal    Cardiovascular:      Rate and Rhythm: Normal rate and regular rhythm  Heart sounds: Normal heart sounds  No murmur heard  No friction rub  No gallop  Pulmonary:      Effort: Pulmonary effort is normal  No respiratory distress  Breath sounds: Normal breath sounds  No stridor  No wheezing, rhonchi or rales  Abdominal:      General: Bowel sounds are normal       Palpations: Abdomen is soft  Musculoskeletal:         General: Normal range of motion  Cervical back: Normal range of motion and neck supple  Right lower leg: No edema  Left lower leg: No edema  Skin:     General: Skin is warm  Neurological:      General: No focal deficit present  Mental Status: She is alert and oriented to person, place, and time  Psychiatric:         Mood and Affect: Mood normal          Behavior: Behavior normal        BMI Counseling: Body mass index is 28 34 kg/m²   The BMI is above normal  Nutrition recommendations include 3-5 servings of fruits/vegetables daily  Exercise recommendations include exercising 3-5 times per week

## 2022-03-17 ENCOUNTER — OFFICE VISIT (OUTPATIENT)
Dept: CARDIOLOGY CLINIC | Facility: CLINIC | Age: 47
End: 2022-03-17
Payer: COMMERCIAL

## 2022-03-17 VITALS
WEIGHT: 162 LBS | HEART RATE: 77 BPM | SYSTOLIC BLOOD PRESSURE: 122 MMHG | TEMPERATURE: 98 F | BODY MASS INDEX: 28.7 KG/M2 | HEIGHT: 63 IN | DIASTOLIC BLOOD PRESSURE: 80 MMHG | OXYGEN SATURATION: 98 %

## 2022-03-17 DIAGNOSIS — R07.9 CHEST PAIN IN ADULT: ICD-10-CM

## 2022-03-17 DIAGNOSIS — R07.9 CHEST PAIN, UNSPECIFIED TYPE: Primary | ICD-10-CM

## 2022-03-17 DIAGNOSIS — E78.00 HYPERCHOLESTEROLEMIA: ICD-10-CM

## 2022-03-17 DIAGNOSIS — R06.02 SHORTNESS OF BREATH ON EXERTION: ICD-10-CM

## 2022-03-17 DIAGNOSIS — I10 HYPERTENSION, UNSPECIFIED TYPE: ICD-10-CM

## 2022-03-17 PROCEDURE — 93000 ELECTROCARDIOGRAM COMPLETE: CPT | Performed by: INTERNAL MEDICINE

## 2022-03-17 PROCEDURE — 3008F BODY MASS INDEX DOCD: CPT | Performed by: INTERNAL MEDICINE

## 2022-03-17 PROCEDURE — 99214 OFFICE O/P EST MOD 30 MIN: CPT | Performed by: INTERNAL MEDICINE

## 2022-03-17 PROCEDURE — 4004F PT TOBACCO SCREEN RCVD TLK: CPT | Performed by: INTERNAL MEDICINE

## 2022-03-17 RX ORDER — CARVEDILOL 3.12 MG/1
3.12 TABLET ORAL 2 TIMES DAILY WITH MEALS
Qty: 180 TABLET | Refills: 2 | Status: SHIPPED | OUTPATIENT
Start: 2022-03-17

## 2022-03-17 RX ORDER — ROSUVASTATIN CALCIUM 5 MG/1
5 TABLET, COATED ORAL DAILY
Qty: 90 TABLET | Refills: 2 | Status: SHIPPED | OUTPATIENT
Start: 2022-03-17

## 2022-03-17 NOTE — PROGRESS NOTES
Progress Note - Cardiology Office  [facility]    Julián Melendez 56 y o  female MRN: [id number]  : 1975  Encounter: [id number]         ASSESSMENT:  Pre diabetes:  Hemoglobin A1c is 6 1  Watching her diet    History of Chest pain     Pharmacologic stress, 2021:  Normal, EF 67%     Hypertension  BP today is  122/80 mmHg with heart rate of 77/Min     TTE, 2021  EF 50%, mild LVH, mild MR, trace TR     History of celiac artery stenosis/compression  Abdominal ultrasound, 2017:  Impression  The aorta is patent and normal in caliber  The celiac, splenic and hepatic arteries are patent  There is evidence of celiac artery compression, velocities are within normal  range with deep inspiration (140cm/s) and are in stenotic range with expiration  (349 cm/s)  The superior and inferior mesenteric arteries are normal and patent in a  fasting state      Hyperlipidemia  2021:  LDL 67, triglycerides 79, HDL 60, normal liver enzymes  Currently on Crestor 5 mg daily     RECOMMENDATIONS:  Repeat lipid profile is scheduled for April, will review and advise  Continue Coreg and Crestor at current dose for now  Low-salt diet  Regular cardiovascular exercise      Please call [phone] if any questions  HPI :     Julián Melendez is a 56y o  year old female who came for follow up  She denies any cardiac symptoms  Recently she has been diagnosed with pre diabetes with a hemoglobin A1c of 6 1    Currently this is being managed with diet  I encouraged her to increase her level of cardiovascular exercise also    REVIEW OF SYSTEMS:  Denies any new or acute cardiac symptoms like chest pain, unusual dyspnea, palpitations syncope    Historical Information   Past Medical History:   Diagnosis Date    Anemia     Asthma     Ear problems     GERD (gastroesophageal reflux disease)     Neck pain      Past Surgical History:   Procedure Laterality Date     SECTION      NOSE SURGERY      SINUS SURGERY      TUBAL LIGATION       Social History     Substance and Sexual Activity   Alcohol Use No     Social History     Substance and Sexual Activity   Drug Use No     Social History     Tobacco Use   Smoking Status Current Every Day Smoker    Packs/day: 1 00    Years: 33 00    Pack years: 33 00    Types: Cigarettes   Smokeless Tobacco Never Used     Family History:   Family History   Problem Relation Age of Onset    Hypertension Father     Hypertension Maternal Aunt     Bone cancer Maternal Grandmother     Stomach cancer Paternal Grandmother     Breast cancer Neg Hx     Colon cancer Neg Hx        Meds/Allergies     Allergies   Allergen Reactions    Amoxicillin Hives    Ciprofloxacin Nausea Only       Current Outpatient Medications:     albuterol (PROVENTIL HFA,VENTOLIN HFA) 90 mcg/act inhaler, Inhale 2 puffs every 4 (four) hours as needed for wheezing, Disp: 6 7 g, Rfl: 2    carvedilol (COREG) 3 125 mg tablet, TAKE 1 TABLET BY MOUTH TWICE A DAY WITH MEALS, Disp: 180 tablet, Rfl: 1    ergocalciferol (ERGOCALCIFEROL) 1 25 MG (81626 UT) capsule, ergocalciferol (vitamin D2) 1,250 mcg (50,000 unit) capsule  TAKE 1 CAPSULE BY MOUTH ONE TIME PER WEEK, Disp: , Rfl:     ferrous sulfate 324 (65 Fe) mg, Take 324 mg by mouth daily, Disp: , Rfl:     fluticasone (FLONASE) 50 mcg/act nasal spray, 2 sprays into each nostril daily, Disp: 48 mL, Rfl: 3    gabapentin (NEURONTIN) 800 mg tablet, Take 2 tablets (1,600 mg total) by mouth daily, Disp: 60 tablet, Rfl: 2    lansoprazole (PREVACID) 30 mg capsule, Take 1 capsule (30 mg total) by mouth daily, Disp: 90 capsule, Rfl: 2    methocarbamol (ROBAXIN) 750 mg tablet, Take 0 5 tablet in the AM, 0 5 tablet in PM and full tablet at bedtime as needed for spasms, Disp: 60 tablet, Rfl: 5    nicotine (NICODERM CQ) 21 mg/24 hr TD 24 hr patch, nicotine 21 mg/24 hr daily transdermal patch, Disp: , Rfl:     rosuvastatin (CRESTOR) 5 mg tablet, TAKE 1 TABLET (5 MG TOTAL) BY MOUTH DAILY  , Disp: 90 tablet, Rfl: 0    Vitals:   /80 mmHg  HR 76/Min  BP Readings from Last 3 Encounters:   22 118/78   22 112/80   22 116/70       Physical Exam:  Neurologic:  Alert & oriented x 3, no new focal deficits, Not in any acute distress,  Constitutional:  Well developed, well nourished, non-toxic appearance   Eyes:  Pupil equal and reacting to light, conjunctiva normal,   HENT:  Atraumatic, oropharynx moist, Neck- normal range of motion, no tenderness,  Neck supple, No JVP, No LNP   Respiratory:  Bilateral air entry, mostly clear to auscultation  Cardiovascular: S1-S2 regular with a I/VI ejection systolic murmur   GI:  Soft, nondistended, normal bowel sounds, nontender, no hepatosplenomegaly appreciated  Musculoskeletal: no tenderness, no deformities     Skin:  Well hydrated, no rash   Lymphatic:  No lymphadenopathy noted   Extremities:  No edema and distal pulses are present        Diagnostic Studies Review Cardio:      EKG:  Normal sinus rhythm, heart rate 76/Min,    Cardiac testing:   Results for orders placed during the hospital encounter of 21    Echo complete with contrast if indicated    Narrative  Louie [address]  [address] [facility]  [phone]    Transthoracic Echocardiogram  2D, M-mode, Doppler, and Color Doppler    Study date:  06-Aug-2021    Patient: Rudolfo Galeazzi  MR number: [id number]  Account number: [de-identified]  : 1975  Age: 40 years  Gender: Female  Status: Outpatient  Location: Echo lab  Height: 63 in  Weight: 155 8 lb  BP: 130/ 88 mmHg    Indications: HTN    Diagnoses: I11 9 - Hypertensive heart disease without heart failure    Sonographer:  IRLANDA Padilla  Primary Physician:  Ying Rodriguez MD  Referring Physician:  Soyla Opitz, MD  Group:  [facility]  Interpreting Physician:  Maria Luisa Flores MD    SUMMARY    PROCEDURE INFORMATION:  This was a technically difficult study  Echocardiographic views were limited due to poor acoustic window availability, decreased penetration, and lung interference  LEFT VENTRICLE:  Systolic function was at the lower limits of normal  Ejection fraction was estimated to be around 50 %  This study was inadequate for the evaluation of regional wall motion  Wall thickness was mildly increased  There was mild concentric hypertrophy  MITRAL VALVE:  There was mild regurgitation  TRICUSPID VALVE:  There was trace regurgitation  HISTORY: PRIOR HISTORY: Tobacco abuse,HTN  PROCEDURE: The procedure was performed in the echo lab  This was a routine study  The transthoracic approach was used  The study included complete 2D imaging, M-mode, complete spectral Doppler, and color Doppler  The heart rate was 73 bpm,  at the start of the study  Images were obtained from the parasternal, apical, subcostal, and suprasternal notch acoustic windows  Echocardiographic views were limited due to poor acoustic window availability, decreased penetration, and  lung interference  This was a technically difficult study  LEFT VENTRICLE: Size was normal  Systolic function was at the lower limits of normal  Ejection fraction was estimated to be around 50 %  This study was inadequate for the evaluation of regional wall motion  Wall thickness was mildly  increased  There was mild concentric hypertrophy  DOPPLER: Left ventricular diastolic function parameters were normal for the patient's age  RIGHT VENTRICLE: The size was normal  Systolic function was normal  Wall thickness was normal     LEFT ATRIUM: Size was at the upper limits of normal     RIGHT ATRIUM: Size was normal     MITRAL VALVE: There was normal leaflet separation  DOPPLER: The transmitral velocity was within the normal range  There was no evidence for stenosis  There was mild regurgitation  AORTIC VALVE: The valve was not well visualized   DOPPLER: There was no evidence for stenosis  There was no significant regurgitation  TRICUSPID VALVE: DOPPLER: There was trace regurgitation  The tricuspid jet envelope definition was inadequate for estimation of RV systolic pressure  PULMONIC VALVE: DOPPLER: There was no significant regurgitation  PERICARDIUM: There was no thickening or calcification  There was no pericardial effusion  AORTA: The root exhibited normal size  SYSTEMIC VEINS: IVC: The inferior vena cava was not well visualized  SYSTEM MEASUREMENT TABLES    2D  EF (Teich): 48 51 %  %FS: 24 12 %  EDV(Teich): 72 77 ml  ESV(Teich): 37 47 ml  IVSd: 0 97 cm  LA Area: 18 06 cm2  LA Diam: 3 61 cm  LVEDV MOD A4C: 114 35 ml  LVEF MOD A4C: 43 41 %  LVESV MOD A4C: 64 71 ml  LVIDd: 4 07 cm  LVIDs: 3 08 cm  LVLd A4C: 8 63 cm  LVLs A4C: 7 27 cm  LVOT Diam: 1 95 cm  LVPWd: 0 95 cm  RA Area: 15 53 cm2  RVIDd: 3 21 cm  SV (Teich): 35 29 ml  SV MOD A4C: 49 63 ml    CW  AV Vmax: 0 81 m/s  AV maxP 63 mmHg    MM  TAPSE: 2 24 cm    PW  MV E/A Ratio: 1 23  E' Sept: 0 11 m/s  E/E' Sept: 5 88  LVOT Vmax: 0 63 m/s  LVOT maxP 57 mmHg  MV A Ortega: 0 53 m/s  MV Dec Coles: 3 56 m/s2  MV DecT: 183 96 ms  MV E Ortega: 0 66 m/s  MV PHT: 53 35 ms  MVA By PHT: 4 12 cm2    [facility] [place] [facility] Accredited Echocardiography Laboratory    Prepared and electronically signed by    Bennie Maurice MD  Signed 06-Aug-2021 13:15:27    No results found for this or any previous visit  No results found for this or any previous visit  No results found for this or any previous visit  No results found for this or any previous visit  No results found for this or any previous visit  No results found for this or any previous visit      Results for orders placed during the hospital encounter of 21    NM Myocardial Perfusion Spect (Pharmacological Induced Stress and/or Rest)    Narrative  [address] [place] [address]  [phone]    Rest/Stress Gated SPECT Myocardial Perfusion Imaging After Regadenoson    Patient: Darling Matthews  MR number: [id number]  Account number: [de-identified]  : 1975  Age: 40 years  Gender: Female  Status: Outpatient  Location: Stress lab  Height: 63 in  Weight: 155 lb  BP: 130/ 82 mmHg    Allergies: AMOXICILLIN, CIPROFLOXACIN    Diagnosis: R07 9 - Chest pain, unspecified    Primary Physician:  Jose Angel Page MD  RN:  Piyush Martinez RN  Interpreting Physician:  Kieran Og MD  Referring Physician:  Lindsey Adam MD  Technician:  Vinny Carlson  Group:  [facility]  Report Prepared By[de-identified]  Piyush Martinez RN    INDICATIONS: Coronary artery disease  HISTORY: The patient is a 40year old  female  Chest pain status: chest pain  Coronary artery disease risk factors: smoking  Cardiovascular history: arrhythmia/tachycardia Co-morbidity: history of lung  disease/COVID,asthma,pneumonia  Medications: no cardiac drugs  Acute coronary syndrome: acute myocardial infarction recently ruled out  PHYSICAL EXAM: Baseline physical exam screening: no wheezes audible  REST ECG: Normal sinus rhythm  Normal baseline ECG  PROCEDURE: The study was performed in the the Stress lab  A regadenoson infusion pharmacologic stress test was performed  Gated SPECT myocardial perfusion imaging was performed after stress  Systolic blood pressure was 130 mmHg, at the  start of the study  Diastolic blood pressure was 82 mmHg, at the start of the study  The heart rate was 85 bpm, at the start of the study  IV double checked  Regadenoson protocol:  HR bpm SBP mmHg DBP mmHg Symptoms  Baseline 85 130 82 none  Immediate 130 146 78 mild dyspnea, dizziness, nausea  1 min 112 126 74 subsiding  2 min -- -- -- none  No medications or fluids given  The patient also performed low level exercise  STRESS SUMMARY: Duration of pharmacologic stress was 3 min   Maximal heart rate during stress was 130 bpm  The rate-pressure product for the peak heart rate and blood pressure was 09836  There was no chest pain during stress  The stress  test was terminated due to protocol completion  Pre oxygen saturation: 100 %  Peak oxygen saturation: 100 %  The stress ECG was negative for ischemia and normal  There were no stress arrhythmias or conduction abnormalities  ISOTOPE ADMINISTRATION:  Resting isotope administration Stress isotope administration  Agent Tetrofosmin Tetrofosmin  Dose 11 mCi 32 1 mCi  Date 05/26/2021 05/26/2021  Injection time 07:58 09:00  Imaging time 08:34 09:47  Injection-image interval 36 min 47 min    The radiopharmaceutical was injected at the peak effect of pharmacologic stress  MYOCARDIAL PERFUSION IMAGING:  The image quality was good  Left ventricular size was normal  The TID ratio was 0 98  PERFUSION DEFECTS:  -  There were no perfusion defects  GATED SPECT:  The calculated left ventricular ejection fraction was 67 %  Left ventricular ejection fraction was within normal limits by visual estimate  There was no left ventricular regional abnormality  SUMMARY:  -  Stress results: There was no chest pain during stress  -  ECG conclusions: The stress ECG was negative for ischemia and normal  There were no stress arrhythmias or conduction abnormalities  -  Perfusion imaging: There were no perfusion defects   -  Gated SPECT: The calculated left ventricular ejection fraction was 67 %  Left ventricular ejection fraction was within normal limits by visual estimate  There was no left ventricular regional abnormality  IMPRESSIONS: Normal study after vasodilation and low level exercise without reproduction of symptoms  The cardiovascular risk is low  Myocardial perfusion imaging was normal at rest and with stress  Prepared and signed by    Nisha Geiger MD  Signed 05/26/2021 13:32:39    No results found for this or any previous visit        Imaging:  Chest X-Ray:   XR chest pa & lateral    Result Date: 5/28/2021  Impression No acute cardiopulmonary disease  Workstation performed: [id number]     XR chest pa & lateral    Result Date: 5/10/2021  Impression Patchy right middle lobe airspace opacity may represent atelectasis versus pneumonia  Clinical and laboratory correlation are recommended  The study was marked in EPIC for significant notification  Workstation performed: [id number]       CT-scan of the chest:     No CTA results available for this patient  Lab Review   Lab Results   Component Value Date    WBC 11 35 (H) 05/25/2021    HGB 13 1 05/25/2021    HCT 40 7 05/25/2021    MCV 78 (L) 05/25/2021    RDW 19 8 (H) 05/25/2021     05/25/2021     BMP:  Lab Results   Component Value Date    SODIUM 140 05/25/2021    K 4 1 05/25/2021     05/25/2021    CO2 26 05/25/2021    ANIONGAP 11 10/05/2015    BUN 9 05/25/2021    CREATININE 0 72 05/25/2021    GLUC 103 05/25/2021    CALCIUM 8 5 05/25/2021    EGFR 101 05/25/2021    MG 2 0 02/08/2017     LFT:  Lab Results   Component Value Date    AST 11 05/10/2021    ALT 18 05/10/2021    ALKPHOS 100 05/10/2021    TP 7 5 05/10/2021    ALB 3 9 05/10/2021      No components found for: [facility]  Lab Results   Component Value Date    WEN6HGTQLZTH 2 146 02/08/2017     Lab Results   Component Value Date    HGBA1C 6 1 01/10/2022     Lipid Profile:   Lab Results   Component Value Date    CHOLESTEROL 195 02/08/2017    HDL 53 02/08/2017    LDLCALC 126 (H) 02/08/2017    TRIG 79 02/08/2017     Lab Results   Component Value Date    CHOLESTEROL 195 02/08/2017     Lab Results   Component Value Date    CKTOTAL 91 05/25/2021    TROPONINI <0 02 05/25/2021     Lab Results   Component Value Date    NTBNP 81 05/10/2021      No results found for this or any previous visit (from the past 672 hour(s))  Dr Trevor Licona MD, [facility]      "This note has been constructed using a voice recognition system  Therefore there may be syntax, spelling, and/or grammatical errors   Please call if you have any questions  "

## 2022-04-06 ENCOUNTER — RA CDI HCC (OUTPATIENT)
Dept: OTHER | Facility: HOSPITAL | Age: 47
End: 2022-04-06

## 2022-04-06 NOTE — PROGRESS NOTES
[facility]  coding opportunities       Chart reviewed, no opportunity found: CHART REVIEWED, NO OPPORTUNITY FOUND        Patients Insurance        Commercial Insurance: Jessica [id number]     Please review/recertify the BPA diagnoses for 2022      If this is correct, please assess and document during your next visit, April 13

## 2022-04-11 ENCOUNTER — APPOINTMENT (OUTPATIENT)
Dept: LAB | Facility: CLINIC | Age: 47
End: 2022-04-11
Payer: COMMERCIAL

## 2022-04-11 DIAGNOSIS — E78.2 MIXED HYPERLIPIDEMIA: ICD-10-CM

## 2022-04-11 DIAGNOSIS — R53.83 FATIGUE, UNSPECIFIED TYPE: ICD-10-CM

## 2022-04-11 DIAGNOSIS — R73.03 PREDIABETES: ICD-10-CM

## 2022-04-11 LAB
25(OH)D3 SERPL-MCNC: 28.7 NG/ML (ref 30–100)
BASOPHILS # BLD AUTO: 0.05 THOUSANDS/ΜL (ref 0–0.1)
BASOPHILS NFR BLD AUTO: 1 % (ref 0–1)
CHOLEST SERPL-MCNC: 128 MG/DL
CREAT UR-MCNC: 220 MG/DL
EOSINOPHIL # BLD AUTO: 0.14 THOUSAND/ΜL (ref 0–0.61)
EOSINOPHIL NFR BLD AUTO: 2 % (ref 0–6)
ERYTHROCYTE [DISTWIDTH] IN BLOOD BY AUTOMATED COUNT: 14.6 % (ref 11.6–15.1)
EST. AVERAGE GLUCOSE BLD GHB EST-MCNC: 120 MG/DL
FERRITIN SERPL-MCNC: 4 NG/ML (ref 8–388)
HBA1C MFR BLD: 5.8 %
HCT VFR BLD AUTO: 39.2 % (ref 34.8–46.1)
HDLC SERPL-MCNC: 52 MG/DL
HGB BLD-MCNC: 12.2 G/DL (ref 11.5–15.4)
IMM GRANULOCYTES # BLD AUTO: 0.02 THOUSAND/UL (ref 0–0.2)
IMM GRANULOCYTES NFR BLD AUTO: 0 % (ref 0–2)
IRON SATN MFR SERPL: 11 % (ref 15–50)
IRON SERPL-MCNC: 42 UG/DL (ref 50–170)
LDLC SERPL CALC-MCNC: 61 MG/DL (ref 0–100)
LYMPHOCYTES # BLD AUTO: 1.98 THOUSANDS/ΜL (ref 0.6–4.47)
LYMPHOCYTES NFR BLD AUTO: 28 % (ref 14–44)
MCH RBC QN AUTO: 26.3 PG (ref 26.8–34.3)
MCHC RBC AUTO-ENTMCNC: 31.1 G/DL (ref 31.4–37.4)
MCV RBC AUTO: 85 FL (ref 82–98)
MICROALBUMIN UR-MCNC: 20.5 MG/L (ref 0–20)
MICROALBUMIN/CREAT 24H UR: 9 MG/G CREATININE (ref 0–30)
MONOCYTES # BLD AUTO: 0.46 THOUSAND/ΜL (ref 0.17–1.22)
MONOCYTES NFR BLD AUTO: 7 % (ref 4–12)
NEUTROPHILS # BLD AUTO: 4.44 THOUSANDS/ΜL (ref 1.85–7.62)
NEUTS SEG NFR BLD AUTO: 62 % (ref 43–75)
NONHDLC SERPL-MCNC: 76 MG/DL
NRBC BLD AUTO-RTO: 0 /100 WBCS
PLATELET # BLD AUTO: 197 THOUSANDS/UL (ref 149–390)
PMV BLD AUTO: 12.9 FL (ref 8.9–12.7)
RBC # BLD AUTO: 4.64 MILLION/UL (ref 3.81–5.12)
TIBC SERPL-MCNC: 370 UG/DL (ref 250–450)
TRIGL SERPL-MCNC: 76 MG/DL
TSH SERPL DL<=0.05 MIU/L-ACNC: 1.47 UIU/ML (ref 0.45–4.5)
VIT B12 SERPL-MCNC: 426 PG/ML (ref 100–900)
WBC # BLD AUTO: 7.09 THOUSAND/UL (ref 4.31–10.16)

## 2022-04-11 PROCEDURE — 82043 UR ALBUMIN QUANTITATIVE: CPT | Performed by: FAMILY MEDICINE

## 2022-04-11 PROCEDURE — 82607 VITAMIN B-12: CPT

## 2022-04-11 PROCEDURE — 80061 LIPID PANEL: CPT

## 2022-04-11 PROCEDURE — 82570 ASSAY OF URINE CREATININE: CPT | Performed by: FAMILY MEDICINE

## 2022-04-11 PROCEDURE — 82728 ASSAY OF FERRITIN: CPT

## 2022-04-11 PROCEDURE — 83036 HEMOGLOBIN GLYCOSYLATED A1C: CPT

## 2022-04-11 PROCEDURE — 82306 VITAMIN D 25 HYDROXY: CPT

## 2022-04-11 PROCEDURE — 83550 IRON BINDING TEST: CPT

## 2022-04-11 PROCEDURE — 84443 ASSAY THYROID STIM HORMONE: CPT | Performed by: FAMILY MEDICINE

## 2022-04-11 PROCEDURE — 85025 COMPLETE CBC W/AUTO DIFF WBC: CPT

## 2022-04-11 PROCEDURE — 83540 ASSAY OF IRON: CPT

## 2022-04-11 PROCEDURE — 36415 COLL VENOUS BLD VENIPUNCTURE: CPT

## 2022-04-13 ENCOUNTER — OFFICE VISIT (OUTPATIENT)
Dept: FAMILY MEDICINE CLINIC | Facility: CLINIC | Age: 47
End: 2022-04-13
Payer: COMMERCIAL

## 2022-04-13 VITALS
HEART RATE: 82 BPM | BODY MASS INDEX: 28.7 KG/M2 | SYSTOLIC BLOOD PRESSURE: 118 MMHG | RESPIRATION RATE: 16 BRPM | OXYGEN SATURATION: 99 % | HEIGHT: 63 IN | DIASTOLIC BLOOD PRESSURE: 70 MMHG | WEIGHT: 162 LBS

## 2022-04-13 DIAGNOSIS — R73.03 PREDIABETES: Primary | ICD-10-CM

## 2022-04-13 DIAGNOSIS — E61.1 LOW SERUM IRON: ICD-10-CM

## 2022-04-13 DIAGNOSIS — E78.2 MIXED HYPERLIPIDEMIA: ICD-10-CM

## 2022-04-13 DIAGNOSIS — F17.200 CURRENT SMOKER: ICD-10-CM

## 2022-04-13 DIAGNOSIS — R53.83 FATIGUE, UNSPECIFIED TYPE: ICD-10-CM

## 2022-04-13 DIAGNOSIS — M79.2 NEUROPATHIC PAIN: ICD-10-CM

## 2022-04-13 DIAGNOSIS — K63.5 POLYP OF COLON, UNSPECIFIED PART OF COLON, UNSPECIFIED TYPE: ICD-10-CM

## 2022-04-13 DIAGNOSIS — E66.3 OVERWEIGHT (BMI 25.0-29.9): ICD-10-CM

## 2022-04-13 DIAGNOSIS — I10 ESSENTIAL HYPERTENSION: ICD-10-CM

## 2022-04-13 DIAGNOSIS — E55.9 VITAMIN D INSUFFICIENCY: ICD-10-CM

## 2022-04-13 PROCEDURE — 3725F SCREEN DEPRESSION PERFORMED: CPT | Performed by: FAMILY MEDICINE

## 2022-04-13 PROCEDURE — 99214 OFFICE O/P EST MOD 30 MIN: CPT | Performed by: FAMILY MEDICINE

## 2022-04-13 RX ORDER — FERROUS SULFATE TAB EC 324 MG (65 MG FE EQUIVALENT) 324 (65 FE) MG
324 TABLET DELAYED RESPONSE ORAL DAILY
Qty: 90 TABLET | Refills: 0 | Status: SHIPPED | OUTPATIENT
Start: 2022-04-13 | End: 2022-07-05

## 2022-04-13 NOTE — PROGRESS NOTES
Assessment/Plan:   Diagnoses and all orders for this visit:    Prediabetes  - A1c (4/11/2022): 5 8 <-- 6 1  - on Gabapentin for Neuropathy   - discussed diet and encouraged exercise   - insurance does not cover referral to Nutritionist or to DM education   - referred to Mihai Velez in [place] with labs for f/u - pt aware and agreeable   Neuropathic pain  Overweight (BMI 25 0-29 9)  - BMI 28 7    Essential hypertension  - BP stable in the office  - cont Coreg  - follows with Cardio - last OV was 3/17/2022 - 6month f/u advised     Mixed hyperlipidemia  - Lipids (4/11/2022): , TG 76, HDL 52, LDL 61   - cont Statin   - The ASCVD Risk score (Marina Christie , et al , 2013) failed to calculate for the following reasons: The valid total cholesterol range is 130 to 320 mg/dL    Current smoker  - smokes 1PPD/33yrs (contemplating quitting but not yet ready to do so)  - has tried Nicotine Patch - which made her cravings worse   - has been referred to smoking cessation counseling    - cont to monitor     Low serum iron  -     ferrous sulfate 324 (65 Fe) mg; Take 1 tablet (324 mg total) by mouth daily  -     CBC and differential; Future  -     Iron Panel (Includes Ferritin, Iron Sat%, Iron, and TIBC); Future  - advised Fe QD  - Vit D 2000IU QD  - nml B12 and TSH   - RTO in 3months with repeat labs for f/u - pt aware and agreeable   Vitamin D insufficiency  Fatigue, unspecified type    Polyp of colon, unspecified part of colon, unspecified type  -     Ambulatory Referral to Gastroenterology; Future          Subjective:    Patient ID: Dony Jarvis is a 56 y o  female    HPI  52yo F presents to the office for f/u   1) Labs (4/11/2022)  - nml TSH, microalbumin, CBC, B12  - A1c = 5 8 <-- 6 1 <-- 5 9  - low Fe and Fe sat   - Vit D 29  - Lipids: , TG 76, HDL 52, LDL 61   2) General   - saw Cardio 3/17/2022 - cont Coreg and Statin   - still smoking 1PPD/33yrs - not ready to quit - has been referred to smoking cessation   - tired the patch but it made her cravings worse  - has an appt with Ob/Gyn tmrw   - overdue for Cscope - pt with h/o polyps   - (+) fatigue   - has been watching her carb intake   - insurance does not cover referral to Nutritionist or to DM education       The following portions of the patient's history were reviewed and updated as appropriate: allergies, current medications, past family history, past medical history, past social history, past surgical history and problem list     Review of Systems  as per HPI    Objective:  /70 (BP Location: Left arm, Patient Position: Sitting, Cuff Size: Large)   Pulse 82   Resp 16   Ht 5' 3" (1 6 m)   Wt 73 5 kg (162 lb)   SpO2 99%   BMI 28 70 kg/m²    Physical Exam  Vitals reviewed  Constitutional:       General: She is not in acute distress  Appearance: Normal appearance  She is not ill-appearing, toxic-appearing or diaphoretic  HENT:      Head: Normocephalic and atraumatic  Right Ear: External ear normal       Left Ear: External ear normal       Nose: Nose normal    Eyes:      General: No scleral icterus  Right eye: No discharge  Left eye: No discharge  Extraocular Movements: Extraocular movements intact  Conjunctiva/sclera: Conjunctivae normal    Cardiovascular:      Rate and Rhythm: Normal rate and regular rhythm  Heart sounds: Normal heart sounds  No murmur heard  No friction rub  No gallop  Pulmonary:      Effort: Pulmonary effort is normal  No respiratory distress  Breath sounds: Normal breath sounds  No stridor  No wheezing, rhonchi or rales  Abdominal:      Palpations: Abdomen is soft  Musculoskeletal:         General: Normal range of motion  Cervical back: Normal range of motion and neck supple  Right lower leg: No edema  Left lower leg: No edema  Skin:     General: Skin is warm  Neurological:      General: No focal deficit present        Mental Status: She is alert and oriented to person, place, and time  Psychiatric:         Mood and Affect: Mood normal          Behavior: Behavior normal          BMI Counseling: Body mass index is 28 7 kg/m²  The BMI is above normal  Nutrition recommendations include 3-5 servings of fruits/vegetables daily  Exercise recommendations include exercising 3-5 times per week

## 2022-04-14 ENCOUNTER — OFFICE VISIT (OUTPATIENT)
Dept: OBGYN CLINIC | Facility: CLINIC | Age: 47
End: 2022-04-14
Payer: COMMERCIAL

## 2022-04-14 VITALS — HEIGHT: 63 IN | WEIGHT: 160.2 LBS | BODY MASS INDEX: 28.39 KG/M2

## 2022-04-14 DIAGNOSIS — Z01.419 WELL WOMAN EXAM WITH ROUTINE GYNECOLOGICAL EXAM: ICD-10-CM

## 2022-04-14 DIAGNOSIS — Z12.4 CERVICAL CANCER SCREENING: ICD-10-CM

## 2022-04-14 DIAGNOSIS — Z11.51 SCREENING FOR HPV (HUMAN PAPILLOMAVIRUS): ICD-10-CM

## 2022-04-14 DIAGNOSIS — Z12.31 ENCOUNTER FOR SCREENING MAMMOGRAM FOR MALIGNANT NEOPLASM OF BREAST: ICD-10-CM

## 2022-04-14 DIAGNOSIS — Z12.4 ENCOUNTER FOR SCREENING FOR MALIGNANT NEOPLASM OF CERVIX: Primary | ICD-10-CM

## 2022-04-14 PROCEDURE — 99386 PREV VISIT NEW AGE 40-64: CPT | Performed by: OBSTETRICS & GYNECOLOGY

## 2022-04-14 PROCEDURE — 4004F PT TOBACCO SCREEN RCVD TLK: CPT | Performed by: OBSTETRICS & GYNECOLOGY

## 2022-04-14 PROCEDURE — G0476 HPV COMBO ASSAY CA SCREEN: HCPCS | Performed by: OBSTETRICS & GYNECOLOGY

## 2022-04-14 PROCEDURE — G0145 SCR C/V CYTO,THINLAYER,RESCR: HCPCS | Performed by: OBSTETRICS & GYNECOLOGY

## 2022-04-14 PROCEDURE — 3008F BODY MASS INDEX DOCD: CPT | Performed by: OBSTETRICS & GYNECOLOGY

## 2022-04-14 NOTE — PROGRESS NOTES
ASSESSMENT & PLAN:   Diagnoses and all orders for this visit:    Encounter for screening for malignant neoplasm of cervix  -     Liquid-based pap, screening    Screening for HPV (human papillomavirus)  -     Liquid-based pap, screening    Well woman exam with routine gynecological exam  -     Liquid-based pap, screening    Encounter for screening mammogram for malignant neoplasm of breast    Cervical cancer screening  -     Ambulatory referral to Gynecology    Other orders  -     VITAMIN D PO; Take by mouth          The following were reviewed in today's visit: ASCCP guidelines, STD testing breast self exam, mammography screening ordered, menopause, adequate intake of calcium and vitamin D, exercise and healthy diet  Patient to return to office in yearly for annual exam      All questions have been answered to her satisfaction  CC:  Annual Gynecologic Examination  Chief Complaint   Patient presents with    Gynecologic Exam       HPI: Manoj Khan is a 56 y o  Lalla Gambles who presents for annual gynecologic examination  She has the following concerns:    - new patient  Establish gyn care  Denies any gyn concerns  She is sexually active with 1 partner and feels safe in her relationship  She denies any hot flashes and other vasomotor symptoms  She reports mild vaginal dryness that is intermittent  Denies urinary incontinence except if she has to hold her urine for an extended period of time at work         Health Maintenance:    Exercise: infrequently  Breast exams/breast awareness: yes  Diet: well balanced diet  Last mammogram:   Colorectal cancer screening:      Past Medical History:   Diagnosis Date    Anemia     Asthma     Ear problems     GERD (gastroesophageal reflux disease)     Neck pain        Past Surgical History:   Procedure Laterality Date     SECTION      NOSE SURGERY      SINUS SURGERY      TUBAL LIGATION         Past OB/Gyn History:  Period Duration (Days): 6  Period Pattern: Regular  Menstrual Flow: ModeratePatient's last menstrual period was 03/24/2022  Menopausal status: perimenopausal  Menopausal symptoms: vaginal dryness mild    Last Pap:unavailable for review  History of abnormal Pap smear: no    Patient is currently sexually active  STD testing: no  Current contraception: tubal ligation      Family History  Family History   Problem Relation Age of Onset    Hypertension Father     Hypertension Maternal Aunt     Bone cancer Maternal Grandmother     Stomach cancer Paternal Grandmother     Breast cancer Neg Hx     Colon cancer Neg Hx        Family history of uterine or ovarian cancer: no  Family history of breast cancer: no  Family history of colon cancer: no    Social History:  Social History     Socioeconomic History    Marital status: /Civil Union     Spouse name: Not on file    Number of children: Not on file    Years of education: Not on file    Highest education level: Not on file   Occupational History    Not on file   Tobacco Use    Smoking status: Current Every Day Smoker     Packs/day: 1 00     Years: 33 00     Pack years: 33 00     Types: Cigarettes    Smokeless tobacco: Never Used   Vaping Use    Vaping Use: Never used   Substance and Sexual Activity    Alcohol use: No    Drug use: No    Sexual activity: Yes     Partners: Male     Birth control/protection: Female Sterilization   Other Topics Concern    Not on file   Social History Narrative    Not on file     Social Determinants of Health     Financial Resource Strain: Not on file   Food Insecurity: Not on file   Transportation Needs: Not on file   Physical Activity: Not on file   Stress: Not on file   Social Connections: Not on file   Intimate Partner Violence: Not on file   Housing Stability: Not on file     Domestic violence screen: negative    Allergies:   Allergies   Allergen Reactions    Amoxicillin Hives    Ciprofloxacin Nausea Only       Medications:    Current Outpatient Medications:     albuterol (PROVENTIL HFA,VENTOLIN HFA) 90 mcg/act inhaler, Inhale 2 puffs every 4 (four) hours as needed for wheezing, Disp: 6 7 g, Rfl: 2    carvedilol (COREG) 3 125 mg tablet, Take 1 tablet (3 125 mg total) by mouth 2 (two) times a day with meals, Disp: 180 tablet, Rfl: 2    ferrous sulfate 324 (65 Fe) mg, Take 1 tablet (324 mg total) by mouth daily, Disp: 90 tablet, Rfl: 0    fluticasone (FLONASE) 50 mcg/act nasal spray, 2 sprays into each nostril daily, Disp: 48 mL, Rfl: 3    gabapentin (NEURONTIN) 800 mg tablet, Take 2 tablets (1,600 mg total) by mouth daily, Disp: 60 tablet, Rfl: 2    lansoprazole (PREVACID) 30 mg capsule, Take 1 capsule (30 mg total) by mouth daily, Disp: 90 capsule, Rfl: 2    rosuvastatin (CRESTOR) 5 mg tablet, Take 1 tablet (5 mg total) by mouth daily, Disp: 90 tablet, Rfl: 2    VITAMIN D PO, Take by mouth, Disp: , Rfl:     methocarbamol (ROBAXIN) 750 mg tablet, Take 0 5 tablet in the AM, 0 5 tablet in PM and full tablet at bedtime as needed for spasms (Patient not taking: Reported on 3/17/2022 ), Disp: 60 tablet, Rfl: 5    Review of Systems:  Review of Systems   Constitutional: Negative for activity change, appetite change and unexpected weight change  Respiratory: Negative for cough and shortness of breath  Cardiovascular: Negative for chest pain  Gastrointestinal: Negative for abdominal pain, constipation, diarrhea, nausea and vomiting  Genitourinary: Negative for difficulty urinating, dyspareunia, frequency, menstrual problem, pelvic pain, urgency, vaginal bleeding, vaginal discharge and vaginal pain  Musculoskeletal: Negative for back pain  Skin: Negative  Neurological: Negative for dizziness, weakness, light-headedness and headaches  Psychiatric/Behavioral: Negative            Physical Exam:  Ht 5' 3" (1 6 m)   Wt 72 7 kg (160 lb 3 2 oz)   LMP 03/24/2022   BMI 28 38 kg/m²    Physical Exam  Constitutional:       General: She is not in acute distress  Appearance: Normal appearance  She is well-developed  She is not diaphoretic  Genitourinary:      Vulva and bladder normal       No lesions in the vagina  Genitourinary Comments: Perineum normal in appearance, no lacerations, no ulcerations, no lesions visualized  Right Labia: No rash, tenderness or lesions  Left Labia: No tenderness, lesions or rash  No inguinal adenopathy present in the right or left side  No vaginal discharge, erythema, tenderness or bleeding  No vaginal prolapse present  No vaginal atrophy present  Right Adnexa: not tender, not full and no mass present  Left Adnexa: not tender, not full and no mass present  Cervix is nulliparous  Cervical nabothian cyst present  No cervical motion tenderness, discharge, friability, lesion or polyp  No parametrium nodularity or thickening present  Uterus is not enlarged or tender  No uterine mass detected  Uterus is midaxial       No urethral prolapse or mass present  Bladder is not tender  Pelvic exam was performed with patient in the lithotomy position  Rectum:      No tenderness or external hemorrhoid  Breasts: Breasts are symmetrical       Right: No swelling, bleeding, mass, skin change or tenderness  Left: No swelling, bleeding, mass, skin change or tenderness  HENT:      Head: Normocephalic and atraumatic  Neck:      Thyroid: No thyromegaly or thyroid tenderness  Cardiovascular:      Rate and Rhythm: Normal rate and regular rhythm  Heart sounds: Normal heart sounds  No murmur heard  No friction rub  Pulmonary:      Effort: Pulmonary effort is normal  No respiratory distress  Breath sounds: Normal breath sounds  No wheezing or rales  Abdominal:      Palpations: Abdomen is soft  There is no mass  Tenderness: There is no abdominal tenderness  There is no guarding  Musculoskeletal:         General: No tenderness  Normal range of motion  Right lower leg: No edema  Left lower leg: No edema  Lymphadenopathy:      Lower Body: No right inguinal adenopathy  No left inguinal adenopathy  Neurological:      Mental Status: She is alert and oriented to person, place, and time  Skin:     General: Skin is warm and dry  Coloration: Skin is not pale  Findings: No erythema  Psychiatric:         Mood and Affect: Mood normal          Behavior: Behavior normal          Thought Content: Thought content normal          Judgment: Judgment normal    Vitals and nursing note reviewed

## 2022-04-22 LAB
HPV HR 12 DNA CVX QL NAA+PROBE: NEGATIVE
HPV16 DNA CVX QL NAA+PROBE: NEGATIVE
HPV18 DNA CVX QL NAA+PROBE: NEGATIVE
LAB AP GYN PRIMARY INTERPRETATION: NORMAL
Lab: NORMAL

## 2022-04-26 ENCOUNTER — HOSPITAL ENCOUNTER (OUTPATIENT)
Dept: MAMMOGRAPHY | Facility: HOSPITAL | Age: 47
Discharge: HOME | End: 2022-04-26
Payer: COMMERCIAL

## 2022-04-26 VITALS — BODY MASS INDEX: 28.4 KG/M2 | HEIGHT: 63 IN | WEIGHT: 160.27 LBS

## 2022-04-26 DIAGNOSIS — M79.2 NEUROPATHIC PAIN: ICD-10-CM

## 2022-04-26 DIAGNOSIS — Z12.31 BREAST CANCER SCREENING BY MAMMOGRAM: ICD-10-CM

## 2022-04-26 PROCEDURE — 77063 BREAST TOMOSYNTHESIS BI: CPT

## 2022-04-26 PROCEDURE — 77067 SCR MAMMO BI INCL CAD: CPT

## 2022-04-26 RX ORDER — GABAPENTIN 800 MG/1
1600 TABLET ORAL DAILY
Qty: 60 TABLET | Refills: 2 | Status: SHIPPED | OUTPATIENT
Start: 2022-04-26

## 2022-05-12 ENCOUNTER — HOSPITAL ENCOUNTER (OUTPATIENT)
Dept: MAMMOGRAPHY | Facility: HOSPITAL | Age: 47
Discharge: HOME | End: 2022-05-12

## 2022-05-12 VITALS — BODY MASS INDEX: 28.4 KG/M2 | HEIGHT: 63 IN | WEIGHT: 160.27 LBS

## 2022-05-12 DIAGNOSIS — Z12.31 ENCOUNTER FOR SCREENING MAMMOGRAM FOR BREAST CANCER: ICD-10-CM

## 2022-06-28 ENCOUNTER — TELEMEDICINE (OUTPATIENT)
Dept: FAMILY MEDICINE CLINIC | Facility: CLINIC | Age: 47
End: 2022-06-28
Payer: COMMERCIAL

## 2022-06-28 VITALS — WEIGHT: 160 LBS | BODY MASS INDEX: 28.34 KG/M2

## 2022-06-28 DIAGNOSIS — U07.1 COVID-19: Primary | ICD-10-CM

## 2022-06-28 DIAGNOSIS — R07.89 CHEST TIGHTNESS: ICD-10-CM

## 2022-06-28 DIAGNOSIS — J34.89 RHINORRHEA: ICD-10-CM

## 2022-06-28 DIAGNOSIS — R50.9 ELEVATED TEMPERATURE: ICD-10-CM

## 2022-06-28 DIAGNOSIS — R53.81 MALAISE AND FATIGUE: ICD-10-CM

## 2022-06-28 DIAGNOSIS — J02.9 SORE THROAT: ICD-10-CM

## 2022-06-28 DIAGNOSIS — R53.83 MALAISE AND FATIGUE: ICD-10-CM

## 2022-06-28 PROCEDURE — 99441 PR PHYS/QHP TELEPHONE EVALUATION 5-10 MIN: CPT | Performed by: FAMILY MEDICINE

## 2022-06-28 NOTE — PROGRESS NOTES
COVID-19 Outpatient Progress Note      Disposition:     Patient is fully vaccinated and has received their booster shot  According to CDC guidelines, quarantine is not required after close contact exposure and they were advised to wear a mask for 10 days after the exposure  If patient were to develop symptoms, they should immediately self isolate and call our office for further guidance  Patient may quarantine up to today if interested  She is still feeling poor and therefore needs to wear mask if leaving the house  She was interested in [facility] treatment  Started Paxlovid for next 5 days  She was told to discontinue her statin medication, crestor  She can restart after treatment  She is to hydrate well and use OTC medications of tylenol, advil, and mucinex/ muncinex DM for other symptoms  If she continues to have symptoms of worsening bronchitis she can call PCP for azithromycin treatment  She will call back with concerns  I have spent 9 minutes directly with the patient  This virtual check-in was done via [employer] and patient was informed that this is a secure, HIPAA-compliant platform  She agrees to proceed  Patient agrees to participate in a virtual check in via telephone or video visit instead of presenting to the office to address urgent/immediate medical needs  Patient is aware this is a billable service  After connecting through [facility], the patient was identified by name and date of birth  Andrez Chowdary was informed that this was a telemedicine visit and that the exam was being conducted confidentially over secure lines  My office door was closed  No one else was in the room  Andrez Chowdary acknowledged consent and understanding of privacy and security of the telemedicine visit  I informed the patient that I have reviewed her record in Epic and presented the opportunity for her to ask any questions regarding the visit today   The patient agreed to participate  Verification of patient location:  Patient is located in the following state in which I hold an active license: [place]    Subjective:   Vivek Gann is a 56 y o  female who is concerned about COVID-19  Patient's symptoms include fever (elevated temperature), fatigue, malaise, nasal congestion, rhinorrhea (improving), sore throat (friday), cough, chest tightness, myalgias (improving) and headache  Patient denies chills, anosmia, loss of taste, shortness of breath, abdominal pain, nausea, vomiting and diarrhea  - Date of symptom onset: 2022      COVID-19 vaccination status: Fully vaccinated (primary series)    Exposure:   Contact with a person who is under investigation (PUI) for or who is positive for COVID-19 within the last 14 days?: Yes    Hospitalized recently for fever and/or lower respiratory symptoms?: No      Currently a healthcare worker that is involved in direct patient care?: No      Works in a special setting where the risk of COVID-19 transmission may be high? (this may include long-term care, correctional and correction facilities; homeless shelters; assisted-living facilities and group homes ): Yes      Resident in a special setting where the risk of COVID-19 transmission may be high? (this may include long-term care, correctional and correction facilities; homeless shelters; assisted-living facilities and group homes ): No      Patient has a history of asthma  She typically gets bronchitis with viral infection  She usually requires azithromycin to decrease inflammation and symptoms  She is positive COVID on Friday of last week            Lab Results   Component Value Date    SARSCORONAVI Detected (A) 2020     Past Medical History:   Diagnosis Date    Anemia     Asthma     Ear problems     GERD (gastroesophageal reflux disease)     Neck pain      Past Surgical History:   Procedure Laterality Date     SECTION      NOSE SURGERY      SINUS SURGERY      TUBAL LIGATION       Current Outpatient Medications   Medication Sig Dispense Refill    albuterol (PROVENTIL HFA,VENTOLIN HFA) 90 mcg/act inhaler Inhale 2 puffs every 4 (four) hours as needed for wheezing 6 7 g 2    carvedilol (COREG) 3 125 mg tablet Take 1 tablet (3 125 mg total) by mouth 2 (two) times a day with meals 180 tablet 2    ferrous sulfate 324 (65 Fe) mg Take 1 tablet (324 mg total) by mouth daily 90 tablet 0    fluticasone (FLONASE) 50 mcg/act nasal spray 2 sprays into each nostril daily 48 mL 3    gabapentin (NEURONTIN) 800 mg tablet TAKE 2 TABLETS (1,600 MG TOTAL) BY MOUTH DAILY 60 tablet 2    lansoprazole (PREVACID) 30 mg capsule Take 1 capsule (30 mg total) by mouth daily 90 capsule 2    nirmatrelvir & ritonavir (Paxlovid) tablet therapy pack Take 3 tablets by mouth 2 (two) times a day for 5 days Take 2 nirmatrelvir tablets + 1 ritonavir tablet together per dose 30 tablet 0    rosuvastatin (CRESTOR) 5 mg tablet Take 1 tablet (5 mg total) by mouth daily 90 tablet 2    VITAMIN D PO Take by mouth       No current facility-administered medications for this visit  Allergies   Allergen Reactions    Amoxicillin Hives    Ciprofloxacin Nausea Only       Review of Systems   Constitutional: Positive for fatigue and fever (elevated temperature)  Negative for chills  HENT: Positive for congestion, rhinorrhea (improving) and sore throat (friday)  Respiratory: Positive for cough and chest tightness  Negative for shortness of breath  Gastrointestinal: Negative for abdominal pain, diarrhea, nausea and vomiting  Musculoskeletal: Positive for myalgias (improving)  Neurological: Positive for headaches  Objective:    Vitals:    06/28/22 1111   Weight: 72 6 kg (160 lb)       Physical Exam  Telephone visit  Beverly Beverly was seen today for covid-19, cold like symptoms, cough and covid-19    Diagnoses and all orders for this visit:    COVID-19  Elevated temperature  Malaise and fatigue  Rhinorrhea  Sore throat  Chest tightness  Patient has positive COVID 19  She was offered Paxlovid to help decrease severity and duration of symptoms  I recommended we start with paxlovid since this may improve the COVID sym,ptoms and therefore decrease possible bronchitis  Also Paxlovid requires administration  starting on day 5 or before  She is on day 5 today  If she continues to have bronchitis symptoms or is concerned she can contact PCP to determine if Z pack is indicated  -     nirmatrelvir & ritonavir (Paxlovid) tablet therapy pack; Take 3 tablets by mouth 2 (two) times a day for 5 days Take 2 nirmatrelvir tablets + 1 ritonavir tablet together per dose          Encounter provider Shay Yuen DO    Provider located at [address] [place] [address]    Recent Visits  No visits were found meeting these conditions  Showing recent visits within past 7 days and meeting all other requirements  Today's Visits  Date Type Provider Dept   06/28/22 Telemedicine Shay Yuen DO [facility]   Showing today's visits and meeting all other requirements  Future Appointments  No visits were found meeting these conditions  Showing future appointments within next 150 days and meeting all other requirements  VIRTUAL VISIT [facility] [address] verbally agrees to participate in [employer]  Pt is aware that [employer] could be limited without vital signs or the ability to perform a full hands-on physical Cortez Mccord understands she or the provider may request at any time to terminate the video visit and request the patient to seek care or treatment in person

## 2022-07-05 DIAGNOSIS — E61.1 LOW SERUM IRON: ICD-10-CM

## 2022-07-05 RX ORDER — FERROUS SULFATE TAB EC 324 MG (65 MG FE EQUIVALENT) 324 (65 FE) MG
TABLET DELAYED RESPONSE ORAL
Qty: 90 TABLET | Refills: 0 | Status: SHIPPED | OUTPATIENT
Start: 2022-07-05 | End: 2022-07-20 | Stop reason: SDUPTHER

## 2022-07-13 ENCOUNTER — APPOINTMENT (OUTPATIENT)
Dept: LAB | Facility: CLINIC | Age: 47
End: 2022-07-13
Payer: COMMERCIAL

## 2022-07-13 DIAGNOSIS — E61.1 LOW SERUM IRON: ICD-10-CM

## 2022-07-13 DIAGNOSIS — R73.01 ELEVATED FASTING BLOOD SUGAR: ICD-10-CM

## 2022-07-13 LAB
BASOPHILS # BLD AUTO: 0.07 THOUSANDS/ΜL (ref 0–0.1)
BASOPHILS NFR BLD AUTO: 1 % (ref 0–1)
EOSINOPHIL # BLD AUTO: 0.14 THOUSAND/ΜL (ref 0–0.61)
EOSINOPHIL NFR BLD AUTO: 2 % (ref 0–6)
ERYTHROCYTE [DISTWIDTH] IN BLOOD BY AUTOMATED COUNT: 15.5 % (ref 11.6–15.1)
FERRITIN SERPL-MCNC: 20 NG/ML (ref 8–388)
HCT VFR BLD AUTO: 39.8 % (ref 34.8–46.1)
HGB BLD-MCNC: 12.9 G/DL (ref 11.5–15.4)
IMM GRANULOCYTES # BLD AUTO: 0.02 THOUSAND/UL (ref 0–0.2)
IMM GRANULOCYTES NFR BLD AUTO: 0 % (ref 0–2)
IRON SATN MFR SERPL: 25 % (ref 15–50)
IRON SERPL-MCNC: 80 UG/DL (ref 50–170)
LYMPHOCYTES # BLD AUTO: 2.02 THOUSANDS/ΜL (ref 0.6–4.47)
LYMPHOCYTES NFR BLD AUTO: 28 % (ref 14–44)
MCH RBC QN AUTO: 27.2 PG (ref 26.8–34.3)
MCHC RBC AUTO-ENTMCNC: 32.4 G/DL (ref 31.4–37.4)
MCV RBC AUTO: 84 FL (ref 82–98)
MONOCYTES # BLD AUTO: 0.58 THOUSAND/ΜL (ref 0.17–1.22)
MONOCYTES NFR BLD AUTO: 8 % (ref 4–12)
NEUTROPHILS # BLD AUTO: 4.34 THOUSANDS/ΜL (ref 1.85–7.62)
NEUTS SEG NFR BLD AUTO: 61 % (ref 43–75)
NRBC BLD AUTO-RTO: 0 /100 WBCS
PLATELET # BLD AUTO: 235 THOUSANDS/UL (ref 149–390)
PMV BLD AUTO: 12.9 FL (ref 8.9–12.7)
RBC # BLD AUTO: 4.74 MILLION/UL (ref 3.81–5.12)
TIBC SERPL-MCNC: 319 UG/DL (ref 250–450)
WBC # BLD AUTO: 7.17 THOUSAND/UL (ref 4.31–10.16)

## 2022-07-13 PROCEDURE — 83550 IRON BINDING TEST: CPT

## 2022-07-13 PROCEDURE — 85025 COMPLETE CBC W/AUTO DIFF WBC: CPT

## 2022-07-13 PROCEDURE — 83036 HEMOGLOBIN GLYCOSYLATED A1C: CPT

## 2022-07-13 PROCEDURE — 83540 ASSAY OF IRON: CPT

## 2022-07-13 PROCEDURE — 82728 ASSAY OF FERRITIN: CPT

## 2022-07-13 PROCEDURE — 36415 COLL VENOUS BLD VENIPUNCTURE: CPT

## 2022-07-14 LAB
EST. AVERAGE GLUCOSE BLD GHB EST-MCNC: 123 MG/DL
HBA1C MFR BLD: 5.9 %

## 2022-07-20 ENCOUNTER — OFFICE VISIT (OUTPATIENT)
Dept: FAMILY MEDICINE CLINIC | Facility: CLINIC | Age: 47
End: 2022-07-20
Payer: COMMERCIAL

## 2022-07-20 VITALS
BODY MASS INDEX: 28 KG/M2 | HEART RATE: 82 BPM | OXYGEN SATURATION: 99 % | WEIGHT: 158 LBS | SYSTOLIC BLOOD PRESSURE: 125 MMHG | DIASTOLIC BLOOD PRESSURE: 78 MMHG | HEIGHT: 63 IN

## 2022-07-20 DIAGNOSIS — R73.03 PREDIABETES: ICD-10-CM

## 2022-07-20 DIAGNOSIS — E66.3 OVERWEIGHT (BMI 25.0-29.9): ICD-10-CM

## 2022-07-20 DIAGNOSIS — I10 ESSENTIAL HYPERTENSION: ICD-10-CM

## 2022-07-20 DIAGNOSIS — K63.5 POLYP OF COLON, UNSPECIFIED PART OF COLON, UNSPECIFIED TYPE: ICD-10-CM

## 2022-07-20 DIAGNOSIS — E61.1 LOW SERUM IRON: Primary | ICD-10-CM

## 2022-07-20 DIAGNOSIS — K21.9 GASTROESOPHAGEAL REFLUX DISEASE WITHOUT ESOPHAGITIS: ICD-10-CM

## 2022-07-20 DIAGNOSIS — E55.9 VITAMIN D INSUFFICIENCY: ICD-10-CM

## 2022-07-20 DIAGNOSIS — E78.2 MIXED HYPERLIPIDEMIA: ICD-10-CM

## 2022-07-20 DIAGNOSIS — F17.200 CURRENT SMOKER: ICD-10-CM

## 2022-07-20 DIAGNOSIS — R53.83 FATIGUE, UNSPECIFIED TYPE: ICD-10-CM

## 2022-07-20 DIAGNOSIS — M79.2 NEUROPATHIC PAIN: ICD-10-CM

## 2022-07-20 PROCEDURE — 99214 OFFICE O/P EST MOD 30 MIN: CPT | Performed by: FAMILY MEDICINE

## 2022-07-20 RX ORDER — FERROUS SULFATE TAB EC 324 MG (65 MG FE EQUIVALENT) 324 (65 FE) MG
324 TABLET DELAYED RESPONSE ORAL DAILY
Qty: 90 TABLET | Refills: 3 | Status: SHIPPED | OUTPATIENT
Start: 2022-07-20

## 2022-07-20 RX ORDER — LANSOPRAZOLE 30 MG/1
30 CAPSULE, DELAYED RELEASE ORAL DAILY
Qty: 90 CAPSULE | Refills: 3 | Status: SHIPPED | OUTPATIENT
Start: 2022-07-20 | End: 2022-08-26

## 2022-07-20 NOTE — PROGRESS NOTES
Assessment/Plan:   Diagnoses and all orders for this visit:    Low serum iron  -     ferrous sulfate 324 (65 Fe) mg; Take 1 tablet (324 mg total) by mouth daily  - nml CBC and Fe panel   - has been taking Fe supplements QD - no issues re: constipation   Vitamin D insufficiency  - takes Vit D QD   Fatigue, unspecified type  - nml B12, TSH  - could be lingering from recent COVID infection at the end of 6/2022 - strongly advised to schedule Booster - pt aware and agreeable     Essential hypertension  - BP stable in the office  - cont Coreg  - follows with Cardio - last OV was 3/17/2022 - due for f/u in 9/2022     Prediabetes  - A1c = 5 9   - has an appt scheduled with Endo for 10/10/2022  - on Gabapentin for Neuropathy   - discussed diet and encouraged exercise   - insurance does not cover referral to Nutritionist or to DM education   - referred to Mihai Velez [facility] for f/u - pt aware and agreeable   Neuropathic pain  Overweight (BMI 25 0-29 9)  - BMI ~28    Mixed hyperlipidemia  - Lipids (4/11/2022): , TG 76, HDL 52, LDL 61   - cont Statin     Current smoker  - smokes 1PPD/33yrs (contemplating quitting but not yet ready to do so)  - has tried Nicotine Patch - which made her cravings worse   - has been referred to smoking cessation counseling    - cont to monitor     Gastroesophageal reflux disease without esophagitis  -     lansoprazole (PREVACID) 30 mg capsule; Take 1 capsule (30 mg total) by mouth daily  Polyp of colon, unspecified part of colon, unspecified type  - has been referred to GI         Subjective:    Patient ID: Delta Lundberg is a 56 y o  female    HPI   1) Labs (7/13/2022)  - nml CBC, Fe panel   - A1c = 5 9 <-- 5 8 <-- 6 1 (1/2022)  2) General   - had COVID and took Paxlovid (6/28/2022)   - has received J&J vaccine x1 - no Booster   - has been taking Vit D and Fe 324mg QD - no issues with constipation   - "still tired"   - still smoking 1PPD/33yrs - was referred to smoking cessation counseling - not yet ready to quit   - has an appt with Endo 10/10/2022  - insurance does not cover referral to Nutritionist or to DM education   - referred to Sydnie Tubbs      The following portions of the patient's history were reviewed and updated as appropriate: allergies, current medications, past family history, past medical history, past social history, past surgical history and problem list     Review of Systems  as per HPI    Objective:  /78   Pulse 82   Ht 5' 3" (1 6 m)   Wt 71 7 kg (158 lb)   SpO2 99%   BMI 27 99 kg/m²    Physical Exam  Vitals reviewed  Constitutional:       General: She is not in acute distress  Appearance: Normal appearance  She is not ill-appearing, toxic-appearing or diaphoretic  HENT:      Head: Normocephalic and atraumatic  Right Ear: External ear normal       Left Ear: External ear normal       Nose: Nose normal    Eyes:      General: No scleral icterus  Right eye: No discharge  Left eye: No discharge  Extraocular Movements: Extraocular movements intact  Conjunctiva/sclera: Conjunctivae normal    Cardiovascular:      Rate and Rhythm: Normal rate and regular rhythm  Heart sounds: Normal heart sounds  Pulmonary:      Effort: Pulmonary effort is normal       Breath sounds: Normal breath sounds  Abdominal:      Palpations: Abdomen is soft  Musculoskeletal:         General: Normal range of motion  Cervical back: Normal range of motion  Right lower leg: No edema  Left lower leg: No edema  Skin:     General: Skin is warm  Neurological:      General: No focal deficit present  Mental Status: She is alert and oriented to person, place, and time  Psychiatric:         Mood and Affect: Mood normal          Behavior: Behavior normal          BMI Counseling: Body mass index is 27 99 kg/m²   The BMI is above normal  Nutrition recommendations include 3-5 servings of fruits/vegetables daily  Exercise recommendations include exercising 3-5 times per week

## 2022-08-23 ENCOUNTER — TELEPHONE (OUTPATIENT)
Dept: GASTROENTEROLOGY | Facility: CLINIC | Age: 47
End: 2022-08-23

## 2022-08-23 ENCOUNTER — CONSULT (OUTPATIENT)
Dept: GASTROENTEROLOGY | Facility: CLINIC | Age: 47
End: 2022-08-23
Payer: COMMERCIAL

## 2022-08-23 VITALS
BODY MASS INDEX: 28.53 KG/M2 | DIASTOLIC BLOOD PRESSURE: 87 MMHG | SYSTOLIC BLOOD PRESSURE: 126 MMHG | WEIGHT: 161 LBS | HEIGHT: 63 IN | HEART RATE: 85 BPM

## 2022-08-23 DIAGNOSIS — K21.9 GASTROESOPHAGEAL REFLUX DISEASE WITHOUT ESOPHAGITIS: ICD-10-CM

## 2022-08-23 DIAGNOSIS — R14.2 BURPING: ICD-10-CM

## 2022-08-23 DIAGNOSIS — K63.5 POLYP OF COLON, UNSPECIFIED PART OF COLON, UNSPECIFIED TYPE: Primary | ICD-10-CM

## 2022-08-23 DIAGNOSIS — R19.4 CHANGE IN BOWEL HABIT: ICD-10-CM

## 2022-08-23 PROCEDURE — 99204 OFFICE O/P NEW MOD 45 MIN: CPT | Performed by: INTERNAL MEDICINE

## 2022-08-23 RX ORDER — POLYETHYLENE GLYCOL 3350, SODIUM SULFATE ANHYDROUS, SODIUM BICARBONATE, SODIUM CHLORIDE, POTASSIUM CHLORIDE 236; 22.74; 6.74; 5.86; 2.97 G/4L; G/4L; G/4L; G/4L; G/4L
4 POWDER, FOR SOLUTION ORAL ONCE
Qty: 4000 ML | Refills: 0 | Status: SHIPPED | OUTPATIENT
Start: 2022-08-23 | End: 2022-09-30

## 2022-08-23 NOTE — TELEPHONE ENCOUNTER
[facility] Assessment    Name: Cleveland Kussmaul  YOB: 1975  Last Height: 5' 3" (1 6 m)  Last weight: 73 kg (161 lb)  BMI: 28 52 kg/m²  Procedure: flip  Diagnosis: see orders  Date of procedure: 9/30/22  Prep: golytely  Responsible : yes  Phone#: [phone]  Name completing form: Duong Carrera  Date form completed: 08/23/22      If the patient answers yes to any of these questions, schedule in a hospital  Are you pregnant: No  Do you rely on a wheelchair for mobility: No  Have you been diagnosed with End Stage Renal Disease (ESRD): No  Do you need oxygen during the day: No  Have you had a heart attack or stroke within the past three months: No  Have you had a seizure within the past three months: No  Have you ever been informed by anesthesia that you have a difficult airway: No  Additional Questions  Have you had any cardiac testing or are under the care of a Cardiologist (see cardiac list): No  Cardiac list:   Do you have an implanted cardiac defibrillator: No (Comment:  This patient should be scheduled in the hospital)    Have any bleeding problems, such as anemia or hemophilia (If patient has H&H result below 8, schedule in hospital   H&H must be within 30 days of procedure): No    Had an organ transplant within the past 3 months: No    Do you have any present infections: No  Do you get short of breath when walking a few blocks: No  Have you been diagnosed with diabetes: No  Comments (provide cardiac provider information if applicable):

## 2022-08-23 NOTE — PROGRESS NOTES
[facility] - Outpatient Consultation  Jerrel Cooks 56 y o  female MRN: [id number]  Encounter: [id number]          ASSESSMENT AND PLAN:      1  Polyp of colon, unspecified part of colon, unspecified type  -     Ambulatory Referral to Gastroenterology  -     Colonoscopy; Future; Expected date: 08/23/2022  -     EGD; Future; Expected date: 08/23/2022  -     polyethylene glycol (Golytely) 4000 mL solution; Take 4,000 mL by mouth once for 1 dose Take according to instructions given by the office for colonoscopy bowel prep  2  Gastroesophageal reflux disease without esophagitis  -     EGD; Future; Expected date: 08/23/2022    3  Change in bowel habit  -     Colonoscopy; Future; Expected date: 08/23/2022    4  Burping  -     EGD; Future; Expected date: 08/23/2022      History of GERD, burping, dyspepsia, anti-reflux measures reviewed diet discussed, advised to abstain from smoking  Schedule EGD  Change in bowel habits, frequency bowel movements in the morning, history of polyps many years ago, though this may be IBS but given her age any other colonic pathology/IBD/polyps of concern  Colonoscopy procedure and prep discussed at length  Schedule in next few weeks  IBS management reviewed  ______________________________________________________________________    HPI:   Patient came in for evaluation of her symptoms of burping, acid reflux, appetite is fair, weight stable  Also has frequency of bowel movements, has to go several times in the morning because she feels that she is not empty  Has some change in bowel habits  No apparent melena hematochezia or blood in stools  Denies any dysphagia chest pain shortness of breath coughing choking spells nocturnal reflux regurgitation bronchitis pneumonias  No history of CVA Ca CAD stents pacemakers  Lives with her , works at [address] [place] [address] Court  Diet medications more than 10 pertinent systems reviewed    Some of the prior records noted  REVIEW OF SYSTEMS:    CONSTITUTIONAL: Denies any fever, chills, rigors, and weight loss  HEENT: No earache or tinnitus  CARDIOVASCULAR: No chest pain or palpitations  RESPIRATORY: Denies any cough, hemoptysis, shortness of breath or dyspnea on exertion  GASTROINTESTINAL: As noted in the History of Present Illness  GENITOURINARY: Denies any hematuria or dysuria  NEUROLOGIC: No dizziness or vertigo  MUSCULOSKELETAL: Denies any joint swellings  SKIN: Denies skin rashes or itching  ENDOCRINE: Denies excessive thirst  Denies intolerance to heat or cold  PSYCHOSOCIAL: Denies depression or anxiety  Denies any recent memory loss         Historical Information   Past Medical History:   Diagnosis Date    Anemia     Asthma     Ear problems     GERD (gastroesophageal reflux disease)     Neck pain      Past Surgical History:   Procedure Laterality Date     SECTION      NOSE SURGERY      SINUS SURGERY      TUBAL LIGATION       Social History   Social History     Substance and Sexual Activity   Alcohol Use No     Social History     Substance and Sexual Activity   Drug Use No     Social History     Tobacco Use   Smoking Status Current Every Day Smoker    Packs/day: 1 00    Years: 33 00    Pack years: 33 00    Types: Cigarettes   Smokeless Tobacco Never Used     Family History   Problem Relation Age of Onset    Hypertension Father     Hypertension Maternal Aunt     Bone cancer Maternal Grandmother 80    Stomach cancer Paternal Grandmother 80    Breast cancer Neg Hx     Colon cancer Neg Hx        Meds/Allergies       Current Outpatient Medications:     albuterol (PROVENTIL HFA,VENTOLIN HFA) 90 mcg/act inhaler    carvedilol (COREG) 3 125 mg tablet    ferrous sulfate 324 (65 Fe) mg    fluticasone (FLONASE) 50 mcg/act nasal spray    gabapentin (NEURONTIN) 800 mg tablet    lansoprazole (PREVACID) 30 mg capsule    polyethylene glycol (Golytely) 4000 mL solution   rosuvastatin (CRESTOR) 5 mg tablet    VITAMIN D PO    Allergies   Allergen Reactions    Amoxicillin Hives    Ciprofloxacin Nausea Only           Objective     Blood pressure 126/87, pulse 85, height 5' 3" (1 6 m), weight 73 kg (161 lb)  Body mass index is 28 52 kg/m²  PHYSICAL EXAM:      General Appearance:   Alert, cooperative, no distress   HEENT:   Normocephalic, atraumatic, anicteric  Neck:  Supple, symmetrical, trachea midline   Lungs:   Clear to auscultation bilaterally; no rales, rhonchi or wheezing; respirations unlabored    Heart[de-identified]   Regular rate and rhythm; no murmur  Abdomen:   Soft, non-tender, non-distended; normal bowel sounds; no masses, no organomegaly    Genitalia:   Deferred    Rectal:   Deferred    Extremities:  No cyanosis, clubbing or edema    Skin:  No jaundice, rashes, or lesions    Lymph nodes:  No palpable cervical lymphadenopathy        Lab Results:   No visits with results within 1 Day(s) from this visit     Latest known visit with results is:   Appointment on 07/13/2022   Component Date Value    WBC 07/13/2022 7 17     RBC 07/13/2022 4 74     Hemoglobin 07/13/2022 12 9     Hematocrit 07/13/2022 39 8     MCV 07/13/2022 84     MCH 07/13/2022 27 2     MCHC 07/13/2022 32 4     RDW 07/13/2022 15 5 (A)    MPV 07/13/2022 12 9 (A)    Platelets 82/14/7430 235     nRBC 07/13/2022 0     Neutrophils Relative 07/13/2022 61     Immat GRANS % 07/13/2022 0     Lymphocytes Relative 07/13/2022 28     Monocytes Relative 07/13/2022 8     Eosinophils Relative 07/13/2022 2     Basophils Relative 07/13/2022 1     Neutrophils Absolute 07/13/2022 4 34     Immature Grans Absolute 07/13/2022 0 02     Lymphocytes Absolute 07/13/2022 2 02     Monocytes Absolute 07/13/2022 0 58     Eosinophils Absolute 07/13/2022 0 14     Basophils Absolute 07/13/2022 0 07     Hemoglobin A1C 07/13/2022 5 9 (A)    EAG 07/13/2022 123     Iron Saturation 07/13/2022 25     TIBC 07/13/2022 319     Iron 07/13/2022 80     Ferritin 07/13/2022 20          Radiology Results:   No results found

## 2022-08-25 ENCOUNTER — OFFICE VISIT (OUTPATIENT)
Dept: CARDIOLOGY CLINIC | Facility: CLINIC | Age: 47
End: 2022-08-25
Payer: COMMERCIAL

## 2022-08-25 VITALS
SYSTOLIC BLOOD PRESSURE: 110 MMHG | HEIGHT: 63 IN | WEIGHT: 161 LBS | OXYGEN SATURATION: 98 % | HEART RATE: 85 BPM | BODY MASS INDEX: 28.53 KG/M2 | DIASTOLIC BLOOD PRESSURE: 80 MMHG | TEMPERATURE: 97 F

## 2022-08-25 DIAGNOSIS — E78.00 HYPERCHOLESTEROLEMIA: Primary | ICD-10-CM

## 2022-08-25 PROCEDURE — 93000 ELECTROCARDIOGRAM COMPLETE: CPT | Performed by: INTERNAL MEDICINE

## 2022-08-25 PROCEDURE — 99214 OFFICE O/P EST MOD 30 MIN: CPT | Performed by: INTERNAL MEDICINE

## 2022-08-25 NOTE — PROGRESS NOTES
Progress Note - Cardiology Office  [facility]    Aslheigh Velazquez 56 y o  female MRN: [id number]  : 1975  Encounter: [id number]      ASSESSMENT:  Pre diabetes:  Hemoglobin A1c is 6 1  Watching her diet     History of Chest pain     Pharmacologic stress, 2021:  Normal, EF 67%     Hypertension  BP today is  110/80 mmHg     TTE, 2021  EF 50%, mild LVH, mild MR, trace TR     History of celiac artery stenosis/compression  Abdominal ultrasound, 2017:  Impression  The aorta is patent and normal in caliber  The celiac, splenic and hepatic arteries are patent  There is evidence of celiac artery compression, velocities are within normal  range with deep inspiration (140cm/s) and are in stenotic range with expiration  (349 cm/s)  The superior and inferior mesenteric arteries are normal and patent in a  fasting state      Hyperlipidemia  2021:  LDL 67, triglycerides 79, HDL 60, normal liver enzymes  2022:  LDL 61, TG 76, HDL 52  Currently on Crestor 5 mg daily     RECOMMENDATIONS:  Repeat lipid profile and hepatic function panel in 6 months prior to next office visit  Continue Coreg and Crestor at current dose for now  Low-salt diet and low-cholesterol diet  Regular cardiovascular exercise      Please call [phone] if any questions  HPI :  2022:  Mariza Fletcher is a 56y o  year old female who came for follow up  Patient is gently doing well denies any symptoms  We discussed the results of previous lipid profile which excellent  However no LFTs were done  As such requesting repeat lipid profile in 6 months with hepatic function panel    Her blood pressure is well controlled and her cardiac examination and EKG normal    REVIEW OF SYSTEMS:  Denies any cardiac symptoms  Denies chest pain, palpitations, dizziness, dyspnea or syncope    Historical Information   Past Medical History:   Diagnosis Date    Anemia     Asthma     Ear problems     GERD (gastroesophageal reflux disease)     Neck pain      Past Surgical History:   Procedure Laterality Date     SECTION      NOSE SURGERY      SINUS SURGERY      TUBAL LIGATION       Social History     Substance and Sexual Activity   Alcohol Use No     Social History     Substance and Sexual Activity   Drug Use No     Social History     Tobacco Use   Smoking Status Current Every Day Smoker    Packs/day: 1 00    Years: 33 00    Pack years: 33     Types: Cigarettes   Smokeless Tobacco Never Used     Family History:   Family History   Problem Relation Age of Onset    Hypertension Father     Hypertension Maternal Aunt     Bone cancer Maternal Grandmother 80    Stomach cancer Paternal Grandmother 80    Breast cancer Neg Hx     Colon cancer Neg Hx        Meds/Allergies     Allergies   Allergen Reactions    Amoxicillin Hives    Ciprofloxacin Nausea Only       Current Outpatient Medications:     albuterol (PROVENTIL HFA,VENTOLIN HFA) 90 mcg/act inhaler, Inhale 2 puffs every 4 (four) hours as needed for wheezing, Disp: 6 7 g, Rfl: 2    carvedilol (COREG) 3 125 mg tablet, Take 1 tablet (3 125 mg total) by mouth 2 (two) times a day with meals, Disp: 180 tablet, Rfl: 2    ferrous sulfate 324 (65 Fe) mg, Take 1 tablet (324 mg total) by mouth daily, Disp: 90 tablet, Rfl: 3    fluticasone (FLONASE) 50 mcg/act nasal spray, 2 sprays into each nostril daily, Disp: 48 mL, Rfl: 3    gabapentin (NEURONTIN) 800 mg tablet, TAKE 2 TABLETS (1,600 MG TOTAL) BY MOUTH DAILY, Disp: 60 tablet, Rfl: 2    lansoprazole (PREVACID) 30 mg capsule, Take 1 capsule (30 mg total) by mouth daily, Disp: 90 capsule, Rfl: 3    rosuvastatin (CRESTOR) 5 mg tablet, Take 1 tablet (5 mg total) by mouth daily, Disp: 90 tablet, Rfl: 2    VITAMIN D PO, Take by mouth, Disp: , Rfl:     polyethylene glycol (Golytely) 4000 mL solution, Take 4,000 mL by mouth once for 1 dose Take according to instructions given by the office for colonoscopy bowel prep , Disp: 4000 mL, Rfl: 0    Vitals: Blood pressure 110/80, pulse 85, temperature (!) 97 °F (36 1 °C), height 5' 3" (1 6 m), weight 73 kg (161 lb), SpO2 98 %  ?  Body mass index is 28 52 kg/m²  Vitals:    22 0938   Weight: 73 kg (161 lb)     BP Readings from Last 3 Encounters:   22 110/80   22 126/87   22 125/78       Physical Exam:    Neurologic:  Alert & oriented x 3, no new focal deficits, Not in any acute distress,  Constitutional:  Well developed, well nourished, non-toxic appearance   Eyes:  Pupil equal and reacting to light, conjunctiva normal,   HENT:  Atraumatic, oropharynx moist, Neck- normal range of motion, no tenderness,  Neck supple, No JVP, No LNP   Respiratory:  Bilateral air entry, mostly clear to auscultation  Cardiovascular: S1-S2 regular with a I/VI systolic murmur   GI:  Soft, nondistended, normal bowel sounds, nontender, no hepatosplenomegaly appreciated  Musculoskeletal:  No edema, no tenderness, no deformities     Skin:  Well hydrated, no rash   Lymphatic:  No lymphadenopathy noted   Extremities:  No edema        Diagnostic Studies Review Cardio:      EKG:  Normal sinus rhythm, heart rate 85 per minute    Cardiac testing:   Results for orders placed during the hospital encounter of 21    Echo complete with contrast if indicated    Narrative  [address]  [address] [facility] [address]  [phone]    Transthoracic Echocardiogram  2D, M-mode, Doppler, and Color Doppler    Study date:  06-Aug-2021    Patient: Ruther Ahumada  MR number: [id number]  Account number: [de-identified]  : 1975  Age: 40 years  Gender: Female  Status: Outpatient  Location: Echo lab  Height: 63 in  Weight: 155 8 lb  BP: 130/ 88 mmHg    Indications: HTN    Diagnoses: I11 9 - Hypertensive heart disease without heart failure    Sonographer:  IRLANDA Valerio  Primary Physician:  Irena Orozco MD  Referring Physician:  Cuca Miller Jaciel Oliveros MD  Group:  [facility]  Interpreting Physician:  Fuentes Owens MD    SUMMARY    PROCEDURE INFORMATION:  This was a technically difficult study  Echocardiographic views were limited due to poor acoustic window availability, decreased penetration, and lung interference  LEFT VENTRICLE:  Systolic function was at the lower limits of normal  Ejection fraction was estimated to be around 50 %  This study was inadequate for the evaluation of regional wall motion  Wall thickness was mildly increased  There was mild concentric hypertrophy  MITRAL VALVE:  There was mild regurgitation  TRICUSPID VALVE:  There was trace regurgitation  HISTORY: PRIOR HISTORY: Tobacco abuse,HTN  PROCEDURE: The procedure was performed in the echo lab  This was a routine study  The transthoracic approach was used  The study included complete 2D imaging, M-mode, complete spectral Doppler, and color Doppler  The heart rate was 73 bpm,  at the start of the study  Images were obtained from the parasternal, apical, subcostal, and suprasternal notch acoustic windows  Echocardiographic views were limited due to poor acoustic window availability, decreased penetration, and  lung interference  This was a technically difficult study  LEFT VENTRICLE: Size was normal  Systolic function was at the lower limits of normal  Ejection fraction was estimated to be around 50 %  This study was inadequate for the evaluation of regional wall motion  Wall thickness was mildly  increased  There was mild concentric hypertrophy  DOPPLER: Left ventricular diastolic function parameters were normal for the patient's age  RIGHT VENTRICLE: The size was normal  Systolic function was normal  Wall thickness was normal     LEFT ATRIUM: Size was at the upper limits of normal     RIGHT ATRIUM: Size was normal     MITRAL VALVE: There was normal leaflet separation  DOPPLER: The transmitral velocity was within the normal range   There was no evidence for stenosis  There was mild regurgitation  AORTIC VALVE: The valve was not well visualized  DOPPLER: There was no evidence for stenosis  There was no significant regurgitation  TRICUSPID VALVE: DOPPLER: There was trace regurgitation  The tricuspid jet envelope definition was inadequate for estimation of RV systolic pressure  PULMONIC VALVE: DOPPLER: There was no significant regurgitation  PERICARDIUM: There was no thickening or calcification  There was no pericardial effusion  AORTA: The root exhibited normal size  SYSTEMIC VEINS: IVC: The inferior vena cava was not well visualized      SYSTEM MEASUREMENT TABLES    2D  EF (Teich): 48 51 %  %FS: 24 12 %  EDV(Teich): 72 77 ml  ESV(Teich): 37 47 ml  IVSd: 0 97 cm  LA Area: 18 06 cm2  LA Diam: 3 61 cm  LVEDV MOD A4C: 114 35 ml  LVEF MOD A4C: 43 41 %  LVESV MOD A4C: 64 71 ml  LVIDd: 4 07 cm  LVIDs: 3 08 cm  LVLd A4C: 8 63 cm  LVLs A4C: 7 27 cm  LVOT Diam: 1 95 cm  LVPWd: 0 95 cm  RA Area: 15 53 cm2  RVIDd: 3 21 cm  SV (Teich): 35 29 ml  SV MOD A4C: 49 63 ml    CW  AV Vmax: 0 81 m/s  AV maxP 63 mmHg    MM  TAPSE: 2 24 cm    PW  MV E/A Ratio: 1 23  E' Sept: 0 11 m/s  E/E' Sept: 5 88  LVOT Vmax: 0 63 m/s  LVOT maxP 57 mmHg  MV A Ortega: 0 53 m/s  MV Dec [place]: 3 56 m/s2  MV DecT: 183 96 ms  MV E Ortega: 0 66 m/s  MV PHT: 53 35 ms  MVA By PHT: 4 12 cm2    [employer] Accredited Echocardiography Laboratory    Prepared and electronically signed by    Tushar Zuniga MD  Signed 06-Aug-2021 13:15:27    Results for orders placed during the hospital encounter of 21    NM Myocardial Perfusion Spect (Pharmacological Induced Stress and/or Rest)    Narrative  [address] [place] [address]  [phone]    Rest/Stress Gated SPECT Myocardial Perfusion Imaging After Regadenoson    Patient: Thor Guerra  MR number: [id number]  Account number: [de-identified]  : 1975  Age: 40 years  Gender: Female  Status: Outpatient  Location: Stress lab  Height: 63 in  Weight: 155 lb  BP: 130/ 82 mmHg    Allergies: AMOXICILLIN, CIPROFLOXACIN    Diagnosis: R07 9 - Chest pain, unspecified    Primary Physician:  Pb Magallon MD  RN:  Jaqui Yepez RN  Interpreting Physician:  Danny Modi MD  Referring Physician:  Rosie Álvarez MD  Technician:  Alexa Ortega  Group:  [facility]  Report Prepared By[de-identified]  Jqaui Yepez RN    INDICATIONS: Coronary artery disease  HISTORY: The patient is a 40year old  female  Chest pain status: chest pain  Coronary artery disease risk factors: smoking  Cardiovascular history: arrhythmia/tachycardia Co-morbidity: history of lung  disease/COVID,asthma,pneumonia  Medications: no cardiac drugs  Acute coronary syndrome: acute myocardial infarction recently ruled out  PHYSICAL EXAM: Baseline physical exam screening: no wheezes audible  REST ECG: Normal sinus rhythm  Normal baseline ECG  PROCEDURE: The study was performed in the the Stress lab  A regadenoson infusion pharmacologic stress test was performed  Gated SPECT myocardial perfusion imaging was performed after stress  Systolic blood pressure was 130 mmHg, at the  start of the study  Diastolic blood pressure was 82 mmHg, at the start of the study  The heart rate was 85 bpm, at the start of the study  IV double checked  Regadenoson protocol:  HR bpm SBP mmHg DBP mmHg Symptoms  Baseline 85 130 82 none  Immediate 130 146 78 mild dyspnea, dizziness, nausea  1 min 112 126 74 subsiding  2 min -- -- -- none  No medications or fluids given  The patient also performed low level exercise  STRESS SUMMARY: Duration of pharmacologic stress was 3 min  Maximal heart rate during stress was 130 bpm  The rate-pressure product for the peak heart rate and blood pressure was 72943  There was no chest pain during stress  The stress  test was terminated due to protocol completion   Pre oxygen saturation: 100 %  Peak oxygen saturation: 100 %  The stress ECG was negative for ischemia and normal  There were no stress arrhythmias or conduction abnormalities  ISOTOPE ADMINISTRATION:  Resting isotope administration Stress isotope administration  Agent Tetrofosmin Tetrofosmin  Dose 11 mCi 32 1 mCi  Date 05/26/2021 05/26/2021  Injection time 07:58 09:00  Imaging time 08:34 09:47  Injection-image interval 36 min 47 min    The radiopharmaceutical was injected at the peak effect of pharmacologic stress  MYOCARDIAL PERFUSION IMAGING:  The image quality was good  Left ventricular size was normal  The TID ratio was 0 98  PERFUSION DEFECTS:  -  There were no perfusion defects  GATED SPECT:  The calculated left ventricular ejection fraction was 67 %  Left ventricular ejection fraction was within normal limits by visual estimate  There was no left ventricular regional abnormality  SUMMARY:  -  Stress results: There was no chest pain during stress  -  ECG conclusions: The stress ECG was negative for ischemia and normal  There were no stress arrhythmias or conduction abnormalities  -  Perfusion imaging: There were no perfusion defects   -  Gated SPECT: The calculated left ventricular ejection fraction was 67 %  Left ventricular ejection fraction was within normal limits by visual estimate  There was no left ventricular regional abnormality  IMPRESSIONS: Normal study after vasodilation and low level exercise without reproduction of symptoms  The cardiovascular risk is low  Myocardial perfusion imaging was normal at rest and with stress  Prepared and signed by    Angie Hair MD  Signed 05/26/2021 13:32:39        Imaging:  Chest X-Ray:   No Chest XR results available for this patient  CT-scan of the chest:     No CTA results available for this patient    Lab Review   Lab Results   Component Value Date    WBC 7 17 07/13/2022    HGB 12 9 07/13/2022    HCT 39 8 07/13/2022    MCV 84 07/13/2022    RDW 15 5 (H) 07/13/2022  07/13/2022     BMP:  Lab Results   Component Value Date    SODIUM 140 05/25/2021    K 4 1 05/25/2021     05/25/2021    CO2 26 05/25/2021    ANIONGAP 11 10/05/2015    BUN 9 05/25/2021    CREATININE 0 72 05/25/2021    GLUC 103 05/25/2021    CALCIUM 8 5 05/25/2021    EGFR 101 05/25/2021    MG 2 0 02/08/2017     LFT:  Lab Results   Component Value Date    AST 11 05/10/2021    ALT 18 05/10/2021    ALKPHOS 100 05/10/2021    TP 7 5 05/10/2021    ALB 3 9 05/10/2021      No components found for: [employer] [facility]  Lab Results   Component Value Date    DWL9XULCEOKE 1 470 04/11/2022     Lab Results   Component Value Date    HGBA1C 5 9 (H) 07/13/2022     Lipid Profile:   Lab Results   Component Value Date    CHOLESTEROL 128 04/11/2022    HDL 52 04/11/2022    LDLCALC 61 04/11/2022    TRIG 76 04/11/2022     Lab Results   Component Value Date    CHOLESTEROL 128 04/11/2022    CHOLESTEROL 195 02/08/2017     Lab Results   Component Value Date    CKTOTAL 91 05/25/2021    TROPONINI <0 02 05/25/2021     Lab Results   Component Value Date    NTBNP 81 05/10/2021      No results found for this or any previous visit (from the past 672 hour(s))  Dr Giovanny Morales MD, [facility]      "This note has been constructed using a voice recognition system  Therefore there may be syntax, spelling, and/or grammatical errors   Please call if you have any questions  "

## 2022-08-29 DIAGNOSIS — M79.2 NEUROPATHIC PAIN: ICD-10-CM

## 2022-08-29 RX ORDER — GABAPENTIN 800 MG/1
1600 TABLET ORAL DAILY
Qty: 60 TABLET | Refills: 2 | Status: SHIPPED | OUTPATIENT
Start: 2022-08-29

## 2022-09-23 ENCOUNTER — TELEPHONE (OUTPATIENT)
Dept: GASTROENTEROLOGY | Facility: CLINIC | Age: 47
End: 2022-09-23

## 2022-09-23 NOTE — TELEPHONE ENCOUNTER
Spoke to pt confirming pt's colonoscopy/egd scheduled on 9/30/22 at [facility] with Dr Jose J Tinoco   Informed pt [facility] would be calling the 1-2 days prior with arrival time  Informed of clear liquid diet day prior as well as the bowel cleansing preparation  Informed would need a  the day of the procedure due to being under sedation  Spoke to pt and she informed has instructions and has no questions  Advised pt to contact insurance if has any questions regarding procedure coverage

## 2022-09-30 ENCOUNTER — ANESTHESIA (OUTPATIENT)
Dept: GASTROENTEROLOGY | Facility: AMBULATORY SURGERY CENTER | Age: 47
End: 2022-09-30

## 2022-09-30 ENCOUNTER — HOSPITAL ENCOUNTER (OUTPATIENT)
Dept: GASTROENTEROLOGY | Facility: AMBULATORY SURGERY CENTER | Age: 47
Discharge: HOME | End: 2022-09-30
Payer: COMMERCIAL

## 2022-09-30 ENCOUNTER — ANESTHESIA EVENT (OUTPATIENT)
Dept: GASTROENTEROLOGY | Facility: AMBULATORY SURGERY CENTER | Age: 47
End: 2022-09-30

## 2022-09-30 VITALS
BODY MASS INDEX: 27.49 KG/M2 | OXYGEN SATURATION: 98 % | HEART RATE: 75 BPM | TEMPERATURE: 98.3 F | HEIGHT: 64 IN | RESPIRATION RATE: 18 BRPM | WEIGHT: 161 LBS | SYSTOLIC BLOOD PRESSURE: 131 MMHG | DIASTOLIC BLOOD PRESSURE: 82 MMHG

## 2022-09-30 DIAGNOSIS — K63.5 POLYP OF COLON, UNSPECIFIED PART OF COLON, UNSPECIFIED TYPE: ICD-10-CM

## 2022-09-30 DIAGNOSIS — K21.9 GASTROESOPHAGEAL REFLUX DISEASE WITHOUT ESOPHAGITIS: ICD-10-CM

## 2022-09-30 DIAGNOSIS — R14.2 BURPING: ICD-10-CM

## 2022-09-30 DIAGNOSIS — R19.4 CHANGE IN BOWEL HABIT: ICD-10-CM

## 2022-09-30 PROBLEM — F17.200 SMOKING: Status: ACTIVE | Noted: 2019-01-14

## 2022-09-30 PROBLEM — IMO0001 SMOKING: Status: ACTIVE | Noted: 2019-01-14

## 2022-09-30 LAB
EXT PREGNANCY TEST URINE: NEGATIVE
EXT. CONTROL: NORMAL

## 2022-09-30 PROCEDURE — 45385 COLONOSCOPY W/LESION REMOVAL: CPT | Performed by: INTERNAL MEDICINE

## 2022-09-30 PROCEDURE — 45380 COLONOSCOPY AND BIOPSY: CPT | Performed by: INTERNAL MEDICINE

## 2022-09-30 PROCEDURE — 43239 EGD BIOPSY SINGLE/MULTIPLE: CPT | Performed by: INTERNAL MEDICINE

## 2022-09-30 RX ORDER — LIDOCAINE HYDROCHLORIDE 20 MG/ML
INJECTION, SOLUTION EPIDURAL; INFILTRATION; INTRACAUDAL; PERINEURAL AS NEEDED
Status: DISCONTINUED | OUTPATIENT
Start: 2022-09-30 | End: 2022-09-30

## 2022-09-30 RX ORDER — ONDANSETRON 2 MG/ML
INJECTION INTRAMUSCULAR; INTRAVENOUS AS NEEDED
Status: DISCONTINUED | OUTPATIENT
Start: 2022-09-30 | End: 2022-09-30

## 2022-09-30 RX ORDER — LIDOCAINE HYDROCHLORIDE 10 MG/ML
0.5 INJECTION, SOLUTION EPIDURAL; INFILTRATION; INTRACAUDAL; PERINEURAL ONCE AS NEEDED
Status: DISCONTINUED | OUTPATIENT
Start: 2022-09-30 | End: 2022-10-04 | Stop reason: HOSPADM

## 2022-09-30 RX ORDER — SODIUM CHLORIDE, SODIUM LACTATE, POTASSIUM CHLORIDE, CALCIUM CHLORIDE 600; 310; 30; 20 MG/100ML; MG/100ML; MG/100ML; MG/100ML
125 INJECTION, SOLUTION INTRAVENOUS CONTINUOUS
Status: DISCONTINUED | OUTPATIENT
Start: 2022-09-30 | End: 2022-10-04 | Stop reason: HOSPADM

## 2022-09-30 RX ORDER — PROPOFOL 10 MG/ML
INJECTION, EMULSION INTRAVENOUS AS NEEDED
Status: DISCONTINUED | OUTPATIENT
Start: 2022-09-30 | End: 2022-09-30

## 2022-09-30 RX ORDER — SODIUM CHLORIDE, SODIUM LACTATE, POTASSIUM CHLORIDE, CALCIUM CHLORIDE 600; 310; 30; 20 MG/100ML; MG/100ML; MG/100ML; MG/100ML
20 INJECTION, SOLUTION INTRAVENOUS CONTINUOUS
Status: DISCONTINUED | OUTPATIENT
Start: 2022-09-30 | End: 2022-10-04 | Stop reason: HOSPADM

## 2022-09-30 RX ADMIN — SODIUM CHLORIDE, SODIUM LACTATE, POTASSIUM CHLORIDE, CALCIUM CHLORIDE: 600; 310; 30; 20 INJECTION, SOLUTION INTRAVENOUS at 11:11

## 2022-09-30 RX ADMIN — PROPOFOL 60 MG: 10 INJECTION, EMULSION INTRAVENOUS at 11:40

## 2022-09-30 RX ADMIN — PROPOFOL 20 MG: 10 INJECTION, EMULSION INTRAVENOUS at 11:46

## 2022-09-30 RX ADMIN — PROPOFOL 30 MG: 10 INJECTION, EMULSION INTRAVENOUS at 11:43

## 2022-09-30 RX ADMIN — PROPOFOL 50 MG: 10 INJECTION, EMULSION INTRAVENOUS at 11:32

## 2022-09-30 RX ADMIN — PROPOFOL 100 MG: 10 INJECTION, EMULSION INTRAVENOUS at 11:31

## 2022-09-30 RX ADMIN — LIDOCAINE HYDROCHLORIDE 100 MG: 20 INJECTION, SOLUTION EPIDURAL; INFILTRATION; INTRACAUDAL; PERINEURAL at 11:31

## 2022-09-30 RX ADMIN — PROPOFOL 60 MG: 10 INJECTION, EMULSION INTRAVENOUS at 11:37

## 2022-09-30 RX ADMIN — PROPOFOL 30 MG: 10 INJECTION, EMULSION INTRAVENOUS at 11:34

## 2022-09-30 RX ADMIN — ONDANSETRON 4 MG: 2 INJECTION INTRAMUSCULAR; INTRAVENOUS at 11:30

## 2022-09-30 NOTE — ANESTHESIA PREPROCEDURE EVALUATION
Procedure:  COLONOSCOPY  EGD    Relevant Problems   ANESTHESIA  Does well with Ondansetron treatment      CARDIO   (+) Hypercholesterolemia      GI/HEPATIC   (+) Gastroesophageal reflux disease      HEMATOLOGY   (+) Anemia of chronic disease      MUSCULOSKELETAL   (+) Back pain   (+) Degeneration of lumbar intervertebral disc      NEURO/PSYCH   (+) Anxiety      PULMONARY   (+) Smoking      Nervous and Auditory   (+) Cervical disc disorder with radiculopathy of mid-cervical region        Physical Exam    Airway    Mallampati score: II  TM Distance: >3 FB  Neck ROM: full     Dental   upper dentures,     Cardiovascular      Pulmonary      Other Findings  Upper partial plate      Anesthesia Plan  ASA Score- 2     Anesthesia Type- IV sedation with anesthesia with ASA Monitors  Additional Monitors:   Airway Plan:           Plan Factors-Exercise tolerance (METS): >4 METS  Chart reviewed  EKG reviewed  Existing labs reviewed  Patient summary reviewed  Patient is a current smoker  Patient smoked on day of surgery  Induction-     Postoperative Plan-     Informed Consent- Anesthetic plan and risks discussed with patient  I personally reviewed this patient with the CRNA  Discussed and agreed on the Anesthesia Plan with the CRNA  Lexa Gaines

## 2022-09-30 NOTE — ANESTHESIA POSTPROCEDURE EVALUATION
Post-Op Assessment Note    CV Status:  Stable  Pain Score: 0    Pain management: adequate     Mental Status:  Alert and awake   Hydration Status:  Euvolemic   PONV Controlled:  Controlled   Airway Patency:  Patent      Post Op Vitals Reviewed: Yes      Staff: Anesthesiologist, CRNA         No complications documented  /70 (Simultaneous filing  User may not have seen previous data ) (09/30/22 1154)    Temp      Pulse 80 (Simultaneous filing  User may not have seen previous data ) (09/30/22 1154)   Resp 20 (Simultaneous filing  User may not have seen previous data ) (09/30/22 1154)    SpO2 97 % (Simultaneous filing   User may not have seen previous data ) (09/30/22 1154)

## 2022-09-30 NOTE — H&P
History and Physical - [facility]  Sarabjit Hyde 56 y o  female MRN: [id number]                  HPI: Sarabjit Hyde is a 56y o  year old female who presents for history of GERD and polyps      REVIEW OF SYSTEMS: Per the HPI, and otherwise unremarkable      Historical Information   Past Medical History:   Diagnosis Date    Anemia     Asthma     Colon polyp     Ear problems     GERD (gastroesophageal reflux disease)     Hyperlipidemia     Hypertension     Neck pain     PONV (postoperative nausea and vomiting)      Past Surgical History:   Procedure Laterality Date     SECTION      COLONOSCOPY      NOSE SURGERY      SINUS SURGERY      TUBAL LIGATION       Social History   Social History     Substance and Sexual Activity   Alcohol Use No     Social History     Substance and Sexual Activity   Drug Use No     Social History     Tobacco Use   Smoking Status Current Every Day Smoker    Packs/day: 1 00    Years: 33 00    Pack years: 33 00    Types: Cigarettes   Smokeless Tobacco Never Used     Family History   Problem Relation Age of Onset    Hypertension Father     Bone cancer Maternal Grandmother 80    Cancer Paternal Grandmother     Stomach cancer Paternal Grandmother 80    Hypertension Maternal Aunt     Breast cancer Neg Hx     Colon cancer Neg Hx        Meds/Allergies       Current Outpatient Medications:     albuterol (PROVENTIL HFA,VENTOLIN HFA) 90 mcg/act inhaler    carvedilol (COREG) 3 125 mg tablet    ferrous sulfate 324 (65 Fe) mg    fluticasone (FLONASE) 50 mcg/act nasal spray    gabapentin (NEURONTIN) 800 mg tablet    lansoprazole (PREVACID) 30 mg capsule    rosuvastatin (CRESTOR) 5 mg tablet    VITAMIN D PO    Current Facility-Administered Medications:     lactated ringers infusion, 125 mL/hr, Intravenous, Continuous    lidocaine (PF) (XYLOCAINE-MPF) 1 % injection 0 5 mL, 0 5 mL, Infiltration, Once PRN    Allergies   Allergen Reactions    Amoxicillin Hives    Ciprofloxacin Nausea Only       Objective     LMP 08/28/2022 (Approximate)       PHYSICAL EXAM    Gen: NAD  Head: NCAT  CV: RRR  CHEST: Clear  ABD: soft, NT/ND  EXT: no edema      ASSESSMENT/PLAN:  This is a 56y o  year old female here for EGD colonoscopy, and she is stable and optimized for her procedure

## 2022-10-20 ENCOUNTER — OFFICE VISIT (OUTPATIENT)
Dept: FAMILY MEDICINE CLINIC | Facility: CLINIC | Age: 47
End: 2022-10-20
Payer: COMMERCIAL

## 2022-10-20 VITALS
OXYGEN SATURATION: 99 % | SYSTOLIC BLOOD PRESSURE: 120 MMHG | HEART RATE: 87 BPM | DIASTOLIC BLOOD PRESSURE: 80 MMHG | BODY MASS INDEX: 27.49 KG/M2 | TEMPERATURE: 99.4 F | HEIGHT: 64 IN | WEIGHT: 161 LBS

## 2022-10-20 DIAGNOSIS — K21.9 GASTROESOPHAGEAL REFLUX DISEASE WITHOUT ESOPHAGITIS: ICD-10-CM

## 2022-10-20 DIAGNOSIS — J40 BRONCHITIS: ICD-10-CM

## 2022-10-20 DIAGNOSIS — J34.89 RHINORRHEA: ICD-10-CM

## 2022-10-20 DIAGNOSIS — R09.81 NASAL CONGESTION: Primary | ICD-10-CM

## 2022-10-20 DIAGNOSIS — R05.1 ACUTE COUGH: ICD-10-CM

## 2022-10-20 PROCEDURE — 99213 OFFICE O/P EST LOW 20 MIN: CPT | Performed by: FAMILY MEDICINE

## 2022-10-20 RX ORDER — AZITHROMYCIN 250 MG/1
TABLET, FILM COATED ORAL
Qty: 6 TABLET | Refills: 0 | Status: SHIPPED | OUTPATIENT
Start: 2022-10-20 | End: 2022-10-24

## 2022-10-20 RX ORDER — LANSOPRAZOLE 30 MG/1
30 CAPSULE, DELAYED RELEASE ORAL DAILY
Qty: 30 CAPSULE | Refills: 1 | Status: SHIPPED | OUTPATIENT
Start: 2022-10-20

## 2022-10-20 RX ORDER — ALBUTEROL SULFATE 90 UG/1
2 AEROSOL, METERED RESPIRATORY (INHALATION) EVERY 6 HOURS PRN
Qty: 18 G | Refills: 0 | Status: SHIPPED | OUTPATIENT
Start: 2022-10-20

## 2022-10-20 NOTE — LETTER
October 20, 2022     Patient: Julián Room  YOB: 1975  Date of Visit: 10/20/2022      To Whom it May Concern:    Fahad Ray is under my professional care  Anne-Marie Townsend was seen in my office on 10/20/2022  Anne-Marie Townsend may return to work on 10/20/2022  If you have any questions or concerns, please don't hesitate to call                   Sincerely,          Gurvinder Glez, DO

## 2022-10-20 NOTE — PATIENT INSTRUCTIONS
Please start taking azithromycin 2 tablets on the 1st day, followed by 1 tablet the next 4 days  Please continue to use albuterol every 4-6 hours over the 1st 24-48 hours depending on how you feel  Over the 1st 24 hours I recommend it at least every 6 hours scheduled, you can go to as needed afterwards  Please continue to monitor symptoms, if you have any issues or problems please do not hesitate to reach out to the office  I will likely be touching base with 1 of the dermatologists  They may be reaching out to you and I may put a referral in for them depending on their suspicion

## 2022-10-20 NOTE — PROGRESS NOTES
Outpatient Note- Follow up     HPI:     Moisés Guy , 56 y o  female  presents today for upper respiratory symptoms  About 1 week ago she started with symptoms of headache, nasal congestion, and sinus pressure on the left side  This transition to include the ear with increased pressure and pain  She typically has the symptoms regularly and unfortunately it typically turns into a bronchitis  Her primary care provider typically gives her azithromycin for treatment, this is the only thing that helps improve her symptoms  Patient has not been using any over-the-counter medications for symptoms  Past Medical History:   Diagnosis Date   • Anemia    • Asthma    • Colon polyp    • Ear problems    • GERD (gastroesophageal reflux disease)    • Hyperlipidemia    • Hypertension    • Neck pain    • PONV (postoperative nausea and vomiting)           ROS:     Review of Systems   Constitutional: Negative for chills and fever  HENT: Positive for congestion, ear pain, postnasal drip, rhinorrhea, sinus pressure and sinus pain  Negative for ear discharge and sore throat  Respiratory: Positive for cough  Negative for chest tightness and shortness of breath  Cardiovascular: Negative for chest pain and palpitations  Gastrointestinal: Negative for abdominal pain, constipation, diarrhea, nausea and vomiting  Allergic/Immunologic: Positive for environmental allergies  Neurological: Positive for light-headedness and headaches  Negative for dizziness  OBJECTIVE  Vitals:    10/20/22 1430   BP: 120/80   Pulse: 87   Temp: 99 4 °F (37 4 °C)   SpO2: 99%        Physical Exam  Exam conducted with a chaperone present (CARIN MILES present during back examination under shirt)  Constitutional:       General: She is not in acute distress  Appearance: Normal appearance  She is not ill-appearing, toxic-appearing or diaphoretic  HENT:      Head: Normocephalic and atraumatic        Right Ear: Tympanic membrane, ear canal and external ear normal       Left Ear: Tympanic membrane, ear canal and external ear normal       Ears:      Comments: Mild serous effusion     Nose: Congestion and rhinorrhea present  Comments: Pressure over left side of face and nose     Mouth/Throat:      Mouth: Mucous membranes are moist       Pharynx: Oropharynx is clear  No oropharyngeal exudate or posterior oropharyngeal erythema  Eyes:      General:         Right eye: No discharge  Left eye: No discharge  Conjunctiva/sclera: Conjunctivae normal       Pupils: Pupils are equal, round, and reactive to light  Cardiovascular:      Rate and Rhythm: Normal rate and regular rhythm  Pulses: Normal pulses  Heart sounds: Normal heart sounds  No murmur heard  No friction rub  No gallop  Pulmonary:      Breath sounds: No stridor  No wheezing, rhonchi or rales  Comments: Decreased air flow in all lung fields  Lungs sound tight with tightness in the voice as well  Abdominal:      General: Bowel sounds are normal  There is no distension  Palpations: Abdomen is soft  Tenderness: There is no abdominal tenderness  Musculoskeletal:      Cervical back: Neck supple  No tenderness  Lymphadenopathy:      Cervical: No cervical adenopathy  Skin:     General: Skin is warm  Capillary Refill: Capillary refill takes less than 2 seconds  Findings: Lesion ( multiple dark lesions on back compared to other skin lesions  Multiple fleshy moles  ) present  Neurological:      Mental Status: She is alert  ASSESSMENT AND PLAN   Bill Fernandez was seen today for cold like symptoms and earache  Diagnoses and all orders for this visit:    Nasal congestion  Bronchitis  Rhinorrhea  Acute cough  Patient has strong past history of bronchitis  It is feeling the same way with nasal congestion, sinus pressure and pain, runny nose, acute cough, and hoarseness of voice    She also notes some tightness in her breathing and she has albuterol at home for a history of reactive airway  Will treat patient's symptoms with azithromycin and albuterol  She is to continue to monitor, and if she has any worsening symptoms she is to call the office  She may use over-the-counter medications such as Mucinex DM, Tylenol, and ibuprofen  -     azithromycin (ZITHROMAX) 250 mg tablet; Take 2 tablets today then 1 tablet daily x 4 days  -     albuterol (PROVENTIL HFA,VENTOLIN HFA) 90 mcg/act inhaler; Inhale 2 puffs every 6 (six) hours as needed for wheezing       During examination, patient had to abnormal lesions on her back  They were darker as well as different than the surrounding nevi  Will hopefully discuss Dermatology over the next several days        Frida Marshall DO  [facility]  10/20/2022 3:20 PM

## 2022-10-25 ENCOUNTER — TELEPHONE (OUTPATIENT)
Dept: FAMILY MEDICINE CLINIC | Facility: CLINIC | Age: 47
End: 2022-10-25

## 2022-10-25 ENCOUNTER — OFFICE VISIT (OUTPATIENT)
Dept: ENDOCRINOLOGY | Facility: CLINIC | Age: 47
End: 2022-10-25
Payer: COMMERCIAL

## 2022-10-25 VITALS
SYSTOLIC BLOOD PRESSURE: 108 MMHG | BODY MASS INDEX: 28.2 KG/M2 | HEART RATE: 91 BPM | DIASTOLIC BLOOD PRESSURE: 76 MMHG | HEIGHT: 64 IN | WEIGHT: 165.2 LBS

## 2022-10-25 DIAGNOSIS — R53.83 OTHER FATIGUE: ICD-10-CM

## 2022-10-25 DIAGNOSIS — E78.00 HYPERCHOLESTEROLEMIA: ICD-10-CM

## 2022-10-25 DIAGNOSIS — E55.9 VITAMIN D DEFICIENCY: ICD-10-CM

## 2022-10-25 DIAGNOSIS — E04.2 MULTINODULAR THYROID: ICD-10-CM

## 2022-10-25 DIAGNOSIS — D63.8 ANEMIA OF CHRONIC DISEASE: ICD-10-CM

## 2022-10-25 DIAGNOSIS — L81.9 PIGMENTED SKIN LESION SUSPICIOUS FOR MALIGNANT NEOPLASM: ICD-10-CM

## 2022-10-25 DIAGNOSIS — R73.03 PREDIABETES: Primary | ICD-10-CM

## 2022-10-25 DIAGNOSIS — D22.9 MULTIPLE ATYPICAL NEVI: Primary | ICD-10-CM

## 2022-10-25 PROCEDURE — 99204 OFFICE O/P NEW MOD 45 MIN: CPT | Performed by: INTERNAL MEDICINE

## 2022-10-25 NOTE — LETTER
Medical Fitness Referral    Patient: Asim Garcia  : 1975  MRN: [id number]  Address: [address] [place] [id number]  Phone Number: [phone]  E-mail: [email or web address]    [] Medical Disorders (Ex  Diabetes)   [] Cardiovascular Disorders (Ex  Hypertension)   [] Pulmonary Disorders (Ex  Asthma)   [] Cancer Disorders (Ex  Breast, Prostate)   [] Immunological & Hematological Disorders (Rheumatoid Arthritis)   [] Neurological Disorders (Ex  Parkinson's Disease)   [] Cognitive Disorders (Ex  Dementia)   [] Children & Adolescents (Ex  BMI)   [] Older Adults (Ex  Balance & Ambulation)   [] Female Specific Disorders (Ex  Osteoporosis)       Diagnoses:   1  Prediabetes     2  Vitamin D deficiency     3  Hypercholesterolemia     4   Anemia of chronic disease       Additional Comments:     Service Requested:  [x] Medical Fitness Consult- Screening For Appropriateness of Medical Fitness Program   [x] Medical Fitness Program- Assessment of Body Composition, Aerobic, Anaerobic, Muscle Strength & Endurance, Flexibility, Neuromuscular & Functional Fitness   [x] Medical Fitness Assessment- Individualized Evidence-Based Exercise Program       Requesting Provider: Aby Aquino  Date: 10/25/22

## 2022-10-25 NOTE — TELEPHONE ENCOUNTER
Pt had an appt with Dr Treasure Erickson on 10/20/22 and during the examination, pt has abnormal lesions on her back  Pt said he was going to referral to a Dermatologist  Please let pt know when referral is put in

## 2022-10-25 NOTE — PROGRESS NOTES
New Consult Note      CC: prediabetes, multinodular thyroid  History of Present Illness:   56 yr female with Hx of prediabetes, HTN, HLD, fibromyalgia, anemia, GERD, multinodular thyroid, cervical spinal stenosis and fatigue  She was diagnosed with prediabetes 1 year ago  She reports fatigue  No other symptoms  She presents for review of metabolic health  Menstrual cycles are regular      Patient Active Problem List   Diagnosis   • Syncope   • Cellulitis of left hand   • Smoking   • Cervical disc disorder with radiculopathy of mid-cervical region   • Greater trochanteric bursitis of both hips   • Arthropathy of cervical facet joint   • Leukocytosis   • Back pain   • Anemia of chronic disease   • Anxiety   • Degeneration of lumbar intervertebral disc   • Gastroesophageal reflux disease   • General unsteadiness   • Hypercholesterolemia   • Neuropathic pain   • Opioid dependence (HCC)   • Vitamin D deficiency     Past Medical History:   Diagnosis Date   • Anemia    • Asthma    • Colon polyp    • Ear problems    • GERD (gastroesophageal reflux disease)    • Hyperlipidemia    • Hypertension    • Neck pain    • PONV (postoperative nausea and vomiting)       Past Surgical History:   Procedure Laterality Date   •  SECTION     • COLONOSCOPY     • NOSE SURGERY     • SINUS SURGERY     • TUBAL LIGATION        Family History   Problem Relation Age of Onset   • Hypertension Father    • Bone cancer Maternal Grandmother 80   • Cancer Paternal Grandmother    • Stomach cancer Paternal Grandmother 80   • Hypertension Maternal Aunt    • Breast cancer Neg Hx    • Colon cancer Neg Hx      Social History     Tobacco Use   • Smoking status: Current Every Day Smoker     Packs/day: 1 00     Years: 33 00     Pack years: 33      Types: Cigarettes   • Smokeless tobacco: Never Used   Substance Use Topics   • Alcohol use: No     Allergies   Allergen Reactions   • Amoxicillin Hives   • Ciprofloxacin Nausea Only         Current Outpatient Medications:   •  albuterol (PROVENTIL HFA,VENTOLIN HFA) 90 mcg/act inhaler, Inhale 2 puffs every 6 (six) hours as needed for wheezing, Disp: 18 g, Rfl: 0  •  carvedilol (COREG) 3 125 mg tablet, Take 1 tablet (3 125 mg total) by mouth 2 (two) times a day with meals, Disp: 180 tablet, Rfl: 2  •  ferrous sulfate 324 (65 Fe) mg, Take 1 tablet (324 mg total) by mouth daily, Disp: 90 tablet, Rfl: 3  •  fluticasone (FLONASE) 50 mcg/act nasal spray, 2 sprays into each nostril daily, Disp: 48 mL, Rfl: 3  •  gabapentin (NEURONTIN) 800 mg tablet, TAKE 2 TABLETS (1,600 MG TOTAL) BY MOUTH DAILY, Disp: 60 tablet, Rfl: 2  •  lansoprazole (PREVACID) 30 mg capsule, Take 1 capsule (30 mg total) by mouth daily, Disp: 30 capsule, Rfl: 1  •  rosuvastatin (CRESTOR) 5 mg tablet, Take 1 tablet (5 mg total) by mouth daily, Disp: 90 tablet, Rfl: 2  •  VITAMIN D PO, Take by mouth, Disp: , Rfl:     Review of Systems   Constitutional: Positive for fatigue  HENT: Negative  Eyes: Negative  Respiratory: Negative  Cardiovascular: Negative  Gastrointestinal: Negative  Endocrine: Negative  Musculoskeletal: Negative  Skin: Negative  Allergic/Immunologic: Negative  Neurological: Negative  Hematological: Negative  Psychiatric/Behavioral: Negative  Physical Exam:  Body mass index is 28 36 kg/m²  /76   Pulse 91   Ht 5' 4" (1 626 m)   Wt 74 9 kg (165 lb 3 2 oz)   BMI 28 36 kg/m²    Vitals:    10/25/22 1128   Weight: 74 9 kg (165 lb 3 2 oz)        Physical Exam  Constitutional:       Appearance: She is well-developed  HENT:      Head: Normocephalic  Eyes:      Pupils: Pupils are equal, round, and reactive to light  Neck:      Thyroid: No thyromegaly  Cardiovascular:      Rate and Rhythm: Normal rate  Heart sounds: Normal heart sounds  Pulmonary:      Effort: Pulmonary effort is normal       Breath sounds: Normal breath sounds     Abdominal:      General: Bowel sounds are normal  Palpations: Abdomen is soft  Musculoskeletal:         General: No deformity  Cervical back: Normal range of motion  Skin:     Capillary Refill: Capillary refill takes less than 2 seconds  Coloration: Skin is not pale  Findings: No rash  Neurological:      Mental Status: She is alert and oriented to person, place, and time  Labs:   Lab Results   Component Value Date    HGBA1C 5 9 (H) 07/13/2022       Lab Results   Component Value Date    HZN3EJNEJMXD 1 470 04/11/2022       Lab Results   Component Value Date    CREATININE 0 72 05/25/2021    CREATININE 0 76 05/10/2021    CREATININE 0 77 01/14/2019    BUN 9 05/25/2021     10/05/2015    K 4 1 05/25/2021     05/25/2021    CO2 26 05/25/2021     eGFR   Date Value Ref Range Status   05/25/2021 101 ml/min/1 73sq m Final       Lab Results   Component Value Date    ALT 18 05/10/2021    AST 11 05/10/2021    ALKPHOS 100 05/10/2021    BILITOT 0 27 10/05/2015       Lab Results   Component Value Date    CHOLESTEROL 128 04/11/2022    CHOLESTEROL 195 02/08/2017     Lab Results   Component Value Date    HDL 52 04/11/2022    HDL 53 02/08/2017     Lab Results   Component Value Date    TRIG 76 04/11/2022    TRIG 79 02/08/2017     Lab Results   Component Value Date    [place] 76 04/11/2022         Impression:  1  Prediabetes    2  Vitamin D deficiency    3  Hypercholesterolemia    4  Anemia of chronic disease    5  Other fatigue    6  Multinodular thyroid         Plan:    Diagnoses and all orders for this visit:    Prediabetes  She is stable with A1c in less than 6% range  Today we discussed preventative options including diet and lifestyle modification as well as metformin therapy to prevent progression to diabetes type 2  Referral made to [facility] fitness program   Advised carbohydrate consistent diet with less than 1800 calories per day      Follow-up in 6 months   -     Hemoglobin A1C; Future  -     Microalbumin / creatinine urine ratio; Future    Vitamin D deficiency  Advised replacement with vitamin D3 5000 units daily over-the-counter  Hypercholesterolemia  Controlled with Crestor  Anemia of chronic disease  Anemia associated with iron deficiency  Other fatigue  Discussed common causes of fatigue including multisystem dysfunction, medications and mental health issues  Will do some initial screening tests as listed below  -     C-reactive protein- Clinic Collect  -     Sedimentation rate, automated; Future  -     Ambulatory referral to Sleep Medicine; Future    Multinodular thyroid  Past US showed multiple sub centimeter nodules  Today we discussed all aspects of thyroid nodules including prevalence, usual risk factors, pathophysiology, complications including cancer risk, role of US, biopsy, genetic testing and treatment options including hemithyroidectomy, recurrent imaging and labs  Follow up in 6 months  -     T4, free; Future  -     TSH, 3rd generation; Future  -     US thyroid; Future        I have spent 45 minutes with patient today in which greater than 50% of this time was spent in counseling/coordination of care  Discussed with the patient and all questioned fully answered  She will call me if any problems arise  Educated/ Counseled patient on diagnostic test results, prognosis, risk vs benefit of treatment options, importance of treatment compliance, healthy life and lifestyle choices        Dread Olivas

## 2022-10-26 NOTE — TELEPHONE ENCOUNTER
I have placed the dermatology referral and messaged the patient about the referral       Wicho Saldana,   [facility]  10/26/2022 12:49 PM

## 2022-11-02 ENCOUNTER — APPOINTMENT (OUTPATIENT)
Dept: LAB | Facility: CLINIC | Age: 47
End: 2022-11-02

## 2022-11-02 DIAGNOSIS — R53.83 OTHER FATIGUE: ICD-10-CM

## 2022-11-02 DIAGNOSIS — R73.03 PREDIABETES: ICD-10-CM

## 2022-11-02 DIAGNOSIS — E04.2 MULTINODULAR THYROID: ICD-10-CM

## 2022-11-02 LAB
CREAT UR-MCNC: 250 MG/DL
CRP SERPL QL: <3 MG/L
ERYTHROCYTE [SEDIMENTATION RATE] IN BLOOD: 9 MM/HOUR (ref 0–19)
EST. AVERAGE GLUCOSE BLD GHB EST-MCNC: 120 MG/DL
HBA1C MFR BLD: 5.8 %
MICROALBUMIN UR-MCNC: 33.9 MG/L (ref 0–20)
MICROALBUMIN/CREAT 24H UR: 14 MG/G CREATININE (ref 0–30)
T4 FREE SERPL-MCNC: 0.98 NG/DL (ref 0.76–1.46)
TSH SERPL DL<=0.05 MIU/L-ACNC: 2.29 UIU/ML (ref 0.45–4.5)

## 2022-11-07 ENCOUNTER — HOSPITAL ENCOUNTER (OUTPATIENT)
Dept: ULTRASOUND IMAGING | Facility: HOSPITAL | Age: 47
Discharge: HOME | End: 2022-11-07
Attending: INTERNAL MEDICINE

## 2022-11-07 DIAGNOSIS — E04.2 MULTINODULAR THYROID: ICD-10-CM

## 2022-11-09 ENCOUNTER — OFFICE VISIT (OUTPATIENT)
Dept: FAMILY MEDICINE CLINIC | Facility: CLINIC | Age: 47
End: 2022-11-09

## 2022-11-09 ENCOUNTER — HOSPITAL ENCOUNTER (OUTPATIENT)
Dept: RADIOLOGY | Facility: HOSPITAL | Age: 47
Discharge: HOME | End: 2022-11-09
Attending: FAMILY MEDICINE

## 2022-11-09 VITALS
TEMPERATURE: 99.1 F | WEIGHT: 167 LBS | BODY MASS INDEX: 28.51 KG/M2 | SYSTOLIC BLOOD PRESSURE: 150 MMHG | OXYGEN SATURATION: 98 % | HEIGHT: 64 IN | HEART RATE: 80 BPM | DIASTOLIC BLOOD PRESSURE: 100 MMHG

## 2022-11-09 DIAGNOSIS — R07.89 CHEST TIGHTNESS: Primary | ICD-10-CM

## 2022-11-09 DIAGNOSIS — R07.89 CHEST TIGHTNESS: ICD-10-CM

## 2022-11-09 DIAGNOSIS — J34.89 PAIN OF MAXILLARY SINUS: ICD-10-CM

## 2022-11-09 DIAGNOSIS — J40 BRONCHITIS: ICD-10-CM

## 2022-11-09 DIAGNOSIS — R06.2 WHEEZING: ICD-10-CM

## 2022-11-09 DIAGNOSIS — J34.89 SINUS PRESSURE: ICD-10-CM

## 2022-11-09 DIAGNOSIS — J30.9 ALLERGIC RHINITIS, UNSPECIFIED SEASONALITY, UNSPECIFIED TRIGGER: ICD-10-CM

## 2022-11-09 RX ORDER — DOXYCYCLINE HYCLATE 100 MG/1
100 CAPSULE ORAL EVERY 12 HOURS SCHEDULED
Qty: 20 CAPSULE | Refills: 0 | Status: SHIPPED | OUTPATIENT
Start: 2022-11-09 | End: 2022-11-19

## 2022-11-09 RX ORDER — FLUTICASONE PROPIONATE 50 MCG
2 SPRAY, SUSPENSION (ML) NASAL DAILY
Qty: 48 ML | Refills: 0 | Status: SHIPPED | OUTPATIENT
Start: 2022-11-09

## 2022-11-09 RX ORDER — PREDNISONE 20 MG/1
40 TABLET ORAL DAILY
Qty: 10 TABLET | Refills: 0 | Status: SHIPPED | OUTPATIENT
Start: 2022-11-09 | End: 2022-11-13

## 2022-11-09 NOTE — PROGRESS NOTES
Outpatient Note- acute    HPI:     Dony Jarvis , 56 y o  female  presents today for follow-up previous sinusitis/bronchitis  Patient was seen on 10/20/2022 for nasal congestion and sinusitis  She was started on azithromycin and albuterol to help with bronchitis-like symptoms and sinus pressure  Over the last 2 weeks she has been attempting to improve her symptoms, unfortunately she has not been able to do so with over-the-counter medications and previously prescribed meds  Therefore she returned today for further evaluation  She continues with cough, congestion, rhinorrhea, sinus pressure, sinus pain, ear pressure, ear pain, hoarseness  She has been coughing up whitish to clear mucus  She denies any significant fevers  Past Medical History:   Diagnosis Date   • Anemia    • Asthma    • Colon polyp    • Ear problems    • GERD (gastroesophageal reflux disease)    • Hyperlipidemia    • Hypertension    • Neck pain    • PONV (postoperative nausea and vomiting)         ROS:     Review of Systems   Constitutional: Positive for appetite change (decreased) and fatigue  Negative for chills and fever  HENT: Positive for congestion, ear pain (left side), postnasal drip, rhinorrhea, sinus pressure, sinus pain ( left maxillary), sneezing and sore throat  Respiratory: Positive for cough, chest tightness (mild) and wheezing  Negative for shortness of breath  Cardiovascular: Negative for chest pain and palpitations  Gastrointestinal: Negative for abdominal pain, constipation, diarrhea, nausea and vomiting  Skin: Negative for rash and wound  Neurological: Positive for dizziness, light-headedness and headaches (left side)  OBJECTIVE  Vitals:    11/09/22 0957   BP: 150/100   Pulse: 80   Temp: 99 1 °F (37 3 °C)   SpO2: 98%        Physical Exam  Constitutional:       General: She is not in acute distress  Appearance: Normal appearance  She is not ill-appearing, toxic-appearing or diaphoretic     HENT: Head: Normocephalic and atraumatic  Right Ear: Tympanic membrane, ear canal and external ear normal       Left Ear: Tympanic membrane, ear canal and external ear normal       Nose: Congestion and rhinorrhea present  Mouth/Throat:      Mouth: Mucous membranes are moist       Pharynx: Oropharynx is clear  Posterior oropharyngeal erythema present  No oropharyngeal exudate  Eyes:      General:         Right eye: No discharge  Left eye: No discharge  Pupils: Pupils are equal, round, and reactive to light  Cardiovascular:      Rate and Rhythm: Normal rate and regular rhythm  Pulses: Normal pulses  Heart sounds: Normal heart sounds  No murmur heard  No friction rub  No gallop  Pulmonary:      Effort: Prolonged expiration present  No tachypnea, accessory muscle usage, respiratory distress or retractions  Breath sounds: Decreased air movement and transmitted upper airway sounds present  Examination of the right-upper field reveals decreased breath sounds  Examination of the left-upper field reveals decreased breath sounds  Examination of the right-middle field reveals decreased breath sounds  Examination of the left-middle field reveals decreased breath sounds  Examination of the right-lower field reveals decreased breath sounds  Examination of the left-lower field reveals decreased breath sounds  Decreased breath sounds present  No wheezing, rhonchi or rales  Abdominal:      General: Bowel sounds are normal  There is no distension  Palpations: Abdomen is soft  Tenderness: There is no abdominal tenderness  Musculoskeletal:      Cervical back: Neck supple  No tenderness  Lymphadenopathy:      Cervical: Cervical adenopathy (Mild increase in size) present  Neurological:      Mental Status: She is alert  ASSESSMENT AND PLAN   Jackson Showers was seen today for follow-up, cough and cold like symptoms      Diagnoses and all orders for this visit:    Chest tightness  Wheezing  Sinus pressure  Pain of maxillary sinus  Bronchitis  Patient continues to have upper respiratory symptoms despite the previous treatment for atypical pneumonia, sinusitis, and acute bronchitis  Will prescribe prednisone 40 mg daily over the next 5 days due to increased hoarseness, congestion, pressure, and chest tightness  She can continue to use the albuterol as needed for rescue, this helps intermittently with her symptoms  I will also start doxycycline which is a monotherapy for community-acquired typical pneumonia  She is allergic to amoxicillin and ciprofloxacin, therefore limiting our antibiotics that we may use including the 1st line of Augmentin  Will start Ceftin if the patient is not feeling better by Friday between the 2 medications  We will also order a chest x-ray for further evaluation for pneumonia  We discussed if there is no evidence of pneumonia on x-ray that we will continue the course of doxycycline either way  She knows to call back with any worsening symptoms into not wait 2-3 weeks to return if she has her symptoms are getting worse  We did discuss red flag symptoms that would require ED evaluation  -     predniSONE 20 mg tablet; Take 2 tablets (40 mg total) by mouth daily for 5 days  -     doxycycline hyclate (VIBRAMYCIN) 100 mg capsule;  Take 1 capsule (100 mg total) by mouth every 12 (twelve) hours for 10 days  -     XR chest pa & lateral; Future       Bib Wasserman DO  [facility]  11/9/2022 10:30 AM

## 2022-11-09 NOTE — LETTER
November 9, 2022     Patient: Fiordaliza Ascencio  YOB: 1975  Date of Visit: 11/9/2022      To Whom it May Concern:    Rupert Cavazos is under my professional care  Jeanna Vickie was seen in my office on 11/9/2022  Please excuse the patient for 11/9-11/10/2022  Jeanna Johnston may return to work on 11/11/2022  If you have any questions or concerns, please don't hesitate to call                     Sincerely,          Tenzin Marc, DO

## 2022-11-09 NOTE — PATIENT INSTRUCTIONS
Continue to monitor symptoms closely  If you have any worsening symptoms despite the medication that we have given you today please give me a call so we can send over the other antibiotic, Ceftin  Please start taking doxycycline 100 mg twice a day for the next 10 days  This is to hopefully treat empirically any possible underlying pneumonia  Even if the his chest x-ray is negative I am going to have you complete the course  Please obtain the CXR at your earliest convenience    Please start taking the steroids ordered     Doxycycline (By mouth)   Doxycycline ([id number]-s-ZLO-[id number])  Treats and prevents infections  Also used to prevent malaria and treat rosacea or severe acne  This medicine is a tetracycline antibiotic  Brand Name(s): Acticlate, Adoxa, Adoxa Michele 1/150, Avidoxy, Doryx, Doryx MPC, LymePak, Mondoxyne NL, Monodox, Morgidox [id number], Morgidox [id number], Oracea, Targadox, Vibramycin Calcium, Vibramycin Hyclate   There may be other brand names for this medicine  When This Medicine Should Not Be Used: This medicine is not right for everyone  Do not use it if you had an allergic reaction to doxycycline or another tetracycline antibiotic, or if you are pregnant or breastfeeding  How to Use This Medicine:   Capsule, Delayed Release Capsule, Long Acting Capsule, Liquid, Tablet, Delayed Release Tablet  Your doctor will tell you how much medicine to use  Do not use more than directed  Ask your pharmacist or doctor if you need to take this medicine with or without food  Some forms can be taken with food or milk, but others must be taken on an empty stomach  Capsule: Swallow whole  Do not break, crush, chew, or open it  Oracea® capsules: This medicine must be taken on an empty stomach, at least 1 hour before or 2 hours after a meal   Acticlate® Cap capsules: You may take this medicine with food or milk to avoid stomach irritation  Delayed-release capsules:  You may also take this medicine by sprinkling the open capsules onto cold, soft applesauce  Do not lose any pellets when transferring the contents  Swallow this mixture right away  Do not chew it  Do not store the mixture for later use  You may take this medicine with food or milk to avoid stomach upset  Delayed-release tablets: You may also take this medicine by sprinkling the broken tablets onto room-temperature applesauce  Swallow this mixture right away  Do not chew it  Do not store the mixture for later use  You may take this medicine with food or milk to avoid stomach upset  Oral liquid: Shake the bottle well just before each use  Measure the oral liquid medicine with a marked measuring spoon, oral syringe, or medicine cup  Tablets: You may take this medicine with food or milk to avoid stomach irritation  To break a tablet, hold the tablet between your thumb and index fingers close to the appropriate scored line  Then, apply enough pressure to snap the tablet segments apart  Do not use the tablet if it does not break on the scored lines  Take all of the medicine in your prescription to clear up your infection, even if you feel better after the first few doses  Drink plenty of fluids to avoid throat problems, if you take the capsule or tablet form  Malaria prevention: Start taking the medicine 1 or 2 days before you travel  Take the medicine every day during your trip  Keep taking it for 4 weeks after you return  However, do not use the medicine for longer than 4 months  Do not use this medicine for more than 9 months if you are using it for rosacea  Use only the brand of medicine your doctor prescribed  Other brands may not work the same way  Read and follow the patient instructions that come with this medicine  Talk to your doctor or pharmacist if you have any questions  Missed dose: Take a dose as soon as you remember  If it is almost time for your next dose, wait until then and take a regular dose   Do not take extra medicine to make up for a missed dose  Store the medicine in a closed container at room temperature, away from heat, moisture, and direct light  Do not freeze the oral liquid  Drugs and Foods to Avoid:   Ask your doctor or pharmacist before using any other medicine, including over-the-counter medicines, vitamins, and herbal products  Some medicines can affect how doxycycline works  Tell your doctor if you are using any of the following:  Bismuth subsalicylate  Acne medicines (including isotretinoin)  Birth control pills  Blood thinner (including warfarin)  Medicine for seizures (including carbamazepine, phenobarbital, phenytoin)  Medicine that contains aluminum, calcium, magnesium, or iron (including an antacid or vitamin supplement)  Medicine to treat psoriasis (including acitretin)  Penicillin antibiotic  Stomach medicine  Warnings While Using This Medicine: This medicine may cause birth defects if either partner is using it during conception or pregnancy  Tell your doctor right away if you or your partner becomes pregnant  Birth control pills may not work as well when used together with this medicine  Use a second form of birth control to keep from getting pregnant  Tell your doctor if you have kidney disease, liver disease, asthma, or an allergy to sulfites  Tell your doctor if you had surgery on your stomach, or if you have a history of yeast infections  This medicine may cause the following problems:  Permanent change in tooth color (in children younger than 6years of age)  Serious skin reactions, including drug reaction with eosinophilia and systemic symptoms (DRESS)  Increased pressure inside the head  Yeast infection  Immune system problems  This medicine can cause diarrhea  Call your doctor if the diarrhea becomes severe, does not stop, or is bloody  Do not take any medicine to stop diarrhea until you have talked to your doctor  Diarrhea can occur 2 months or more after you stop taking this medicine    This medicine may make your skin more sensitive to sunlight  Wear sunscreen  Do not use sunlamps or tanning beds  Tell any doctor or dentist who treats you that you are using this medicine  This medicine may affect certain medical test results  Call your doctor if your symptoms do not improve or if they get worse  Your doctor will do lab tests at regular visits to check on the effects of this medicine  Keep all appointments  Keep all medicine out of the reach of children  Never share your medicine with anyone  Possible Side Effects While Using This Medicine:   Call your doctor right away if you notice any of these side effects: Allergic reaction: Itching or hives, swelling in your face or hands, swelling or tingling in your mouth or throat, chest tightness, trouble breathing  Blistering, peeling, red skin rash  Burning, pain, or irritation in your upper stomach or throat  Diarrhea that may contain blood  Fever, chills, cough, runny or stuffy nose, sore throat, body aches  Joint pain, unusual tiredness or weakness  Severe headache, dizziness, vision changes  Sudden and severe stomach pain, nausea, vomiting, lightheadedness  Swollen, painful, or tender lymph glands in the neck, armpit, or groin, or yellow skin or eyes  If you notice these less serious side effects, talk with your doctor:   Darkening of your skin, scars, teeth, or gums  Sores or white patches on your lips, mouth, or throat  If you notice other side effects that you think are caused by this medicine, tell your doctor  Call your doctor for medical advice about side effects  You may report side effects to FDA at 2-477-FDA-7724    © Copyright [employer] 2022 Information is for End User's use only and may not be sold, redistributed or otherwise used for commercial purposes  The above information is an  only  It is not intended as medical advice for individual conditions or treatments   Talk to your doctor, nurse or pharmacist before following any medical regimen to see if it is safe and effective for you  Prednisone (By mouth)   Prednisone (PRED-ni-sone)  Treats many diseases and conditions, especially problems related to inflammation  This medicine is a corticosteroid  Brand Name(s): Emi, predniSONE Intensol   There may be other brand names for this medicine  When This Medicine Should Not Be Used: This medicine is not right for everyone  Do not use if you had an allergic reaction to prednisone or if you are pregnant  How to Use This Medicine:   Liquid, Tablet, Delayed Release Tablet  Take your medicine as directed  Your dose may need to be changed several times to find what works best for you  It is best to take this medicine with food or milk  Swallow the delayed-release tablet whole  Do not crush, break, or chew it  Measure the oral liquid medicine with a marked measuring spoon, oral syringe, or medicine cup  Missed dose: Take a dose as soon as you remember  If it is almost time for your next dose, wait until then and take a regular dose  Do not take extra medicine to make up for a missed dose  Store the medicine in a closed container at room temperature, away from heat, moisture, and direct light  Do not freeze the oral liquid  Drugs and Foods to Avoid:   Ask your doctor or pharmacist before using any other medicine, including over-the-counter medicines, vitamins, and herbal products    Tell your doctor if you use any of the following:  Aminoglutethimide, amphotericin B, carbamazepine, cholestyramine, cyclosporine, digoxin, isoniazid, ketoconazole, phenobarbital, phenytoin, or rifampin  Blood thinner, such as warfarin  NSAID pain or arthritis medicine, such as aspirin, diclofenac, ibuprofen, naproxen, celecoxib  Diuretic (water pill)  Diabetes medicine  Macrolide antibiotic, such as azithromycin, clarithromycin, erythromycin  Estrogen, including birth control pills or hormone replacement therapy  This medicine may interfere with vaccines  Ask your doctor before you get a flu shot or any other vaccines  Warnings While Using This Medicine: It is not safe to take this medicine during pregnancy  It could harm an unborn baby  Tell your doctor right away if you become pregnant  Tell your doctor if you are breastfeeding or if you have kidney problems, heart failure, high blood pressure, a recent heart attack, diabetes, glaucoma, osteoporosis, or thyroid problems  Tell your doctor about any infection you have  Also tell your doctor if you have had mental or emotional problems (such as depression) or stomach or bowel problems (such as an ulcer or diverticulitis)  This medicine may cause the following problems:  Mood or behavior changes  Higher blood pressure, retaining water, changes in salt or potassium levels in your body  Cataracts or glaucoma (with long-term use)  Weak bones or osteoporosis (with long-term use)  Slow growth in children (with long-term use)  Muscle problems (with high doses, especially if you have myasthenia gravis or similar nerve and muscle problems)  Do not stop using this medicine suddenly  Your doctor will need to slowly decrease your dose before you stop it completely  This medicine could cause you to get infections more easily  Tell your doctor right away if you are exposed to chicken pox, measles, or other serious infection  Tell your doctor if you had a serious infection in the past, such as tuberculosis or herpes  Tell your doctor about any extra stress or anxiety in your life  Your dose might need to be changed for a short time  Tell any doctor or dentist who treats you that you are using this medicine  This medicine may affect certain medical test results  Keep all medicine out of the reach of children  Never share your medicine with anyone  Possible Side Effects While Using This Medicine:   Call your doctor right away if you notice any of these side effects:   Allergic reaction: Itching or hives, swelling in your face or hands, swelling or tingling in your mouth or throat, chest tightness, trouble breathing  Dark freckles, skin color changes, coldness, weakness, tiredness, nausea, vomiting, weight loss  Depression, unusual thoughts, feelings, or behaviors, trouble sleeping  Fever, chills, cough, sore throat, and body aches  Muscle pain or weakness  Rapid weight gain, swelling in your hands, ankles, or feet  Severe stomach pain, nausea, vomiting, or red or black stools  Skin changes or growths  Trouble seeing, eye pain, headache  If you notice these less serious side effects, talk with your doctor: Increased appetite  Round, puffy face  Weight gain around your neck, upper back, breast, face, or waist  If you notice other side effects that you think are caused by this medicine, tell your doctor  Call your doctor for medical advice about side effects  You may report side effects to FDA at 5-460-FDA-6649    © Copyright [employer] 2022 Information is for End User's use only and may not be sold, redistributed or otherwise used for commercial purposes  The above information is an  only  It is not intended as medical advice for individual conditions or treatments  Talk to your doctor, nurse or pharmacist before following any medical regimen to see if it is safe and effective for you

## 2022-11-12 DIAGNOSIS — J40 BRONCHITIS: ICD-10-CM

## 2022-11-13 ENCOUNTER — TELEPHONE (OUTPATIENT)
Dept: FAMILY MEDICINE CLINIC | Facility: CLINIC | Age: 47
End: 2022-11-13

## 2022-11-13 DIAGNOSIS — J34.89 PAIN OF MAXILLARY SINUS: ICD-10-CM

## 2022-11-13 DIAGNOSIS — R06.2 WHEEZING: ICD-10-CM

## 2022-11-13 DIAGNOSIS — J40 BRONCHITIS: Primary | ICD-10-CM

## 2022-11-13 DIAGNOSIS — J34.89 SINUS PRESSURE: ICD-10-CM

## 2022-11-13 DIAGNOSIS — R07.89 CHEST TIGHTNESS: ICD-10-CM

## 2022-11-13 RX ORDER — PREDNISONE 10 MG/1
TABLET ORAL
Qty: 18 TABLET | Refills: 0 | Status: SHIPPED | OUTPATIENT
Start: 2022-11-13 | End: 2022-11-22

## 2022-11-13 RX ORDER — CEFUROXIME AXETIL 500 MG/1
500 TABLET ORAL EVERY 12 HOURS SCHEDULED
Qty: 14 TABLET | Refills: 0 | Status: SHIPPED | OUTPATIENT
Start: 2022-11-13 | End: 2022-11-20

## 2022-11-13 NOTE — TELEPHONE ENCOUNTER
Called patient and she continues to have significant bronchitis like symptoms  The patient has completed the steroids and has not improved significantly having increased bronchitis symptoms  The patient will continue with a prednisone taper and also I will add Ceftin to add in some other antibiotic coverage since she cannot have Augmentin  It is third generation and should have less of a likelihood to cross react since she has allergy to amoxicillin  Will follow up later this week

## 2022-11-14 ENCOUNTER — PATIENT MESSAGE (OUTPATIENT)
Dept: FAMILY MEDICINE CLINIC | Facility: CLINIC | Age: 47
End: 2022-11-14

## 2022-11-14 DIAGNOSIS — J40 BRONCHITIS: Primary | ICD-10-CM

## 2022-11-14 DIAGNOSIS — R06.2 WHEEZING: ICD-10-CM

## 2022-11-14 DIAGNOSIS — J45.21 MILD INTERMITTENT ASTHMA WITH (ACUTE) EXACERBATION: ICD-10-CM

## 2022-11-16 ENCOUNTER — TELEPHONE (OUTPATIENT)
Dept: PULMONOLOGY | Facility: CLINIC | Age: 47
End: 2022-11-16

## 2022-11-16 RX ORDER — ALBUTEROL SULFATE 90 UG/1
AEROSOL, METERED RESPIRATORY (INHALATION)
Qty: 6.7 G | Refills: 0 | Status: SHIPPED | OUTPATIENT
Start: 2022-11-16

## 2022-11-16 NOTE — TELEPHONE ENCOUNTER
Called and spoke to patient she would like to hold off on scheduling until she is better I tried to schedule her for January but patient would like to hold off for now     ----- Message -----   From: Merlinda Stabler   Sent: 11/15/2022  11:27 AM EST   To: Leandro Murrieta   Subject: FW: Appointment canceled                              ----- Message -----   From: Mychart, Generic   Sent: 11/14/2022  11:28 AM EST   To: , *   Subject: Appointment canceled                               Appointment canceled for Rayna Ramesh ([id number])   Visit Type: SLEEP CONSULT [facility]   Date        Time      Length    Provider                  Department   11/22/2022  11:00 AM  40 mins  Trina Lucero MD       [facility] [place]      Reason for Cancellation: Canceled via [employer]

## 2022-11-17 RX ORDER — ALBUTEROL SULFATE 2.5 MG/3ML
2.5 SOLUTION RESPIRATORY (INHALATION) EVERY 6 HOURS PRN
Qty: 180 ML | Refills: 0 | Status: SHIPPED | OUTPATIENT
Start: 2022-11-17

## 2022-11-17 NOTE — TELEPHONE ENCOUNTER
From: Moisés Guy  To:  Neno Mathias  Sent: 11/14/2022 10:29 PM EST  Subject: Med asap    I need a prescription for a nebulizer I think the nebulizer with the medicine will knock it out of me fast even though I’m on the antibiotics please let me know ASAP medication therapy

## 2022-11-17 NOTE — PROGRESS NOTES
Order placed for nebulizer  Patient requested machine over mychart  Unable to place orders yesterday       Glynn Garcia DO  [facility]  11/17/2022 7:45 AM

## 2022-11-19 LAB

## 2022-11-29 DIAGNOSIS — M79.2 NEUROPATHIC PAIN: ICD-10-CM

## 2022-11-30 RX ORDER — GABAPENTIN 800 MG/1
1600 TABLET ORAL DAILY
Qty: 60 TABLET | Refills: 2 | Status: SHIPPED | OUTPATIENT
Start: 2022-11-30

## 2022-12-02 ENCOUNTER — OFFICE VISIT (OUTPATIENT)
Dept: FAMILY MEDICINE CLINIC | Facility: CLINIC | Age: 47
End: 2022-12-02

## 2022-12-02 VITALS
TEMPERATURE: 97.7 F | SYSTOLIC BLOOD PRESSURE: 124 MMHG | OXYGEN SATURATION: 90 % | HEART RATE: 87 BPM | BODY MASS INDEX: 28.67 KG/M2 | RESPIRATION RATE: 16 BRPM | HEIGHT: 64 IN | DIASTOLIC BLOOD PRESSURE: 90 MMHG

## 2022-12-02 DIAGNOSIS — F17.200 CURRENT SMOKER: ICD-10-CM

## 2022-12-02 DIAGNOSIS — J40 BRONCHITIS: ICD-10-CM

## 2022-12-02 DIAGNOSIS — R05.1 ACUTE COUGH: Primary | ICD-10-CM

## 2022-12-02 RX ORDER — BENZONATATE 100 MG/1
100 CAPSULE ORAL 3 TIMES DAILY PRN
Qty: 20 CAPSULE | Refills: 0 | Status: SHIPPED | OUTPATIENT
Start: 2022-12-02

## 2022-12-02 RX ORDER — SULFAMETHOXAZOLE AND TRIMETHOPRIM 800; 160 MG/1; MG/1
1 TABLET ORAL EVERY 12 HOURS SCHEDULED
Qty: 20 TABLET | Refills: 0 | Status: SHIPPED | OUTPATIENT
Start: 2022-12-02 | End: 2022-12-12

## 2022-12-02 NOTE — PROGRESS NOTES
Assessment/Plan:   Diagnoses and all orders for this visit:    Acute cough  -     sulfamethoxazole-trimethoprim (BACTRIM DS) 800-160 mg per tablet; Take 1 tablet by mouth every 12 (twelve) hours for 10 days  -     benzonatate (TESSALON PERLES) 100 mg capsule; Take 1 capsule (100 mg total) by mouth 3 (three) times a day as needed for cough  - 10/20/2022 - treated with Ludmila Drafts for sinusitis   - 11/9/2022 - Pred Burst x5days and Doxy 100mg BID [id number] for Bronchitis   - CXR 11/9/2022 - nml   - 11/13/2022 - Ceftin 500mg x7days and Pred taper  - still symptomatic and O2 sats = 90% on RA  - advised to use Albuterol Q4 standing, tessalon perles  - reviewed allergies and will eRx Bactrim DS BID [id number]   - STRONGLY advised to f/u with Pulm (pt had appt scheduled 11/22/2022 but canceled as she wasn't feeling well) - pt aware and agreeable  - RTO in 2-3days for close f/u - pt aware and agreeable   Bronchitis  Current smoker          Subjective:    Patient ID: Ferdinand Montoya is a 56 y o  female  HPI   - on 10/20/2022 - was treated with Ludmila Drafts for sinusitis   - was seen 11/9/2022 and given Pred Burst and Doxy for Bronchitis   - CXR 11/9/2022 - nml   - on 11/13/2022 was given Ceftin 500mg x7days and Pred taper  - still with cough - now productive  - (+) L-sided pressure, HA, chest tightness, little bit of SOB/wheezing   - denies F/C/N/V/ear pain/sore throat  - has been doing Albuterol Nebs BID   - O2 stat in the office 90%   - COVID NEG   - good PO intake  - smoking - 10cigs/day       The following portions of the patient's history were reviewed and updated as appropriate: allergies, current medications, past family history, past medical history, past social history, past surgical history and problem list     Review of Systems  as per HPI    Objective:  /90   Pulse 87   Temp 97 7 °F (36 5 °C)   Resp 16   Ht 5' 4" (1 626 m)   SpO2 90%   BMI 28 67 kg/m²    Physical Exam  Vitals reviewed     Constitutional:       General: She is not in acute distress  Appearance: Normal appearance  She is not ill-appearing, toxic-appearing or diaphoretic  HENT:      Head: Normocephalic and atraumatic  Right Ear: External ear normal  There is no impacted cerumen  Left Ear: External ear normal  There is no impacted cerumen  Nose: Nose normal    Eyes:      General: No scleral icterus  Right eye: No discharge  Left eye: No discharge  Extraocular Movements: Extraocular movements intact  Conjunctiva/sclera: Conjunctivae normal    Cardiovascular:      Rate and Rhythm: Normal rate and regular rhythm  Heart sounds: Normal heart sounds  Pulmonary:      Effort: No respiratory distress  Breath sounds: No stridor  Wheezing present  Abdominal:      Palpations: Abdomen is soft  Musculoskeletal:         General: Normal range of motion  Cervical back: Normal range of motion and neck supple  Skin:     General: Skin is warm  Neurological:      General: No focal deficit present  Mental Status: She is alert and oriented to person, place, and time     Psychiatric:         Mood and Affect: Mood normal          Behavior: Behavior normal

## 2022-12-14 DIAGNOSIS — K21.9 GASTROESOPHAGEAL REFLUX DISEASE WITHOUT ESOPHAGITIS: ICD-10-CM

## 2022-12-14 RX ORDER — LANSOPRAZOLE 30 MG/1
CAPSULE, DELAYED RELEASE ORAL
Qty: 30 CAPSULE | Refills: 1 | Status: SHIPPED | OUTPATIENT
Start: 2022-12-14

## 2022-12-22 ENCOUNTER — VBI (OUTPATIENT)
Dept: ADMINISTRATIVE | Facility: OTHER | Age: 47
End: 2022-12-22

## 2022-12-25 DIAGNOSIS — J40 BRONCHITIS: ICD-10-CM

## 2022-12-27 RX ORDER — ALBUTEROL SULFATE 90 UG/1
AEROSOL, METERED RESPIRATORY (INHALATION)
Qty: 18 G | Refills: 0 | Status: SHIPPED | OUTPATIENT
Start: 2022-12-27

## 2023-01-05 ENCOUNTER — TELEPHONE (OUTPATIENT)
Dept: CARDIOLOGY CLINIC | Facility: CLINIC | Age: 48
End: 2023-01-05

## 2023-01-05 DIAGNOSIS — I10 HYPERTENSION, UNSPECIFIED TYPE: ICD-10-CM

## 2023-01-05 RX ORDER — CARVEDILOL 3.12 MG/1
TABLET ORAL
Qty: 180 TABLET | Refills: 2 | Status: SHIPPED | OUTPATIENT
Start: 2023-01-05

## 2023-01-05 NOTE — TELEPHONE ENCOUNTER
Pt left a v/m stating she has questions? Attempt to call pt back and got her voicemail  Left msg to return a call

## 2023-01-12 ENCOUNTER — RA CDI HCC (OUTPATIENT)
Dept: OTHER | Facility: HOSPITAL | Age: 48
End: 2023-01-12

## 2023-01-12 NOTE — PROGRESS NOTES
[facility]  coding opportunities       Chart reviewed, no opportunity found: CHART REVIEWED, NO OPPORTUNITY FOUND        Patients Insurance        Commercial Insurance: Jessica Diamond

## 2023-01-25 ENCOUNTER — OFFICE VISIT (OUTPATIENT)
Dept: FAMILY MEDICINE CLINIC | Facility: CLINIC | Age: 48
End: 2023-01-25

## 2023-01-25 VITALS
DIASTOLIC BLOOD PRESSURE: 80 MMHG | RESPIRATION RATE: 16 BRPM | HEIGHT: 64 IN | WEIGHT: 171 LBS | HEART RATE: 88 BPM | SYSTOLIC BLOOD PRESSURE: 140 MMHG | BODY MASS INDEX: 29.19 KG/M2 | OXYGEN SATURATION: 98 %

## 2023-01-25 DIAGNOSIS — Z00.00 ANNUAL PHYSICAL EXAM: Primary | ICD-10-CM

## 2023-01-25 DIAGNOSIS — I10 ESSENTIAL HYPERTENSION: ICD-10-CM

## 2023-01-25 DIAGNOSIS — J41.0 SIMPLE CHRONIC BRONCHITIS (HCC): ICD-10-CM

## 2023-01-25 DIAGNOSIS — Z23 NEED FOR TDAP VACCINATION: ICD-10-CM

## 2023-01-25 DIAGNOSIS — R73.03 PREDIABETES: ICD-10-CM

## 2023-01-25 DIAGNOSIS — R80.9 URINE TEST POSITIVE FOR MICROALBUMINURIA: ICD-10-CM

## 2023-01-25 DIAGNOSIS — F17.200 CURRENT SMOKER: ICD-10-CM

## 2023-01-25 RX ORDER — LISINOPRIL 5 MG/1
5 TABLET ORAL DAILY
Qty: 30 TABLET | Refills: 1 | Status: SHIPPED | OUTPATIENT
Start: 2023-01-25

## 2023-01-25 NOTE — PROGRESS NOTES
Assessment/Plan:   Diagnoses and all orders for this visit:    Current smoker  -     Ambulatory Referral to Smoking Cessation Program; Future  - currently smokes 1PPD/35yrs - contemplating cutting back  - referred to Smoking Cessation Program   Simple chronic bronchitis ([address] )  - follows with Pulm   - started on Advair and doing well     Essential hypertension  -     lisinopril (ZESTRIL) 5 mg tablet; Take 1 tablet (5 mg total) by mouth daily  - BP in the office 140/80 x2  - (+) microalbuminuria on labs done 11/2022  - currently on Coreg 3 125mg BID as per Cardio   - will add ACEI 5mg QD and repeat urine microalbumin in 1month   - RTO in 1month, with labs, for f/u - pt aware and agreeable   Urine test positive for microalbuminuria  -     Microalbumin / creatinine urine ratio; Future    Prediabetes  - A1c 5 8  - repeat in 7/2023             Subjective:    Patient ID: Soto Castro is a 56 y o  female    Soto Castro is a 56 y o  female who presents to the office for an annual exam and f/u   1) Annual   - Specialists: Rey Ny (has f/u with Cardio next month), GI, Rheum, Pain Management, Pulm (has f/u with Pulm in 3/2023)  - PMHx: GERD, colon polyps, DJD, Tobacco abuse, Chronic Bronchitis, Anemia of chronic disease, HL, Vit D deficiency, Neuropathic pain  - allergies: Amox and Cipro   - Meds: see med rec   - PSHx: nose/sinus surgery, Csection and tubal ligation   - FHx: F (HTN), M (Epilepsy), MA (Breast Ca), MGM (Bone Ca), PGM (Stomach Ca), denies FHx of Breast/Colon Ca   - Immunizations: J&J COVID IMM and no Boosters, Tdap in 2013 (will update in office today), gets Flu vaccine at work ([employer])   - GYN Hx: UTD with annual GYN exam and PAP (4/2022)   - Hm: last Mammo was in 5/12/2022 - annual screening, last Cscope was 9/30/2022 - 5yr recall   - diet/exercise: walks, diet: balanced   - social: (+) tob - 1PPD/35yrs (contemplating cutting back), denies tob/illicts   - sexual Hx: active with spouse, s/p tubal ligation, denied STD screening in the office today   - last vision: reading glasses, goes annually   - last dental: last OV was >3yrs ago   2) HTN   - BP in the office 140/80 x2   - (+) microalbuminuria on labs done 11/2022  - currently on Coreg 3 125mg BID as per Cardio   - pt denies F/C/N/V/HA/visual changes/CP/palpitations/SOB/wheezing/abd pain/D/LE edema       The following portions of the patient's history were reviewed and updated as appropriate: allergies, current medications, past family history, past medical history, past social history, past surgical history and problem list     Review of Systems  as per HPI    Objective:  /80 (BP Location: Left arm, Patient Position: Sitting, Cuff Size: Standard)   Pulse 88   Resp 16   Ht 5' 4" (1 626 m)   Wt 77 6 kg (171 lb)   LMP  (LMP Unknown)   SpO2 98%   BMI 29 35 kg/m²    Physical Exam  Vitals reviewed  Constitutional:       General: She is not in acute distress  Appearance: Normal appearance  She is not ill-appearing, toxic-appearing or diaphoretic  HENT:      Head: Normocephalic and atraumatic  Right Ear: External ear normal       Left Ear: External ear normal       Nose: Nose normal    Eyes:      General: No scleral icterus  Right eye: No discharge  Left eye: No discharge  Extraocular Movements: Extraocular movements intact  Conjunctiva/sclera: Conjunctivae normal    Cardiovascular:      Rate and Rhythm: Normal rate and regular rhythm  Heart sounds: Normal heart sounds  Pulmonary:      Effort: Pulmonary effort is normal  No respiratory distress  Breath sounds: Normal breath sounds  No stridor  No wheezing, rhonchi or rales  Abdominal:      Palpations: Abdomen is soft  Musculoskeletal:         General: Normal range of motion  Cervical back: Normal range of motion and neck supple  Right lower leg: No edema  Left lower leg: No edema  Skin:     General: Skin is warm        Comments: Atypical nevi    Neurological:      General: No focal deficit present  Mental Status: She is alert and oriented to person, place, and time  Psychiatric:         Mood and Affect: Mood normal          Behavior: Behavior normal          BMI Counseling: Body mass index is 29 35 kg/m²  The BMI is above normal  Nutrition recommendations include 3-5 servings of fruits/vegetables daily  Exercise recommendations include exercising 3-5 times per week  Depression Screening Follow-up Plan: Patient's depression screening was positive with a PHQ-2 score of 0  Their PHQ-9 score was   Clinically patient does not have depression  No treatment is required

## 2023-01-25 NOTE — PATIENT INSTRUCTIONS

## 2023-01-25 NOTE — PROGRESS NOTES
Assessment/Plan:   Diagnoses and all orders for this visit:    Annual physical exam  - reviewed PMHx, PSHx, meds, allergies, FHx, Soc Hx and Sexual Hx  - received J&J COVID IMM but declined Boosters   - last Tdap was in 2013 - received Tdap in office today   - UTD with annual Flu vaccine (received it at work - [facility])   - UTD with annual GYN exam and PAP (4/2022)    - last Mammo was in 5/12/2022 - annual screening  - last Cscope was 9/30/2022 - 5yr recall   - reviewed labs   - declined STD screening in the office today   - UTD with Optho   - overdue for Dental - advised to schedule   - RTO in 1yr for annual exam - pt aware and agreeable   Need for Tdap vaccination  -     TDAP VACCINE GREATER THAN OR EQUAL TO 6YO IM    Current smoker  -     Ambulatory Referral to Smoking Cessation Program; Future  - currently smokes 1PPD/35yrs - contemplating cutting back  - referred to Smoking Cessation Program   Simple chronic bronchitis ([facility] [address] )  - follows with Pulm   - started on Advair and doing well     Essential hypertension  -     lisinopril (ZESTRIL) 5 mg tablet; Take 1 tablet (5 mg total) by mouth daily  - BP in the office 140/80 x2  - (+) microalbuminuria on labs done 11/2022  - currently on Coreg 3 125mg BID as per Cardio   - will add ACEI 5mg QD and repeat urine microalbumin in 1month   - RTO in 1month, with labs, for f/u - pt aware and agreeable   Urine test positive for microalbuminuria  -     Microalbumin / creatinine urine ratio; Future    Prediabetes  - A1c 5 8  - repeat in 7/2023             Subjective:    Patient ID: Michael Logan is a 56 y o  female    Michael Logan is a 56 y o  female who presents to the office for an annual exam and f/u   1) Annual   - Specialists: Alexandr Chicas (has f/u with Cardio next month), GI, Rheum, Pain Management, Pulm (has f/u with Pulm in 3/2023)  - PMHx: GERD, colon polyps, DJD, Tobacco abuse, Chronic Bronchitis, Anemia of chronic disease, HL, Vit D deficiency, Neuropathic pain  - allergies: Amox and Cipro   - Meds: see med rec   - PSHx: nose/sinus surgery, Csection and tubal ligation   - FHx: F (HTN), M (Epilepsy), MA (Breast Ca), MGM (Bone Ca), PGM (Stomach Ca), denies FHx of Breast/Colon Ca   - Immunizations: J&J COVID IMM and no Boosters, Tdap in 2013 (will update in office today), gets Flu vaccine at work ([employer])   - GYN Hx: UTD with annual GYN exam and PAP (4/2022)   - Hm: last Mammo was in 5/12/2022 - annual screening, last Cscope was 9/30/2022 - 5yr recall   - diet/exercise: walks, diet: balanced   - social: (+) tob - 1PPD/35yrs (contemplating cutting back), denies tob/illicts   - sexual Hx: active with spouse, s/p tubal ligation, denied STD screening in the office today   - last vision: reading glasses, goes annually   - last dental: last OV was >3yrs ago   2) HTN   - BP in the office 140/80 x2   - (+) microalbuminuria on labs done 11/2022  - currently on Coreg 3 125mg BID as per Cardio   - pt denies F/C/N/V/HA/visual changes/CP/palpitations/SOB/wheezing/abd pain/D/LE edema       The following portions of the patient's history were reviewed and updated as appropriate: allergies, current medications, past family history, past medical history, past social history, past surgical history and problem list     Review of Systems  as per HPI    Objective:  /80 (BP Location: Left arm, Patient Position: Sitting, Cuff Size: Standard)   Pulse 88   Resp 16   Ht 5' 4" (1 626 m)   Wt 77 6 kg (171 lb)   LMP  (LMP Unknown)   SpO2 98%   BMI 29 35 kg/m²    Physical Exam  Vitals reviewed  Constitutional:       General: She is not in acute distress  Appearance: Normal appearance  She is not ill-appearing, toxic-appearing or diaphoretic  HENT:      Head: Normocephalic and atraumatic  Right Ear: External ear normal       Left Ear: External ear normal       Nose: Nose normal    Eyes:      General: No scleral icterus  Right eye: No discharge           Left eye: No discharge  Extraocular Movements: Extraocular movements intact  Conjunctiva/sclera: Conjunctivae normal    Cardiovascular:      Rate and Rhythm: Normal rate and regular rhythm  Heart sounds: Normal heart sounds  Pulmonary:      Effort: Pulmonary effort is normal  No respiratory distress  Breath sounds: Normal breath sounds  No stridor  No wheezing, rhonchi or rales  Abdominal:      Palpations: Abdomen is soft  Musculoskeletal:         General: Normal range of motion  Cervical back: Normal range of motion and neck supple  Right lower leg: No edema  Left lower leg: No edema  Skin:     General: Skin is warm  Comments: Atypical nevi    Neurological:      General: No focal deficit present  Mental Status: She is alert and oriented to person, place, and time  Psychiatric:         Mood and Affect: Mood normal          Behavior: Behavior normal          BMI Counseling: Body mass index is 29 35 kg/m²  The BMI is above normal  Nutrition recommendations include 3-5 servings of fruits/vegetables daily  Exercise recommendations include exercising 3-5 times per week  Depression Screening Follow-up Plan: Patient's depression screening was positive with a PHQ-2 score of 0  Their PHQ-9 score was   Clinically patient does not have depression  No treatment is required

## 2023-01-30 ENCOUNTER — OFFICE VISIT (OUTPATIENT)
Dept: CARDIOLOGY CLINIC | Facility: CLINIC | Age: 48
End: 2023-01-30

## 2023-01-30 VITALS
SYSTOLIC BLOOD PRESSURE: 132 MMHG | HEIGHT: 64 IN | DIASTOLIC BLOOD PRESSURE: 84 MMHG | BODY MASS INDEX: 29.02 KG/M2 | HEART RATE: 81 BPM | TEMPERATURE: 98.4 F | OXYGEN SATURATION: 96 % | WEIGHT: 170 LBS

## 2023-01-30 DIAGNOSIS — E78.00 HYPERCHOLESTEROLEMIA: ICD-10-CM

## 2023-01-30 DIAGNOSIS — R07.9 CHEST PAIN IN ADULT: Primary | ICD-10-CM

## 2023-01-30 DIAGNOSIS — R09.89 LABILE HYPERTENSION: ICD-10-CM

## 2023-01-30 DIAGNOSIS — I10 HYPERTENSION, UNSPECIFIED TYPE: ICD-10-CM

## 2023-01-30 DIAGNOSIS — R06.02 SHORTNESS OF BREATH ON EXERTION: ICD-10-CM

## 2023-01-30 RX ORDER — CARVEDILOL 6.25 MG/1
6.25 TABLET ORAL 2 TIMES DAILY WITH MEALS
Qty: 180 TABLET | Refills: 1 | Status: SHIPPED | OUTPATIENT
Start: 2023-01-30

## 2023-01-30 NOTE — PROGRESS NOTES
[facility]  [address] [facility] [address]  [address]   [place] [place]  Cardiology Consultation  Catheryn Peabody  1975  [address] [facility] [place]  [address]  [address], [address]  [place] [address] [facility] [place]  [phone]    1  Chest pain in adult  POCT ECG      2  Hypercholesterolemia  POCT ECG      3  Hypertension, unspecified type  POCT ECG      4  Shortness of breath on exertion  POCT ECG         Discussion/Summary:   Elevated blood pressure/ER visit-ruled out for acute coronary syndrome  Resting twelve-lead EKG without dynamic ST changes  Previous nuclear stress test negative for ischemia  She reports having elevated blood pressures highest in a m  She also has significant daytime fatigue  Plan for a home sleep study  We will also increase her beta-blocker to 6 25 mg twice a day  She will continue with lisinopril  She will continue with a low-sodium diet  She will make a blood pressure journal   She will have close follow-up with cardiology  If she has no improvement in her symptoms consideration for secondary hypertension work-up    Mild mitral regurgitation    Hyperlipidemia currently on Crestor 5 mg    Diabetes with last hemoglobin A1c 6 1    Interval History:   53year-old with recent ER visit secondary to elevated blood pressures  She states her blood pressures have been labile  They are increased in the early a m  She reports having daytime fatigue  She can sleep throughout the day  She is compliant with her medications  She does not feel there is significant sodium in her diet      Past Medical History:   Diagnosis Date   • Anemia    • Asthma    • Colon polyp    • Ear problems    • GERD (gastroesophageal reflux disease)    • Hyperlipidemia    • Hypertension    • Neck pain    • PONV (postoperative nausea and vomiting)      Social History     Socioeconomic History   • Marital status: /Civil Union     Spouse name: Not on file   • Number of children: Not on file   • Years of education: Not on file   • Highest education level: Not on file   Occupational History   • Not on file   Tobacco Use   • Smoking status: Every Day     Packs/day: 1 00     Years: 33 00     Pack years: 33 00     Types: Cigarettes   • Smokeless tobacco: Never   Vaping Use   • Vaping Use: Never used   Substance and Sexual Activity   • Alcohol use: No   • Drug use: No   • Sexual activity: Yes     Partners: Male     Birth control/protection: Female Sterilization   Other Topics Concern   • Not on file   Social History Narrative   • Not on file     Social Determinants of Health     Financial Resource Strain: Not on file   Food Insecurity: Not on file   Transportation Needs: Not on file   Physical Activity: Not on file   Stress: Not on file   Social Connections: Not on file   Intimate Partner Violence: Not on file   Housing Stability: Not on file      Family History   Problem Relation Age of Onset   • Hypertension Father    • Bone cancer Maternal Grandmother 80   • Cancer Paternal Grandmother    • Stomach cancer Paternal Grandmother 80   • Hypertension Maternal Aunt    • Breast cancer Neg Hx    • Colon cancer Neg Hx      Past Surgical History:   Procedure Laterality Date   •  SECTION     • COLONOSCOPY     • NOSE SURGERY     • SINUS SURGERY     • TUBAL LIGATION         Current Outpatient Medications:   •  Advair Diskus 100-50 MCG/ACT inhaler, INHALE 1 PUFF 2 (TWO) TIMES A DAY   RINSE THROAT AFTER USE, Disp: , Rfl:   •  albuterol (2 5 mg/3 mL) 0 083 % nebulizer solution, Take 3 mL (2 5 mg total) by nebulization every 6 (six) hours as needed for wheezing or shortness of breath, Disp: 180 mL, Rfl: 0  •  albuterol (PROVENTIL HFA,VENTOLIN HFA) 90 mcg/act inhaler, TAKE 2 PUFFS BY MOUTH EVERY 6 HOURS AS NEEDED FOR WHEEZE, Disp: 18 g, Rfl: 0  •  benzonatate (TESSALON PERLES) 100 mg capsule, Take 1 capsule (100 mg total) by mouth 3 (three) times a day as needed for cough, Disp: 20 capsule, Rfl: 0  •  carvedilol (COREG) 3 125 mg tablet, TAKE 1 TABLET BY MOUTH TWICE A DAY WITH MEALS, Disp: 180 tablet, Rfl: 2  •  ferrous sulfate 324 (65 Fe) mg, Take 1 tablet (324 mg total) by mouth daily, Disp: 90 tablet, Rfl: 3  •  fluticasone (FLONASE) 50 mcg/act nasal spray, 2 sprays into each nostril daily, Disp: 48 mL, Rfl: 0  •  gabapentin (NEURONTIN) 800 mg tablet, TAKE 2 TABLETS (1,600 MG TOTAL) BY MOUTH DAILY, Disp: 60 tablet, Rfl: 2  •  lansoprazole (PREVACID) 30 mg capsule, TAKE 1 CAPSULE BY MOUTH EVERY DAY, Disp: 30 capsule, Rfl: 1  •  levocetirizine (XYZAL) 5 MG tablet, Take 1 tablet (5 mg total) by mouth every evening, Disp: 30 tablet, Rfl: 6  •  lisinopril (ZESTRIL) 5 mg tablet, Take 1 tablet (5 mg total) by mouth daily, Disp: 30 tablet, Rfl: 1  •  rosuvastatin (CRESTOR) 5 mg tablet, Take 1 tablet (5 mg total) by mouth daily, Disp: 90 tablet, Rfl: 2  •  VITAMIN D PO, Take by mouth, Disp: , Rfl:   Allergies   Allergen Reactions   • Amoxicillin Hives   • Ciprofloxacin Nausea Only     Vitals:    01/30/23 1028   BP: 132/84   BP Location: Right arm   Patient Position: Sitting   Cuff Size: Standard   Pulse: 81   Temp: 98 4 °F (36 9 °C)   SpO2: 96%   Weight: 77 1 kg (170 lb)   Height: 5' 4" (1 626 m)       Review of Systems:   Review of Systems   Constitutional: Positive for fatigue  Negative for activity change, appetite change, chills, diaphoresis, fever and unexpected weight change  HENT: Negative  Negative for congestion, dental problem, drooling, ear discharge, ear pain, facial swelling, hearing loss, mouth sores, nosebleeds, postnasal drip, rhinorrhea, sinus pressure, sinus pain, sneezing, sore throat, tinnitus, trouble swallowing and voice change  Eyes: Negative  Negative for photophobia, pain, redness, itching and visual disturbance  Respiratory: Positive for chest tightness and shortness of breath  Negative for apnea, cough, choking, wheezing and stridor  Cardiovascular: Negative  Negative for chest pain, palpitations and leg swelling  Gastrointestinal: Negative  Negative for abdominal distention, abdominal pain, anal bleeding, blood in stool, constipation, diarrhea, nausea, rectal pain and vomiting  Endocrine: Negative  Negative for cold intolerance, heat intolerance, polydipsia, polyphagia and polyuria  Genitourinary: Negative  Negative for decreased urine volume, difficulty urinating, dyspareunia, dysuria, enuresis, flank pain, frequency, genital sores, hematuria, menstrual problem, pelvic pain, urgency, vaginal bleeding, vaginal discharge and vaginal pain  Musculoskeletal: Negative  Negative for arthralgias, back pain, gait problem, joint swelling, myalgias, neck pain and neck stiffness  Skin: Negative  Negative for color change, pallor, rash and wound  Allergic/Immunologic: Negative  Negative for environmental allergies, food allergies and immunocompromised state  Neurological: Negative  Negative for dizziness, tremors, seizures, syncope, facial asymmetry, speech difficulty, weakness, light-headedness, numbness and headaches  Hematological: Negative  Negative for adenopathy  Does not bruise/bleed easily  Psychiatric/Behavioral: Negative  Negative for agitation, behavioral problems, confusion, decreased concentration, dysphoric mood, hallucinations, self-injury, sleep disturbance and suicidal ideas  The patient is not nervous/anxious and is not hyperactive  All other systems reviewed and are negative        Vitals:    01/30/23 1028   BP: 132/84   BP Location: Right arm   Patient Position: Sitting   Cuff Size: Standard   Pulse: 81   Temp: 98 4 °F (36 9 °C)   SpO2: 96%   Weight: 77 1 kg (170 lb)   Height: 5' 4" (1 626 m)     Physical Examination:   Physical Exam    Labs:     Lab Results   Component Value Date    WBC 7 17 07/13/2022    HGB 12 9 07/13/2022    HCT 39 8 07/13/2022    MCV 84 07/13/2022    RDW 15 5 (H) 07/13/2022    PLT 235 2022     BMP:  Lab Results   Component Value Date    SODIUM 140 2021    K 4 1 2021     2021    CO2 26 2021    ANIONGAP 11 10/05/2015    BUN 9 2021    CREATININE 0 72 2021    GLUC 103 2021    CALCIUM 8 5 2021    EGFR 101 2021    MG 2 0 2017     LFT:  Lab Results   Component Value Date    AST 11 05/10/2021    ALT 18 05/10/2021    ALKPHOS 100 05/10/2021    TP 7 5 05/10/2021    ALB 3 9 05/10/2021      Lab Results   Component Value Date    LRL7NCVNFALA 2 290 2022     Lab Results   Component Value Date    HGBA1C 5 8 (H) 2022     Lipid Profile:   Lab Results   Component Value Date    CHOLESTEROL 128 2022    HDL 52 2022    LDLCALC 61 2022    TRIG 76 2022     Lab Results   Component Value Date    CHOLESTEROL 128 2022    CHOLESTEROL 195 2017     Lab Results   Component Value Date    CKTOTAL 91 2021    TROPONINI <0 02 2021     Lab Results   Component Value Date    NTBNP 81 05/10/2021      No results found for this or any previous visit (from the past 672 hour(s))  Imaging & Testing   I have personally reviewed pertinent reports        Cardiac Testing   Results for orders placed during the hospital encounter of 21    Echo complete with contrast if indicated    Narrative  [facility] [address]  [address] [address]  [phone]    Transthoracic Echocardiogram  2D, M-mode, Doppler, and Color Doppler    Study date:  06-Aug-2021    Patient: Jillyn Sacks  MR number: [id number]  Account number: [de-identified]  : 1975  Age: 40 years  Gender: Female  Status: Outpatient  Location: Echo lab  Height: 63 in  Weight: 155 8 lb  BP: 130/ 88 mmHg    Indications: HTN    Diagnoses: I11 9 - Hypertensive heart disease without heart failure    Sonographer:  IRLANDA Chavis  Primary Physician:  Charli Freire MD  Referring Physician:  Magan Rees MD  Group:    [facility]  Interpreting Physician:  Keturah Sacks, MD    SUMMARY    PROCEDURE INFORMATION:  This was a technically difficult study  Echocardiographic views were limited due to poor acoustic window availability, decreased penetration, and lung interference  LEFT VENTRICLE:  Systolic function was at the lower limits of normal  Ejection fraction was estimated to be around 50 %  This study was inadequate for the evaluation of regional wall motion  Wall thickness was mildly increased  There was mild concentric hypertrophy  MITRAL VALVE:  There was mild regurgitation  TRICUSPID VALVE:  There was trace regurgitation  HISTORY: PRIOR HISTORY: Tobacco abuse,HTN  PROCEDURE: The procedure was performed in the echo lab  This was a routine study  The transthoracic approach was used  The study included complete 2D imaging, M-mode, complete spectral Doppler, and color Doppler  The heart rate was 73 bpm,  at the start of the study  Images were obtained from the parasternal, apical, subcostal, and suprasternal notch acoustic windows  Echocardiographic views were limited due to poor acoustic window availability, decreased penetration, and  lung interference  This was a technically difficult study  LEFT VENTRICLE: Size was normal  Systolic function was at the lower limits of normal  Ejection fraction was estimated to be around 50 %  This study was inadequate for the evaluation of regional wall motion  Wall thickness was mildly  increased  There was mild concentric hypertrophy  DOPPLER: Left ventricular diastolic function parameters were normal for the patient's age  RIGHT VENTRICLE: The size was normal  Systolic function was normal  Wall thickness was normal     LEFT ATRIUM: Size was at the upper limits of normal     RIGHT ATRIUM: Size was normal     MITRAL VALVE: There was normal leaflet separation  DOPPLER: The transmitral velocity was within the normal range   There was no evidence for stenosis  There was mild regurgitation  AORTIC VALVE: The valve was not well visualized  DOPPLER: There was no evidence for stenosis  There was no significant regurgitation  TRICUSPID VALVE: DOPPLER: There was trace regurgitation  The tricuspid jet envelope definition was inadequate for estimation of RV systolic pressure  PULMONIC VALVE: DOPPLER: There was no significant regurgitation  PERICARDIUM: There was no thickening or calcification  There was no pericardial effusion  AORTA: The root exhibited normal size  SYSTEMIC VEINS: IVC: The inferior vena cava was not well visualized  SYSTEM MEASUREMENT TABLES    2D  EF (Teich): 48 51 %  %FS: 24 12 %  EDV(Teich): 72 77 ml  ESV(Teich): 37 47 ml  IVSd: 0 97 cm  LA Area: 18 06 cm2  LA Diam: 3 61 cm  LVEDV MOD A4C: 114 35 ml  LVEF MOD A4C: 43 41 %  LVESV MOD A4C: 64 71 ml  LVIDd: 4 07 cm  LVIDs: 3 08 cm  LVLd A4C: 8 63 cm  LVLs A4C: 7 27 cm  LVOT Diam: 1 95 cm  LVPWd: 0 95 cm  RA Area: 15 53 cm2  RVIDd: 3 21 cm  SV (Teich): 35 29 ml  SV MOD A4C: 49 63 ml    CW  AV Vmax: 0 81 m/s  AV maxP 63 mmHg    MM  TAPSE: 2 24 cm    PW  MV E/A Ratio: 1 23  E' Sept: 0 11 m/s  E/E' Sept: 5 88  LVOT Vmax: 0 63 m/s  LVOT maxP 57 mmHg  MV A Ortega: 0 53 m/s  MV Dec McDonough: 3 56 m/s2  MV DecT: 183 96 ms  MV E Ortega: 0 66 m/s  MV PHT: 53 35 ms  MVA By PHT: 4 12 cm2    [employer] Accredited Echocardiography Laboratory    Prepared and electronically signed by    Amber Alba MD  Signed 06-Aug-2021 13:15:27    No results found for this or any previous visit  No results found for this or any previous visit  No results found for this or any previous visit  No results found for this or any previous visit      Results for orders placed during the hospital encounter of 21    NM Myocardial Perfusion Spect (Pharmacological Induced Stress and/or Rest)    [address] [address]  [phone]    Rest/Stress Gated SPECT Myocardial Perfusion Imaging After Regadenoson    Patient: Viet Meneses  MR number: [id number]  Account number: [de-identified]  : 1975  Age: 40 years  Gender: Female  Status: Outpatient  Location: Stress lab  Height: 63 in  Weight: 155 lb  BP: 130/ 82 mmHg    Allergies: AMOXICILLIN, CIPROFLOXACIN    Diagnosis: R07 9 - Chest pain, unspecified    Primary Physician:  Nima Reilly MD  RN:  Cady Connor RN  Interpreting Physician:  Ebony Castano MD  Referring Physician:  Nati Calvillo MD  Technician:  Gloria Spangler  Group:  [facility]  Report Prepared By[de-identified]  Cady Connor RN    INDICATIONS: Coronary artery disease  HISTORY: The patient is a 40year old  female  Chest pain status: chest pain  Coronary artery disease risk factors: smoking  Cardiovascular history: arrhythmia/tachycardia Co-morbidity: history of lung  disease/COVID,asthma,pneumonia  Medications: no cardiac drugs  Acute coronary syndrome: acute myocardial infarction recently ruled out  PHYSICAL EXAM: Baseline physical exam screening: no wheezes audible  REST ECG: Normal sinus rhythm  Normal baseline ECG  PROCEDURE: The study was performed in the the Stress lab  A regadenoson infusion pharmacologic stress test was performed  Gated SPECT myocardial perfusion imaging was performed after stress  Systolic blood pressure was 130 mmHg, at the  start of the study  Diastolic blood pressure was 82 mmHg, at the start of the study  The heart rate was 85 bpm, at the start of the study  IV double checked  Regadenoson protocol:  HR bpm SBP mmHg DBP mmHg Symptoms  Baseline 85 130 82 none  Immediate 130 146 78 mild dyspnea, dizziness, nausea  1 min 112 126 74 subsiding  2 min -- -- -- none  No medications or fluids given  The patient also performed low level exercise  STRESS SUMMARY: Duration of pharmacologic stress was 3 min   Maximal heart rate during stress was 130 bpm  The rate-pressure product for the peak heart rate and blood pressure was 74106  There was no chest pain during stress  The stress  test was terminated due to protocol completion  Pre oxygen saturation: 100 %  Peak oxygen saturation: 100 %  The stress ECG was negative for ischemia and normal  There were no stress arrhythmias or conduction abnormalities  ISOTOPE ADMINISTRATION:  Resting isotope administration Stress isotope administration  Agent Tetrofosmin Tetrofosmin  Dose 11 mCi 32 1 mCi  Date 05/26/2021 05/26/2021  Injection time 07:58 09:00  Imaging time 08:34 09:47  Injection-image interval 36 min 47 min    The radiopharmaceutical was injected at the peak effect of pharmacologic stress  MYOCARDIAL PERFUSION IMAGING:  The image quality was good  Left ventricular size was normal  The TID ratio was 0 98  PERFUSION DEFECTS:  -  There were no perfusion defects  GATED SPECT:  The calculated left ventricular ejection fraction was 67 %  Left ventricular ejection fraction was within normal limits by visual estimate  There was no left ventricular regional abnormality  SUMMARY:  -  Stress results: There was no chest pain during stress  -  ECG conclusions: The stress ECG was negative for ischemia and normal  There were no stress arrhythmias or conduction abnormalities  -  Perfusion imaging: There were no perfusion defects   -  Gated SPECT: The calculated left ventricular ejection fraction was 67 %  Left ventricular ejection fraction was within normal limits by visual estimate  There was no left ventricular regional abnormality  IMPRESSIONS: Normal study after vasodilation and low level exercise without reproduction of symptoms  The cardiovascular risk is low  Myocardial perfusion imaging was normal at rest and with stress  Prepared and signed by    Kennedy Edmond MD  Signed 05/26/2021 13:32:39      EKG: Personally reviewed          Montserrat Costa MD [facility]  [phone]  Please call with any questions or suggestions    Counseling :  A description of the counseling:   Goals and Barriers:  Patient's ability to self care:  Medication side effect reviewed with patient in detail and all their questions answered  "Portions of the record may have been created with voice recognition software  Occasional wrong word or "sound a like" substitutions may have occurred due to the inherent limitations of voice recognition software  Read the chart carefully and recognize, using context, where substitutions have occurred   Please call if you have any questions  "

## 2023-01-30 NOTE — PATIENT INSTRUCTIONS
Low-Sodium Diet   WHAT YOU NEED TO KNOW:   A low-sodium diet limits foods that are high in sodium (salt)  You will need to follow a low-sodium diet if you have high blood pressure, kidney disease, or heart failure  You may also need to follow this diet if you have a condition that is causing your body to retain (hold) extra fluid  You may need to limit the amount of sodium you eat in a day to 1,500 to 2,000 mg  Ask your healthcare provider how much sodium you can have each day  DISCHARGE INSTRUCTIONS:   How to use food labels to choose foods that are low in sodium:  Read food labels to find the amount of sodium they contain  The amount of sodium is listed in milligrams (mg)  The % Daily Value (DV) column tells you how much of your daily needs are met by 1 serving of the food for each nutrient listed  Choose foods that have less than 5% of the DV of sodium  These foods are considered low in sodium  Foods that have 20% or more of the DV of sodium are considered high in sodium  Some food labels may also list any of the following terms that tell you about the sodium content in the food:  Sodium-free:  Less than 5 mg in each serving    Very low sodium:  35 mg of sodium or less in each serving    Low sodium:  140 mg of sodium or less in each serving    Reduced sodium: At least 25% less sodium in each serving than the regular type    Light in sodium:  50% less sodium in each serving    Unsalted or no added salt:  No extra salt is added during processing (the food may still contain sodium)       Foods to avoid:  Salty foods are high in sodium   You should avoid the following:  Processed foods:      Mixes for cornbread, biscuits, cake, and pudding     Instant foods, such as potatoes, cereals, noodles, and rice     Packaged foods, such as bread stuffing, rice and pasta mixes, snack dip mixes, and macaroni and cheese     Canned foods, such as canned vegetables, soups, broths, sauces, and vegetable or tomato juice    Snack foods, such as salted chips, popcorn, pretzels, pork rinds, salted crackers, and salted nuts    Frozen foods, such as dinners, entrees, vegetables with sauces, and breaded meats    Sauerkraut, pickled vegetables, and other foods prepared in brine    Meats and cheeses:      Smoked or cured meat, such as corned beef, mueller, ham, hot dogs, and sausage    Canned meats or spreads, such as potted meats, sardines, anchovies, and imitation seafood    Deli or lunch meats, such as bologna, ham, turkey, and roast beef    Processed cheese, such as American cheese and cheese spreads    Condiments, sauces, and seasonings:      Salt (¼ teaspoon of salt contains 575 mg of sodium)    Seasonings made with salt, such as garlic salt, celery salt, onion salt, and seasoned salt    Regular soy sauce, barbecue sauce, teriyaki sauce, steak sauce, Worcestershire sauce, and most flavored vinegars    Canned gravy and mixes     Regular condiments, such as mustard, ketchup, and salad dressings    Pickles and olives    Meat tenderizers and monosodium glutamate (MSG)    Foods to include:  Read the food label to find the amount of sodium in each serving  Bread and cereal:  Try to choose breads with less than 80 mg of sodium per serving  Bread, roll, kassi, tortilla, or unsalted crackers  Ready-to-eat cereals with less than 5% DV of sodium (examples include shredded wheat and puffed rice)    Pasta    Vegetables and fruits:      Unsalted fresh, frozen, or canned vegetables    Fresh, frozen, or canned fruits    Fruit juice    Dairy:  One serving has about 150 mg of sodium  Milk, all types    Yogurt    Hard cheese, such as cheddar, Swiss, [employer], or [employer] and other protein foods:  Some raw meats may have added sodium       Plain meats, fish, and poultry     Egg    Other foods:      Homemade pudding    Unsalted nuts, popcorn, or pretzels    Unsalted butter or margarine    Ways to decrease sodium:   Add spices and herbs to foods instead of salt during cooking  Use salt-free seasonings to add flavor to foods  Examples include onion powder, garlic powder, basil, khan powder, paprika, and parsley  Try lemon or lime juice or vinegar to give foods a tart flavor  Use hot peppers, pepper, or cayenne pepper to add a spicy flavor  Do not keep a salt shaker at your kitchen table  This may help keep you from adding salt to food at the table  A teaspoon of salt has 2,300 mg of sodium  It may take time to get used to enjoying the natural flavor of food instead of adding salt  Talk to your healthcare provider before you use salt substitutes  Some salt substitutes have a high amount of potassium and need to be avoided if you have kidney disease  Choose low-sodium foods at restaurants  Meals from restaurants are often high in sodium  Some restaurants have nutrition information on the menu that tells you the amount of sodium in their foods  If possible, ask for your food to be prepared with less, or no salt  Shop for unsalted or low-sodium foods and snacks at the grocery store  Examples include unsalted or low-sodium broths, soups, and canned vegetables  Choose fresh or frozen vegetables instead  Choose unsalted nuts or seeds or fresh fruits or vegetables as snacks  Read food labels and choose salt-free, very low-sodium, or low-sodium foods  © Copyright [facility] 2022 Information is for End User's use only and may not be sold, redistributed or otherwise used for commercial purposes  All illustrations and images included in CareNotes® are the copyrighted property of A D A M , Inc  or Ra Valentino   The above information is an  only  It is not intended as medical advice for individual conditions or treatments  Talk to your doctor, nurse or pharmacist before following any medical regimen to see if it is safe and effective for you

## 2023-02-09 ENCOUNTER — TELEPHONE (OUTPATIENT)
Dept: SLEEP CENTER | Facility: CLINIC | Age: 48
End: 2023-02-09

## 2023-02-09 DIAGNOSIS — J30.9 ALLERGIC RHINITIS, UNSPECIFIED SEASONALITY, UNSPECIFIED TRIGGER: ICD-10-CM

## 2023-02-09 RX ORDER — FLUTICASONE PROPIONATE 50 MCG
SPRAY, SUSPENSION (ML) NASAL
Qty: 48 ML | Refills: 0 | Status: SHIPPED | OUTPATIENT
Start: 2023-02-09

## 2023-02-09 NOTE — TELEPHONE ENCOUNTER
----- Message from Shane Trevino DO sent at 2/7/2023  9:48 PM EST -----  approved  ----- Message -----  From: Guerline Suarez  Sent: 1/31/2023  10:08 AM EST  To: Sleep Medicine [facility] Provider    This Home sleep study needs approval      If approved please sign and return to clerical pool  If denied please include reasons why  Also provide alternative testing if warranted  Please sign and return to clerical pool

## 2023-02-13 ENCOUNTER — OFFICE VISIT (OUTPATIENT)
Dept: CARDIOLOGY CLINIC | Facility: CLINIC | Age: 48
End: 2023-02-13

## 2023-02-13 VITALS
DIASTOLIC BLOOD PRESSURE: 78 MMHG | WEIGHT: 171 LBS | HEIGHT: 64 IN | OXYGEN SATURATION: 98 % | BODY MASS INDEX: 29.19 KG/M2 | SYSTOLIC BLOOD PRESSURE: 118 MMHG | HEART RATE: 80 BPM

## 2023-02-13 DIAGNOSIS — E78.00 PURE HYPERCHOLESTEROLEMIA: Primary | ICD-10-CM

## 2023-02-13 NOTE — PROGRESS NOTES
Progress Note - Cardiology Office  [facility]    Valdez Carson 53 y o  female MRN: [id number]  : 1975  Encounter: [id number]      ASSESSMENT:  Pre diabetes:  Hemoglobin A1c is 6 1  Watching her diet     History of Chest pain     Pharmacologic stress, 2021:  Normal, EF 67%     Hypertension  BP today is 118/78 mmHg with heart rate of 80/min     TTE, 2021  EF 50%, mild LVH, mild MR, trace TR     History of celiac artery stenosis/compression  Abdominal ultrasound, 2017:  Impression  The aorta is patent and normal in caliber  The celiac, splenic and hepatic arteries are patent  There is evidence of celiac artery compression, velocities are within normal  range with deep inspiration (140cm/s) and are in stenotic range with expiration  (349 cm/s)  The superior and inferior mesenteric arteries are normal and patent in a  fasting state      Hyperlipidemia  2021:  LDL 67, triglycerides 79, HDL 60, normal liver enzymes  2022:  LDL 61, TG 76, HDL 52  Currently on Crestor 5 mg daily       RECOMMENDATIONS:  Repeat lipid profile and hepatic function panel  Retrieve old records or perform new if none found     Continue current cardiac medications  Low-salt and low-cholesterol diet  Regular cardiovascular exercise and weight loss      Please call [phone] if any questions  HPI :     Valdez Carson is a 53y o  year old female who came for follow up  She is doing well and denies any cardiac symptoms since her last visit  According to the patient she recently had some blood test done at her PCPs office but there is none in the computer  Patient will check with her primary care physician's office to see if it can be retrieved otherwise we will get new labs  REVIEW OF SYSTEMS:  Denies any new or acute cardiac symptoms    Denies chest pain, dyspnea, palpitations or syncope  To size any recent abdominal pain    Historical Information   Past Medical History: Diagnosis Date   • Anemia    • Asthma    • Colon polyp    • Ear problems    • GERD (gastroesophageal reflux disease)    • Hyperlipidemia    • Hypertension    • Neck pain    • PONV (postoperative nausea and vomiting)      Past Surgical History:   Procedure Laterality Date   •  SECTION     • COLONOSCOPY     • NOSE SURGERY     • SINUS SURGERY     • TUBAL LIGATION       Social History     Substance and Sexual Activity   Alcohol Use No     Social History     Substance and Sexual Activity   Drug Use No     Social History     Tobacco Use   Smoking Status Every Day   • Packs/day: 1 00   • Years: 33 00   • Pack years: 33 00   • Types: Cigarettes   Smokeless Tobacco Never     Family History:   Family History   Problem Relation Age of Onset   • Hypertension Father    • Bone cancer Maternal Grandmother 80   • Cancer Paternal Grandmother    • Stomach cancer Paternal Grandmother 80   • Hypertension Maternal Aunt    • Breast cancer Neg Hx    • Colon cancer Neg Hx        Meds/Allergies     Allergies   Allergen Reactions   • Amoxicillin Hives   • Ciprofloxacin Nausea Only       Current Outpatient Medications:   •  Advair Diskus 100-50 MCG/ACT inhaler, INHALE 1 PUFF 2 (TWO) TIMES A DAY   RINSE THROAT AFTER USE, Disp: , Rfl:   •  albuterol (2 5 mg/3 mL) 0 083 % nebulizer solution, Take 3 mL (2 5 mg total) by nebulization every 6 (six) hours as needed for wheezing or shortness of breath, Disp: 180 mL, Rfl: 0  •  albuterol (PROVENTIL HFA,VENTOLIN HFA) 90 mcg/act inhaler, TAKE 2 PUFFS BY MOUTH EVERY 6 HOURS AS NEEDED FOR WHEEZE, Disp: 18 g, Rfl: 0  •  carvedilol (COREG) 6 25 mg tablet, Take 1 tablet (6 25 mg total) by mouth 2 (two) times a day with meals, Disp: 180 tablet, Rfl: 1  •  ferrous sulfate 324 (65 Fe) mg, Take 1 tablet (324 mg total) by mouth daily, Disp: 90 tablet, Rfl: 3  •  fluticasone (FLONASE) 50 mcg/act nasal spray, SPRAY 2 SPRAYS INTO EACH NOSTRIL EVERY DAY, Disp: 48 mL, Rfl: 0  •  gabapentin (NEURONTIN) 800 mg tablet, TAKE 2 TABLETS (1,600 MG TOTAL) BY MOUTH DAILY, Disp: 60 tablet, Rfl: 2  •  lansoprazole (PREVACID) 30 mg capsule, TAKE 1 CAPSULE BY MOUTH EVERY DAY, Disp: 30 capsule, Rfl: 1  •  lisinopril (ZESTRIL) 5 mg tablet, Take 1 tablet (5 mg total) by mouth daily, Disp: 30 tablet, Rfl: 1  •  rosuvastatin (CRESTOR) 5 mg tablet, Take 1 tablet (5 mg total) by mouth daily, Disp: 90 tablet, Rfl: 2  •  VITAMIN D PO, Take by mouth, Disp: , Rfl:   •  benzonatate (TESSALON PERLES) 100 mg capsule, Take 1 capsule (100 mg total) by mouth 3 (three) times a day as needed for cough (Patient not taking: Reported on 2/13/2023), Disp: 20 capsule, Rfl: 0  •  levocetirizine (XYZAL) 5 MG tablet, Take 1 tablet (5 mg total) by mouth every evening (Patient not taking: Reported on 2/13/2023), Disp: 30 tablet, Rfl: 6    Vitals: Blood pressure 118/78, pulse 80, height 5' 4" (1 626 m), weight 77 6 kg (171 lb), SpO2 98 %  ?  Body mass index is 29 35 kg/m²  Vitals:    02/13/23 0914   Weight: 77 6 kg (171 lb)     BP Readings from Last 3 Encounters:   02/13/23 118/78   01/30/23 132/84   01/25/23 140/80       Physical Exam:    Neurologic:  Alert & oriented x 3, no new focal deficits, Not in any acute distress,  Constitutional:  Well developed, well nourished, non-toxic appearance   Eyes:  Pupil equal and reacting to light, conjunctiva normal,   HENT:  Atraumatic, oropharynx moist, Neck- normal range of motion, no tenderness,  Neck supple, No JVP, No LNP   Respiratory:  Bilateral air entry, mostly clear to auscultation  Cardiovascular: S1-S2 regular with a I/VI ejection systolic murmur   GI:  Soft, nondistended, normal bowel sounds, nontender, no hepatosplenomegaly appreciated  Musculoskeletal:  No tenderness, no deformities     Skin:  Well hydrated, no rash   Lymphatic:  No lymphadenopathy noted   Extremities:  No edema and distal pulses are present        Cardiac testing:   Results for orders placed during the hospital encounter of 21    Echo complete with contrast if indicated    Narrative  [address]  [address] [facility] [phone]    Transthoracic Echocardiogram  2D, M-mode, Doppler, and Color Doppler    Study date:  06-Aug-2021    Patient: Joella Schlatter  MR number: [id number]  Account number: [de-identified]  : 1975  Age: 40 years  Gender: Female  Status: Outpatient  Location: Echo lab  Height: 63 in  Weight: 155 8 lb  BP: 130/ 88 mmHg    Indications: HTN    Diagnoses: I11 9 - Hypertensive heart disease without heart failure    Sonographer:  IRLANDA Schmidt  Primary Physician:  Adam Ngo MD  Referring Physician:  Tom Lockett MD  Group:  [facility]  Interpreting Physician:  Ash Hills MD    SUMMARY    PROCEDURE INFORMATION:  This was a technically difficult study  Echocardiographic views were limited due to poor acoustic window availability, decreased penetration, and lung interference  LEFT VENTRICLE:  Systolic function was at the lower limits of normal  Ejection fraction was estimated to be around 50 %  This study was inadequate for the evaluation of regional wall motion  Wall thickness was mildly increased  There was mild concentric hypertrophy  MITRAL VALVE:  There was mild regurgitation  TRICUSPID VALVE:  There was trace regurgitation  HISTORY: PRIOR HISTORY: Tobacco abuse,HTN  PROCEDURE: The procedure was performed in the echo lab  This was a routine study  The transthoracic approach was used  The study included complete 2D imaging, M-mode, complete spectral Doppler, and color Doppler  The heart rate was 73 bpm,  at the start of the study  Images were obtained from the parasternal, apical, subcostal, and suprasternal notch acoustic windows  Echocardiographic views were limited due to poor acoustic window availability, decreased penetration, and  lung interference  This was a technically difficult study      LEFT VENTRICLE: Size was normal  Systolic function was at the lower limits of normal  Ejection fraction was estimated to be around 50 %  This study was inadequate for the evaluation of regional wall motion  Wall thickness was mildly  increased  There was mild concentric hypertrophy  DOPPLER: Left ventricular diastolic function parameters were normal for the patient's age  RIGHT VENTRICLE: The size was normal  Systolic function was normal  Wall thickness was normal     LEFT ATRIUM: Size was at the upper limits of normal     RIGHT ATRIUM: Size was normal     MITRAL VALVE: There was normal leaflet separation  DOPPLER: The transmitral velocity was within the normal range  There was no evidence for stenosis  There was mild regurgitation  AORTIC VALVE: The valve was not well visualized  DOPPLER: There was no evidence for stenosis  There was no significant regurgitation  TRICUSPID VALVE: DOPPLER: There was trace regurgitation  The tricuspid jet envelope definition was inadequate for estimation of RV systolic pressure  PULMONIC VALVE: DOPPLER: There was no significant regurgitation  PERICARDIUM: There was no thickening or calcification  There was no pericardial effusion  AORTA: The root exhibited normal size  SYSTEMIC VEINS: IVC: The inferior vena cava was not well visualized      SYSTEM MEASUREMENT TABLES    2D  EF (Teich): 48 51 %  %FS: 24 12 %  EDV(Teich): 72 77 ml  ESV(Teich): 37 47 ml  IVSd: 0 97 cm  LA Area: 18 06 cm2  LA Diam: 3 61 cm  LVEDV MOD A4C: 114 35 ml  LVEF MOD A4C: 43 41 %  LVESV MOD A4C: 64 71 ml  LVIDd: 4 07 cm  LVIDs: 3 08 cm  LVLd A4C: 8 63 cm  LVLs A4C: 7 27 cm  LVOT Diam: 1 95 cm  LVPWd: 0 95 cm  RA Area: 15 53 cm2  RVIDd: 3 21 cm  SV (Teich): 35 29 ml  SV MOD A4C: 49 63 ml    CW  AV Vmax: 0 81 m/s  AV maxP 63 mmHg    MM  TAPSE: 2 24 cm    PW  MV E/A Ratio: 1 23  E' Sept: 0 11 m/s  E/E' Sept: 5 88  LVOT Vmax: 0 63 m/s  LVOT maxP 57 mmHg  MV A Ortega: 0 53 m/s  MV Dec Sumner: 3 56 m/s2  MV DecT: 183 96 ms  MV E Ortega: 0 66 m/s  MV PHT: 53 35 ms  MVA By PHT: 4 12 cm2    [facility] Accredited Echocardiography Laboratory    Prepared and electronically signed by    Cyndy Velasquez MD  Signed 06-Aug-2021 13:15:27        Results for orders placed during the hospital encounter of 21    NM Myocardial Perfusion Spect (Pharmacological Induced Stress and/or Rest)    Narrative  [address] [place] [address], [address] [place] [address]  [phone]    Rest/Stress Gated SPECT Myocardial Perfusion Imaging After Regadenoson    Patient: Mike Beach  MR number: [id number]  Account number: [de-identified]  : 1975  Age: 40 years  Gender: Female  Status: Outpatient  Location: Stress lab  Height: 63 in  Weight: 155 lb  BP: 130/ 82 mmHg    Allergies: AMOXICILLIN, CIPROFLOXACIN    Diagnosis: R07 9 - Chest pain, unspecified    Primary Physician:  Nida Lundborg, MD  RN:  Swapna Chaudhary RN  Interpreting Physician:  Ziggy Bautista MD  Referring Physician:  Twyla Appiah MD  Technician:  Home Brown  Group:  [facility]  Report Prepared By[de-identified]  Swapna Chaudhary RN    INDICATIONS: Coronary artery disease  HISTORY: The patient is a 40year old  female  Chest pain status: chest pain  Coronary artery disease risk factors: smoking  Cardiovascular history: arrhythmia/tachycardia Co-morbidity: history of lung  disease/COVID,asthma,pneumonia  Medications: no cardiac drugs  Acute coronary syndrome: acute myocardial infarction recently ruled out  PHYSICAL EXAM: Baseline physical exam screening: no wheezes audible  REST ECG: Normal sinus rhythm  Normal baseline ECG  PROCEDURE: The study was performed in the the Stress lab  A regadenoson infusion pharmacologic stress test was performed  Gated SPECT myocardial perfusion imaging was performed after stress  Systolic blood pressure was 130 mmHg, at the  start of the study   Diastolic blood pressure was 82 mmHg, at the start of the study  The heart rate was 85 bpm, at the start of the study  IV double checked  Regadenoson protocol:  HR bpm SBP mmHg DBP mmHg Symptoms  Baseline 85 130 82 none  Immediate 130 146 78 mild dyspnea, dizziness, nausea  1 min 112 126 74 subsiding  2 min -- -- -- none  No medications or fluids given  The patient also performed low level exercise  STRESS SUMMARY: Duration of pharmacologic stress was 3 min  Maximal heart rate during stress was 130 bpm  The rate-pressure product for the peak heart rate and blood pressure was 68670  There was no chest pain during stress  The stress  test was terminated due to protocol completion  Pre oxygen saturation: 100 %  Peak oxygen saturation: 100 %  The stress ECG was negative for ischemia and normal  There were no stress arrhythmias or conduction abnormalities  ISOTOPE ADMINISTRATION:  Resting isotope administration Stress isotope administration  Agent Tetrofosmin Tetrofosmin  Dose 11 mCi 32 1 mCi  Date 05/26/2021 05/26/2021  Injection time 07:58 09:00  Imaging time 08:34 09:47  Injection-image interval 36 min 47 min    The radiopharmaceutical was injected at the peak effect of pharmacologic stress  MYOCARDIAL PERFUSION IMAGING:  The image quality was good  Left ventricular size was normal  The TID ratio was 0 98  PERFUSION DEFECTS:  -  There were no perfusion defects  GATED SPECT:  The calculated left ventricular ejection fraction was 67 %  Left ventricular ejection fraction was within normal limits by visual estimate  There was no left ventricular regional abnormality  SUMMARY:  -  Stress results: There was no chest pain during stress  -  ECG conclusions: The stress ECG was negative for ischemia and normal  There were no stress arrhythmias or conduction abnormalities  -  Perfusion imaging: There were no perfusion defects   -  Gated SPECT: The calculated left ventricular ejection fraction was 67 %   Left ventricular ejection fraction was within normal limits by visual estimate  There was no left ventricular regional abnormality  IMPRESSIONS: Normal study after vasodilation and low level exercise without reproduction of symptoms  The cardiovascular risk is low  Myocardial perfusion imaging was normal at rest and with stress  Prepared and signed by    Cinthya Morrow MD  Signed 05/26/2021 13:32:39    No results found for this or any previous visit  Imaging:  Chest X-Ray:   XR chest pa & lateral    Result Date: 11/10/2022  Impression No acute cardiopulmonary disease  Workstation performed: [id number]       CT-scan of the chest:     No CTA results available for this patient    Lab Review   Lab Results   Component Value Date    WBC 7 17 07/13/2022    HGB 12 9 07/13/2022    HCT 39 8 07/13/2022    MCV 84 07/13/2022    RDW 15 5 (H) 07/13/2022     07/13/2022     BMP:  Lab Results   Component Value Date    SODIUM 140 05/25/2021    K 4 1 05/25/2021     05/25/2021    CO2 26 05/25/2021    ANIONGAP 11 10/05/2015    BUN 9 05/25/2021    CREATININE 0 72 05/25/2021    GLUC 103 05/25/2021    CALCIUM 8 5 05/25/2021    EGFR 101 05/25/2021    MG 2 0 02/08/2017     LFT:  Lab Results   Component Value Date    AST 11 05/10/2021    ALT 18 05/10/2021    ALKPHOS 100 05/10/2021    TP 7 5 05/10/2021    ALB 3 9 05/10/2021      No components found for: [facility]  Lab Results   Component Value Date    [id number] 2 290 11/02/2022     Lab Results   Component Value Date    HGBA1C 5 8 (H) 11/02/2022     Lipid Profile:   Lab Results   Component Value Date    CHOLESTEROL 128 04/11/2022    HDL 52 04/11/2022    LDLCALC 61 04/11/2022    TRIG 76 04/11/2022     Lab Results   Component Value Date    CHOLESTEROL 128 04/11/2022    CHOLESTEROL 195 02/08/2017     Lab Results   Component Value Date    CKTOTAL 91 05/25/2021    TROPONINI <0 02 05/25/2021     Lab Results   Component Value Date    NTBNP 81 05/10/2021      No results found for this or any previous visit (from the past 672 hour(s))  Dr Nam Cruz MD, [facility]      "This note has been constructed using a voice recognition system  Therefore there may be syntax, spelling, and/or grammatical errors   Please call if you have any questions  "

## 2023-02-15 ENCOUNTER — TELEPHONE (OUTPATIENT)
Dept: PSYCHIATRY | Facility: CLINIC | Age: 48
End: 2023-02-15

## 2023-02-15 NOTE — TELEPHONE ENCOUNTER
Patient called and left voice mail requesting a call back from nurse and asked for them by name  Called and spoke with patient who declined to give more information regarding the call and stated nurse called her on 2/14/23 and he would know what it was about  For your review

## 2023-02-17 DIAGNOSIS — K21.9 GASTROESOPHAGEAL REFLUX DISEASE WITHOUT ESOPHAGITIS: ICD-10-CM

## 2023-02-17 DIAGNOSIS — J40 BRONCHITIS: ICD-10-CM

## 2023-02-17 DIAGNOSIS — R06.2 WHEEZING: ICD-10-CM

## 2023-02-17 DIAGNOSIS — J45.21 MILD INTERMITTENT ASTHMA WITH (ACUTE) EXACERBATION: ICD-10-CM

## 2023-02-17 DIAGNOSIS — M79.2 NEUROPATHIC PAIN: ICD-10-CM

## 2023-02-17 RX ORDER — GABAPENTIN 800 MG/1
1600 TABLET ORAL DAILY
Qty: 60 TABLET | Refills: 2 | Status: SHIPPED | OUTPATIENT
Start: 2023-02-17

## 2023-02-17 RX ORDER — LANSOPRAZOLE 30 MG/1
CAPSULE, DELAYED RELEASE ORAL
Qty: 30 CAPSULE | Refills: 1 | Status: SHIPPED | OUTPATIENT
Start: 2023-02-17

## 2023-02-17 RX ORDER — ALBUTEROL SULFATE 2.5 MG/3ML
SOLUTION RESPIRATORY (INHALATION)
Qty: 150 ML | Refills: 0 | Status: SHIPPED | OUTPATIENT
Start: 2023-02-17

## 2023-02-24 ENCOUNTER — HOSPITAL ENCOUNTER (OUTPATIENT)
Dept: SLEEP CENTER | Facility: CLINIC | Age: 48
Discharge: HOME | End: 2023-02-24

## 2023-02-24 DIAGNOSIS — R06.02 SHORTNESS OF BREATH ON EXERTION: ICD-10-CM

## 2023-02-24 DIAGNOSIS — I10 HYPERTENSION, UNSPECIFIED TYPE: ICD-10-CM

## 2023-02-25 LAB
CREAT ?TM UR-SCNC: 125.7 UMOL/L
EXT ALBUMIN URINE RANDOM: 0.9
MICROALBUMIN/CREAT UR: 7.2 MG/G{CREAT}

## 2023-02-28 DIAGNOSIS — E78.00 HYPERCHOLESTEROLEMIA: ICD-10-CM

## 2023-03-01 ENCOUNTER — TELEPHONE (OUTPATIENT)
Dept: CARDIOLOGY CLINIC | Facility: CLINIC | Age: 48
End: 2023-03-01

## 2023-03-01 NOTE — PROGRESS NOTES
Home Sleep Study Documentation    HOME STUDY DEVICE: Noxturnal no                                           Odalis G3 yes      Pre-Sleep Home Study:    Set-up and instructions performed by: MS    Technician performed demonstration for Patient: yes    Return demonstration performed by Patient: yes    Written instructions provided to Patient: yes    Patient signed consent form: yes        Post-Sleep Home Study:    Additional comments by Patient: None    Home Sleep Study Failed:no:    Failure reason: N/A    Reported or Detected: N/A    Scored by: Crys Best

## 2023-03-03 ENCOUNTER — TELEPHONE (OUTPATIENT)
Dept: SLEEP CENTER | Facility: CLINIC | Age: 48
End: 2023-03-03

## 2023-03-03 ENCOUNTER — OFFICE VISIT (OUTPATIENT)
Dept: FAMILY MEDICINE CLINIC | Facility: CLINIC | Age: 48
End: 2023-03-03

## 2023-03-03 ENCOUNTER — TELEPHONE (OUTPATIENT)
Dept: CARDIOLOGY CLINIC | Facility: CLINIC | Age: 48
End: 2023-03-03

## 2023-03-03 VITALS
BODY MASS INDEX: 29.19 KG/M2 | HEART RATE: 73 BPM | SYSTOLIC BLOOD PRESSURE: 100 MMHG | DIASTOLIC BLOOD PRESSURE: 70 MMHG | OXYGEN SATURATION: 98 % | RESPIRATION RATE: 16 BRPM | HEIGHT: 64 IN | WEIGHT: 171 LBS

## 2023-03-03 DIAGNOSIS — F17.200 CURRENT SMOKER: ICD-10-CM

## 2023-03-03 DIAGNOSIS — R73.03 PREDIABETES: ICD-10-CM

## 2023-03-03 DIAGNOSIS — E78.2 MIXED HYPERLIPIDEMIA: ICD-10-CM

## 2023-03-03 DIAGNOSIS — R80.9 URINE TEST POSITIVE FOR MICROALBUMINURIA: ICD-10-CM

## 2023-03-03 DIAGNOSIS — I10 ESSENTIAL HYPERTENSION: Primary | ICD-10-CM

## 2023-03-03 DIAGNOSIS — Z12.31 ENCOUNTER FOR SCREENING MAMMOGRAM FOR MALIGNANT NEOPLASM OF BREAST: ICD-10-CM

## 2023-03-03 LAB
CREAT UR-MCNC: 170 MG/DL
MICROALBUMIN UR-MCNC: 12.5 MG/L (ref 0–20)
MICROALBUMIN/CREAT 24H UR: 7 MG/G CREATININE (ref 0–30)

## 2023-03-03 RX ORDER — LISINOPRIL 5 MG/1
2.5 TABLET ORAL DAILY
Qty: 30 TABLET | Refills: 0
Start: 2023-03-03

## 2023-03-03 NOTE — PROGRESS NOTES
Assessment/Plan:   Diagnoses and all orders for this visit:    Essential hypertension  -     lisinopril (ZESTRIL) 5 mg tablet; Take 0 5 tablets (2 5 mg total) by mouth daily  - was at ER on 1/28/2023 for elevated HTN   - had c/s with Cardio (1/30/2023): "We will also increase her beta-blocker to 6 25 mg twice a day  She will continue with lisinopril  She will continue with a low-sodium diet"  - saw Cardio (2/13/2023) - cont current regimen and f/u in 6months   - had Sleep Study 2/24/2023   - BP in the office 110/70 and 100/70 - Coreg 6 25mg BID and ACEI 5mg QD   - (+) dizzy and lightheaded - will decrease ACEI from 5mg QD to 2 5mg QD  - urine microalbumin pending   - advised to f/u with Cardio - pt aware and agreeable   - RTO in 1month for f/u   Urine test positive for microalbuminuria  -     Cancel: POCT urine microalbumin/creatinine  -     Microalbumin / creatinine urine ratio    Prediabetes  - A1c 5 8  - repeat in 7/2023     Current smoker  - currently smokes 1PPD/35yrs - contemplating cutting back  - has been referred to Smoking Cessation Program     Mixed hyperlipidemia  - LDL (2/25/2023) = 58  - cont statin   - The ASCVD Risk score (Cheryl DK, et al , 2019) failed to calculate for the following reasons: The valid total cholesterol range is 130 to 320 mg/dL    Encounter for screening mammogram for malignant neoplasm of breast  -     Mammo screening bilateral w 3d & cad; Future          Subjective:    Patient ID: Soto Castro is a 53 y o  female  HPI  - 54yo F presents to the office for f/u   - was at ER on 1/28/2023 for elevated HTN   - had c/s with Cardio (1/30/2023): "We will also increase her beta-blocker to 6 25 mg twice a day  She will continue with lisinopril    She will continue with a low-sodium diet"  - saw Cardio (2/13/2023) - cont current regimen and f/u in 6months   - had Sleep Study 2/24/2023   - has an appt with Pulm on 3/16/2023  - has an appt with Endo on 4/25/2023   - BP in the office 110/70 and 100/70 -- Coreg 6 25mg BID and ACEI 5mg QD   - intermittent SOB - has been using her inhalers intermittently   - (+) dizzy and lightheaded       The following portions of the patient's history were reviewed and updated as appropriate: allergies, current medications, past family history, past medical history, past social history, past surgical history and problem list     Review of Systems  as per HPI    Objective:  /70 (BP Location: Left arm, Patient Position: Sitting, Cuff Size: Standard)   Pulse 73   Resp 16   Ht 5' 4" (1 626 m)   Wt 77 6 kg (171 lb)   SpO2 98%   BMI 29 35 kg/m²    Physical Exam  Vitals reviewed  Constitutional:       General: She is not in acute distress  Appearance: Normal appearance  She is not ill-appearing, toxic-appearing or diaphoretic  HENT:      Head: Normocephalic and atraumatic  Right Ear: External ear normal       Left Ear: External ear normal       Nose: Nose normal    Eyes:      General: No scleral icterus  Right eye: No discharge  Left eye: No discharge  Extraocular Movements: Extraocular movements intact  Conjunctiva/sclera: Conjunctivae normal    Cardiovascular:      Rate and Rhythm: Normal rate and regular rhythm  Heart sounds: Normal heart sounds  Pulmonary:      Effort: Pulmonary effort is normal  No respiratory distress  Breath sounds: Normal breath sounds  No stridor  No wheezing, rhonchi or rales  Abdominal:      Palpations: Abdomen is soft  Musculoskeletal:         General: Normal range of motion  Cervical back: Normal range of motion  Right lower leg: No edema  Left lower leg: No edema  Skin:     General: Skin is warm  Neurological:      General: No focal deficit present  Mental Status: She is alert and oriented to person, place, and time  Psychiatric:         Mood and Affect: Mood normal          Behavior: Behavior normal          BMI Counseling:  Body mass index is 29 35 kg/m²  The BMI is above normal  Nutrition recommendations include 3-5 servings of fruits/vegetables daily  Exercise recommendations include exercising 3-5 times per week

## 2023-03-03 NOTE — TELEPHONE ENCOUNTER
Pt called to report low blood pressure reading at 100/85 101/85 at her doc appt with PCP, reporting lightheadedness followed with dizziness  PCP recommended cutting Lisinopril in half  Also PCP said to follow up with cardio to make sure that's okay

## 2023-03-03 NOTE — TELEPHONE ENCOUNTER
Call placed to patient  Advised sleep study is resulted and does not show JJ, does show moderate snoring  Patient will follow-up with ordering provider, Dr Paulo Grimes

## 2023-03-04 RX ORDER — ROSUVASTATIN CALCIUM 5 MG/1
5 TABLET, COATED ORAL DAILY
Qty: 90 TABLET | Refills: 3 | Status: SHIPPED | OUTPATIENT
Start: 2023-03-04

## 2023-03-06 ENCOUNTER — TELEPHONE (OUTPATIENT)
Dept: CARDIOLOGY CLINIC | Facility: CLINIC | Age: 48
End: 2023-03-06

## 2023-03-06 NOTE — TELEPHONE ENCOUNTER
----- Message from Celena Hatchet, MD sent at 3/4/2023  6:56 PM EST -----  Please call and inform patient that the blood test showed that  cholesterol is in normal range, and  liver enzymes are normal

## 2023-03-06 NOTE — TELEPHONE ENCOUNTER
----- Message from Khloe Campos MD sent at 3/6/2023 12:45 PM EST -----  Her sleep study was negative for evidence of major drops in her oxygen  She had moderate snoring  She does not meet criteria for significant obstructive sleep apnea    Continue good sleep hygiene

## 2023-03-06 NOTE — TELEPHONE ENCOUNTER
I reviewed her blood work  Her cholesterol is at goal with her LDL below 70    She has normal liver function test

## 2023-03-10 ENCOUNTER — TELEPHONE (OUTPATIENT)
Dept: FAMILY MEDICINE CLINIC | Facility: CLINIC | Age: 48
End: 2023-03-10

## 2023-03-27 ENCOUNTER — RA CDI HCC (OUTPATIENT)
Dept: OTHER | Facility: HOSPITAL | Age: 48
End: 2023-03-27

## 2023-03-27 NOTE — PROGRESS NOTES
[facility]  coding opportunities       Chart reviewed, no opportunity found: CHART REVIEWED, NO OPPORTUNITY FOUND        Patients Insurance        Commercial Insurance: [employer]

## 2023-04-04 ENCOUNTER — OFFICE VISIT (OUTPATIENT)
Dept: FAMILY MEDICINE CLINIC | Facility: CLINIC | Age: 48
End: 2023-04-04

## 2023-04-04 VITALS
HEART RATE: 74 BPM | HEIGHT: 64 IN | WEIGHT: 172 LBS | OXYGEN SATURATION: 99 % | BODY MASS INDEX: 29.37 KG/M2 | SYSTOLIC BLOOD PRESSURE: 120 MMHG | DIASTOLIC BLOOD PRESSURE: 80 MMHG

## 2023-04-04 DIAGNOSIS — I10 ESSENTIAL HYPERTENSION: Primary | ICD-10-CM

## 2023-04-04 DIAGNOSIS — Z12.31 ENCOUNTER FOR SCREENING MAMMOGRAM FOR MALIGNANT NEOPLASM OF BREAST: ICD-10-CM

## 2023-04-04 DIAGNOSIS — J40 BRONCHITIS: ICD-10-CM

## 2023-04-04 DIAGNOSIS — N10 ACUTE PYELONEPHRITIS: ICD-10-CM

## 2023-04-04 DIAGNOSIS — R73.03 PREDIABETES: ICD-10-CM

## 2023-04-04 DIAGNOSIS — F17.200 CURRENT SMOKER: ICD-10-CM

## 2023-04-04 LAB
SL AMB  POCT GLUCOSE, UA: NORMAL
SL AMB LEUKOCYTE ESTERASE,UA: 75
SL AMB POCT BILIRUBIN,UA: 1
SL AMB POCT BLOOD,UA: 50
SL AMB POCT CLARITY,UA: CLEAR
SL AMB POCT COLOR,UA: YELLOW
SL AMB POCT KETONES,UA: 15
SL AMB POCT NITRITE,UA: NORMAL
SL AMB POCT PH,UA: 5
SL AMB POCT SPECIFIC GRAVITY,UA: 1.02
SL AMB POCT URINE PROTEIN: NORMAL
SL AMB POCT UROBILINOGEN: NORMAL

## 2023-04-04 RX ORDER — LISINOPRIL 2.5 MG/1
2.5 TABLET ORAL DAILY
Qty: 90 TABLET | Refills: 0 | Status: SHIPPED | OUTPATIENT
Start: 2023-04-04

## 2023-04-04 RX ORDER — BUPROPION HYDROCHLORIDE 150 MG/1
150 TABLET, EXTENDED RELEASE ORAL 2 TIMES DAILY
COMMUNITY
Start: 2023-03-16 | End: 2023-04-15

## 2023-04-04 RX ORDER — AZELASTINE HYDROCHLORIDE 137 UG/1
SPRAY, METERED NASAL
COMMUNITY
Start: 2023-03-16

## 2023-04-04 RX ORDER — NITROFURANTOIN 25; 75 MG/1; MG/1
100 CAPSULE ORAL 2 TIMES DAILY
Qty: 14 CAPSULE | Refills: 0 | Status: SHIPPED | OUTPATIENT
Start: 2023-04-04 | End: 2023-04-11

## 2023-04-04 NOTE — PROGRESS NOTES
"Assessment/Plan:   Diagnoses and all orders for this visit:    Essential hypertension  -     lisinopril (ZESTRIL) 2 5 mg tablet; Take 1 tablet (2 5 mg total) by mouth daily  - BP stable in the office  - nml urine microalbumin  - had c/s with Cardio (1/30/2023): \"We will also increase her beta-blocker to 6 25 mg twice a day   She will continue with lisinopril  Lylia Ee will continue with a low-sodium diet\"  - saw Cardio (2/13/2023) - cont current regimen and f/u in 6months   - cont BB and ACEI 2 5mg QD   - RTO in 3months for f/u - pt aware and agreeable     Current smoker  - currently smokes 1PPD/35yrs - contemplating cutting back  - has been referred to Smoking Cessation Program     Prediabetes  -     HEMOGLOBIN A1C W/ EAG ESTIMATION; Future  - A1c 5 8 - repeat in 7/2023     Acute pyelonephritis  -     POCT urine dip  -     nitrofurantoin (MACROBID) 100 mg capsule; Take 1 capsule (100 mg total) by mouth 2 (two) times a day for 7 days  - urine dip POS for UTI - will empirically treat with Macrobid 100mg BID x7days   - sample sent out for UCx    Bronchitis  - nml Pulm exam in the office today     Encounter for screening mammogram for malignant neoplasm of breast  - has script for Mammo - advised to schedule      Other orders  -     buPROPion (WELLBUTRIN SR) 150 mg 12 hr tablet; Take 150 mg by mouth 2 (two) times a day  -     Azelastine HCl 137 MCG/SPRAY SOLN; 1 SPRAY INTO EACH NOSTRIL 2 (TWO) TIMES A DAY  USE IN EACH NOSTRIL AS DIRECTED          Subjective:    Patient ID: Yunier Matamoros is a 53 y o  female    HPI   56yo F presents to the office for f/u   - BP in the office 130/80 and 120/80 on my repeat  - nml urine microalbumin (3/3/2023)   - of note, her antihypertensive regimen was changed at last OV - Coreg 6 25mg BID and ACEI 2 5mg QD (down from 5mg QD as pt was feeling dizzy and lightheaded and noted to have -110/70s) - pt did make her Cardio aware   - (+) had burning with urination 3x on Saturday but not " "since then, (+) increased urinary frequency   - denies F/C/suprapubic or CVA tenderness    - has also started to get L-sided HA, did have a viral URI/Bronchitis 2wks ago - has been using her nebs       The following portions of the patient's history were reviewed and updated as appropriate: allergies, current medications, past family history, past medical history, past social history, past surgical history and problem list     Review of Systems  as per HPI    Objective:  /80 (BP Location: Left arm, Patient Position: Sitting, Cuff Size: Standard)   Pulse 74   Ht 5' 4\" (1 626 m)   Wt 78 kg (172 lb)   SpO2 99%   BMI 29 52 kg/m²    Physical Exam  Vitals reviewed  Constitutional:       General: She is not in acute distress  Appearance: Normal appearance  She is not ill-appearing, toxic-appearing or diaphoretic  HENT:      Head: Normocephalic and atraumatic  Right Ear: External ear normal       Left Ear: External ear normal       Nose: Nose normal    Eyes:      General: No scleral icterus  Right eye: No discharge  Left eye: No discharge  Extraocular Movements: Extraocular movements intact  Conjunctiva/sclera: Conjunctivae normal    Cardiovascular:      Rate and Rhythm: Normal rate and regular rhythm  Heart sounds: Normal heart sounds  Pulmonary:      Effort: Pulmonary effort is normal  No respiratory distress  Breath sounds: Normal breath sounds  No stridor  No wheezing, rhonchi or rales  Abdominal:      Palpations: Abdomen is soft  Tenderness: There is no abdominal tenderness  There is no right CVA tenderness or left CVA tenderness  Musculoskeletal:         General: Normal range of motion  Cervical back: Normal range of motion  Right lower leg: No edema  Left lower leg: No edema  Skin:     General: Skin is warm  Neurological:      General: No focal deficit present        Mental Status: She is alert and oriented to person, place, and " time    Psychiatric:         Mood and Affect: Mood normal          Behavior: Behavior normal          BMI Counseling: Body mass index is 29 52 kg/m²  The BMI is above normal  Nutrition recommendations include 3-5 servings of fruits/vegetables daily  Exercise recommendations include exercising 3-5 times per week

## 2023-04-06 ENCOUNTER — TELEPHONE (OUTPATIENT)
Dept: FAMILY MEDICINE CLINIC | Facility: CLINIC | Age: 48
End: 2023-04-06

## 2023-04-06 NOTE — TELEPHONE ENCOUNTER
----- Message from Rose Vincent DO sent at 4/6/2023  1:24 PM EDT -----  Ucx - no growth   Was empirically treated with Macrobid for UTI

## 2023-05-11 ENCOUNTER — TELEPHONE (OUTPATIENT)
Dept: PSYCHIATRY | Facility: CLINIC | Age: 48
End: 2023-05-11

## 2023-05-11 NOTE — TELEPHONE ENCOUNTER
Patient contacted the office requesting a call back from provider  Patient stated provider called her but she miss her call

## 2023-05-16 DIAGNOSIS — E78.00 HYPERCHOLESTEROLEMIA: ICD-10-CM

## 2023-05-16 RX ORDER — ROSUVASTATIN CALCIUM 5 MG/1
5 TABLET, COATED ORAL DAILY
Qty: 90 TABLET | Refills: 0 | Status: SHIPPED | OUTPATIENT
Start: 2023-05-16

## 2023-05-19 DIAGNOSIS — E61.1 LOW SERUM IRON: ICD-10-CM

## 2023-05-19 RX ORDER — FERROUS SULFATE TAB EC 324 MG (65 MG FE EQUIVALENT) 324 (65 FE) MG
324 TABLET DELAYED RESPONSE ORAL DAILY
Qty: 90 TABLET | Refills: 0 | Status: SHIPPED | OUTPATIENT
Start: 2023-05-19

## 2023-06-29 LAB — HBA1C MFR BLD HPLC: 5.9 %

## 2023-07-06 RX ORDER — FLUTICASONE PROPIONATE AND SALMETEROL 50; 250 UG/1; UG/1
POWDER RESPIRATORY (INHALATION)
COMMUNITY
Start: 2023-05-01

## 2023-07-07 ENCOUNTER — OFFICE VISIT (OUTPATIENT)
Dept: FAMILY MEDICINE CLINIC | Facility: CLINIC | Age: 48
End: 2023-07-07
Payer: COMMERCIAL

## 2023-07-07 VITALS
HEART RATE: 79 BPM | HEIGHT: 64 IN | OXYGEN SATURATION: 96 % | SYSTOLIC BLOOD PRESSURE: 110 MMHG | DIASTOLIC BLOOD PRESSURE: 70 MMHG | BODY MASS INDEX: 29.53 KG/M2 | RESPIRATION RATE: 16 BRPM | WEIGHT: 173 LBS

## 2023-07-07 DIAGNOSIS — E61.1 LOW SERUM IRON: ICD-10-CM

## 2023-07-07 DIAGNOSIS — F17.200 CURRENT SMOKER: ICD-10-CM

## 2023-07-07 DIAGNOSIS — R09.82 PND (POST-NASAL DRIP): ICD-10-CM

## 2023-07-07 DIAGNOSIS — Z13.29 SCREENING FOR THYROID DISORDER: ICD-10-CM

## 2023-07-07 DIAGNOSIS — R73.03 PREDIABETES: ICD-10-CM

## 2023-07-07 DIAGNOSIS — I10 ESSENTIAL HYPERTENSION: Primary | ICD-10-CM

## 2023-07-07 DIAGNOSIS — E55.9 VITAMIN D INSUFFICIENCY: ICD-10-CM

## 2023-07-07 DIAGNOSIS — E78.00 HYPERCHOLESTEROLEMIA: ICD-10-CM

## 2023-07-07 DIAGNOSIS — F11.21 OPIOID DEPENDENCE IN REMISSION (HCC): ICD-10-CM

## 2023-07-07 PROCEDURE — 99214 OFFICE O/P EST MOD 30 MIN: CPT | Performed by: FAMILY MEDICINE

## 2023-07-07 RX ORDER — LISINOPRIL 2.5 MG/1
2.5 TABLET ORAL DAILY
Qty: 90 TABLET | Refills: 1 | Status: SHIPPED | OUTPATIENT
Start: 2023-07-07

## 2023-07-07 RX ORDER — ROSUVASTATIN CALCIUM 5 MG/1
5 TABLET, COATED ORAL DAILY
Qty: 90 TABLET | Refills: 1 | Status: SHIPPED | OUTPATIENT
Start: 2023-07-07

## 2023-07-07 RX ORDER — FERROUS SULFATE TAB EC 324 MG (65 MG FE EQUIVALENT) 324 (65 FE) MG
324 TABLET DELAYED RESPONSE ORAL DAILY
Qty: 90 TABLET | Refills: 1 | Status: SHIPPED | OUTPATIENT
Start: 2023-07-07

## 2023-07-07 NOTE — PROGRESS NOTES
Assessment/Plan:   Diagnoses and all orders for this visit:    Essential hypertension  -     lisinopril (ZESTRIL) 2.5 mg tablet; Take 1 tablet (2.5 mg total) by mouth daily  -     Comprehensive metabolic panel; Future  -     Albumin / creatinine urine ratio; Future  - BP stable in the office  - nml urine microalbumin  - had c/s with Cardio (1/30/2023): "We will also increase her beta-blocker to 6.25 mg twice a day.  She will continue with lisinopril. Liudmila Jimenez will continue with a low-sodium diet"  - saw Cardio (2/13/2023) - cont current regimen and f/u in 6months   - cont BB and ACEI 2.5mg QD   - RTO in 6months with labs for f/u and annual exam- pt aware and agreeable     Current smoker  - currently smokes 1PPD/35yrs - contemplating cutting back  - has been referred to Smoking Cessation Program     Prediabetes  -     HEMOGLOBIN A1C W/ EAG ESTIMATION; Future  - A1c = 5.9   - diet/exercise  - RTO in 6months with labs for f/u and annual exam- pt aware and agreeable     Low serum iron  -     ferrous sulfate 324 (65 Fe) mg; Take 1 tablet (324 mg total) by mouth daily  -     CBC and differential; Future  -     Iron Panel (Includes Ferritin, Iron Sat%, Iron, and TIBC); Future    Hypercholesterolemia  -     rosuvastatin (CRESTOR) 5 mg tablet; Take 1 tablet (5 mg total) by mouth daily  -     Lipid panel; Future    PND (post-nasal drip)  - cont Flonase, Xyzal and advised to add OTC Mucinex     Vitamin D insufficiency  -     Vitamin D 25 hydroxy; Future  - cont Vit D supplements     Screening for thyroid disorder  -     TSH, 3rd generation with Free T4 reflex; Future    Other orders  -     Advair Diskus 250-50 MCG/ACT inhaler; INHALE 1 PUFF 2 (TWO) TIMES A DAY. TO RINSE THROAT AFTER USE          Subjective:    Patient ID: Thomas Read is a 53 y.o. female.   HPI  56yo F presents to the office for f/u   - BP in the office 110/70   - nml urine microalbumin (3/3/2023)   - of note, her antihypertensive regimen was changed on 3/3/2023 - Coreg 6.25mg BID and ACEI 2.5mg QD (down from 5mg QD as pt was feeling dizzy and lightheaded and noted to have -110/70s) - pt did make her Cardio aware   - denies F/C/N/V/HA/dizziness or lightheadedness/wheezing/abd pain/LE edema   - (+) "tickle in throat" and dry cough worse in the AM and PM   - A1c (6/29/2023) = 5.9   - MIL passed away a month ago  - daughter getting  8/2024  - has script for Mammo but has not yet scheduled       The following portions of the patient's history were reviewed and updated as appropriate: allergies, current medications, past family history, past medical history, past social history, past surgical history and problem list.    Review of Systems  as per HPI    Objective:  /70   Pulse 79   Resp 16   Ht 5' 4" (1.626 m)   Wt 78.5 kg (173 lb)   SpO2 96%   BMI 29.70 kg/m²    Physical Exam  Vitals reviewed. Constitutional:       General: She is not in acute distress. Appearance: Normal appearance. She is normal weight. She is not ill-appearing, toxic-appearing or diaphoretic. HENT:      Head: Normocephalic and atraumatic. Right Ear: External ear normal.      Left Ear: External ear normal.      Nose: Nose normal.      Mouth/Throat:      Mouth: Mucous membranes are moist.      Pharynx: Posterior oropharyngeal erythema present. No oropharyngeal exudate. Eyes:      General: No scleral icterus. Right eye: No discharge. Left eye: No discharge. Extraocular Movements: Extraocular movements intact. Conjunctiva/sclera: Conjunctivae normal.   Cardiovascular:      Rate and Rhythm: Normal rate and regular rhythm. Heart sounds: Normal heart sounds. No murmur heard. No friction rub. No gallop. Pulmonary:      Effort: Pulmonary effort is normal. No respiratory distress. Breath sounds: Normal breath sounds. No stridor. No wheezing, rhonchi or rales. Abdominal:      Palpations: Abdomen is soft.    Musculoskeletal: General: Normal range of motion. Cervical back: Normal range of motion. Right lower leg: No edema. Left lower leg: No edema. Skin:     General: Skin is warm. Neurological:      General: No focal deficit present. Mental Status: She is alert and oriented to person, place, and time. Psychiatric:         Mood and Affect: Mood normal.         Behavior: Behavior normal.         BMI Counseling: Body mass index is 29.7 kg/m². The BMI is above normal. Nutrition recommendations include 3-5 servings of fruits/vegetables daily. Exercise recommendations include exercising 3-5 times per week and strength training exercises. Depression Screening Follow-up Plan: Patient's depression screening was positive with a PHQ-2 score of 0. Their PHQ-9 score was . Clinically patient does not have depression. No treatment is required.

## 2023-07-10 DIAGNOSIS — R09.89 LABILE HYPERTENSION: ICD-10-CM

## 2023-07-10 DIAGNOSIS — I10 ESSENTIAL HYPERTENSION: ICD-10-CM

## 2023-07-10 RX ORDER — LISINOPRIL 5 MG/1
5 TABLET ORAL DAILY
Qty: 30 TABLET | Refills: 0 | OUTPATIENT
Start: 2023-07-10

## 2023-07-10 RX ORDER — CARVEDILOL 6.25 MG/1
TABLET ORAL
Qty: 180 TABLET | Refills: 1 | Status: SHIPPED | OUTPATIENT
Start: 2023-07-10

## 2023-07-19 DIAGNOSIS — J30.9 ALLERGIC RHINITIS, UNSPECIFIED SEASONALITY, UNSPECIFIED TRIGGER: ICD-10-CM

## 2023-07-19 RX ORDER — FLUTICASONE PROPIONATE 50 MCG
2 SPRAY, SUSPENSION (ML) NASAL DAILY
Qty: 48 ML | Refills: 0 | Status: SHIPPED | OUTPATIENT
Start: 2023-07-19

## 2023-08-12 DIAGNOSIS — J30.9 ALLERGIC RHINITIS, UNSPECIFIED SEASONALITY, UNSPECIFIED TRIGGER: ICD-10-CM

## 2023-08-14 RX ORDER — FLUTICASONE PROPIONATE 50 MCG
2 SPRAY, SUSPENSION (ML) NASAL DAILY
Qty: 48 ML | Refills: 0 | Status: SHIPPED | OUTPATIENT
Start: 2023-08-14

## 2023-08-21 NOTE — PROGRESS NOTES
Progress Note - Cardiology Office  [facility]    Paola Trevizo 53 y.o. female MRN: [id number]  : 1975  Encounter: [id number]      ASSESSMENT:  Pre diabetes:  Hemoglobin A1c is 6.1  Diet controlled     History of Chest pain     Pharmacologic stress, 2021:    Normal, EF 67%     Hypertension  BP today is 114/74 mmHg with heart rate of 77/min     TTE, 2021  EF 50%, mild LVH, mild MR, trace TR     History of celiac artery stenosis/compression  Abdominal ultrasound, 2017:  Impression  The aorta is patent and normal in caliber. The celiac, splenic and hepatic arteries are patent. There is evidence of celiac artery compression, velocities are within normal  range with deep inspiration (140cm/s) and are in stenotic range with expiration  (349 cm/s). The superior and inferior mesenteric arteries are normal and patent in a  fasting state.     Hyperlipidemia  2021:  LDL 67, triglycerides 79, HDL 60, normal liver enzymes  2022:  LDL 61, TG 76, HDL 52  Currently on Crestor 5 mg daily  2023: LDL 58, TG 77, HDL 56, normal AST and ALT     RECOMMENDATIONS:  Continue current cardiac medications  Low-salt and low-cholesterol diet  Regular cardiovascular exercise and weight loss         Please call [phone] if any questions. HPI :     Paola Trevizo is a 53y.o. year old female who came for follow up. She generally feels well although she feels exhausted at sometimes. She has a daughter's wedding coming up. She denies any specific cardiac symptoms  Her blood pressure is well controlled and her last lipid profile was also good    REVIEW OF SYSTEMS:  Denies any new or acute cardiac symptoms.   Denies chest pain, dyspnea, palpitations or syncope    Historical Information   Past Medical History:   Diagnosis Date   • Anemia    • Asthma    • Colon polyp    • Ear problems    • GERD (gastroesophageal reflux disease)    • Hyperlipidemia    • Hypertension    • Neck pain    • PONV (postoperative nausea and vomiting)    • Sleep apnea      Past Surgical History:   Procedure Laterality Date   •  SECTION     • COLONOSCOPY     • NOSE SURGERY     • SINUS SURGERY     • TUBAL LIGATION       Social History     Substance and Sexual Activity   Alcohol Use No     Social History     Substance and Sexual Activity   Drug Use No     Social History     Tobacco Use   Smoking Status Every Day   • Packs/day: 1.00   • Years: 33.00   • Total pack years: 33.00   • Types: Cigarettes   Smokeless Tobacco Never     Family History:   Family History   Problem Relation Age of Onset   • Hypertension Father    • Bone cancer Maternal Grandmother 80   • Cancer Paternal Grandmother    • Stomach cancer Paternal Grandmother 80   • Hypertension Maternal Aunt    • Breast cancer Neg Hx    • Colon cancer Neg Hx        Meds/Allergies     Allergies   Allergen Reactions   • Amoxicillin Hives   • Ciprofloxacin Nausea Only       Current Outpatient Medications:   •  Advair Diskus 100-50 MCG/ACT inhaler, INHALE 1 PUFF 2 (TWO) TIMES A DAY. RINSE THROAT AFTER USE (Patient not taking: Reported on 2023), Disp: , Rfl:   •  Advair Diskus 250-50 MCG/ACT inhaler, INHALE 1 PUFF 2 (TWO) TIMES A DAY. TO RINSE THROAT AFTER USE, Disp: , Rfl:   •  albuterol (2.5 mg/3 mL) 0.083 % nebulizer solution, TAKE 3 ML (2.5 MG TOTAL) BY NEBULIZATION EVERY 6 HOURS AS NEEDED FOR WHEEZING OR SHORTNESS OF BREATH, Disp: 150 mL, Rfl: 0  •  albuterol (PROVENTIL HFA,VENTOLIN HFA) 90 mcg/act inhaler, TAKE 2 PUFFS BY MOUTH EVERY 6 HOURS AS NEEDED FOR WHEEZE, Disp: 18 g, Rfl: 0  •  Azelastine HCl 137 MCG/SPRAY SOLN, 1 SPRAY INTO EACH NOSTRIL 2 (TWO) TIMES A DAY.  USE IN EACH NOSTRIL AS DIRECTED, Disp: , Rfl:   •  buPROPion (WELLBUTRIN SR) 150 mg 12 hr tablet, Take 150 mg by mouth 2 (two) times a day, Disp: , Rfl:   •  carvedilol (COREG) 6.25 mg tablet, TAKE 1 TABLET BY MOUTH TWICE A DAY WITH MEALS, Disp: 180 tablet, Rfl: 1  •  ferrous sulfate 324 (65 Fe) mg, Take 1 tablet (324 mg total) by mouth daily, Disp: 90 tablet, Rfl: 1  •  fluticasone (FLONASE) 50 mcg/act nasal spray, 2 sprays into each nostril daily, Disp: 48 mL, Rfl: 0  •  gabapentin (NEURONTIN) 800 mg tablet, TAKE 2 TABLETS (1,600 MG TOTAL) BY MOUTH DAILY, Disp: 60 tablet, Rfl: 2  •  lansoprazole (PREVACID) 30 mg capsule, TAKE 1 CAPSULE BY MOUTH EVERY DAY, Disp: 90 capsule, Rfl: 1  •  levocetirizine (XYZAL) 5 MG tablet, Take 1 tablet (5 mg total) by mouth every evening, Disp: 30 tablet, Rfl: 6  •  lisinopril (ZESTRIL) 2.5 mg tablet, Take 1 tablet (2.5 mg total) by mouth daily, Disp: 90 tablet, Rfl: 1  •  rosuvastatin (CRESTOR) 5 mg tablet, Take 1 tablet (5 mg total) by mouth daily, Disp: 90 tablet, Rfl: 1  •  VITAMIN D PO, Take by mouth, Disp: , Rfl:     Vitals: There were no vitals taken for this visit. There is no height or weight on file to calculate BMI. There were no vitals filed for this visit. BP Readings from Last 3 Encounters:   07/07/23 110/70   04/04/23 120/80   03/03/23 100/70       Physical Exam:  Physical Exam    Neurologic:  Alert & oriented x 3, no new focal deficits, Not in any acute distress,  Constitutional: Overweight  Eyes:  Pupil equal and reacting to light, conjunctiva normal,   HENT:  Atraumatic, oropharynx moist, Neck- normal range of motion, no tenderness,  Neck supple, No JVP, No LNP   Respiratory:  Bilateral air entry, mostly clear to auscultation  Cardiovascular: S1-S2 regular rhythm with 1/6 systolic murmur  GI:  Soft, nondistended, normal bowel sounds, nontender, no hepatosplenomegaly appreciated. Musculoskeletal: no tenderness, no deformities.    Skin:  Well hydrated, no rash   Lymphatic:  No lymphadenopathy noted   Extremities:  No edema and distal pulses are present        Diagnostic Studies Review Cardio:      EKG: Normal sinus rhythm, heart rate 77/min    Cardiac testing:   Results for orders placed during the hospital encounter of 08/06/21    Echo complete with contrast if indicated    [place] [address] [address] [facility].  [place] [address] [address]  [phone]    Transthoracic Echocardiogram  2D, M-mode, Doppler, and Color Doppler    Study date:  06-Aug-2021    Patient: Gian Almanza  MR number: [id number]  Account number: [de-identified]  : 1975  Age: 40 years  Gender: Female  Status: Outpatient  Location: Echo lab  Height: 63 in  Weight: 155.8 lb  BP: 130/ 88 mmHg    Indications: HTN    Diagnoses: I11.9 - Hypertensive heart disease without heart failure    Sonographer:  Hillary Gottron, RCS  Primary Physician:  Deedee Johnson MD  Referring Physician:  Westley Hussein MD  Group:  [facility]  Interpreting Physician:  Carlita Deshpande MD    SUMMARY    PROCEDURE INFORMATION:  This was a technically difficult study. Echocardiographic views were limited due to poor acoustic window availability, decreased penetration, and lung interference. LEFT VENTRICLE:  Systolic function was at the lower limits of normal. Ejection fraction was estimated to be around 50 %. This study was inadequate for the evaluation of regional wall motion. Wall thickness was mildly increased. There was mild concentric hypertrophy. MITRAL VALVE:  There was mild regurgitation. TRICUSPID VALVE:  There was trace regurgitation. HISTORY: PRIOR HISTORY: Tobacco abuse,HTN. PROCEDURE: The procedure was performed in the echo lab. This was a routine study. The transthoracic approach was used. The study included complete 2D imaging, M-mode, complete spectral Doppler, and color Doppler. The heart rate was 73 bpm,  at the start of the study. Images were obtained from the parasternal, apical, subcostal, and suprasternal notch acoustic windows. Echocardiographic views were limited due to poor acoustic window availability, decreased penetration, and  lung interference. This was a technically difficult study.     LEFT VENTRICLE: Size was normal. Systolic function was at the lower limits of normal. Ejection fraction was estimated to be around 50 %. This study was inadequate for the evaluation of regional wall motion. Wall thickness was mildly  increased. There was mild concentric hypertrophy. DOPPLER: Left ventricular diastolic function parameters were normal for the patient's age. RIGHT VENTRICLE: The size was normal. Systolic function was normal. Wall thickness was normal.    LEFT ATRIUM: Size was at the upper limits of normal.    RIGHT ATRIUM: Size was normal.    MITRAL VALVE: There was normal leaflet separation. DOPPLER: The transmitral velocity was within the normal range. There was no evidence for stenosis. There was mild regurgitation. AORTIC VALVE: The valve was not well visualized. DOPPLER: There was no evidence for stenosis. There was no significant regurgitation. TRICUSPID VALVE: DOPPLER: There was trace regurgitation. The tricuspid jet envelope definition was inadequate for estimation of RV systolic pressure. PULMONIC VALVE: DOPPLER: There was no significant regurgitation. PERICARDIUM: There was no thickening or calcification. There was no pericardial effusion. AORTA: The root exhibited normal size. SYSTEMIC VEINS: IVC: The inferior vena cava was not well visualized.     SYSTEM MEASUREMENT TABLES    2D  EF (Teich): 48.51 %  %FS: 24.12 %  EDV(Teich): 72.77 ml  ESV(Teich): 37.47 ml  IVSd: 0.97 cm  LA Area: 18.06 cm2  LA Diam: 3.61 cm  LVEDV MOD A4C: 114.35 ml  LVEF MOD A4C: 43.41 %  LVESV MOD A4C: 64.71 ml  LVIDd: 4.07 cm  LVIDs: 3.08 cm  LVLd A4C: 8.63 cm  LVLs A4C: 7.27 cm  LVOT Diam: 1.95 cm  LVPWd: 0.95 cm  RA Area: 15.53 cm2  RVIDd: 3.21 cm  SV (Teich): 35.29 ml  SV MOD A4C: 49.63 ml    CW  AV Vmax: 0.81 m/s  AV maxP.63 mmHg    MM  TAPSE: 2.24 cm    PW  MV E/A Ratio: 1.23  E' Sept: 0.11 m/s  E/E' Sept: 5.88  LVOT Vmax: 0.63 m/s  LVOT maxP.57 mmHg  MV A [place]tega: 0.53 m/s  MV Dec [place]: 3.56 m/s2  MV DecT: 183.96 ms  MV E Ortega: 0.66 m/s  MV PHT: 53.35 ms  MVA By PHT: 4.12 cm2    [facility] Accredited Echocardiography Laboratory    Prepared and electronically signed by    Orlando Serrano MD  Signed 06-Aug-2021 13:15:27      Results for orders placed during the hospital encounter of 21    NM Myocardial Perfusion Spect (Pharmacological Induced Stress and/or Rest)    Narrative  [address] [place] [address], [address] [place] [address]  [phone]    Rest/Stress Gated SPECT Myocardial Perfusion Imaging After Regadenoson    Patient: Mat Gregory  MR number: [id number]  Account number: [de-identified]  : 1975  Age: 40 years  Gender: Female  Status: Outpatient  Location: Stress lab  Height: 63 in  Weight: 155 lb  BP: 130/ 82 mmHg    Allergies: AMOXICILLIN, CIPROFLOXACIN    Diagnosis: R07.9 - Chest pain, unspecified    Primary Physician:  Sal Anthony MD  RN:  Mia Arroyo RN  Interpreting Physician:  Adma Grewal MD  Referring Physician:  Kendall Bowie MD  Technician:  Bon Bullock  Group:  [facility]  Report Prepared By[de-identified]  Mia Arroyo RN    INDICATIONS: Coronary artery disease. HISTORY: The patient is a 40year old  female. Chest pain status: chest pain. Coronary artery disease risk factors: smoking. Cardiovascular history: arrhythmia/tachycardia Co-morbidity: history of lung  disease/COVID,asthma,pneumonia. Medications: no cardiac drugs. Acute coronary syndrome: acute myocardial infarction recently ruled out. PHYSICAL EXAM: Baseline physical exam screening: no wheezes audible. REST ECG: Normal sinus rhythm. Normal baseline ECG. PROCEDURE: The study was performed in the the Stress lab. A regadenoson infusion pharmacologic stress test was performed. Gated SPECT myocardial perfusion imaging was performed after stress. Systolic blood pressure was 130 mmHg, at the  start of the study. Diastolic blood pressure was 82 mmHg, at the start of the study.  The heart rate was 85 bpm, at the start of the study. IV double checked. Regadenoson protocol:  HR bpm SBP mmHg DBP mmHg Symptoms  Baseline 85 130 82 none  Immediate 130 146 78 mild dyspnea, dizziness, nausea  1 min 112 126 74 subsiding  2 min -- -- -- none  No medications or fluids given. The patient also performed low level exercise. STRESS SUMMARY: Duration of pharmacologic stress was 3 min. Maximal heart rate during stress was 130 bpm. The rate-pressure product for the peak heart rate and blood pressure was 21119. There was no chest pain during stress. The stress  test was terminated due to protocol completion. Pre oxygen saturation: 100 %. Peak oxygen saturation: 100 %. The stress ECG was negative for ischemia and normal. There were no stress arrhythmias or conduction abnormalities. ISOTOPE ADMINISTRATION:  Resting isotope administration Stress isotope administration  Agent Tetrofosmin Tetrofosmin  Dose 11 mCi 32.1 mCi  Date 05/26/2021 05/26/2021  Injection time 07:58 09:00  Imaging time 08:34 09:47  Injection-image interval 36 min 47 min    The radiopharmaceutical was injected at the peak effect of pharmacologic stress. MYOCARDIAL PERFUSION IMAGING:  The image quality was good. Left ventricular size was normal. The TID ratio was 0.98. PERFUSION DEFECTS:  -  There were no perfusion defects. GATED SPECT:  The calculated left ventricular ejection fraction was 67 %. Left ventricular ejection fraction was within normal limits by visual estimate. There was no left ventricular regional abnormality. SUMMARY:  -  Stress results: There was no chest pain during stress. -  ECG conclusions: The stress ECG was negative for ischemia and normal. There were no stress arrhythmias or conduction abnormalities. -  Perfusion imaging: There were no perfusion defects.  -  Gated SPECT: The calculated left ventricular ejection fraction was 67 %.  Left ventricular ejection fraction was within normal limits by visual estimate. There was no left ventricular regional abnormality. IMPRESSIONS: Normal study after vasodilation and low level exercise without reproduction of symptoms. The cardiovascular risk is low. Myocardial perfusion imaging was normal at rest and with stress. Prepared and signed by    Sylvia Rivera MD  Signed 05/26/2021 13:32:39    No results found for this or any previous visit. Imaging:  Chest X-Ray:   XR chest pa & lateral    Result Date: 1/28/2023  Impression IMPRESSION: No acute cardiopulmonary disease. Workstation:[id number]    XR chest pa & lateral    Result Date: 12/3/2022  Impression Impression: Minimal patchy interstitial opacities suggesting bronchitis. No focal pneumonia. Workstation:[id number]    XR chest pa & lateral    Result Date: 11/10/2022  Impression No acute cardiopulmonary disease. Workstation performed: [id number]       CT-scan of the chest:     No CTA results available for this patient.   Lab Review   Lab Results   Component Value Date    WBC 7.17 07/13/2022    HGB 12.9 07/13/2022    HCT 39.8 07/13/2022    MCV 84 07/13/2022    RDW 15.5 (H) 07/13/2022     07/13/2022     BMP:  Lab Results   Component Value Date    SODIUM 140 05/25/2021    K 4.1 05/25/2021     05/25/2021    CO2 26 05/25/2021    ANIONGAP 11 10/05/2015    BUN 9 05/25/2021    CREATININE 0.72 05/25/2021    GLUC 103 05/25/2021    CALCIUM 8.5 05/25/2021    EGFR 101 05/25/2021    MG 2.0 02/08/2017     LFT:  Lab Results   Component Value Date    AST 11 05/10/2021    ALT 18 05/10/2021    ALKPHOS 100 05/10/2021    TP 7.5 05/10/2021    ALB 3.9 05/10/2021      No components found for: "TSH3"  Lab Results   Component Value Date    GBQ6HSDBHPAB 2.290 11/02/2022     Lab Results   Component Value Date    HGBA1C 5.9 (H) 06/29/2023     Lipid Profile:   Lab Results   Component Value Date    CHOLESTEROL 128 04/11/2022    HDL 52 04/11/2022    LDLCALC 61 04/11/2022    TRIG 76 04/11/2022     Lab Results   Component Value Date CHOLESTEROL 128 04/11/2022    CHOLESTEROL 195 02/08/2017     Lab Results   Component Value Date    CKTOTAL 91 05/25/2021    TROPONINI <0.02 05/25/2021     Lab Results   Component Value Date    NTBNP 81 05/10/2021      No results found for this or any previous visit (from the past 672 hour(s)). Dr. Tono Weller MD, [facility]      "This note has been constructed using a voice recognition system. Therefore there may be syntax, spelling, and/or grammatical errors.  Please call if you have any questions. "

## 2023-08-22 ENCOUNTER — OFFICE VISIT (OUTPATIENT)
Dept: CARDIOLOGY CLINIC | Facility: CLINIC | Age: 48
End: 2023-08-22
Payer: COMMERCIAL

## 2023-08-22 VITALS
DIASTOLIC BLOOD PRESSURE: 74 MMHG | WEIGHT: 172 LBS | HEIGHT: 64 IN | BODY MASS INDEX: 29.37 KG/M2 | SYSTOLIC BLOOD PRESSURE: 114 MMHG | HEART RATE: 77 BPM | OXYGEN SATURATION: 99 %

## 2023-08-22 DIAGNOSIS — I10 HYPERTENSION, UNSPECIFIED TYPE: Primary | ICD-10-CM

## 2023-08-22 PROCEDURE — 99214 OFFICE O/P EST MOD 30 MIN: CPT | Performed by: INTERNAL MEDICINE

## 2023-08-22 PROCEDURE — 93000 ELECTROCARDIOGRAM COMPLETE: CPT | Performed by: INTERNAL MEDICINE

## 2023-09-16 DIAGNOSIS — J30.9 ALLERGIC RHINITIS, UNSPECIFIED SEASONALITY, UNSPECIFIED TRIGGER: ICD-10-CM

## 2023-09-18 RX ORDER — FLUTICASONE PROPIONATE 50 MCG
SPRAY, SUSPENSION (ML) NASAL
Qty: 48 ML | Refills: 0 | Status: SHIPPED | OUTPATIENT
Start: 2023-09-18

## 2023-09-21 ENCOUNTER — HOSPITAL ENCOUNTER (OUTPATIENT)
Dept: RADIOLOGY | Facility: HOSPITAL | Age: 48
Discharge: HOME | End: 2023-09-21
Payer: COMMERCIAL

## 2023-09-21 ENCOUNTER — TELEPHONE (OUTPATIENT)
Dept: FAMILY MEDICINE CLINIC | Facility: CLINIC | Age: 48
End: 2023-09-21

## 2023-09-21 ENCOUNTER — OFFICE VISIT (OUTPATIENT)
Dept: FAMILY MEDICINE CLINIC | Facility: CLINIC | Age: 48
End: 2023-09-21
Payer: COMMERCIAL

## 2023-09-21 VITALS
HEIGHT: 64 IN | OXYGEN SATURATION: 98 % | RESPIRATION RATE: 16 BRPM | SYSTOLIC BLOOD PRESSURE: 110 MMHG | TEMPERATURE: 98.6 F | BODY MASS INDEX: 29.37 KG/M2 | HEART RATE: 80 BPM | WEIGHT: 172 LBS | DIASTOLIC BLOOD PRESSURE: 70 MMHG

## 2023-09-21 DIAGNOSIS — R06.2 WHEEZING: ICD-10-CM

## 2023-09-21 DIAGNOSIS — J40 BRONCHITIS: Primary | ICD-10-CM

## 2023-09-21 DIAGNOSIS — J40 BRONCHITIS: ICD-10-CM

## 2023-09-21 DIAGNOSIS — F17.200 CURRENT SMOKER: ICD-10-CM

## 2023-09-21 DIAGNOSIS — J01.40 ACUTE NON-RECURRENT PANSINUSITIS: ICD-10-CM

## 2023-09-21 PROCEDURE — 99213 OFFICE O/P EST LOW 20 MIN: CPT | Performed by: FAMILY MEDICINE

## 2023-09-21 PROCEDURE — 71046 X-RAY EXAM CHEST 2 VIEWS: CPT

## 2023-09-21 RX ORDER — FLUTICASONE PROPIONATE AND SALMETEROL 250; 50 UG/1; UG/1
1 POWDER RESPIRATORY (INHALATION) 2 TIMES DAILY
COMMUNITY
Start: 2023-09-14

## 2023-09-21 RX ORDER — CEFUROXIME AXETIL 500 MG/1
500 TABLET ORAL EVERY 12 HOURS SCHEDULED
Qty: 14 TABLET | Refills: 0 | Status: SHIPPED | OUTPATIENT
Start: 2023-09-21 | End: 2023-09-28

## 2023-09-21 RX ORDER — BENZONATATE 100 MG/1
100 CAPSULE ORAL 3 TIMES DAILY PRN
Qty: 20 CAPSULE | Refills: 0 | Status: SHIPPED | OUTPATIENT
Start: 2023-09-21

## 2023-09-21 NOTE — PROGRESS NOTES
Assessment/Plan:   Diagnoses and all orders for this visit:    Bronchitis  -     cefuroxime (CEFTIN) 500 mg tablet; Take 1 tablet (500 mg total) by mouth every 12 (twelve) hours for 7 days  -     XR chest pa & lateral; Future  -     benzonatate (TESSALON PERLES) 100 mg capsule; Take 1 capsule (100 mg total) by mouth 3 (three) times a day as needed for cough  - has tolerated Ceftin 500mg BID in the past - will eRx  - cont inhalers  - smoking cessation   - RTO in 1month to receive annual Flu vaccine   - strongly advised to get new COVID Booster   - f/u PRN - pt aware and agreeable   Acute non-recurrent pansinusitis  Current smoker  Wheezing    Other orders  -     Fluticasone-Salmeterol (Advair) 250-50 mcg/dose inhaler; Inhale 1 puff 2 (two) times a day          Subjective:    Patient ID: Juani January is a 53 y.o. female. HPI   - had COVID 9/10/2023 - (+) low grade temp, chills, lethargic, sore throat, HA, drainage   - this week with Bronchitis and was wheezing this AM - s/p Neb   - (+) current daily smoker   - denies F/C  - (+) N/V/dizziness/HA (frontal and L-sided maxillary)    - good PO intake       The following portions of the patient's history were reviewed and updated as appropriate: allergies, current medications, past family history, past medical history, past social history, past surgical history and problem list.    Review of Systems  as per HPI    Objective:  /70   Pulse 80   Temp 98.6 °F (37 °C)   Resp 16   Ht 5' 4" (1.626 m)   Wt 78 kg (172 lb)   SpO2 98%   BMI 29.52 kg/m²    Physical Exam  Vitals reviewed. Constitutional:       General: She is not in acute distress. Appearance: Normal appearance. She is ill-appearing. She is not toxic-appearing or diaphoretic. HENT:      Head: Normocephalic and atraumatic. Right Ear: External ear normal.      Left Ear: External ear normal.      Nose: Congestion present. Right Sinus: Frontal sinus tenderness present.       Left Sinus: Maxillary sinus tenderness and frontal sinus tenderness present. Eyes:      General: No scleral icterus. Right eye: No discharge. Left eye: No discharge. Extraocular Movements: Extraocular movements intact. Conjunctiva/sclera: Conjunctivae normal.   Cardiovascular:      Rate and Rhythm: Normal rate and regular rhythm. Heart sounds: Normal heart sounds. Pulmonary:      Effort: Pulmonary effort is normal.      Breath sounds: Normal breath sounds. Comments: (+) dry cough   Abdominal:      Palpations: Abdomen is soft. Musculoskeletal:         General: Normal range of motion. Cervical back: Normal range of motion. Skin:     General: Skin is warm. Neurological:      General: No focal deficit present. Mental Status: She is alert and oriented to person, place, and time.    Psychiatric:         Mood and Affect: Mood normal.         Behavior: Behavior normal.

## 2023-09-21 NOTE — TELEPHONE ENCOUNTER
----- Message from Bony Meza DO sent at 9/21/2023  5:36 PM EDT -----  Tried x2 and unable to leave  for pt   clear CXR - cont with abx as prescribed for bronchitis

## 2023-10-03 DIAGNOSIS — M79.2 NEUROPATHIC PAIN: ICD-10-CM

## 2023-10-03 RX ORDER — GABAPENTIN 800 MG/1
1600 TABLET ORAL DAILY
Qty: 60 TABLET | Refills: 0 | Status: SHIPPED | OUTPATIENT
Start: 2023-10-03

## 2023-10-16 DIAGNOSIS — K21.9 GASTROESOPHAGEAL REFLUX DISEASE WITHOUT ESOPHAGITIS: ICD-10-CM

## 2023-10-16 RX ORDER — LANSOPRAZOLE 30 MG/1
CAPSULE, DELAYED RELEASE ORAL
Qty: 90 CAPSULE | Refills: 1 | Status: SHIPPED | OUTPATIENT
Start: 2023-10-16

## 2023-11-07 DIAGNOSIS — E78.00 HYPERCHOLESTEROLEMIA: ICD-10-CM

## 2023-11-07 DIAGNOSIS — E61.1 LOW SERUM IRON: ICD-10-CM

## 2023-11-07 DIAGNOSIS — M79.2 NEUROPATHIC PAIN: ICD-10-CM

## 2023-11-07 RX ORDER — GABAPENTIN 800 MG/1
1600 TABLET ORAL DAILY
Qty: 60 TABLET | Refills: 5 | Status: SHIPPED | OUTPATIENT
Start: 2023-11-07

## 2023-11-07 RX ORDER — ROSUVASTATIN CALCIUM 5 MG/1
5 TABLET, COATED ORAL DAILY
Qty: 90 TABLET | Refills: 0 | Status: SHIPPED | OUTPATIENT
Start: 2023-11-07

## 2023-11-07 RX ORDER — FERROUS SULFATE 324(65)MG
324 TABLET, DELAYED RELEASE (ENTERIC COATED) ORAL DAILY
Qty: 90 TABLET | Refills: 0 | Status: SHIPPED | OUTPATIENT
Start: 2023-11-07

## 2023-11-21 DIAGNOSIS — J40 BRONCHITIS: ICD-10-CM

## 2023-11-21 RX ORDER — ALBUTEROL SULFATE 90 UG/1
AEROSOL, METERED RESPIRATORY (INHALATION)
Qty: 18 G | Refills: 5 | Status: SHIPPED | OUTPATIENT
Start: 2023-11-21

## 2023-12-08 ENCOUNTER — OFFICE VISIT (OUTPATIENT)
Dept: FAMILY MEDICINE CLINIC | Facility: CLINIC | Age: 48
End: 2023-12-08
Payer: COMMERCIAL

## 2023-12-08 VITALS
BODY MASS INDEX: 29.53 KG/M2 | OXYGEN SATURATION: 98 % | HEIGHT: 64 IN | TEMPERATURE: 98 F | SYSTOLIC BLOOD PRESSURE: 120 MMHG | DIASTOLIC BLOOD PRESSURE: 80 MMHG | HEART RATE: 75 BPM | WEIGHT: 173 LBS

## 2023-12-08 DIAGNOSIS — J45.41 MODERATE PERSISTENT ASTHMA WITH EXACERBATION: ICD-10-CM

## 2023-12-08 DIAGNOSIS — R09.81 NASAL CONGESTION: ICD-10-CM

## 2023-12-08 DIAGNOSIS — J01.90 ACUTE RHINOSINUSITIS: ICD-10-CM

## 2023-12-08 DIAGNOSIS — R06.2 WHEEZING: ICD-10-CM

## 2023-12-08 DIAGNOSIS — J34.89 SINUS PAIN: ICD-10-CM

## 2023-12-08 DIAGNOSIS — R05.1 ACUTE COUGH: ICD-10-CM

## 2023-12-08 DIAGNOSIS — J40 BRONCHITIS: Primary | ICD-10-CM

## 2023-12-08 DIAGNOSIS — J34.89 SINUS PRESSURE: ICD-10-CM

## 2023-12-08 PROCEDURE — 99213 OFFICE O/P EST LOW 20 MIN: CPT | Performed by: FAMILY MEDICINE

## 2023-12-08 RX ORDER — PREDNISONE 20 MG/1
40 TABLET ORAL DAILY
Qty: 10 TABLET | Refills: 0 | Status: SHIPPED | OUTPATIENT
Start: 2023-12-08 | End: 2023-12-13

## 2023-12-08 RX ORDER — DOXYCYCLINE 100 MG/1
100 CAPSULE ORAL 2 TIMES DAILY
Qty: 14 CAPSULE | Refills: 0 | Status: SHIPPED | OUTPATIENT
Start: 2023-12-08 | End: 2023-12-15

## 2023-12-08 NOTE — PROGRESS NOTES
Outpatient Note- acute    HPI:     Cheryal Homans , 53 y.o. female  presents today for follow-up of upper respiratory symptoms. Patient has a significant history of asthma which she is currently on Advair and albuterol for. She has been using these appropriately, and albuterol more frequently. In addition to the chest tightness, wheezing, and shortness of breath she has been having increased nasal congestion, sinus pressure and pain, intermittent sore throat and headaches. Her symptoms started several weeks ago, but unfortunately she was only able to come in after her insurance was restarted on December 1, 2023. She denies any fever or chills, but is currently in similar symptoms to her asthma exacerbation. Past Medical History:   Diagnosis Date    Anemia     Asthma     Colon polyp     Ear problems     GERD (gastroesophageal reflux disease)     Hyperlipidemia     Hypertension     Neck pain     PONV (postoperative nausea and vomiting)     Sleep apnea         ROS:   Review of Systems   Constitutional:  Negative for chills and fever. HENT:  Positive for congestion, postnasal drip, rhinorrhea, sinus pressure, sinus pain and sore throat (intemittent). Negative for ear discharge and ear pain. Eyes:  Negative for visual disturbance. Respiratory:  Positive for cough, chest tightness, shortness of breath and wheezing. Cardiovascular:  Negative for chest pain and palpitations. Gastrointestinal:  Positive for nausea and vomiting (small aamount of blood, coughing frequent). Negative for abdominal pain, constipation and diarrhea. Neurological:  Positive for headaches. Negative for dizziness and light-headedness. OBJECTIVE  Vitals:    12/08/23 0848   BP: 120/80   Pulse: 75   Temp: 98 °F (36.7 °C)   SpO2: 98%      Physical Exam  Constitutional:       General: She is not in acute distress. Appearance: Normal appearance. She is not ill-appearing, toxic-appearing or diaphoretic.    HENT:      Head: Normocephalic and atraumatic. Right Ear: Tympanic membrane, ear canal and external ear normal. There is no impacted cerumen. Left Ear: Tympanic membrane, ear canal and external ear normal. There is no impacted cerumen. Nose: Congestion and rhinorrhea present. Mouth/Throat:      Mouth: Mucous membranes are moist.      Pharynx: Oropharynx is clear. No oropharyngeal exudate or posterior oropharyngeal erythema. Eyes:      General:         Right eye: No discharge. Pupils: Pupils are equal, round, and reactive to light. Cardiovascular:      Rate and Rhythm: Normal rate and regular rhythm. Heart sounds: Normal heart sounds. No murmur heard. No friction rub. No gallop. Pulmonary:      Effort: Pulmonary effort is normal. Prolonged expiration present. No respiratory distress. Breath sounds: Examination of the right-upper field reveals wheezing. Examination of the left-upper field reveals wheezing. Examination of the left-middle field reveals wheezing. Examination of the right-lower field reveals decreased breath sounds. Examination of the left-lower field reveals decreased breath sounds. Decreased breath sounds and wheezing present. No rhonchi or rales. Musculoskeletal:      Cervical back: Neck supple. No tenderness. Lymphadenopathy:      Cervical: No cervical adenopathy. Neurological:      Mental Status: She is alert. ASSESSMENT AND PLAN   Phi Cain was seen today for cold like symptoms. Diagnoses and all orders for this visit:    Bronchitis  Moderate persistent asthma with exacerbation  Wheezing  Acute cough  Sinus pressure  Sinus pain  Acute rhinosinusitis  Nasal congestion  Presents today with typical symptoms of bronchitis, asthma exacerbation, and possibly acute rhinosinusitis. She started having symptoms about 2 or 3 weeks ago, but was unable to get in sooner since she was having trouble with her insurance.   Therefore we will start her on a prednisone burst of 40 mg for the next 5 days. This should improve her nasal congestion, headaches, dizziness, and most importantly her asthma symptoms. I have sent doxycycline to the pharmacy in case her symptoms worsen over the weekend or into early next week. She was given parameters including increased pressure in the nasal passages, sinuses, or if she is starting having fever or chills. Patient has another albuterol at home, she just picked up a new prescription. She should take the albuterol 2 puffs every 4 hours on day 1, every 6 hours on day 2, and as needed on day 3 while awake. -     predniSONE 20 mg tablet; Take 2 tablets (40 mg total) by mouth daily for 5 days  -     doxycycline monohydrate (MONODOX) 100 mg capsule;  Take 1 capsule (100 mg total) by mouth 2 (two) times a day for 7 days       Brian Milian DO  [facility]  12/8/2023 9:09 AM

## 2023-12-15 ENCOUNTER — APPOINTMENT (EMERGENCY)
Dept: RADIOLOGY | Facility: HOSPITAL | Age: 48
End: 2023-12-15
Payer: COMMERCIAL

## 2023-12-15 ENCOUNTER — HOSPITAL ENCOUNTER (EMERGENCY)
Facility: HOSPITAL | Age: 48
Discharge: HOME | End: 2023-12-15
Attending: EMERGENCY MEDICINE
Payer: COMMERCIAL

## 2023-12-15 VITALS
SYSTOLIC BLOOD PRESSURE: 156 MMHG | TEMPERATURE: 98.3 F | HEART RATE: 77 BPM | RESPIRATION RATE: 18 BRPM | DIASTOLIC BLOOD PRESSURE: 77 MMHG | OXYGEN SATURATION: 98 %

## 2023-12-15 DIAGNOSIS — J40 BRONCHITIS: Primary | ICD-10-CM

## 2023-12-15 LAB
FLUAV RNA RESP QL NAA+PROBE: NEGATIVE
FLUBV RNA RESP QL NAA+PROBE: NEGATIVE
RSV RNA RESP QL NAA+PROBE: NEGATIVE
SARS-COV-2 RNA RESP QL NAA+PROBE: NEGATIVE

## 2023-12-15 PROCEDURE — 93005 ELECTROCARDIOGRAM TRACING: CPT

## 2023-12-15 PROCEDURE — 99283 EMERGENCY DEPT VISIT LOW MDM: CPT

## 2023-12-15 PROCEDURE — 71046 X-RAY EXAM CHEST 2 VIEWS: CPT

## 2023-12-15 PROCEDURE — 99285 EMERGENCY DEPT VISIT HI MDM: CPT | Performed by: EMERGENCY MEDICINE

## 2023-12-15 PROCEDURE — 0241U HB NFCT DS VIR RESP RNA 4 TRGT: CPT | Performed by: EMERGENCY MEDICINE

## 2023-12-15 RX ORDER — METHYLPREDNISOLONE 4 MG/1
TABLET ORAL
Qty: 21 TABLET | Refills: 0 | Status: SHIPPED | OUTPATIENT
Start: 2023-12-15

## 2023-12-15 RX ORDER — IPRATROPIUM BROMIDE AND ALBUTEROL SULFATE 2.5; .5 MG/3ML; MG/3ML
3 SOLUTION RESPIRATORY (INHALATION) ONCE
Status: COMPLETED | OUTPATIENT
Start: 2023-12-15 | End: 2023-12-15

## 2023-12-15 RX ADMIN — IPRATROPIUM BROMIDE AND ALBUTEROL SULFATE 3 ML: 2.5; .5 SOLUTION RESPIRATORY (INHALATION) at 13:07

## 2023-12-15 NOTE — ED PROVIDER NOTES
History  Chief Complaint   Patient presents with    URI     Pt reports has had URI for 2 weeks not improved after antibiotics and steroids. Told to come to Er for eval     HPI    27-year-old female with history of asthma and smokes 1 pack/day. Presents for evaluation of 2 weeks of cough productive of yellow sputum. She was seen by her doctor 1 week ago and prescribed doxycycline as well as 5-day course of prednisone. States that her shortness of breath improved with the prednisone but the now that she is off of it it has returned. She has had no improvement in her cough with the doxycycline. Denies fevers but reports occasional chills. Reports chest pressure only with coughing, not present at rest.  Reports shortness of breath it is also associated with her cough. States that she typically gets bronchitis twice a year but that this is lasting longer than usual for her. No nausea, vomiting, or abdominal pain. States that for the last 3 days she has felt as if her neck is stiff. Reports sinus pressure for the last week that did not improve with doxycycline. Prior to Admission Medications   Prescriptions Last Dose Informant Patient Reported? Taking? Advair Diskus 100-50 MCG/ACT inhaler  Self Yes No   Advair Diskus 250-50 MCG/ACT inhaler   Yes No   Sig: INHALE 1 PUFF 2 (TWO) TIMES A DAY. TO RINSE THROAT AFTER USE   Azelastine HCl 137 MCG/SPRAY SOLN   Yes No   Si SPRAY INTO EACH NOSTRIL 2 (TWO) TIMES A DAY.  USE IN EACH NOSTRIL AS DIRECTED   Fluticasone-Salmeterol (Advair) 250-50 mcg/dose inhaler   Yes No   Sig: Inhale 1 puff 2 (two) times a day   VITAMIN D PO  Self Yes No   Sig: Take by mouth   albuterol (2.5 mg/3 mL) 0.083 % nebulizer solution   No No   Sig: TAKE 3 ML (2.5 MG TOTAL) BY NEBULIZATION EVERY 6 HOURS AS NEEDED FOR WHEEZING OR SHORTNESS OF BREATH   albuterol (PROVENTIL HFA,VENTOLIN HFA) 90 mcg/act inhaler   No No   Sig: INHALE 2 PUFFS BY MOUTH EVERY 6 HOURS AS NEEDED FOR WHEEZE   benzonatate (TESSALON PERLES) 100 mg capsule   No No   Sig: Take 1 capsule (100 mg total) by mouth 3 (three) times a day as needed for cough   Patient not taking: Reported on 2023   buPROPion (WELLBUTRIN SR) 150 mg 12 hr tablet   Yes No   Sig: Take 150 mg by mouth 2 (two) times a day   carvedilol (COREG) 6.25 mg tablet   No No   Sig: TAKE 1 TABLET BY MOUTH TWICE A DAY WITH MEALS   doxycycline monohydrate (MONODOX) 100 mg capsule   No No   Sig: Take 1 capsule (100 mg total) by mouth 2 (two) times a day for 7 days   ferrous sulfate 324 (65 Fe) mg   No No   Sig: Take 1 tablet (324 mg total) by mouth daily   fluticasone (FLONASE) 50 mcg/act nasal spray   No No   Sig: SPRAY 2 SPRAYS INTO EACH NOSTRIL EVERY DAY   gabapentin (NEURONTIN) 800 mg tablet   No No   Sig: Take 2 tablets (1,600 mg total) by mouth daily   lansoprazole (PREVACID) 30 mg capsule   No No   Sig: TAKE 1 CAPSULE BY MOUTH EVERY DAY   levocetirizine (XYZAL) 5 MG tablet  Self No No   Sig: Take 1 tablet (5 mg total) by mouth every evening   lisinopril (ZESTRIL) 2.5 mg tablet   No No   Sig: Take 1 tablet (2.5 mg total) by mouth daily   rosuvastatin (CRESTOR) 5 mg tablet   No No   Sig: Take 1 tablet (5 mg total) by mouth daily      Facility-Administered Medications: None       Past Medical History:   Diagnosis Date    Anemia     Asthma     Colon polyp     Ear problems     GERD (gastroesophageal reflux disease)     Hyperlipidemia     Hypertension     Neck pain     PONV (postoperative nausea and vomiting)     Sleep apnea        Past Surgical History:   Procedure Laterality Date     SECTION      COLONOSCOPY      NOSE SURGERY      SINUS SURGERY      TUBAL LIGATION         Family History   Problem Relation Age of Onset    Hypertension Father     Bone cancer Maternal Grandmother 80    Cancer Paternal Grandmother     Stomach cancer Paternal Grandmother 80    Hypertension Maternal Aunt     Breast cancer Neg Hx     Colon cancer Neg Hx      I have reviewed and agree with the history as documented. E-Cigarette/Vaping    E-Cigarette Use Never User      E-Cigarette/Vaping Substances    Nicotine No     THC No     CBD No     Flavoring No     Other No     Unknown No      Social History     Tobacco Use    Smoking status: Every Day     Current packs/day: 1.00     Average packs/day: 1 pack/day for 33.0 years (33.0 ttl pk-yrs)     Types: Cigarettes    Smokeless tobacco: Never   Vaping Use    Vaping status: Never Used   Substance Use Topics    Alcohol use: No    Drug use: No       Review of Systems   Constitutional:  Positive for chills. Negative for fever. HENT:  Positive for sinus pressure. Negative for congestion. Eyes:  Negative for visual disturbance. Respiratory:  Positive for cough and shortness of breath. Cardiovascular:  Positive for chest pain (with coughing). Negative for palpitations and leg swelling. Gastrointestinal:  Negative for abdominal pain, diarrhea, nausea and vomiting. Genitourinary:  Negative for dysuria and frequency. Musculoskeletal:  Positive for neck stiffness. Negative for arthralgias, back pain and neck pain. Skin:  Negative for rash. Neurological:  Negative for weakness, numbness and headaches. Psychiatric/Behavioral:  Negative for agitation, behavioral problems and confusion. All other systems reviewed and are negative. Physical Exam  Physical Exam  Vitals and nursing note reviewed. Constitutional:       General: She is not in acute distress. Appearance: Normal appearance. She is well-developed. She is not ill-appearing or diaphoretic. HENT:      Head: Normocephalic and atraumatic. Right Ear: External ear normal.      Left Ear: External ear normal.      Nose: Nose normal.   Eyes:      Extraocular Movements: Extraocular movements intact. Conjunctiva/sclera: Conjunctivae normal.   Neck:      Comments: No meningismus or rigidity  Cardiovascular:      Rate and Rhythm: Normal rate and regular rhythm.       Pulses: Normal pulses. Heart sounds: Normal heart sounds. No murmur heard. No friction rub. No gallop. Pulmonary:      Effort: Pulmonary effort is normal. No respiratory distress. Breath sounds: Wheezing (faint, end-expiratory, diffuse, with good air movement throughout) present. No rales. Abdominal:      General: Bowel sounds are normal. There is no distension. Palpations: Abdomen is soft. Tenderness: There is no abdominal tenderness. There is no guarding. Musculoskeletal:         General: No deformity. Normal range of motion. Cervical back: Normal range of motion and neck supple. No rigidity. Right lower leg: No edema. Left lower leg: No edema. Comments: No calf swelling or tenderness   Skin:     General: Skin is warm and dry. Neurological:      Mental Status: She is alert and oriented to person, place, and time. Motor: No abnormal muscle tone.    Psychiatric:         Mood and Affect: Mood normal.         Vital Signs  ED Triage Vitals   Temperature Pulse Respirations Blood Pressure SpO2   12/15/23 1253 12/15/23 1111 12/15/23 1111 12/15/23 1111 12/15/23 1111   98.3 °F (36.8 °C) 77 18 156/77 98 %      Temp Source Heart Rate Source Patient Position - Orthostatic VS BP Location FiO2 (%)   12/15/23 1253 12/15/23 1111 12/15/23 1111 -- --   Oral Monitor Sitting        Pain Score       --                  Vitals:    12/15/23 1111   BP: 156/77   Pulse: 77   Patient Position - Orthostatic VS: Sitting         Visual Acuity      ED Medications  Medications   ipratropium-albuterol (DUO-NEB) 0.5-2.5 mg/3 mL inhalation solution 3 mL (3 mL Nebulization Given 12/15/23 1307)       Diagnostic Studies  Results Reviewed       Procedure Component Value Units Date/Time    FLU/RSV/COVID - if FLU/RSV clinically relevant [[id number]]  (Normal) Collected: 12/15/23 1255    Lab Status: Final result Specimen: Nares from Nose Updated: 12/15/23 1401     SARS-CoV-2 Negative     INFLUENZA A PCR Negative     INFLUENZA B PCR Negative     RSV PCR Negative    Narrative:      FOR PEDIATRIC PATIENTS - copy/paste COVID Guidelines URL to browser: [email or web address]    SARS-CoV-2 assay is a Nucleic Acid Amplification assay intended for the  qualitative detection of nucleic acid from SARS-CoV-2 in nasopharyngeal  swabs. Results are for the presumptive identification of SARS-CoV-2 RNA. Positive results are indicative of infection with SARS-CoV-2, the virus  causing COVID-19, but do not rule out bacterial infection or co-infection  with other viruses. Laboratories within the [facility] and its  territories are required to report all positive results to the appropriate  public health authorities. Negative results do not preclude SARS-CoV-2  infection and should not be used as the sole basis for treatment or other  patient management decisions. Negative results must be combined with  clinical observations, patient history, and epidemiological information. This test has not been FDA cleared or approved. This test has been authorized by FDA under an Emergency Use Authorization  (EUA). This test is only authorized for the duration of time the  declaration that circumstances exist justifying the authorization of the  emergency use of an in vitro diagnostic tests for detection of SARS-CoV-2  virus and/or diagnosis of COVID-19 infection under section 564(b)(1) of  the Act, 21 U. S.C. 364RQA-9(T)(7), unless the authorization is terminated  or revoked sooner. The test has been validated but independent review by FDA  and CLIA is pending. Test performed using [employer] GeneXpert: This RT-PCR assay targets N2,  a region unique to SARS-CoV-2. A conserved region in the E-gene was chosen  for pan-Sarbecovirus detection which includes SARS-CoV-2. According to CMS-2020-01-R, this platform meets the definition of high-throughput technology.                    XR chest 2 views   ED Interpretation by Heath Sicard, MD (12/15 1408)   No acute cardiopulmonary abnormalities                 Procedures  Procedures         ED Course  ED Course as of 12/15/23 1613   Fri Dec 15, 2023   1408 Chest x-ray with no acute cardiopulmonary abnormalities. No focal consolidation to suggest pneumonia. Patient given DuoNeb with improvement in her wheezing. She does have a nebulizer machine at home and was counseled to use this every 4 hours for the next couple of days and space as tolerated. Patient felt that her symptoms were improved with a 5-day prednisone burst but then states that the shortness of breath and cough got worse again. Will prescribe Medrol Dosepak. No further role for antibiotics. Patient counseled that her cough may last for 2-3 months. Discussed supportive management and smoking cessation. Return precautions discussed. 1409 COVID/flu/RSV negative. 1410 I personally interpreted the patient's EKG which reveals normal rate, normal sinus rhythm, normal axis, normal intervals, nonspecific T wave abnormality isolated to lead aVL, no ST segment deviation. Patient's chest discomfort only occurs with coughing, doubt underlying cardiac etiology, likely in the setting of her bronchitis. Medical Decision Making  Please see ED course above for details of the Medical Decision Making. Amount and/or Complexity of Data Reviewed  Radiology: ordered and independent interpretation performed. Decision-making details documented in ED Course. ECG/medicine tests: ordered and independent interpretation performed. Decision-making details documented in ED Course. Risk  Prescription drug management.              Disposition  Final diagnoses:   Bronchitis     Time reflects when diagnosis was documented in both MDM as applicable and the Disposition within this note       Time User Action Codes Description Comment    12/15/2023  2:16 PM Jeremy [address] [facility] Bronchitis           ED Disposition       ED Disposition   Discharge    Condition   Stable    Date/Time   Fri Dec 15, 2023  2:16 PM    Comment   Arsenio Rosenthal discharge to home/self care. Follow-up Information       Follow up With Specialties Details Why Contact Info Additional Information    Mei Garcia, DO Family Medicine In 1 week If cough persists. [address] [facility] [address] [facility] Emergency Medicine  As we discussed, return to the Emergency Department immediately for chest pain in the absence of coughing, trouble breathing, fever, or for new or concerning symptoms. [address], [place], [place], [address]            Discharge Medication List as of 12/15/2023  2:17 PM        START taking these medications    Details   methylPREDNISolone 4 MG tablet therapy pack Use as directed on package, Normal           CONTINUE these medications which have NOT CHANGED    Details   Advair Diskus 100-50 MCG/ACT inhaler Historical Med      !! Advair Diskus 250-50 MCG/ACT inhaler INHALE 1 PUFF 2 (TWO) TIMES A DAY. TO RINSE THROAT AFTER USE, Historical Med      albuterol (2.5 mg/3 mL) 0.083 % nebulizer solution TAKE 3 ML (2.5 MG TOTAL) BY NEBULIZATION EVERY 6 HOURS AS NEEDED FOR WHEEZING OR SHORTNESS OF BREATH, Normal      albuterol (PROVENTIL HFA,VENTOLIN HFA) 90 mcg/act inhaler INHALE 2 PUFFS BY MOUTH EVERY 6 HOURS AS NEEDED FOR WHEEZE, Normal      Azelastine HCl 137 MCG/SPRAY SOLN 1 SPRAY INTO EACH NOSTRIL 2 (TWO) TIMES A DAY.  USE IN EACH NOSTRIL AS DIRECTED, Historical Med      benzonatate (TESSALON PERLES) 100 mg capsule Take 1 capsule (100 mg total) by mouth 3 (three) times a day as needed for cough, Starting Thu 9/21/2023, Normal      buPROPion (WELLBUTRIN SR) 150 mg 12 hr tablet Take 150 mg by mouth 2 (two) times a day, Starting Thu 3/16/2023, Until Sat 4/15/2023, Historical Med      carvedilol (COREG) 6.25 mg tablet TAKE 1 TABLET BY MOUTH TWICE A DAY WITH MEALS, Normal      doxycycline monohydrate (MONODOX) 100 mg capsule Take 1 capsule (100 mg total) by mouth 2 (two) times a day for 7 days, Starting Fri 12/8/2023, Until Fri 12/15/2023, Normal      ferrous sulfate 324 (65 Fe) mg Take 1 tablet (324 mg total) by mouth daily, Starting Tue 11/7/2023, Normal      fluticasone (FLONASE) 50 mcg/act nasal spray SPRAY 2 SPRAYS INTO EACH NOSTRIL EVERY DAY, Normal      !! Fluticasone-Salmeterol (Advair) 250-50 mcg/dose inhaler Inhale 1 puff 2 (two) times a day, Starting Thu 9/14/2023, Historical Med      gabapentin (NEURONTIN) 800 mg tablet Take 2 tablets (1,600 mg total) by mouth daily, Starting Mon 12/4/2023, Normal      lansoprazole (PREVACID) 30 mg capsule TAKE 1 CAPSULE BY MOUTH EVERY DAY, Normal      levocetirizine (XYZAL) 5 MG tablet Take 1 tablet (5 mg total) by mouth every evening, Starting Thu 1/5/2023, Normal      lisinopril (ZESTRIL) 2.5 mg tablet Take 1 tablet (2.5 mg total) by mouth daily, Starting Fri 7/7/2023, Normal      rosuvastatin (CRESTOR) 5 mg tablet Take 1 tablet (5 mg total) by mouth daily, Starting Tue 11/7/2023, Normal      VITAMIN D PO Take by mouth, Historical Med       !! - Potential duplicate medications found. Please discuss with provider. No discharge procedures on file.     PDMP Review         Value Time User    PDMP Reviewed  Yes 12/4/2019  1:33 PM Lexx Malik MD            ED Provider  Electronically Signed by             Stephane Denton MD  12/15/23 6066

## 2023-12-17 LAB
ATRIAL RATE: 67 BPM
P AXIS: 16 DEGREES
PR INTERVAL: 184 MS
QRS AXIS: 32 DEGREES
QRSD INTERVAL: 78 MS
QT INTERVAL: 414 MS
QTC INTERVAL: 437 MS
T WAVE AXIS: 70 DEGREES
VENTRICULAR RATE: 67 BPM

## 2023-12-18 ENCOUNTER — VBI (OUTPATIENT)
Dept: FAMILY MEDICINE CLINIC | Facility: CLINIC | Age: 48
End: 2023-12-18

## 2023-12-18 NOTE — TELEPHONE ENCOUNTER
12/18/23 2:00 PM    Patient contacted post ED visit, [facility] department spoke with patient/caregiver and outreach was successful.    Thank you.  Ashley Walton MA  [facility]

## 2024-01-09 LAB
CREAT ?TM UR-SCNC: 220.5 UMOL/L
EXT ALBUMIN URINE RANDOM: 1.5
HBA1C MFR BLD HPLC: 6 %
MICROALBUMIN/CREAT UR: 6.8 MG/G{CREAT}

## 2024-01-16 ENCOUNTER — TELEPHONE (OUTPATIENT)
Dept: FAMILY MEDICINE CLINIC | Facility: CLINIC | Age: 49
End: 2024-01-16

## 2024-01-16 DIAGNOSIS — E55.9 VITAMIN D DEFICIENCY: Primary | ICD-10-CM

## 2024-01-16 NOTE — TELEPHONE ENCOUNTER
----- Message from Mei Garcia DO sent at 1/16/2024  9:03 AM EST -----  Vit D deficiency - eRx for Vit D 50,000IU Qwk x12wks sent to pharmacy on file   Will discuss other labs at [facility] on 2/6/2024

## 2024-02-12 DIAGNOSIS — R09.89 LABILE HYPERTENSION: ICD-10-CM

## 2024-02-12 DIAGNOSIS — E78.00 HYPERCHOLESTEROLEMIA: ICD-10-CM

## 2024-02-12 DIAGNOSIS — I10 ESSENTIAL HYPERTENSION: ICD-10-CM

## 2024-02-12 RX ORDER — LISINOPRIL 2.5 MG/1
2.5 TABLET ORAL DAILY
Qty: 30 TABLET | Refills: 0 | Status: SHIPPED | OUTPATIENT
Start: 2024-02-12

## 2024-02-12 RX ORDER — ROSUVASTATIN CALCIUM 5 MG/1
5 TABLET, COATED ORAL DAILY
Qty: 30 TABLET | Refills: 0 | Status: SHIPPED | OUTPATIENT
Start: 2024-02-12

## 2024-02-12 RX ORDER — CARVEDILOL 6.25 MG/1
6.25 TABLET ORAL 2 TIMES DAILY WITH MEALS
Qty: 60 TABLET | Refills: 0 | Status: SHIPPED | OUTPATIENT
Start: 2024-02-12 | End: 2024-02-12 | Stop reason: SDUPTHER

## 2024-02-12 RX ORDER — CARVEDILOL 6.25 MG/1
6.25 TABLET ORAL 2 TIMES DAILY WITH MEALS
Qty: 60 TABLET | Refills: 0 | Status: SHIPPED | OUTPATIENT
Start: 2024-02-12

## 2024-02-14 DIAGNOSIS — E61.1 LOW SERUM IRON: ICD-10-CM

## 2024-02-14 RX ORDER — FERROUS SULFATE 324(65)MG
324 TABLET, DELAYED RELEASE (ENTERIC COATED) ORAL DAILY
Qty: 90 TABLET | Refills: 1 | Status: SHIPPED | OUTPATIENT
Start: 2024-02-14

## 2024-02-14 NOTE — TELEPHONE ENCOUNTER
Reason for call:   [x] Refill   [] Prior Auth  [] Other:     Office:   [x] PCP/Provider -   [] Specialty/Provider -     Medication: Ferrous sulfate     Dose/Frequency: 325 (65 Fe) mg taken by mouth once daily     Quantity: 90    Pharmacy: [facility] - [address] [place] [address] [phone]     Does the patient have enough for 3 days?   [] Yes   [x] No - Send as HP to POD

## 2024-02-19 DIAGNOSIS — J40 BRONCHITIS: Primary | ICD-10-CM

## 2024-02-19 DIAGNOSIS — J30.9 ALLERGIC RHINITIS, UNSPECIFIED SEASONALITY, UNSPECIFIED TRIGGER: ICD-10-CM

## 2024-02-19 RX ORDER — ERGOCALCIFEROL 1.25 MG/1
CAPSULE ORAL
COMMUNITY
Start: 2024-01-16

## 2024-02-19 RX ORDER — DULOXETIN HYDROCHLORIDE 30 MG/1
1 CAPSULE, DELAYED RELEASE ORAL DAILY
COMMUNITY
End: 2024-02-20

## 2024-02-19 RX ORDER — AZITHROMYCIN 500 MG/1
TABLET, FILM COATED ORAL
COMMUNITY
End: 2024-02-20

## 2024-02-19 RX ORDER — ONDANSETRON 4 MG/1
TABLET, ORALLY DISINTEGRATING ORAL
COMMUNITY

## 2024-02-19 RX ORDER — HYDROCODONE POLISTIREX AND CHLORPHENIRAMINE POLISTIREX 10; 8 MG/5ML; MG/5ML
SUSPENSION, EXTENDED RELEASE ORAL
COMMUNITY

## 2024-02-19 RX ORDER — CIPROFLOXACIN 500 MG/1
TABLET, FILM COATED ORAL
COMMUNITY
End: 2024-02-20

## 2024-02-19 RX ORDER — PAROXETINE HYDROCHLORIDE HEMIHYDRATE 12.5 MG/1
TABLET, FILM COATED, EXTENDED RELEASE ORAL
COMMUNITY

## 2024-02-19 RX ORDER — FLUTICASONE PROPIONATE AND SALMETEROL 250; 50 UG/1; UG/1
POWDER RESPIRATORY (INHALATION)
Qty: 180 BLISTER | Refills: 0 | Status: SHIPPED | OUTPATIENT
Start: 2024-02-19

## 2024-02-19 RX ORDER — RABEPRAZOLE SODIUM 20 MG/1
TABLET, DELAYED RELEASE ORAL
COMMUNITY

## 2024-02-19 RX ORDER — DARIFENACIN HYDROBROMIDE 15 MG/1
TABLET, EXTENDED RELEASE ORAL
COMMUNITY

## 2024-02-19 RX ORDER — NAPROXEN 500 MG/1
TABLET ORAL
COMMUNITY
End: 2024-02-20

## 2024-02-19 RX ORDER — CEFDINIR 300 MG/1
CAPSULE ORAL
COMMUNITY
End: 2024-02-20

## 2024-02-19 RX ORDER — GABAPENTIN 400 MG/1
CAPSULE ORAL
COMMUNITY
Start: 2024-02-02

## 2024-02-19 RX ORDER — ALPRAZOLAM 0.25 MG/1
TABLET ORAL
COMMUNITY

## 2024-02-19 RX ORDER — FERROUS SULFATE 325(65) MG
TABLET ORAL
COMMUNITY
Start: 2024-02-14 | End: 2024-02-20

## 2024-02-19 NOTE — TELEPHONE ENCOUNTER
Reason for call:   [x] Refill   [] Prior Auth  [] Other:     Office:   [x] PCP/Provider -   [] Specialty/Provider -     Medication: Advair Diskus 250-50 mcg/act inhaler, inhale 1 puff 2 times a day                       Azelastine 137 mcg, 1 spray into each nostril 2 times a day                          Fluticasone 50 mcg, spray 2 sprays into each nostril        Pharmacy: [facility]     Does the patient have enough for 3 days?   [] Yes   [x] No - Send as HP to POD

## 2024-02-20 ENCOUNTER — OFFICE VISIT (OUTPATIENT)
Dept: FAMILY MEDICINE CLINIC | Facility: CLINIC | Age: 49
End: 2024-02-20
Payer: COMMERCIAL

## 2024-02-20 VITALS
RESPIRATION RATE: 16 BRPM | SYSTOLIC BLOOD PRESSURE: 120 MMHG | BODY MASS INDEX: 30.22 KG/M2 | HEIGHT: 64 IN | WEIGHT: 177 LBS | OXYGEN SATURATION: 97 % | HEART RATE: 76 BPM | DIASTOLIC BLOOD PRESSURE: 74 MMHG

## 2024-02-20 DIAGNOSIS — F17.200 CURRENT SMOKER: ICD-10-CM

## 2024-02-20 DIAGNOSIS — E61.1 LOW SERUM IRON: ICD-10-CM

## 2024-02-20 DIAGNOSIS — F11.21 OPIOID DEPENDENCE IN REMISSION (HCC): ICD-10-CM

## 2024-02-20 DIAGNOSIS — Z12.31 ENCOUNTER FOR SCREENING MAMMOGRAM FOR MALIGNANT NEOPLASM OF BREAST: ICD-10-CM

## 2024-02-20 DIAGNOSIS — I10 ESSENTIAL HYPERTENSION: ICD-10-CM

## 2024-02-20 DIAGNOSIS — R79.0 LOW FERRITIN: ICD-10-CM

## 2024-02-20 DIAGNOSIS — R73.03 PREDIABETES: ICD-10-CM

## 2024-02-20 DIAGNOSIS — E55.9 VITAMIN D DEFICIENCY: ICD-10-CM

## 2024-02-20 DIAGNOSIS — Z00.00 ANNUAL PHYSICAL EXAM: Primary | ICD-10-CM

## 2024-02-20 DIAGNOSIS — J41.0 SIMPLE CHRONIC BRONCHITIS (HCC): ICD-10-CM

## 2024-02-20 PROCEDURE — 99214 OFFICE O/P EST MOD 30 MIN: CPT | Performed by: FAMILY MEDICINE

## 2024-02-20 PROCEDURE — 99396 PREV VISIT EST AGE 40-64: CPT | Performed by: FAMILY MEDICINE

## 2024-02-20 RX ORDER — BENZONATATE 100 MG/1
100 CAPSULE ORAL 3 TIMES DAILY PRN
Qty: 20 CAPSULE | Refills: 0 | Status: SHIPPED | OUTPATIENT
Start: 2024-02-20

## 2024-02-20 NOTE — PROGRESS NOTES
Assessment/Plan:   Diagnoses and all orders for this visit:    Annual physical exam  -     TSH, 3rd generation with Free T4 reflex; Future  - reviewed PMHx, PSHx, meds, allergies, FHx, Soc Hx and Sexual Hx  - received J&J COVID IMM but declined Boosters   - UTD with Tdap   - UTD with annual Flu vaccine (received it at work - [facility])   - due for PCV20 and will schedule RN visit for IMM as currently sick - see below   - last annual GYN exam and PAP (4/2022) - overdue and advised to schedule - pt aware and agreeable   - last Mammo was in 5/12/2022 - annual screening advised - overdue and with Fhx of Breast Ca in [place] - strongly advised to schedule - pt aware and agreeable   - last Cscope was 9/30/2022 - 5yr recall (2027)   - reviewed labs done 1/9/2024 - see below   - declined STD screening in the office today   - UTD with Optho   - overdue for Dental - advised to schedule   - RTO in 1yr for annual exam - pt aware and agreeable     Essential hypertension  -     Albumin / creatinine urine ratio; Future  - BP stable in the office   - nml urine microalbumin and renal function on labs done 1/9/2024  - has f/u appt with Cardio scheduled for 3/25/2024  - cont current regimen   - RTO in 6months for f/u - pt aware and agreeable     Simple chronic bronchitis (HCC)  -     benzonatate (TESSALON PERLES) 100 mg capsule; Take 1 capsule (100 mg total) by mouth 3 (three) times a day as needed for cough  Current smoker  - smokes 1PPD/36yrs   - has been referred to smoking cessation counseling   - not yet ready to quit   - cont to monitor     Prediabetes  -     Hemoglobin A1C; Future  -     Ambulatory Referral to Nutrition Services; Future  -     Comprehensive metabolic panel; Future  - A1c = 6.0   - diet/exercise - caution with sugars and carb intake   - smoking cessation   - referred to Nutritionist for further assistance  - RTO when feeling better for PCV20 IMM - pt aware and agreeable  - repeat labs in 6months     Vitamin D  deficiency  -     Vitamin D 25 hydroxy; Future  - noted to be Vit D deficient on labs done 1/9/2024 and started on Qwk supplements x12wks, when completed, switch to OTC Vit D 2000IU QD    Low serum iron  -     Ambulatory Referral to Hematology / Oncology; Future  -     CBC and differential; Future  -     Iron Panel (Includes Ferritin, Iron Sat%, Iron, and TIBC); Future  - nml CBC but noted to have low ferritin and serum Fe levels  - has been taking Fe supplements QD - advised to increase to BID (for now - caution can be constipating and advised to increase water and fiber intake) and referred to Heme/Onc as may benefit from IV Fe infusion   - repeat labs in 6months   Low ferritin  -     Ambulatory Referral to Hematology / Oncology; Future  -     CBC and differential; Future  -     Iron Panel (Includes Ferritin, Iron Sat%, Iron, and TIBC); Future    Opioid dependence in remission (HCC)    Encounter for screening mammogram for malignant neoplasm of breast  -     Mammo screening bilateral w 3d & cad; Future    Other orders  -     gabapentin (NEURONTIN) 400 mg capsule; TAKE 2 CAPSULES (1600 MG TOTAL) BY MOUTH EVERY DAY  -     Discontinue: ferrous sulfate 325 (65 Fe) mg tablet; TAKE 1 TABLET (324 MG TOTAL) BY MOUTH DAILY  -     RABEprazole (Aciphex) 20 MG tablet  -     PARoxetine (PAXIL-CR) 12.5 mg 24 hr tablet  -     ondansetron (ZOFRAN-ODT) 4 mg disintegrating tablet  -     Discontinue: naproxen (NAPROSYN) 500 mg tablet  -     Hydrocod Javier-Chlorphe Javier ER (TUSSIONEX) 10-8 mg/5 mL ER suspension  -     ergocalciferol (VITAMIN D2) 50,000 units; TAKE 1 TABLET (50,000 UNITS TOTAL) BY MOUTH ONCE A WEEK FOR 12 WEEKS AND THEN SWITCH TO OTC VITAMIN D 2000IU EVERY DAY  -     econazole nitrate 1 % cream  -     Discontinue: DULoxetine (Cymbalta) 30 mg delayed release capsule; Take 1 capsule by mouth daily  -     darifenacin (ENABLEX) 15 MG 24 hr tablet  -     Discontinue: cefdinir (OMNICEF) 300 mg capsule  -     Discontinue:  ciprofloxacin (CIPRO) 500 mg tablet  -     Discontinue: azithromycin (ZITHROMAX) 500 MG tablet  -     ALPRAZolam (XANAX) 0.25 mg tablet          Subjective:    Patient ID: Cassy Urena is a 49 y.o. female.  Cassy Urena is a 49 y.o. female who presents to the office for an annual exam and f/u   1) Annual   - Specialists: Cardio (has f/u scheduled for 3/25/2024), GI, Rheum, Pain Management, Pulm (has f/u scheduled for 6/20/2024)   - PMHx: GERD, colon polyps, DJD, Tobacco abuse, Chronic Bronchitis, Anemia of chronic disease, HL, Vit D deficiency, Neuropathic pain  - allergies: Amox and Cipro   - Meds: see med rec   - PSHx: nose/sinus surgery, Csection and tubal ligation   - FHx: F (HTN), M (Epilepsy), [place] (Breast Ca), MGM (Bone Ca), PGM (Stomach Ca), denies FHx of Colon Ca   - Immunizations: J&J COVID IMM and no Boosters, UTD with Tdap, gets Flu vaccine at work ([employer])   - GYN Hx: last annual GYN exam and PAP (4/2022)   - Hm: last Mammo was in 5/12/2022 - annual screening advised, last Cscope was 9/30/2022 - 5yr recall (2027)   - diet/exercise: walks, diet: balanced   - social: (+) tob - 1PPD/36yrs (contemplating cutting back but not ready to quit), denies tob/illicts   - sexual Hx: active with spouse, s/p tubal ligation, denied STD screening in the office today   - last vision: reading glasses, goes annually   - last dental: last OV was >4yrs ago   - reviewed labs done 1/9/2024   2) Cold   - had a cold a month ago   - started 1wk ago with new s/s - (+) runny nose, SOB, some wheezing, productive cough with thick white-yellow phlegm   - denies F/C  - did see Pulm in 12/2023 - simple bronchitis   - UTD with annual Flu vaccine   - has not gotten PVC20   - UTD with 1st COVID IMM - J&J but no additional boosters  - (+) current smoker - smokes 1PPD/36yrs - not ready to quit   3) HTN   - BP in the office 120/74  - reviewed labs done 1/9/2024 - nml renal function and urine microalbumin   - follows with Cardio  "and has f/u appt scheduled for 3/25/2024   - pt denies F/C/N/V/HA/visual changes/CP/palpitations/SOB/wheezing/abd pain/D/LE edema        The following portions of the patient's history were reviewed and updated as appropriate: allergies, current medications, past family history, past medical history, past social history, past surgical history and problem list.    Review of Systems  as per HPI    Objective:  /74   Pulse 76   Resp 16   Ht 5' 4\" (1.626 m)   Wt 80.3 kg (177 lb)   SpO2 97%   BMI 30.38 kg/m²    Physical Exam  Vitals reviewed.   Constitutional:       General: She is not in acute distress.     Appearance: Normal appearance. She is not ill-appearing, toxic-appearing or diaphoretic.   HENT:      Head: Normocephalic and atraumatic.      Right Ear: External ear normal.      Left Ear: External ear normal.      Nose: Nose normal.   Eyes:      General: No scleral icterus.        Right eye: No discharge.         Left eye: No discharge.      Extraocular Movements: Extraocular movements intact.      Conjunctiva/sclera: Conjunctivae normal.   Cardiovascular:      Rate and Rhythm: Normal rate and regular rhythm.      Heart sounds: Normal heart sounds.   Pulmonary:      Effort: Pulmonary effort is normal. No respiratory distress.      Breath sounds: Normal breath sounds. No stridor. No wheezing, rhonchi or rales.   Abdominal:      Palpations: Abdomen is soft.   Musculoskeletal:         General: Normal range of motion.      Cervical back: Normal range of motion.      Right lower leg: No edema.      Left lower leg: No edema.   Skin:     General: Skin is warm.   Neurological:      General: No focal deficit present.      Mental Status: She is alert and oriented to person, place, and time.   Psychiatric:         Mood and Affect: Mood normal.         Behavior: Behavior normal.         BMI Counseling: Body mass index is 30.38 kg/m². The BMI is above normal. Nutrition recommendations include 3-5 servings of " fruits/vegetables daily. Exercise recommendations include exercising 3-5 times per week.    Depression Screening Follow-up Plan: Patient's depression screening was positive with a PHQ-2 score of 0. Their PHQ-9 score was . Clinically patient does not have depression. No treatment is required.

## 2024-02-20 NOTE — PATIENT INSTRUCTIONS
Iron Rich Diet   WHAT YOU NEED TO KNOW:   What is an iron-rich diet?  An iron-rich diet includes foods that are good sources of iron. People need extra iron during childhood, adolescence (teenage years), and pregnancy. Iron is a mineral that your body needs to make hemoglobin. Hemoglobin is part of your blood and helps carry oxygen from your lungs to the rest of your body. Eat iron-rich foods and vitamin C every day to prevent iron deficiency anemia. Iron deficiency anemia can lead to other health problems in adults and growth or development problems in children.  How much iron do I need each day?   Males:      1 to 3 years old: 7 mg    4 to 8 years old: 10 mg    9 to 13 years old: 8 mg    14 to 18 years old: 11 mg    19 years and older: 8 mg    Females:      1 to 3 years old: 7 mg    4 to 8 years old: 10 mg    9 to 13 years old: 8 mg    14 to 18 years old: 15 mg    19 to 50 years: 18 mg    Over 51 years old: 8 mg    Pregnant women:  27 mg    Which foods contain iron?   Meat, fish, and poultry are good sources of iron. They contain heme iron, a form of iron that your body absorbs very well. Fruit, vegetables, eggs, and grains such as pasta, rice, and cereal also contain iron. They contain nonheme iron, a form of iron that is not absorbed as well as heme iron. You can absorb more iron from these foods by eating a food that is high in vitamin C at the same time. You can also absorb more nonheme iron by eating a food from the meat, fish, and poultry group at the same time.    Fish and shellfish contain some mercury, a metal that can be harmful. Children and unborn babies are at higher risk for harm caused by mercury. Children and pregnant women should avoid eating fish high in mercury, such as shark and swordfish. They should also eat only fish that are lower in mercury, such as salmon, canned light tuna, and catfish. Limit the amount of low-mercury fish and shellfish you eat to less than 12 ounces per week.    What  are some iron-rich foods?   Foods that contain 2 mg or more per serving:      3 ounces of cooked beef (agatha, eye of round) or cooked turkey (dark meat)    ½ cup of beans (black, kidney, or lentil, or soybeans)    ½ cup of tofu    1 medium baked potato    1 cup of cooked artichoke or cooked spinach    ¾ cup of instant oatmeal    1 cup of corn flakes    Foods that contain 1 to 2 mg per serving:      3 ounces of chicken    3 ounces of pork    3 ounces of turkey (light meat)    3 ounces of light tuna    ½ cup of seedless, packed raisins    1 slice of whole-wheat or white bread       What are good sources of vitamin C?  Eat a serving of vitamin C with any iron-rich food to help your body absorb more iron. The following fruits and vegetables are good sources of vitamin C:  1 cup of fresh orange juice (124 mg) or pink grapefruit juice (83 mg)    1 cup of strawberries (106 mg)    1 cup of diced cantaloupe (68 mg)    1 cup of sweet yellow pepper (283 mg)    1 cup of fresh, boiled broccoli (116 mg) or cooked brussels sprouts (97 mg)    1 cup of kale (53 mg)    1 cup of tomato juice (45 mg)       What other guidelines should I follow?   Tea and coffee can decrease the amount of iron that your body absorbs from iron-rich foods. Drink coffee and tea separately from meals that contain iron-rich foods.    Have foods and liquids high in calcium separately from iron-rich foods. Calcium prevents iron from being absorbed. Cow's milk and products made from it, such as cheese and yogurt, are high in calcium. Children older than 1 year only need about 24 ounces of cow's milk each day. Other foods high in calcium include leafy greens, green vegetables, almonds, and canned sardines.       CARE AGREEMENT:   You have the right to help plan your care. Discuss treatment options with your healthcare provider to decide what care you want to receive. You always have the right to refuse treatment. The above information is an  only.  It is not intended as medical advice for individual conditions or treatments. Talk to your doctor, nurse or pharmacist before following any medical regimen to see if it is safe and effective for you.  © Copyright [employer] 2023 Information is for End User's use only and may not be sold, redistributed or otherwise used for commercial purposes.    Wellness Visit for Adults   AMBULATORY CARE:   A wellness visit  is when you see your healthcare provider to get screened for health problems. Your healthcare provider will also give you advice on how to stay healthy. Write down your questions so you remember to ask them. Ask your healthcare provider how often you should have a wellness visit.  What happens at a wellness visit:  Your healthcare provider will ask about your health, and your family history of health problems. This includes high blood pressure, heart disease, and cancer. He or she will ask if you have symptoms that concern you, if you smoke, and about your mood. You may also be asked about your intake of medicines, supplements, food, and alcohol. Any of the following may be done:  Your weight  will be checked. Your height may also be checked so your body mass index (BMI) can be calculated. Your BMI shows if you are at a healthy weight.    Your blood pressure  and heart rate will be checked. Your temperature may also be checked.    Blood and urine tests  may be done. Blood tests may be done to check your cholesterol levels. Abnormal cholesterol levels increase your risk for heart disease and stroke. You may also need a blood or urine test to check for diabetes if you are at increased risk. Urine tests may be done to look for signs of an infection or kidney disease.    A physical exam  includes checking your heartbeat and lungs with a stethoscope. Your healthcare provider may also check your skin to look for sun damage.    Screening tests  may be recommended. A screening test is done to check for diseases that may  not cause symptoms. The screening tests you may need depend on your age, gender, family history, and lifestyle habits. For example, colorectal screening may be recommended if you are 50 years old or older.    Screening tests you need if you are a woman:   A Pap smear  is used to screen for cervical cancer. Pap smears are usually done every 3 to 5 years depending on your age. You may need them more often if you have had abnormal Pap smear test results in the past. Ask your healthcare provider how often you should have a Pap smear.    A mammogram  is an x-ray of your breasts to screen for breast cancer. Experts recommend mammograms every 2 years starting at age 50 years. You may need a mammogram at age 49 years or younger if you have an increased risk for breast cancer. Talk to your healthcare provider about when you should start having mammograms and how often you need them.    Vaccines you may need:   Get an influenza vaccine  every year. The influenza vaccine protects you from the flu. Several types of viruses cause the flu. The viruses change over time, so new vaccines are made each year.    Get a tetanus-diphtheria (Td) booster vaccine  every 10 years. This vaccine protects you against tetanus and diphtheria. Tetanus is a severe infection that may cause painful muscle spasms and lockjaw. Diphtheria is a severe bacterial infection that causes a thick covering in the back of your mouth and throat.    Get a human papillomavirus (HPV) vaccine  if you are female and aged 19 to 26 or male 19 to 21 and never received it. This vaccine protects you from HPV infection. HPV is the most common infection spread by sexual contact. HPV may also cause vaginal, penile, and anal cancers.    Get a pneumococcal vaccine  if you are aged 65 years or older. The pneumococcal vaccine is an injection given to protect you from pneumococcal disease. Pneumococcal disease is an infection caused by pneumococcal bacteria. The infection may  cause pneumonia, meningitis, or an ear infection.    Get a shingles vaccine  if you are 60 or older, even if you have had shingles before. The shingles vaccine is an injection to protect you from the varicella-zoster virus. This is the same virus that causes chickenpox. Shingles is a painful rash that develops in people who had chickenpox or have been exposed to the virus.    How to eat healthy:  My Plate is a model for planning healthy meals. It shows the types and amounts of foods that should go on your plate. Fruits and vegetables make up about half of your plate, and grains and protein make up the other half. A serving of dairy is included on the side of your plate. The amount of calories and serving sizes you need depends on your age, gender, weight, and height. Examples of healthy foods are listed below:  Eat a variety of vegetables  such as dark green, red, and orange vegetables. You can also include canned vegetables low in sodium (salt) and frozen vegetables without added butter or sauces.    Eat a variety of fresh fruits , canned fruit in 100% juice, frozen fruit, and dried fruit.    Include whole grains.  At least half of the grains you eat should be whole grains. Examples include whole-wheat bread, wheat pasta, brown rice, and whole-grain cereals such as oatmeal.    Eat a variety of protein foods such as seafood (fish and shellfish), lean meat, and poultry without skin (turkey and chicken). Examples of lean meats include pork leg, shoulder, or tenderloin, and beef round, sirloin, tenderloin, and extra lean ground beef. Other protein foods include eggs and egg substitutes, beans, peas, soy products, nuts, and seeds.    Choose low-fat dairy products such as skim or 1% milk or low-fat yogurt, cheese, and cottage cheese.    Limit unhealthy fats  such as butter, hard margarine, and shortening.       Exercise:  Exercise at least 30 minutes per day on most days of the week. Some examples of exercise include  walking, biking, dancing, and swimming. You can also fit in more physical activity by taking the stairs instead of the elevator or parking farther away from stores. Include muscle strengthening activities 2 days each week. Regular exercise provides many health benefits. It helps you manage your weight, and decreases your risk for type 2 diabetes, heart disease, stroke, and high blood pressure. Exercise can also help improve your mood. Ask your healthcare provider about the best exercise plan for you.       General health and safety guidelines:   Do not smoke.  Nicotine and other chemicals in cigarettes and cigars can cause lung damage. Ask your healthcare provider for information if you currently smoke and need help to quit. E-cigarettes or smokeless tobacco still contain nicotine. Talk to your healthcare provider before you use these products.    Limit alcohol.  A drink of alcohol is 12 ounces of beer, 5 ounces of wine, or 1½ ounces of liquor.    Lose weight, if needed.  Being overweight increases your risk of certain health conditions. These include heart disease, high blood pressure, type 2 diabetes, and certain types of cancer.    Protect your skin.  Do not sunbathe or use tanning beds. Use sunscreen with a SPF 15 or higher. Apply sunscreen at least 15 minutes before you go outside. Reapply sunscreen every 2 hours. Wear protective clothing, hats, and sunglasses when you are outside.    Drive safely.  Always wear your seatbelt. Make sure everyone in your car wears a seatbelt. A seatbelt can save your life if you are in an accident. Do not use your cell phone when you are driving. This could distract you and cause an accident. Pull over if you need to make a call or send a text message.    Practice safe sex.  Use latex condoms if are sexually active and have more than one partner. Your healthcare provider may recommend screening tests for sexually transmitted infections (STIs).    Wear helmets, lifejackets, and  protective gear.  Always wear a helmet when you ride a bike or motorcycle, go skiing, or play sports that could cause a head injury. Wear protective equipment when you play sports. Wear a lifejacket when you are on a boat or doing water sports.    © Copyright [employer] 2023 Information is for End User's use only and may not be sold, redistributed or otherwise used for commercial purposes.  The above information is an  only. It is not intended as medical advice for individual conditions or treatments. Talk to your doctor, nurse or pharmacist before following any medical regimen to see if it is safe and effective for you.

## 2024-02-21 ENCOUNTER — TELEPHONE (OUTPATIENT)
Dept: HEMATOLOGY ONCOLOGY | Facility: CLINIC | Age: 49
End: 2024-02-21

## 2024-02-21 NOTE — TELEPHONE ENCOUNTER
Appointment Change  Cancel, Reschedule, Change to Virtual      Who are you speaking with? Patient   If it is not the patient, is the caller listed on the communication consent form? N/A   Which provider is the appointment scheduled with? Aicha Bates PA-C   When was the original appointment scheduled?    Please list date and time 3/21/24 @ 9am   At which location is the appointment scheduled to take place? [place]   Was the appointment rescheduled?     Was the appointment changed from an in person visit to a virtual visit?    If so, please list the details of the change. Yes 3/4/24 @ 8am with [place] fishman [place]   What is the reason for the appointment change? Patient needed sooner appt       Was STAR transport scheduled? no   Does STAR transport need to be scheduled for the new visit (if applicable) no   Does the patient need an infusion appointment rescheduled? no   Does the patient have an upcoming infusion appointment scheduled? If so, when? no   Is the patient undergoing chemotherapy? no   For appointments cancelled with less than 24 hours:  Was the no-show policy reviewed? yes

## 2024-02-21 NOTE — PROGRESS NOTES
Assessment/Plan:   Diagnoses and all orders for this visit:    Essential hypertension  -     Albumin / creatinine urine ratio; Future  - BP stable in the office   - nml urine microalbumin and renal function on labs done 1/9/2024  - has f/u appt with Cardio scheduled for 3/25/2024  - cont current regimen   - RTO in 6months for f/u - pt aware and agreeable     Simple chronic bronchitis (HCC)  -     benzonatate (TESSALON PERLES) 100 mg capsule; Take 1 capsule (100 mg total) by mouth 3 (three) times a day as needed for cough  Current smoker  - smokes 1PPD/36yrs   - has been referred to smoking cessation counseling   - not yet ready to quit   - cont to monitor     Prediabetes  -     Hemoglobin A1C; Future  -     Ambulatory Referral to Nutrition Services; Future  -     Comprehensive metabolic panel; Future  - A1c = 6.0   - diet/exercise - caution with sugars and carb intake   - smoking cessation   - referred to Nutritionist for further assistance  - RTO when feeling better for PCV20 IMM - pt aware and agreeable  - repeat labs in 6months     Vitamin D deficiency  -     Vitamin D 25 hydroxy; Future  - noted to be Vit D deficient on labs done 1/9/2024 and started on Qwk supplements x12wks, when completed, switch to OTC Vit D 2000IU QD    Low serum iron  -     Ambulatory Referral to Hematology / Oncology; Future  -     CBC and differential; Future  -     Iron Panel (Includes Ferritin, Iron Sat%, Iron, and TIBC); Future  - nml CBC but noted to have low ferritin and serum Fe levels  - has been taking Fe supplements QD - advised to increase to BID (for now - caution can be constipating and advised to increase water and fiber intake) and referred to Heme/Onc as may benefit from IV Fe infusion   - repeat labs in 6months   Low ferritin  -     Ambulatory Referral to Hematology / Oncology; Future  -     CBC and differential; Future  -     Iron Panel (Includes Ferritin, Iron Sat%, Iron, and TIBC); Future    Opioid dependence in  remission (HCC)    Encounter for screening mammogram for malignant neoplasm of breast  -     Mammo screening bilateral w 3d & cad; Future    Other orders  -     gabapentin (NEURONTIN) 400 mg capsule; TAKE 2 CAPSULES (1600 MG TOTAL) BY MOUTH EVERY DAY  -     Discontinue: ferrous sulfate 325 (65 Fe) mg tablet; TAKE 1 TABLET (324 MG TOTAL) BY MOUTH DAILY  -     RABEprazole (Aciphex) 20 MG tablet  -     PARoxetine (PAXIL-CR) 12.5 mg 24 hr tablet  -     ondansetron (ZOFRAN-ODT) 4 mg disintegrating tablet  -     Discontinue: naproxen (NAPROSYN) 500 mg tablet  -     Hydrocod Javier-Chlorphe Javier ER (TUSSIONEX) 10-8 mg/5 mL ER suspension  -     ergocalciferol (VITAMIN D2) 50,000 units; TAKE 1 TABLET (50,000 UNITS TOTAL) BY MOUTH ONCE A WEEK FOR 12 WEEKS AND THEN SWITCH TO OTC VITAMIN D 2000IU EVERY DAY  -     econazole nitrate 1 % cream  -     Discontinue: DULoxetine (Cymbalta) 30 mg delayed release capsule; Take 1 capsule by mouth daily  -     darifenacin (ENABLEX) 15 MG 24 hr tablet  -     Discontinue: cefdinir (OMNICEF) 300 mg capsule  -     Discontinue: ciprofloxacin (CIPRO) 500 mg tablet  -     Discontinue: azithromycin (ZITHROMAX) 500 MG tablet  -     ALPRAZolam (XANAX) 0.25 mg tablet          Subjective:    Patient ID: Cassy Urena is a 49 y.o. female.  Cassy Urena is a 49 y.o. female who presents to the office for an annual exam and f/u   1) Annual   - Specialists: Cardio (has f/u scheduled for 3/25/2024), GI, Rheum, Pain Management, Pulm (has f/u scheduled for 6/20/2024)   - PMHx: GERD, colon polyps, DJD, Tobacco abuse, Chronic Bronchitis, Anemia of chronic disease, HL, Vit D deficiency, Neuropathic pain  - allergies: Amox and Cipro   - Meds: see med rec   - PSHx: nose/sinus surgery, Csection and tubal ligation   - FHx: F (HTN), M (Epilepsy), MA (Breast Ca), MGM (Bone Ca), PGM (Stomach Ca), denies FHx of Colon Ca   - Immunizations: J&J COVID IMM and no Boosters, UTD with Tdap, gets Flu vaccine at work ([employer])   - GYN Hx: last annual GYN exam and PAP (4/2022)   - Hm: last Mammo was in 5/12/2022 - annual screening advised, last Cscope was 9/30/2022 - 5yr recall (2027)   - diet/exercise: walks, diet: balanced   - social: (+) tob - 1PPD/36yrs (contemplating cutting back but not ready to quit), denies tob/illicts   - sexual Hx: active with spouse, s/p tubal ligation, denied STD screening in the office today   - last vision: reading glasses, goes annually   - last dental: last OV was >4yrs ago   - reviewed labs done 1/9/2024   2) Cold   - had a cold a month ago   - started 1wk ago with new s/s - (+) runny nose, SOB, some wheezing, productive cough with thick white-yellow phlegm   - denies F/C  - did see Pulm in 12/2023 - simple bronchitis   - UTD with annual Flu vaccine   - has not gotten PVC20   - UTD with 1st COVID IMM - J&J but no additional boosters  - (+) current smoker - smokes 1PPD/36yrs - not ready to quit   3) HTN   - BP in the office 120/74  - reviewed labs done 1/9/2024 - nml renal function and urine microalbumin   - follows with Cardio and has f/u appt scheduled for 3/25/2024   - pt denies F/C/N/V/HA/visual changes/CP/palpitations/SOB/wheezing/abd pain/D/LE edema        The following portions of the patient's history were reviewed and updated as appropriate: allergies, current medications, past family history, past medical history, past social history, past surgical history and problem list.    Review of Systems  as per HPI    Objective:  /74   Pulse 76   Resp 16   Ht 5' 4\" (1.626 m)   Wt 80.3 kg (177 lb)   SpO2 97%   BMI 30.38 kg/m²    Physical Exam  Vitals reviewed.   Constitutional:       General: She is not in acute distress.     Appearance: Normal appearance. She is not ill-appearing, toxic-appearing or diaphoretic.   HENT:      Head: Normocephalic and atraumatic.      Right Ear: External ear normal.      Left Ear: External ear normal.      Nose: Nose normal.   Eyes:      General: No " scleral icterus.        Right eye: No discharge.         Left eye: No discharge.      Extraocular Movements: Extraocular movements intact.      Conjunctiva/sclera: Conjunctivae normal.   Cardiovascular:      Rate and Rhythm: Normal rate and regular rhythm.      Heart sounds: Normal heart sounds.   Pulmonary:      Effort: Pulmonary effort is normal. No respiratory distress.      Breath sounds: Normal breath sounds. No stridor. No wheezing, rhonchi or rales.   Abdominal:      Palpations: Abdomen is soft.   Musculoskeletal:         General: Normal range of motion.      Cervical back: Normal range of motion.      Right lower leg: No edema.      Left lower leg: No edema.   Skin:     General: Skin is warm.   Neurological:      General: No focal deficit present.      Mental Status: She is alert and oriented to person, place, and time.   Psychiatric:         Mood and Affect: Mood normal.         Behavior: Behavior normal.         BMI Counseling: Body mass index is 30.38 kg/m². The BMI is above normal. Nutrition recommendations include 3-5 servings of fruits/vegetables daily. Exercise recommendations include exercising 3-5 times per week.    Depression Screening Follow-up Plan: Patient's depression screening was positive with a PHQ-2 score of 0. Their PHQ-9 score was . Clinically patient does not have depression. No treatment is required.

## 2024-02-22 RX ORDER — FLUTICASONE PROPIONATE 50 MCG
2 SPRAY, SUSPENSION (ML) NASAL DAILY
Qty: 48 ML | Refills: 0 | Status: SHIPPED | OUTPATIENT
Start: 2024-02-22

## 2024-02-22 RX ORDER — AZELASTINE HYDROCHLORIDE 137 UG/1
SPRAY, METERED NASAL
Refills: 0 | OUTPATIENT
Start: 2024-02-22

## 2024-02-22 RX ORDER — FLUTICASONE PROPIONATE AND SALMETEROL 50; 250 UG/1; UG/1
POWDER RESPIRATORY (INHALATION)
Qty: 60 BLISTER | Refills: 0 | OUTPATIENT
Start: 2024-02-22

## 2024-03-04 ENCOUNTER — TELEPHONE (OUTPATIENT)
Dept: HEMATOLOGY ONCOLOGY | Facility: MEDICAL CENTER | Age: 49
End: 2024-03-04

## 2024-03-04 ENCOUNTER — OFFICE VISIT (OUTPATIENT)
Dept: HEMATOLOGY ONCOLOGY | Facility: MEDICAL CENTER | Age: 49
End: 2024-03-04
Payer: COMMERCIAL

## 2024-03-04 VITALS
HEIGHT: 64 IN | SYSTOLIC BLOOD PRESSURE: 134 MMHG | HEART RATE: 76 BPM | RESPIRATION RATE: 14 BRPM | DIASTOLIC BLOOD PRESSURE: 86 MMHG | TEMPERATURE: 98.2 F | OXYGEN SATURATION: 98 % | BODY MASS INDEX: 29.88 KG/M2 | WEIGHT: 175 LBS

## 2024-03-04 DIAGNOSIS — R79.0 LOW FERRITIN: ICD-10-CM

## 2024-03-04 DIAGNOSIS — E61.1 LOW SERUM IRON: Primary | ICD-10-CM

## 2024-03-04 DIAGNOSIS — R53.82 CHRONIC FATIGUE: ICD-10-CM

## 2024-03-04 DIAGNOSIS — N92.6 IRREGULAR MENSES: ICD-10-CM

## 2024-03-04 PROCEDURE — 99204 OFFICE O/P NEW MOD 45 MIN: CPT | Performed by: PHYSICIAN ASSISTANT

## 2024-03-04 RX ORDER — SODIUM CHLORIDE 9 MG/ML
20 INJECTION, SOLUTION INTRAVENOUS ONCE
OUTPATIENT
Start: 2024-03-18

## 2024-03-04 NOTE — TELEPHONE ENCOUNTER
Lvm with time and date of infusions, patient aware schedule is updated on [facility]. Patient has my teams number to return my call and confirm appts.

## 2024-03-04 NOTE — PROGRESS NOTES
[facility]  [address] [place] [id number]  Hematology Ambulatory Consult  Cassy Urena, 1975, [id number]  3/4/2024      Assessment and Plan   Patient presents today in consultation for iron deficiency anemia.    She had recent labwork on 1/9/2024.  At that time she had hemoglobin 13.3, hematocrit 39.2, ferritin 24.5 and percent saturation 19.  She is currently taking oral iron 2 tablets daily. She admits to constipation on oral iron. She also complains of fatigue and would prefer to try IV iron.    Will administer venofer 200mg weekly x 3. She can hold her oral iron for now. She will repeat labwork in a few months with her PCP.    We discussed the etiology of iron deficiency.  She is aware that this could be related to nutritional deficiency, decreased absorption, or blood loss.    She is up-to-date with her colonoscopy and upper endoscopy. She does continue to have menstrual periods, somewhat irregular, not especially heavy. She denies any obvious bleeding otherwise. She has a regular diet. She does have chronic reflux issues for which she takes prevacid and could be contributing to decreased absorption.     Patient will be made PRN. She will follow-up with her primary care physician for repeat labwork in a few months.  Barrier(s) to care: None.   The patient is able to self care.    Discussed with Dr. Frazier.    Subjective     CC: New patient consultation: Iron deficiency    Referring provider    Mei Garcia DO  [address] [place] [address]    History of present illness: Patient presents today for consultation of iron-deficiency anemia at the request of his primary care physician Dr. Garcia.  The patient reports that she has been taking oral iron 1 tablet daily for several years.  She had repeat lab work on 1/9/2024.  At that time she had hemoglobin 13.3, hematocrit 39.2, ferritin 24.5 and percent saturation  19.  She was recently instructed to increase her oral iron to 2 tablets daily.    She reports menstrual periods which are somewhat irregular. She says her periods sometimes come more than one time per month.  Periods are not especially heavy.  She is up-to-date with both her upper endoscopy and colonoscopy.  Colonoscopy was completed on 9/30/2022.  She had one 7 mm sessile polyp in the distal sigmoid colon removed.  She has small internal hemorrhoids.  Upper endoscopy showed small hiatal hernia and mild erythema in the gastric antrum.    She denies any current complaints other than fatigue. She denies bleeding. No nausea, vomiting. She has mild constipation on oral iron. No chest pain, SOB.    Review of Systems   Constitutional:  Positive for fatigue. Negative for appetite change, fever and unexpected weight change.   HENT:  Negative for nosebleeds.    Respiratory:  Negative for cough, choking and shortness of breath.         Negative hemoptysis.   Cardiovascular:  Negative for chest pain, palpitations and leg swelling.   Gastrointestinal:  Positive for constipation. Negative for abdominal distention, abdominal pain, anal bleeding, blood in stool, diarrhea, nausea and vomiting.   Endocrine: Negative.  Negative for cold intolerance.   Genitourinary: Negative.  Negative for hematuria, menstrual problem, vaginal bleeding, vaginal discharge and vaginal pain.   Musculoskeletal: Negative.  Negative for arthralgias, myalgias, neck pain and neck stiffness.   Skin: Negative.  Negative for color change, pallor and rash.   Allergic/Immunologic: Negative.  Negative for immunocompromised state.   Neurological: Negative.  Negative for weakness and headaches.   Hematological:  Negative for adenopathy. Does not bruise/bleed easily.   All other systems reviewed and are negative.      Past Medical History:   Diagnosis Date    Anemia     Asthma     Colon polyp     Ear problems     GERD (gastroesophageal reflux disease)      Hyperlipidemia     Hypertension     Neck pain     PONV (postoperative nausea and vomiting)     Sleep apnea      Past Surgical History:   Procedure Laterality Date     SECTION      COLONOSCOPY      NOSE SURGERY      SINUS SURGERY      TUBAL LIGATION       Family History   Problem Relation Age of Onset    Hypertension Father     Bone cancer Maternal Grandmother 81    Cancer Paternal Grandmother     Stomach cancer Paternal Grandmother 82    Hypertension Maternal Aunt     Breast cancer Neg Hx     Colon cancer Neg Hx      Social History     Socioeconomic History    Marital status: /Civil Union     Spouse name: None    Number of children: None    Years of education: None    Highest education level: None   Occupational History    None   Tobacco Use    Smoking status: Every Day     Current packs/day: 1.00     Average packs/day: 1 pack/day for 33.0 years (33.0 ttl pk-yrs)     Types: Cigarettes    Smokeless tobacco: Never   Vaping Use    Vaping status: Never Used   Substance and Sexual Activity    Alcohol use: No    Drug use: No    Sexual activity: Yes     Partners: Male     Birth control/protection: Female Sterilization   Other Topics Concern    None   Social History Narrative    None     Social Determinants of Health     Financial Resource Strain: Not on file   Food Insecurity: Not on file   Transportation Needs: Not on file   Physical Activity: Not on file   Stress: Not on file   Social Connections: Not on file   Intimate Partner Violence: Not on file   Housing Stability: Not on file         Current Outpatient Medications:     Advair Diskus 250-50 MCG/ACT inhaler, INHALE 1 PUFF 2 (TWO) TIMES A DAY. TO RINSE THROAT AFTER USE, Disp: , Rfl:     albuterol (2.5 mg/3 mL) 0.083 % nebulizer solution, TAKE 3 ML (2.5 MG TOTAL) BY NEBULIZATION EVERY 6 HOURS AS NEEDED FOR WHEEZING OR SHORTNESS OF BREATH, Disp: 150 mL, Rfl: 0    albuterol (PROVENTIL HFA,VENTOLIN HFA) 90 mcg/act inhaler, INHALE 2 PUFFS BY MOUTH EVERY 6  HOURS AS NEEDED FOR WHEEZE, Disp: 18 g, Rfl: 5    ALPRAZolam (XANAX) 0.25 mg tablet, , Disp: , Rfl:     Azelastine HCl 137 MCG/SPRAY SOLN, 1 SPRAY INTO EACH NOSTRIL 2 (TWO) TIMES A DAY. USE IN EACH NOSTRIL AS DIRECTED, Disp: , Rfl:     benzonatate (TESSALON PERLES) 100 mg capsule, Take 1 capsule (100 mg total) by mouth 3 (three) times a day as needed for cough, Disp: 20 capsule, Rfl: 0    carvedilol (COREG) 6.25 mg tablet, Take 1 tablet (6.25 mg total) by mouth 2 (two) times a day with meals, Disp: 60 tablet, Rfl: 0    Cholecalciferol 1.25 MG (64574 UT) TABS, Take 1 tablet (50,000 Units total) by mouth once a week x12wks and then switch to OTC Vit D 2000IU QD, Disp: 12 tablet, Rfl: 0    darifenacin (ENABLEX) 15 MG 24 hr tablet, , Disp: , Rfl:     econazole nitrate 1 % cream, , Disp: , Rfl:     ergocalciferol (VITAMIN D2) 50,000 units, TAKE 1 TABLET (50,000 UNITS TOTAL) BY MOUTH ONCE A WEEK FOR 12 WEEKS AND THEN SWITCH TO OTC VITAMIN D 2000IU EVERY DAY, Disp: , Rfl:     ferrous sulfate 324 (65 Fe) mg, Take 1 tablet (324 mg total) by mouth daily, Disp: 90 tablet, Rfl: 1    fluticasone (FLONASE) 50 mcg/act nasal spray, 2 sprays into each nostril daily, Disp: 48 mL, Rfl: 0    Fluticasone-Salmeterol (Advair) 250-50 mcg/dose inhaler, TAKE 1 PUFF BY MOUTH TWICE A DAY, Disp: 180 blister, Rfl: 0    gabapentin (NEURONTIN) 400 mg capsule, TAKE 2 CAPSULES (1600 MG TOTAL) BY MOUTH EVERY DAY, Disp: , Rfl:     gabapentin (NEURONTIN) 800 mg tablet, Take 2 tablets (1,600 mg total) by mouth daily, Disp: 60 tablet, Rfl: 5    Hydrocod Javier-Chlorphe Javier ER (TUSSIONEX) 10-8 mg/5 mL ER suspension, , Disp: , Rfl:     lansoprazole (PREVACID) 30 mg capsule, TAKE 1 CAPSULE BY MOUTH EVERY DAY, Disp: 90 capsule, Rfl: 1    lisinopril (ZESTRIL) 2.5 mg tablet, Take 1 tablet (2.5 mg total) by mouth daily, Disp: 30 tablet, Rfl: 0    ondansetron (ZOFRAN-ODT) 4 mg disintegrating tablet, , Disp: , Rfl:     PARoxetine (PAXIL-CR) 12.5 mg 24 hr tablet, ,  "Disp: , Rfl:     RABEprazole (Aciphex) 20 MG tablet, , Disp: , Rfl:     rosuvastatin (CRESTOR) 5 mg tablet, Take 1 tablet (5 mg total) by mouth daily, Disp: 30 tablet, Rfl: 0  Allergies   Allergen Reactions    Amoxicillin Hives    Ciprofloxacin Nausea Only       Objective   /86 (BP Location: Left arm, Patient Position: Sitting, Cuff Size: Adult)   Pulse 76   Temp 98.2 °F (36.8 °C) (Temporal)   Resp 14   Ht 5' 4\" (1.626 m)   Wt 79.4 kg (175 lb)   SpO2 98%   BMI 30.04 kg/m²   Physical Exam  Constitutional:       General: She is not in acute distress.     Appearance: She is well-developed and normal weight.   HENT:      Head: Normocephalic and atraumatic.      Mouth/Throat:      Mouth: Mucous membranes are moist.   Eyes:      General: No scleral icterus.     Conjunctiva/sclera: Conjunctivae normal.   Cardiovascular:      Rate and Rhythm: Normal rate and regular rhythm.   Pulmonary:      Effort: Pulmonary effort is normal. No respiratory distress.      Breath sounds: Normal breath sounds.   Abdominal:      General: Abdomen is flat. There is no distension.      Palpations: Abdomen is soft.   Musculoskeletal:         General: No swelling or tenderness.   Skin:     General: Skin is warm.      Coloration: Skin is not pale.      Findings: No rash.   Neurological:      Mental Status: She is alert and oriented to person, place, and time.   Psychiatric:         Mood and Affect: Mood normal.         Thought Content: Thought content normal.         Judgment: Judgment normal.     Result Review  Labs: 2024 WBC 6.0, hemoglobin 13.3, hematocrit 39.2, platelet count 195.  Percent iron saturation 19, ferritin 24.5, BUN 11, creatinine 0.63, sodium 142, potassium 3.9, LFTs within normal limits.    Imagin/15/2023 CXR: IMPRESSION:     No acute cardiopulmonary disease.    Please note:  This report has been generated by a voice recognition software system. Therefore there may be syntax, spelling, and/or grammatical " errors. Please call if you have any questions.

## 2024-03-04 NOTE — TELEPHONE ENCOUNTER
While we try to accommodate patient requests, our priority is to schedule treatment according to Doctor's orders and site availability.    Does the Provider use the intake sheet or checkout note?  What would be a preferred day of the week that would work best for your infusion appointment? Any day   Do you prefer mornings or afternoons for your appointments? Mornings- early   Are there any days or dates that do not work for your schedule, including any upcoming vacations?   We are going to try our best to schedule you at the infusion center closest to your home.  In the event that we are unable to what would be your next preferred infusion site or sites?     1. [place] [facility]       Do you have transportation to take you to all of your appointments?Yes

## 2024-03-14 ENCOUNTER — TELEPHONE (OUTPATIENT)
Dept: INFUSION CENTER | Facility: HOSPITAL | Age: 49
End: 2024-03-14

## 2024-03-18 ENCOUNTER — HOSPITAL ENCOUNTER (OUTPATIENT)
Dept: INFUSION CENTER | Facility: HOSPITAL | Age: 49
Discharge: HOME | End: 2024-03-18
Attending: INTERNAL MEDICINE
Payer: COMMERCIAL

## 2024-03-18 VITALS
TEMPERATURE: 98.1 F | DIASTOLIC BLOOD PRESSURE: 89 MMHG | OXYGEN SATURATION: 96 % | RESPIRATION RATE: 18 BRPM | HEART RATE: 81 BPM | SYSTOLIC BLOOD PRESSURE: 135 MMHG

## 2024-03-18 DIAGNOSIS — E61.1 LOW SERUM IRON: Primary | ICD-10-CM

## 2024-03-18 DIAGNOSIS — R79.0 LOW FERRITIN: ICD-10-CM

## 2024-03-18 PROCEDURE — 96365 THER/PROPH/DIAG IV INF INIT: CPT

## 2024-03-18 RX ORDER — SODIUM CHLORIDE 9 MG/ML
20 INJECTION, SOLUTION INTRAVENOUS ONCE
Status: COMPLETED | OUTPATIENT
Start: 2024-03-18 | End: 2024-03-18

## 2024-03-18 RX ORDER — SODIUM CHLORIDE 9 MG/ML
20 INJECTION, SOLUTION INTRAVENOUS ONCE
Status: CANCELLED | OUTPATIENT
Start: 2024-03-25

## 2024-03-18 RX ADMIN — IRON SUCROSE 200 MG: 20 INJECTION, SOLUTION INTRAVENOUS at 08:59

## 2024-03-18 RX ADMIN — SODIUM CHLORIDE 20 ML/HR: 0.9 INJECTION, SOLUTION INTRAVENOUS at 08:59

## 2024-03-18 NOTE — PROGRESS NOTES
Cassy Urena  tolerated treatment well with no complications.      Cassy Urena is aware of future appt on 3/25 at 0900.     AVS printed and given to Cassy Urena:  Yes

## 2024-03-20 ENCOUNTER — TELEPHONE (OUTPATIENT)
Dept: HEMATOLOGY ONCOLOGY | Facility: CLINIC | Age: 49
End: 2024-03-20

## 2024-03-20 NOTE — TELEPHONE ENCOUNTER
Patient Call    Who are you speaking with? Patient    If it is not the patient, are they listed on an active communication consent form? N/A   What is the reason for this call? Pt is calling in regards to her lab results. Pt states she had the blood work done 3/4/24. Pt viewed her results in [employer] and has questions regarding the results. Pt would like a call back from the office explaining her results please.    Does this require a call back? Yes   If a call back is required, please list best call back number [phone]   If a call back is required, advise that a message will be forwarded to their care team and someone will return their call as soon as possible.   Did you relay this information to the patient? Yes

## 2024-03-21 DIAGNOSIS — R09.89 LABILE HYPERTENSION: ICD-10-CM

## 2024-03-21 DIAGNOSIS — I10 ESSENTIAL HYPERTENSION: ICD-10-CM

## 2024-03-21 DIAGNOSIS — E78.00 HYPERCHOLESTEROLEMIA: ICD-10-CM

## 2024-03-21 RX ORDER — CARVEDILOL 6.25 MG/1
6.25 TABLET ORAL 2 TIMES DAILY WITH MEALS
Qty: 60 TABLET | Refills: 0 | Status: SHIPPED | OUTPATIENT
Start: 2024-03-21 | End: 2024-03-25 | Stop reason: SDUPTHER

## 2024-03-21 RX ORDER — ROSUVASTATIN CALCIUM 5 MG/1
5 TABLET, COATED ORAL DAILY
Qty: 30 TABLET | Refills: 0 | Status: SHIPPED | OUTPATIENT
Start: 2024-03-21 | End: 2024-03-25 | Stop reason: SDUPTHER

## 2024-03-21 RX ORDER — LISINOPRIL 2.5 MG/1
2.5 TABLET ORAL DAILY
Qty: 30 TABLET | Refills: 0 | Status: SHIPPED | OUTPATIENT
Start: 2024-03-21 | End: 2024-03-25 | Stop reason: SDUPTHER

## 2024-03-22 NOTE — PROGRESS NOTES
Progress Note - Cardiology Office  [facility]    Cassy Urena 49 y.o. female MRN: [id number]  : 1975  Encounter: [id number]      ASSESSMENT:  Pre diabetes:  Hemoglobin A1c is 6.1  Diet controlled     Iron deficiency anemia  Receives iron infusion    History of Chest pain     Pharmacologic stress, 2021:    Normal, EF 67%     Hypertension  BP today is 142/90 with heart rate of 66/minute     TTE, 2021  EF 50%, mild LVH, mild MR, trace TR     History of celiac artery stenosis/compression  Abdominal ultrasound, 2017:  Impression  The aorta is patent and normal in caliber.  The celiac, splenic and hepatic arteries are patent.  There is evidence of celiac artery compression, velocities are within normal  range with deep inspiration (140cm/s) and are in stenotic range with expiration  (349 cm/s).  The superior and inferior mesenteric arteries are normal and patent in a  fasting state.     Hyperlipidemia  2021:  LDL 67, triglycerides 79, HDL 60, normal liver enzymes  2022:  LDL 61, TG 76, HDL 52  Currently on Crestor 5 mg daily  2023: LDL 58, TG 77, HDL 56, normal AST and ALT     RECOMMENDATIONS:  Increase lisinopril to 5 mg daily  Continue other cardiac medications  Lipid profile and hepatic function panel  Low-salt and low-cholesterol diet  Regular cardiovascular exercise and weight loss        Please call [phone] if any questions.    HPI :     Cassy Urena is a 49 y.o. year old female who came for follow up.  She generally feels well.  She went for iron infusion prior to coming to the office for her iron deficiency anemia.  Her blood pressure was elevated at the infusion center and in our office also.  As such I am increasing her lisinopril to 5 mg daily  Advised her on low-salt and low-cholesterol diet regular cardiovascular exercise.    REVIEW OF SYSTEMS:  Denies any new or acute cardiac symptoms.  Denies chest pain, dyspnea, palpitations or  syncope today      Historical Information   Past Medical History:   Diagnosis Date    Anemia     Asthma     Colon polyp     Ear problems     GERD (gastroesophageal reflux disease)     Hyperlipidemia     Hypertension     Neck pain     PONV (postoperative nausea and vomiting)     Sleep apnea      Past Surgical History:   Procedure Laterality Date     SECTION      COLONOSCOPY      NOSE SURGERY      SINUS SURGERY      TUBAL LIGATION       Social History     Substance and Sexual Activity   Alcohol Use No     Social History     Substance and Sexual Activity   Drug Use No     Social History     Tobacco Use   Smoking Status Every Day    Current packs/day: 1.00    Average packs/day: 1 pack/day for 33.0 years (33.0 ttl pk-yrs)    Types: Cigarettes   Smokeless Tobacco Never     Family History:   Family History   Problem Relation Age of Onset    Hypertension Father     Bone cancer Maternal Grandmother 81    Cancer Paternal Grandmother     Stomach cancer Paternal Grandmother 82    Hypertension Maternal Aunt     Breast cancer Neg Hx     Colon cancer Neg Hx        Meds/Allergies     Allergies   Allergen Reactions    Amoxicillin Hives    Ciprofloxacin Nausea Only       Current Outpatient Medications:     albuterol (2.5 mg/3 mL) 0.083 % nebulizer solution, TAKE 3 ML (2.5 MG TOTAL) BY NEBULIZATION EVERY 6 HOURS AS NEEDED FOR WHEEZING OR SHORTNESS OF BREATH, Disp: 150 mL, Rfl: 0    albuterol (PROVENTIL HFA,VENTOLIN HFA) 90 mcg/act inhaler, INHALE 2 PUFFS BY MOUTH EVERY 6 HOURS AS NEEDED FOR WHEEZE, Disp: 18 g, Rfl: 5    Azelastine HCl 137 MCG/SPRAY SOLN, 1 SPRAY INTO EACH NOSTRIL 2 (TWO) TIMES A DAY. USE IN EACH NOSTRIL AS DIRECTED, Disp: , Rfl:     benzonatate (TESSALON PERLES) 100 mg capsule, Take 1 capsule (100 mg total) by mouth 3 (three) times a day as needed for cough, Disp: 20 capsule, Rfl: 0    carvedilol (COREG) 6.25 mg tablet, Take 1 tablet (6.25 mg total) by mouth 2 (two) times a day with meals, Disp: 180 tablet,  Rfl: 2    Cholecalciferol 1.25 MG (65078 UT) TABS, Take 1 tablet (50,000 Units total) by mouth once a week x12wks and then switch to OTC Vit D 2000IU QD, Disp: 12 tablet, Rfl: 0    ergocalciferol (VITAMIN D2) 50,000 units, TAKE 1 TABLET (50,000 UNITS TOTAL) BY MOUTH ONCE A WEEK FOR 12 WEEKS AND THEN SWITCH TO OTC VITAMIN D 2000IU EVERY DAY, Disp: , Rfl:     ferrous sulfate 324 (65 Fe) mg, Take 1 tablet (324 mg total) by mouth daily, Disp: 90 tablet, Rfl: 1    fluticasone (FLONASE) 50 mcg/act nasal spray, 2 sprays into each nostril daily, Disp: 48 mL, Rfl: 0    Fluticasone-Salmeterol (Advair) 250-50 mcg/dose inhaler, TAKE 1 PUFF BY MOUTH TWICE A DAY, Disp: 180 blister, Rfl: 0    gabapentin (NEURONTIN) 400 mg capsule, TAKE 2 CAPSULES (1600 MG TOTAL) BY MOUTH EVERY DAY, Disp: , Rfl:     lansoprazole (PREVACID) 30 mg capsule, TAKE 1 CAPSULE BY MOUTH EVERY DAY, Disp: 90 capsule, Rfl: 1    lisinopril (ZESTRIL) 5 mg tablet, Take 1 tablet (5 mg total) by mouth daily, Disp: 90 tablet, Rfl: 2    rosuvastatin (CRESTOR) 5 mg tablet, Take 1 tablet (5 mg total) by mouth daily, Disp: 90 tablet, Rfl: 2    Advair Diskus 250-50 MCG/ACT inhaler, INHALE 1 PUFF 2 (TWO) TIMES A DAY. TO RINSE THROAT AFTER USE (Patient not taking: Reported on 3/25/2024), Disp: , Rfl:     ALPRAZolam (XANAX) 0.25 mg tablet, , Disp: , Rfl:     darifenacin (ENABLEX) 15 MG 24 hr tablet, , Disp: , Rfl:     econazole nitrate 1 % cream, , Disp: , Rfl:     gabapentin (NEURONTIN) 800 mg tablet, Take 2 tablets (1,600 mg total) by mouth daily (Patient not taking: Reported on 3/25/2024), Disp: 60 tablet, Rfl: 5    Hydrocod Javier-Chlorphe Javier ER (TUSSIONEX) 10-8 mg/5 mL ER suspension, , Disp: , Rfl:     ondansetron (ZOFRAN-ODT) 4 mg disintegrating tablet, , Disp: , Rfl:     PARoxetine (PAXIL-CR) 12.5 mg 24 hr tablet, , Disp: , Rfl:     RABEprazole (Aciphex) 20 MG tablet, , Disp: , Rfl:   No current facility-administered medications for this visit.    Vitals: Blood  "pressure 142/90, pulse 66, height 5' 4\" (1.626 m), weight 79.4 kg (175 lb), SpO2 98%.    Body mass index is 30.04 kg/m².  Vitals:    24 1046   Weight: 79.4 kg (175 lb)     BP Readings from Last 3 Encounters:   24 142/90   24 148/84   24 135/89       Physical Exam:  Physical Exam    Neurologic:  Alert & oriented x 3, no new focal deficits, Not in any acute distress,  Constitutional: Obese  Eyes:  Pupil equal and reacting to light, conjunctiva normal,   HENT:  Atraumatic, oropharynx moist, Neck- normal range of motion, no tenderness,  Neck supple, No JVP, No LNP   Respiratory:  Bilateral air entry, mostly clear to auscultation  Cardiovascular: S1-S2 regular rhythm  GI:  Soft, nondistended, normal bowel sounds, nontender, no hepatosplenomegaly appreciated.  Musculoskeletal:  no tenderness, no deformities.   Skin:  Well hydrated, no rash   Lymphatic:  No lymphadenopathy noted   Extremities:  No edema         Diagnostic Studies Review Cardio:      EKG: Normal sinus rhythm, heart rate 66/min    Cardiac testing:   Results for orders placed during the hospital encounter of 21    Echo complete with contrast if indicated    Narrative  [address] [address] [phone]    Transthoracic Echocardiogram  2D, M-mode, Doppler, and Color Doppler    Study date:  06-Aug-2021    Patient: JESSICA RADER  MR number: [id number]  Account number: [id number]  : 1975  Age: 46 years  Gender: Female  Status: Outpatient  Location: Echo lab  Height: 63 in  Weight: 155.8 lb  BP: 130/ 88 mmHg    Indications: HTN    Diagnoses: I11.9 - Hypertensive heart disease without heart failure    Sonographer:  IRLANDA Dozier  Primary Physician:  James Johnson MD  Referring Physician:  Hong Franco MD  Group:  [facility]  Interpreting Physician:  Gail Crow MD    SUMMARY    PROCEDURE INFORMATION:  This was a technically difficult " study.  Echocardiographic views were limited due to poor acoustic window availability, decreased penetration, and lung interference.    LEFT VENTRICLE:  Systolic function was at the lower limits of normal. Ejection fraction was estimated to be around 50 %.  This study was inadequate for the evaluation of regional wall motion.  Wall thickness was mildly increased.  There was mild concentric hypertrophy.    MITRAL VALVE:  There was mild regurgitation.    TRICUSPID VALVE:  There was trace regurgitation.    HISTORY: PRIOR HISTORY: Tobacco abuse,HTN.    PROCEDURE: The procedure was performed in the echo lab. This was a routine study. The transthoracic approach was used. The study included complete 2D imaging, M-mode, complete spectral Doppler, and color Doppler. The heart rate was 73 bpm,  at the start of the study. Images were obtained from the parasternal, apical, subcostal, and suprasternal notch acoustic windows. Echocardiographic views were limited due to poor acoustic window availability, decreased penetration, and  lung interference. This was a technically difficult study.    LEFT VENTRICLE: Size was normal. Systolic function was at the lower limits of normal. Ejection fraction was estimated to be around 50 %. This study was inadequate for the evaluation of regional wall motion. Wall thickness was mildly  increased. There was mild concentric hypertrophy. DOPPLER: Left ventricular diastolic function parameters were normal for the patient's age.    RIGHT VENTRICLE: The size was normal. Systolic function was normal. Wall thickness was normal.    LEFT ATRIUM: Size was at the upper limits of normal.    RIGHT ATRIUM: Size was normal.    MITRAL VALVE: There was normal leaflet separation. DOPPLER: The transmitral velocity was within the normal range. There was no evidence for stenosis. There was mild regurgitation.    AORTIC VALVE: The valve was not well visualized. DOPPLER: There was no evidence for stenosis. There was  no significant regurgitation.    TRICUSPID VALVE: DOPPLER: There was trace regurgitation. The tricuspid jet envelope definition was inadequate for estimation of RV systolic pressure.    PULMONIC VALVE: DOPPLER: There was no significant regurgitation.    PERICARDIUM: There was no thickening or calcification. There was no pericardial effusion.    AORTA: The root exhibited normal size.    SYSTEMIC VEINS: IVC: The inferior vena cava was not well visualized.    SYSTEM MEASUREMENT TABLES    2D  EF (Teich): 48.51 %  %FS: 24.12 %  EDV(Teich): 72.77 ml  ESV(Teich): 37.47 ml  IVSd: 0.97 cm  LA Area: 18.06 cm2  LA Diam: 3.61 cm  LVEDV MOD A4C: 114.35 ml  LVEF MOD A4C: 43.41 %  LVESV MOD A4C: 64.71 ml  LVIDd: 4.07 cm  LVIDs: 3.08 cm  LVLd A4C: 8.63 cm  LVLs A4C: 7.27 cm  LVOT Diam: 1.95 cm  LVPWd: 0.95 cm  RA Area: 15.53 cm2  RVIDd: 3.21 cm  SV (Teich): 35.29 ml  SV MOD A4C: 49.63 ml    CW  AV Vmax: 0.81 m/s  AV maxP.63 mmHg    MM  TAPSE: 2.24 cm    PW  MV E/A Ratio: 1.23  E' Sept: 0.11 m/s  E/E' Sept: 5.88  LVOT Vmax: 0.63 m/s  LVOT maxP.57 mmHg  MV A Ortega: 0.53 m/s  MV Dec Harnett: 3.56 m/s2  MV DecT: 183.96 ms  MV E Ortega: 0.66 m/s  MV PHT: 53.35 ms  MVA By PHT: 4.12 cm2    [employer] Accredited Echocardiography Laboratory    Prepared and electronically signed by    Gail Crow MD  Signed 06-Aug-2021 13:15:27        Results for orders placed during the hospital encounter of 21    NM Myocardial Perfusion Spect (Pharmacological Induced Stress and/or Rest)    [id number] [place] [address] [phone]    Rest/Stress Gated SPECT Myocardial Perfusion Imaging After Regadenoson    Patient: JESSICA RADER  MR number: [id number]  Account number: [id number]  : 1975  Age: 46 years  Gender: Female  Status: Outpatient  Location: Stress lab  Height: 63 in  Weight: 155 lb  BP: 130/ 82 mmHg    Allergies: AMOXICILLIN, CIPROFLOXACIN    Diagnosis: R07.9 -  Chest pain, unspecified    Primary Physician:  James Johnson MD  RN:  Eli Sanon RN  Interpreting Physician:  Drew Nina MD  Referring Physician:  Val Jefferson MD  Technician:  Julee Gasca  Group:  [facility]  Report Prepared By::  Eli Sanon RN    INDICATIONS: Coronary artery disease.    HISTORY: The patient is a 46 year old  female. Chest pain status: chest pain. Coronary artery disease risk factors: smoking. Cardiovascular history: arrhythmia/tachycardia Co-morbidity: history of lung  disease/COVID,asthma,pneumonia. Medications: no cardiac drugs. Acute coronary syndrome: acute myocardial infarction recently ruled out.    PHYSICAL EXAM: Baseline physical exam screening: no wheezes audible.    REST ECG: Normal sinus rhythm. Normal baseline ECG.    PROCEDURE: The study was performed in the the Stress lab. A regadenoson infusion pharmacologic stress test was performed. Gated SPECT myocardial perfusion imaging was performed after stress. Systolic blood pressure was 130 mmHg, at the  start of the study. Diastolic blood pressure was 82 mmHg, at the start of the study. The heart rate was 85 bpm, at the start of the study. IV double checked.  Regadenoson protocol:  HR bpm SBP mmHg DBP mmHg Symptoms  Baseline 85 130 82 none  Immediate 130 146 78 mild dyspnea, dizziness, nausea  1 min 112 126 74 subsiding  2 min -- -- -- none  No medications or fluids given. The patient also performed low level exercise.    STRESS SUMMARY: Duration of pharmacologic stress was 3 min. Maximal heart rate during stress was 130 bpm. The rate-pressure product for the peak heart rate and blood pressure was 78621. There was no chest pain during stress. The stress  test was terminated due to protocol completion. Pre oxygen saturation: 100 %. Peak oxygen saturation: 100 %. The stress ECG was negative for ischemia and normal. There were no stress arrhythmias or conduction abnormalities.    ISOTOPE  ADMINISTRATION:  Resting isotope administration Stress isotope administration  Agent Tetrofosmin Tetrofosmin  Dose 11 mCi 32.1 mCi  Date 05/26/2021 05/26/2021  Injection time 07:58 09:00  Imaging time 08:34 09:47  Injection-image interval 36 min 47 min    The radiopharmaceutical was injected at the peak effect of pharmacologic stress.    MYOCARDIAL PERFUSION IMAGING:  The image quality was good. Left ventricular size was normal. The TID ratio was 0.98.    PERFUSION DEFECTS:  -  There were no perfusion defects.    GATED SPECT:  The calculated left ventricular ejection fraction was 67 %. Left ventricular ejection fraction was within normal limits by visual estimate. There was no left ventricular regional abnormality.    SUMMARY:  -  Stress results: There was no chest pain during stress.  -  ECG conclusions: The stress ECG was negative for ischemia and normal. There were no stress arrhythmias or conduction abnormalities.  -  Perfusion imaging: There were no perfusion defects.  -  Gated SPECT: The calculated left ventricular ejection fraction was 67 %. Left ventricular ejection fraction was within normal limits by visual estimate. There was no left ventricular regional abnormality.    IMPRESSIONS: Normal study after vasodilation and low level exercise without reproduction of symptoms. The cardiovascular risk is low. Myocardial perfusion imaging was normal at rest and with stress.    Prepared and signed by    Drew Nina MD  Signed 05/26/2021 13:32:39        Imaging:  Chest X-Ray:   XR chest 2 views    Result Date: 12/16/2023  Impression No acute cardiopulmonary disease. Workstation performed: [id number]     XR chest pa & lateral    Result Date: 9/21/2023  Impression No acute cardiopulmonary disease. Resident: LELE LOAIZA I, the attending radiologist, have reviewed the images and agree with the final report above. Workstation performed: [id number]       CT-scan of the chest:     No CTA results available for this  "patient.  Lab Review   Lab Results   Component Value Date    WBC 7.17 07/13/2022    HGB 12.9 07/13/2022    HCT 39.8 07/13/2022    MCV 84 07/13/2022    RDW 15.5 (H) 07/13/2022     07/13/2022     BMP:  Lab Results   Component Value Date    SODIUM 137 01/28/2023    K 3.9 01/28/2023     01/28/2023    CO2 23 01/28/2023    ANIONGAP 11 10/05/2015    BUN 10 01/28/2023    CREATININE 0.67 01/28/2023    GLUC 114 (H) 01/28/2023    CALCIUM 8.7 01/28/2023    EGFR 108 01/28/2023    MG 2.0 02/08/2017     LFT:  Lab Results   Component Value Date    AST 20 01/28/2023    ALT 27 01/28/2023    ALKPHOS 79 01/28/2023    TP 6.9 01/28/2023    ALB 3.5 01/28/2023      No components found for: \"TSH3\"  Lab Results   Component Value Date    BIA2QRQAQMLY 2.290 11/02/2022     Lab Results   Component Value Date    HGBA1C 6.0 01/09/2024     Lipid Profile:   Lab Results   Component Value Date    CHOLESTEROL 128 04/11/2022    HDL 52 04/11/2022    LDLCALC 61 04/11/2022    TRIG 76 04/11/2022     Lab Results   Component Value Date    CHOLESTEROL 128 04/11/2022    CHOLESTEROL 195 02/08/2017     Lab Results   Component Value Date    CKTOTAL 91 05/25/2021    TROPONINI <0.02 05/25/2021     Lab Results   Component Value Date    NTBNP 81 05/10/2021      Recent Results (from the past 672 hour(s))   TIBC PROFILE: IRON AND TIBC    Collection Time: 03/04/24  9:18 AM   Result Value Ref Range    Iron 125 50 - 212 ug/dL    TRANSFERRIN 235 203 - 362 mg/dL    Iron Saturation 38 20 - 50 %    TIBC 329 260 - 430 ug/dL             Dr. Hong Franco MD, FACC      \"This note has been constructed using a voice recognition system.Therefore there may be syntax, spelling, and/or grammatical errors. Please call if you have any questions. \"  "

## 2024-03-25 ENCOUNTER — HOSPITAL ENCOUNTER (OUTPATIENT)
Dept: INFUSION CENTER | Facility: HOSPITAL | Age: 49
Discharge: HOME | End: 2024-03-25
Attending: INTERNAL MEDICINE
Payer: COMMERCIAL

## 2024-03-25 ENCOUNTER — OFFICE VISIT (OUTPATIENT)
Dept: CARDIOLOGY CLINIC | Facility: CLINIC | Age: 49
End: 2024-03-25
Payer: COMMERCIAL

## 2024-03-25 VITALS
DIASTOLIC BLOOD PRESSURE: 90 MMHG | HEART RATE: 66 BPM | OXYGEN SATURATION: 98 % | SYSTOLIC BLOOD PRESSURE: 142 MMHG | HEIGHT: 64 IN | BODY MASS INDEX: 29.88 KG/M2 | WEIGHT: 175 LBS

## 2024-03-25 VITALS
HEART RATE: 99 BPM | RESPIRATION RATE: 18 BRPM | DIASTOLIC BLOOD PRESSURE: 84 MMHG | SYSTOLIC BLOOD PRESSURE: 148 MMHG | TEMPERATURE: 97.5 F | OXYGEN SATURATION: 98 %

## 2024-03-25 DIAGNOSIS — R09.89 LABILE HYPERTENSION: ICD-10-CM

## 2024-03-25 DIAGNOSIS — E78.00 HYPERCHOLESTEROLEMIA: ICD-10-CM

## 2024-03-25 DIAGNOSIS — I10 ESSENTIAL HYPERTENSION: Primary | ICD-10-CM

## 2024-03-25 DIAGNOSIS — R79.0 LOW FERRITIN: ICD-10-CM

## 2024-03-25 DIAGNOSIS — E61.1 LOW SERUM IRON: Primary | ICD-10-CM

## 2024-03-25 PROCEDURE — 99214 OFFICE O/P EST MOD 30 MIN: CPT | Performed by: INTERNAL MEDICINE

## 2024-03-25 PROCEDURE — 96365 THER/PROPH/DIAG IV INF INIT: CPT

## 2024-03-25 PROCEDURE — 93000 ELECTROCARDIOGRAM COMPLETE: CPT | Performed by: INTERNAL MEDICINE

## 2024-03-25 RX ORDER — SODIUM CHLORIDE 9 MG/ML
20 INJECTION, SOLUTION INTRAVENOUS ONCE
OUTPATIENT
Start: 2024-04-01

## 2024-03-25 RX ORDER — CARVEDILOL 6.25 MG/1
6.25 TABLET ORAL 2 TIMES DAILY WITH MEALS
Qty: 180 TABLET | Refills: 2 | Status: SHIPPED | OUTPATIENT
Start: 2024-03-25

## 2024-03-25 RX ORDER — ROSUVASTATIN CALCIUM 5 MG/1
5 TABLET, COATED ORAL DAILY
Qty: 90 TABLET | Refills: 2 | Status: SHIPPED | OUTPATIENT
Start: 2024-03-25

## 2024-03-25 RX ORDER — SODIUM CHLORIDE 9 MG/ML
20 INJECTION, SOLUTION INTRAVENOUS ONCE
Status: COMPLETED | OUTPATIENT
Start: 2024-03-25 | End: 2024-03-25

## 2024-03-25 RX ORDER — LISINOPRIL 5 MG/1
5 TABLET ORAL DAILY
Qty: 90 TABLET | Refills: 2 | Status: SHIPPED | OUTPATIENT
Start: 2024-03-25

## 2024-03-25 RX ADMIN — SODIUM CHLORIDE 20 ML/HR: 0.9 INJECTION, SOLUTION INTRAVENOUS at 09:11

## 2024-03-25 RX ADMIN — IRON SUCROSE 200 MG: 20 INJECTION, SOLUTION INTRAVENOUS at 09:13

## 2024-03-25 NOTE — PROGRESS NOTES
Cassy Urena  tolerated treatment well with no complications.      Cassy Urena is aware of future appt on 4/1/24 at 9am.     AVS printed and given to Cassy Urena:  No (Declined by Cassy Urena)

## 2024-04-01 ENCOUNTER — HOSPITAL ENCOUNTER (OUTPATIENT)
Dept: INFUSION CENTER | Facility: HOSPITAL | Age: 49
Discharge: HOME | End: 2024-04-01
Attending: INTERNAL MEDICINE
Payer: COMMERCIAL

## 2024-04-01 VITALS
OXYGEN SATURATION: 98 % | RESPIRATION RATE: 18 BRPM | DIASTOLIC BLOOD PRESSURE: 89 MMHG | HEART RATE: 78 BPM | TEMPERATURE: 98.1 F | SYSTOLIC BLOOD PRESSURE: 157 MMHG

## 2024-04-01 DIAGNOSIS — E61.1 LOW SERUM IRON: Primary | ICD-10-CM

## 2024-04-01 DIAGNOSIS — R79.0 LOW FERRITIN: ICD-10-CM

## 2024-04-01 PROCEDURE — 96365 THER/PROPH/DIAG IV INF INIT: CPT

## 2024-04-01 RX ORDER — SODIUM CHLORIDE 9 MG/ML
20 INJECTION, SOLUTION INTRAVENOUS ONCE
Status: CANCELLED | OUTPATIENT
Start: 2024-04-01

## 2024-04-01 RX ORDER — SODIUM CHLORIDE 9 MG/ML
20 INJECTION, SOLUTION INTRAVENOUS ONCE
Status: COMPLETED | OUTPATIENT
Start: 2024-04-01 | End: 2024-04-01

## 2024-04-01 RX ADMIN — SODIUM CHLORIDE 20 ML/HR: 0.9 INJECTION, SOLUTION INTRAVENOUS at 08:49

## 2024-04-01 RX ADMIN — IRON SUCROSE 200 MG: 20 INJECTION, SOLUTION INTRAVENOUS at 08:49

## 2024-04-02 ENCOUNTER — TELEPHONE (OUTPATIENT)
Dept: HEMATOLOGY ONCOLOGY | Facility: CLINIC | Age: 49
End: 2024-04-02

## 2024-04-02 NOTE — TELEPHONE ENCOUNTER
Appointment Schedule   Who are you speaking with? Patient   If it is not the patient, are they listed on an active communication consent form? N/A   Which provider is the appointment scheduled with? Tsering Allen PA-C   At which location is the appointment scheduled for? [facility]   When is the appointment scheduled?  Please list date and time 5/3/24 11:00am   What is the reason for this appointment? 1 month follow up, patient was advised at her infusion appointment yesterday that she needs to follow up with hematology and have lab work done one month after her last iron infusion   Did patient voice understanding of the details of this appointment? Yes   Was the no show policy reviewed with patient? Yes         Lab Inquiry   Who are you speaking with? Patient     If it is not the patient, are they listed on an active communication consent form? N/A   Name of ordering provider Tsering Allen PA-C   What is being requested? Lab orders need to be entered   Lab draw location [facility]   What is the best call back number? [phone]   If patient at the lab, Was a live attempts to contact the team made? N/a

## 2024-04-05 ENCOUNTER — TELEPHONE (OUTPATIENT)
Dept: CARDIOLOGY CLINIC | Facility: CLINIC | Age: 49
End: 2024-04-05

## 2024-04-05 NOTE — TELEPHONE ENCOUNTER
----- Message from Hong Franco MD sent at 4/3/2024 12:53 PM EDT -----  Please call and inform patient that the blood test showed that  cholesterol is in normal range, and  liver enzymes are normal.    Continue current treatment

## 2024-04-15 DIAGNOSIS — K21.9 GASTROESOPHAGEAL REFLUX DISEASE WITHOUT ESOPHAGITIS: ICD-10-CM

## 2024-04-16 RX ORDER — LANSOPRAZOLE 30 MG/1
CAPSULE, DELAYED RELEASE ORAL
Qty: 90 CAPSULE | Refills: 1 | Status: SHIPPED | OUTPATIENT
Start: 2024-04-16

## 2024-05-01 ENCOUNTER — APPOINTMENT (OUTPATIENT)
Dept: LAB | Facility: HOSPITAL | Age: 49
End: 2024-05-01
Payer: COMMERCIAL

## 2024-05-01 ENCOUNTER — TELEPHONE (OUTPATIENT)
Dept: HEMATOLOGY ONCOLOGY | Facility: MEDICAL CENTER | Age: 49
End: 2024-05-01

## 2024-05-01 DIAGNOSIS — R79.0 LOW FERRITIN: ICD-10-CM

## 2024-05-01 DIAGNOSIS — E61.1 LOW SERUM IRON: ICD-10-CM

## 2024-05-01 DIAGNOSIS — E61.1 LOW SERUM IRON: Primary | ICD-10-CM

## 2024-05-01 LAB
BASOPHILS # BLD AUTO: 0.06 THOUSANDS/ÂΜL (ref 0–0.1)
BASOPHILS NFR BLD AUTO: 1 % (ref 0–1)
EOSINOPHIL # BLD AUTO: 0.15 THOUSAND/ÂΜL (ref 0–0.61)
EOSINOPHIL NFR BLD AUTO: 2 % (ref 0–6)
ERYTHROCYTE [DISTWIDTH] IN BLOOD BY AUTOMATED COUNT: 13 % (ref 11.6–15.1)
FERRITIN SERPL-MCNC: 81 NG/ML (ref 11–307)
HCT VFR BLD AUTO: 41.5 % (ref 34.8–46.1)
HGB BLD-MCNC: 13.7 G/DL (ref 11.5–15.4)
IMM GRANULOCYTES # BLD AUTO: 0.01 THOUSAND/UL (ref 0–0.2)
IMM GRANULOCYTES NFR BLD AUTO: 0 % (ref 0–2)
IRON SATN MFR SERPL: 23 % (ref 15–50)
IRON SERPL-MCNC: 68 UG/DL (ref 50–212)
LYMPHOCYTES # BLD AUTO: 1.79 THOUSANDS/ÂΜL (ref 0.6–4.47)
LYMPHOCYTES NFR BLD AUTO: 29 % (ref 14–44)
MCH RBC QN AUTO: 29.1 PG (ref 26.8–34.3)
MCHC RBC AUTO-ENTMCNC: 33 G/DL (ref 31.4–37.4)
MCV RBC AUTO: 88 FL (ref 82–98)
MONOCYTES # BLD AUTO: 0.35 THOUSAND/ÂΜL (ref 0.17–1.22)
MONOCYTES NFR BLD AUTO: 6 % (ref 4–12)
NEUTROPHILS # BLD AUTO: 3.91 THOUSANDS/ÂΜL (ref 1.85–7.62)
NEUTS SEG NFR BLD AUTO: 62 % (ref 43–75)
NRBC BLD AUTO-RTO: 0 /100 WBCS
PLATELET # BLD AUTO: 208 THOUSANDS/UL (ref 149–390)
PMV BLD AUTO: 11.5 FL (ref 8.9–12.7)
RBC # BLD AUTO: 4.7 MILLION/UL (ref 3.81–5.12)
TIBC SERPL-MCNC: 302 UG/DL (ref 250–450)
UIBC SERPL-MCNC: 234 UG/DL (ref 155–355)
WBC # BLD AUTO: 6.27 THOUSAND/UL (ref 4.31–10.16)

## 2024-05-01 PROCEDURE — 82728 ASSAY OF FERRITIN: CPT

## 2024-05-01 PROCEDURE — 85025 COMPLETE CBC W/AUTO DIFF WBC: CPT

## 2024-05-01 PROCEDURE — 83540 ASSAY OF IRON: CPT

## 2024-05-01 PROCEDURE — 83550 IRON BINDING TEST: CPT

## 2024-05-01 PROCEDURE — 36415 COLL VENOUS BLD VENIPUNCTURE: CPT

## 2024-05-01 NOTE — TELEPHONE ENCOUNTER
Spoke with patient regarding labs needed to be drawn prior to appointment.  Indicated the scripts are in the system, they are non-fasting and patient can go to any [facility] facility to have the labs drawn.  Patient verbalized understanding.

## 2024-05-03 ENCOUNTER — OFFICE VISIT (OUTPATIENT)
Dept: HEMATOLOGY ONCOLOGY | Facility: MEDICAL CENTER | Age: 49
End: 2024-05-03
Payer: COMMERCIAL

## 2024-05-03 VITALS
SYSTOLIC BLOOD PRESSURE: 136 MMHG | DIASTOLIC BLOOD PRESSURE: 74 MMHG | OXYGEN SATURATION: 96 % | HEIGHT: 64 IN | WEIGHT: 174 LBS | RESPIRATION RATE: 17 BRPM | BODY MASS INDEX: 29.71 KG/M2 | HEART RATE: 88 BPM | TEMPERATURE: 97.8 F

## 2024-05-03 DIAGNOSIS — N92.6 IRREGULAR MENSES: ICD-10-CM

## 2024-05-03 DIAGNOSIS — E61.1 LOW SERUM IRON: Primary | ICD-10-CM

## 2024-05-03 PROCEDURE — 99213 OFFICE O/P EST LOW 20 MIN: CPT | Performed by: PHYSICIAN ASSISTANT

## 2024-05-03 NOTE — PROGRESS NOTES
[facility] [address] [facility] [address]  Hematology Ambulatory Follow-Up  Cassy Urena, 1975, [id number]  5/3/2024      Assessment and Plan   Patient presents today in follow-up of iron deficiency anemia.    She had lab work on 1/9/2024.  At that time she had hemoglobin 13.3, hematocrit 39.2, ferritin 24.5 and percent saturation 19.  She was unable to tolerate oral iron in the past due to constipation.  She preferred to try IV iron.      She received venofer 200mg weekly x 3 from 3/18/2024- 4/1/2024.    She had repeat lab work on 5/1/2024.  WBC 6.27, hemoglobin 13.7, platelets 208.  Ferritin is 81 and iron saturation is 23%.    Her lab work is excellent.  Her hemoglobin has improved.  Iron studies have improved.  She does not need any additional IV iron    Patient prefers to follow-up with us again in 6 months time.  Barrier(s) to care: None.   The patient is able to self care.      Subjective     Chief Complaint   Patient presents with    Follow-up     Iron-deficiency       History of present illness: Patient presents today for evaluation of iron-deficiency anemia at the request of her primary care physician Dr. Garcia.  The patient reports that she has been taking oral iron 1 tablet daily for several years.  She had repeat lab work on 1/9/2024.  At that time she had hemoglobin 13.3, hematocrit 39.2, ferritin 24.5 and percent saturation 19.      She received venofer 200mg weekly x 3 from 3/18/2024- 4/1/2024.    Interval History:  Cassy presents today in follow-up. She received 3 doses of IV iron as above. She says fatigue has improved since receiving the IV iron.  She denies any current complaints otherwise. She denies bleeding. No nausea, vomiting. No chest pain, SOB.    Review of Systems   Constitutional:  Positive for fatigue. Negative for appetite change, fever and unexpected weight change.   HENT:  Negative for  nosebleeds.    Respiratory:  Negative for cough, choking and shortness of breath.         Negative hemoptysis.   Cardiovascular:  Negative for chest pain, palpitations and leg swelling.   Gastrointestinal: Negative.  Negative for abdominal distention, abdominal pain, anal bleeding, blood in stool, constipation, diarrhea, nausea and vomiting.   Endocrine: Negative.  Negative for cold intolerance.   Genitourinary: Negative.  Negative for hematuria, menstrual problem, vaginal bleeding, vaginal discharge and vaginal pain.   Musculoskeletal: Negative.  Negative for arthralgias, myalgias, neck pain and neck stiffness.   Skin: Negative.  Negative for color change, pallor and rash.   Allergic/Immunologic: Negative.  Negative for immunocompromised state.   Neurological: Negative.  Negative for weakness and headaches.   Hematological:  Negative for adenopathy. Does not bruise/bleed easily.   All other systems reviewed and are negative.      Patient Active Problem List   Diagnosis    Syncope    Cellulitis of left hand    Current smoker    Cervical disc disorder with radiculopathy of mid-cervical region    Greater trochanteric bursitis of both hips    Arthropathy of cervical facet joint    Leukocytosis    Back pain    Anemia of chronic disease    Anxiety    Degeneration of lumbar intervertebral disc    Gastroesophageal reflux disease    General unsteadiness    Hypercholesterolemia    Neuropathic pain    Opioid dependence (HCC)    Vitamin D deficiency    Simple chronic bronchitis (HCC)    Hypertension    Sleep apnea    Excessive daytime sleepiness    Prediabetes    Low serum iron    Low ferritin     Past Medical History:   Diagnosis Date    Anemia     Asthma     Colon polyp     Ear problems     GERD (gastroesophageal reflux disease)     Hyperlipidemia     Hypertension     Neck pain     PONV (postoperative nausea and vomiting)     Sleep apnea      Past Surgical History:   Procedure Laterality Date     SECTION       COLONOSCOPY      NOSE SURGERY      SINUS SURGERY      TUBAL LIGATION       Family History   Problem Relation Age of Onset    Hypertension Father     Bone cancer Maternal Grandmother 81    Cancer Paternal Grandmother     Stomach cancer Paternal Grandmother 82    Hypertension Maternal Aunt     Breast cancer Neg Hx     Colon cancer Neg Hx      Social History     Socioeconomic History    Marital status: /Civil Union     Spouse name: Not on file    Number of children: Not on file    Years of education: Not on file    Highest education level: Not on file   Occupational History    Not on file   Tobacco Use    Smoking status: Every Day     Current packs/day: 1.00     Average packs/day: 1 pack/day for 33.0 years (33.0 ttl pk-yrs)     Types: Cigarettes    Smokeless tobacco: Never   Vaping Use    Vaping status: Never Used   Substance and Sexual Activity    Alcohol use: No    Drug use: No    Sexual activity: Yes     Partners: Male     Birth control/protection: Female Sterilization   Other Topics Concern    Not on file   Social History Narrative    Not on file     Social Determinants of Health     Financial Resource Strain: Not on file   Food Insecurity: Not on file   Transportation Needs: Not on file   Physical Activity: Not on file   Stress: Not on file   Social Connections: Not on file   Intimate Partner Violence: Not on file   Housing Stability: Not on file       Current Outpatient Medications:     Advair Diskus 250-50 MCG/ACT inhaler, INHALE 1 PUFF 2 (TWO) TIMES A DAY. TO RINSE THROAT AFTER USE (Patient not taking: Reported on 3/25/2024), Disp: , Rfl:     albuterol (2.5 mg/3 mL) 0.083 % nebulizer solution, TAKE 3 ML (2.5 MG TOTAL) BY NEBULIZATION EVERY 6 HOURS AS NEEDED FOR WHEEZING OR SHORTNESS OF BREATH, Disp: 150 mL, Rfl: 0    albuterol (PROVENTIL HFA,VENTOLIN HFA) 90 mcg/act inhaler, INHALE 2 PUFFS BY MOUTH EVERY 6 HOURS AS NEEDED FOR WHEEZE, Disp: 18 g, Rfl: 5    ALPRAZolam (XANAX) 0.25 mg tablet, , Disp: , Rfl:      Azelastine HCl 137 MCG/SPRAY SOLN, 1 SPRAY INTO EACH NOSTRIL 2 (TWO) TIMES A DAY. USE IN EACH NOSTRIL AS DIRECTED, Disp: , Rfl:     benzonatate (TESSALON PERLES) 100 mg capsule, Take 1 capsule (100 mg total) by mouth 3 (three) times a day as needed for cough, Disp: 20 capsule, Rfl: 0    carvedilol (COREG) 6.25 mg tablet, Take 1 tablet (6.25 mg total) by mouth 2 (two) times a day with meals, Disp: 180 tablet, Rfl: 2    Cholecalciferol 1.25 MG (66697 UT) TABS, Take 1 tablet (50,000 Units total) by mouth once a week x12wks and then switch to OTC Vit D 2000IU QD, Disp: 12 tablet, Rfl: 0    darifenacin (ENABLEX) 15 MG 24 hr tablet, , Disp: , Rfl:     econazole nitrate 1 % cream, , Disp: , Rfl:     ergocalciferol (VITAMIN D2) 50,000 units, TAKE 1 TABLET (50,000 UNITS TOTAL) BY MOUTH ONCE A WEEK FOR 12 WEEKS AND THEN SWITCH TO OTC VITAMIN D 2000IU EVERY DAY, Disp: , Rfl:     ferrous sulfate 324 (65 Fe) mg, Take 1 tablet (324 mg total) by mouth daily, Disp: 90 tablet, Rfl: 1    fluticasone (FLONASE) 50 mcg/act nasal spray, 2 sprays into each nostril daily, Disp: 48 mL, Rfl: 0    Fluticasone-Salmeterol (Advair) 250-50 mcg/dose inhaler, TAKE 1 PUFF BY MOUTH TWICE A DAY, Disp: 180 blister, Rfl: 0    gabapentin (NEURONTIN) 400 mg capsule, TAKE 2 CAPSULES (1600 MG TOTAL) BY MOUTH EVERY DAY, Disp: , Rfl:     gabapentin (NEURONTIN) 800 mg tablet, Take 2 tablets (1,600 mg total) by mouth daily (Patient not taking: Reported on 3/25/2024), Disp: 60 tablet, Rfl: 5    Hydrocod Javier-Chlorphe Javier ER (TUSSIONEX) 10-8 mg/5 mL ER suspension, , Disp: , Rfl:     lansoprazole (PREVACID) 30 mg capsule, TAKE 1 CAPSULE BY MOUTH EVERY DAY, Disp: 90 capsule, Rfl: 1    lisinopril (ZESTRIL) 5 mg tablet, Take 1 tablet (5 mg total) by mouth daily, Disp: 90 tablet, Rfl: 2    ondansetron (ZOFRAN-ODT) 4 mg disintegrating tablet, , Disp: , Rfl:     PARoxetine (PAXIL-CR) 12.5 mg 24 hr tablet, , Disp: , Rfl:     RABEprazole (Aciphex) 20 MG tablet, ,  Disp: , Rfl:     rosuvastatin (CRESTOR) 5 mg tablet, Take 1 tablet (5 mg total) by mouth daily, Disp: 90 tablet, Rfl: 2  Allergies   Allergen Reactions    Amoxicillin Hives    Ciprofloxacin Nausea Only       Objective     Vitals:    24 1058   BP: 136/74   Pulse: 88   Resp: 17   Temp: 97.8 °F (36.6 °C)   SpO2: 96%     Constitutional:       General: She is not in acute distress.     Appearance: She is well-developed and normal weight.   HENT:      Head: Normocephalic and atraumatic.      Mouth/Throat:      Mouth: Mucous membranes are moist.   Eyes:      General: No scleral icterus.     Conjunctiva/sclera: Conjunctivae normal.   Cardiovascular:      Rate and Rhythm: Normal rate and regular rhythm.   Pulmonary:      Effort: Pulmonary effort is normal. No respiratory distress.      Breath sounds: Normal breath sounds.   Abdominal:      General: Abdomen is flat. There is no distension.      Palpations: Abdomen is soft.   Musculoskeletal:         General: No swelling or tenderness.   Skin:     General: Skin is warm.      Coloration: Skin is not pale.      Findings: No rash.   Neurological:      Mental Status: She is alert and oriented to person, place, and time.   Psychiatric:         Mood and Affect: Mood normal.         Thought Content: Thought content normal.         Judgment: Judgment normal.   Extremities: No lower extremity edema bilaterally.     Result Review  Labs:   She had repeat lab work on 2024.  WBC 6.27, hemoglobin 13.7, platelets 208.  Ferritin is 81 and iron saturation is 23%.    2024 WBC 6.0, hemoglobin 13.3, hematocrit 39.2, platelet count 195.  Percent iron saturation 19, ferritin 24.5, BUN 11, creatinine 0.63, sodium 142, potassium 3.9, LFTs within normal limits.     Imagin/15/2023 CXR: IMPRESSION:     No acute cardiopulmonary disease.    Please note:  This report has been generated by a voice recognition software system. Therefore there may be syntax, spelling, and/or grammatical  errors. Please call if you have any questions.

## 2024-05-17 DIAGNOSIS — J40 BRONCHITIS: ICD-10-CM

## 2024-05-17 RX ORDER — FLUTICASONE PROPIONATE AND SALMETEROL 250; 50 UG/1; UG/1
POWDER RESPIRATORY (INHALATION)
Qty: 180 BLISTER | Refills: 1 | Status: SHIPPED | OUTPATIENT
Start: 2024-05-17

## 2024-06-04 DIAGNOSIS — G62.9 NEUROPATHY: Primary | ICD-10-CM

## 2024-06-04 RX ORDER — GABAPENTIN 400 MG/1
CAPSULE ORAL
Qty: 60 CAPSULE | Refills: 0 | Status: SHIPPED | OUTPATIENT
Start: 2024-06-04

## 2024-06-04 NOTE — TELEPHONE ENCOUNTER
Patient called back to get refill expedited. She said she is taking 2 capsules twice daily. Patient is out of medication and would like prescription sent to [facility] - [place] - [address] [phone]. Please review and advise.

## 2024-07-02 DIAGNOSIS — G62.9 NEUROPATHY: ICD-10-CM

## 2024-07-02 RX ORDER — GABAPENTIN 400 MG/1
CAPSULE ORAL
Qty: 60 CAPSULE | Refills: 5 | Status: SHIPPED | OUTPATIENT
Start: 2024-07-02

## 2024-07-25 ENCOUNTER — TELEPHONE (OUTPATIENT)
Age: 49
End: 2024-07-25

## 2024-07-25 NOTE — TELEPHONE ENCOUNTER
Pt is pre diabetic and had a strange experience at work last night- she started to become sweaty, clammy, cold, nauseous, ran to eat twix and pb and j- sugar was 109 when nurse checked it then dropped to 105- pt did eat  and it took 2 hrs  to straighten out- she wasn't sure if she needs to be seen or not?  She stated cannot get her blood work until the 10th of August- per the order, is there any labs Dr. Garcia would like now, she said the experience was scary.  Her BS today is 122 and she feels fine, please review.

## 2024-08-12 LAB
CREAT ?TM UR-SCNC: 261.8 UMOL/L
EXT ALBUMIN URINE RANDOM: 3
HBA1C MFR BLD HPLC: 5.7 %
MICROALBUMIN/CREAT UR: 11.5 MG/G{CREAT}

## 2024-08-14 DIAGNOSIS — J30.9 ALLERGIC RHINITIS, UNSPECIFIED SEASONALITY, UNSPECIFIED TRIGGER: ICD-10-CM

## 2024-08-15 RX ORDER — FLUTICASONE PROPIONATE 50 MCG
SPRAY, SUSPENSION (ML) NASAL
Qty: 48 G | Refills: 0 | Status: SHIPPED | OUTPATIENT
Start: 2024-08-15

## 2024-08-29 ENCOUNTER — APPOINTMENT (EMERGENCY)
Dept: RADIOLOGY | Facility: HOSPITAL | Age: 49
End: 2024-08-29
Payer: COMMERCIAL

## 2024-08-29 ENCOUNTER — HOSPITAL ENCOUNTER (EMERGENCY)
Facility: HOSPITAL | Age: 49
Discharge: HOME | End: 2024-08-29
Attending: EMERGENCY MEDICINE | Admitting: EMERGENCY MEDICINE
Payer: COMMERCIAL

## 2024-08-29 ENCOUNTER — PATIENT MESSAGE (OUTPATIENT)
Dept: FAMILY MEDICINE CLINIC | Facility: CLINIC | Age: 49
End: 2024-08-29

## 2024-08-29 VITALS
RESPIRATION RATE: 17 BRPM | HEART RATE: 79 BPM | OXYGEN SATURATION: 97 % | SYSTOLIC BLOOD PRESSURE: 132 MMHG | BODY MASS INDEX: 29.88 KG/M2 | DIASTOLIC BLOOD PRESSURE: 86 MMHG | HEIGHT: 64 IN | WEIGHT: 175 LBS | TEMPERATURE: 98.1 F

## 2024-08-29 DIAGNOSIS — M46.1 SACROILIAC INFLAMMATION (HCC): ICD-10-CM

## 2024-08-29 DIAGNOSIS — M62.838 MUSCLE SPASM: Primary | ICD-10-CM

## 2024-08-29 LAB
ALBUMIN SERPL BCG-MCNC: 4.3 G/DL (ref 3.5–5)
ALP SERPL-CCNC: 65 U/L (ref 34–104)
ALT SERPL W P-5'-P-CCNC: 20 U/L (ref 7–52)
ANION GAP SERPL CALCULATED.3IONS-SCNC: 7 MMOL/L (ref 4–13)
APTT PPP: 29 SECONDS (ref 23–34)
AST SERPL W P-5'-P-CCNC: 17 U/L (ref 13–39)
BACTERIA UR QL AUTO: ABNORMAL /HPF
BASOPHILS # BLD AUTO: 0.05 THOUSANDS/ÂΜL (ref 0–0.1)
BASOPHILS NFR BLD AUTO: 1 % (ref 0–1)
BILIRUB SERPL-MCNC: 0.46 MG/DL (ref 0.2–1)
BILIRUB UR QL STRIP: NEGATIVE
BUN SERPL-MCNC: 9 MG/DL (ref 5–25)
CALCIUM SERPL-MCNC: 9.2 MG/DL (ref 8.4–10.2)
CHLORIDE SERPL-SCNC: 107 MMOL/L (ref 96–108)
CLARITY UR: CLEAR
CO2 SERPL-SCNC: 24 MMOL/L (ref 21–32)
COLOR UR: YELLOW
CREAT SERPL-MCNC: 0.67 MG/DL (ref 0.6–1.3)
EOSINOPHIL # BLD AUTO: 0.15 THOUSAND/ÂΜL (ref 0–0.61)
EOSINOPHIL NFR BLD AUTO: 2 % (ref 0–6)
ERYTHROCYTE [DISTWIDTH] IN BLOOD BY AUTOMATED COUNT: 13 % (ref 11.6–15.1)
GFR SERPL CREATININE-BSD FRML MDRD: 104 ML/MIN/1.73SQ M
GLUCOSE SERPL-MCNC: 96 MG/DL (ref 65–140)
GLUCOSE UR STRIP-MCNC: NEGATIVE MG/DL
HCT VFR BLD AUTO: 42.2 % (ref 34.8–46.1)
HGB BLD-MCNC: 13.9 G/DL (ref 11.5–15.4)
HGB UR QL STRIP.AUTO: ABNORMAL
IMM GRANULOCYTES # BLD AUTO: 0.03 THOUSAND/UL (ref 0–0.2)
IMM GRANULOCYTES NFR BLD AUTO: 0 % (ref 0–2)
INR PPP: 1.01 (ref 0.85–1.19)
KETONES UR STRIP-MCNC: NEGATIVE MG/DL
LEUKOCYTE ESTERASE UR QL STRIP: ABNORMAL
LIPASE SERPL-CCNC: 16 U/L (ref 11–82)
LYMPHOCYTES # BLD AUTO: 2.43 THOUSANDS/ÂΜL (ref 0.6–4.47)
LYMPHOCYTES NFR BLD AUTO: 28 % (ref 14–44)
MCH RBC QN AUTO: 29.1 PG (ref 26.8–34.3)
MCHC RBC AUTO-ENTMCNC: 32.9 G/DL (ref 31.4–37.4)
MCV RBC AUTO: 89 FL (ref 82–98)
MONOCYTES # BLD AUTO: 0.54 THOUSAND/ÂΜL (ref 0.17–1.22)
MONOCYTES NFR BLD AUTO: 6 % (ref 4–12)
MUCOUS THREADS UR QL AUTO: ABNORMAL
NEUTROPHILS # BLD AUTO: 5.36 THOUSANDS/ÂΜL (ref 1.85–7.62)
NEUTS SEG NFR BLD AUTO: 63 % (ref 43–75)
NITRITE UR QL STRIP: NEGATIVE
NON-SQ EPI CELLS URNS QL MICRO: ABNORMAL /HPF
NRBC BLD AUTO-RTO: 0 /100 WBCS
PH UR STRIP.AUTO: 5.5 [PH]
PLATELET # BLD AUTO: 241 THOUSANDS/UL (ref 149–390)
PMV BLD AUTO: 11.7 FL (ref 8.9–12.7)
POTASSIUM SERPL-SCNC: 4 MMOL/L (ref 3.5–5.3)
PROT SERPL-MCNC: 7.1 G/DL (ref 6.4–8.4)
PROT UR STRIP-MCNC: ABNORMAL MG/DL
PROTHROMBIN TIME: 13.8 SECONDS (ref 12.3–15)
RBC # BLD AUTO: 4.77 MILLION/UL (ref 3.81–5.12)
RBC #/AREA URNS AUTO: ABNORMAL /HPF
SODIUM SERPL-SCNC: 138 MMOL/L (ref 135–147)
SP GR UR STRIP.AUTO: >=1.03 (ref 1–1.03)
UROBILINOGEN UR STRIP-ACNC: 2 MG/DL
WBC # BLD AUTO: 8.56 THOUSAND/UL (ref 4.31–10.16)
WBC #/AREA URNS AUTO: ABNORMAL /HPF

## 2024-08-29 PROCEDURE — 96375 TX/PRO/DX INJ NEW DRUG ADDON: CPT

## 2024-08-29 PROCEDURE — 85610 PROTHROMBIN TIME: CPT | Performed by: EMERGENCY MEDICINE

## 2024-08-29 PROCEDURE — 96361 HYDRATE IV INFUSION ADD-ON: CPT

## 2024-08-29 PROCEDURE — 81001 URINALYSIS AUTO W/SCOPE: CPT | Performed by: EMERGENCY MEDICINE

## 2024-08-29 PROCEDURE — 96374 THER/PROPH/DIAG INJ IV PUSH: CPT

## 2024-08-29 PROCEDURE — 74177 CT ABD & PELVIS W/CONTRAST: CPT

## 2024-08-29 PROCEDURE — 80053 COMPREHEN METABOLIC PANEL: CPT | Performed by: EMERGENCY MEDICINE

## 2024-08-29 PROCEDURE — 99284 EMERGENCY DEPT VISIT MOD MDM: CPT | Performed by: EMERGENCY MEDICINE

## 2024-08-29 PROCEDURE — 99285 EMERGENCY DEPT VISIT HI MDM: CPT

## 2024-08-29 PROCEDURE — 85730 THROMBOPLASTIN TIME PARTIAL: CPT | Performed by: EMERGENCY MEDICINE

## 2024-08-29 PROCEDURE — 87086 URINE CULTURE/COLONY COUNT: CPT | Performed by: EMERGENCY MEDICINE

## 2024-08-29 PROCEDURE — 85025 COMPLETE CBC W/AUTO DIFF WBC: CPT | Performed by: EMERGENCY MEDICINE

## 2024-08-29 PROCEDURE — 36415 COLL VENOUS BLD VENIPUNCTURE: CPT | Performed by: EMERGENCY MEDICINE

## 2024-08-29 PROCEDURE — 83690 ASSAY OF LIPASE: CPT | Performed by: EMERGENCY MEDICINE

## 2024-08-29 RX ORDER — MORPHINE SULFATE 4 MG/ML
4 INJECTION, SOLUTION INTRAMUSCULAR; INTRAVENOUS ONCE
Status: COMPLETED | OUTPATIENT
Start: 2024-08-29 | End: 2024-08-29

## 2024-08-29 RX ORDER — ONDANSETRON 2 MG/ML
4 INJECTION INTRAMUSCULAR; INTRAVENOUS ONCE
Status: COMPLETED | OUTPATIENT
Start: 2024-08-29 | End: 2024-08-29

## 2024-08-29 RX ORDER — CYCLOBENZAPRINE HCL 10 MG
10 TABLET ORAL 2 TIMES DAILY PRN
Qty: 10 TABLET | Refills: 0 | Status: SHIPPED | OUTPATIENT
Start: 2024-08-29

## 2024-08-29 RX ORDER — PREDNISONE 10 MG/1
20 TABLET ORAL DAILY
Qty: 10 TABLET | Refills: 0 | Status: SHIPPED | OUTPATIENT
Start: 2024-08-29 | End: 2024-09-03

## 2024-08-29 RX ORDER — PREDNISONE 20 MG/1
40 TABLET ORAL ONCE
Status: COMPLETED | OUTPATIENT
Start: 2024-08-29 | End: 2024-08-29

## 2024-08-29 RX ORDER — KETOROLAC TROMETHAMINE 30 MG/ML
15 INJECTION, SOLUTION INTRAMUSCULAR; INTRAVENOUS ONCE
Status: COMPLETED | OUTPATIENT
Start: 2024-08-29 | End: 2024-08-29

## 2024-08-29 RX ADMIN — ONDANSETRON 4 MG: 2 INJECTION INTRAMUSCULAR; INTRAVENOUS at 18:49

## 2024-08-29 RX ADMIN — KETOROLAC TROMETHAMINE 15 MG: 30 INJECTION, SOLUTION INTRAMUSCULAR; INTRAVENOUS at 16:44

## 2024-08-29 RX ADMIN — SODIUM CHLORIDE 1000 ML: 0.9 INJECTION, SOLUTION INTRAVENOUS at 16:42

## 2024-08-29 RX ADMIN — PREDNISONE 40 MG: 20 TABLET ORAL at 18:49

## 2024-08-29 RX ADMIN — MORPHINE SULFATE 4 MG: 4 INJECTION INTRAVENOUS at 18:49

## 2024-08-29 RX ADMIN — IOHEXOL 100 ML: 350 INJECTION, SOLUTION INTRAVENOUS at 17:35

## 2024-08-29 NOTE — ED PROVIDER NOTES
History  Chief Complaint   Patient presents with    Abdominal Pain     Woke up with lower back pain this morning and throughout the day it started to radiate to whole abdomen and now having nausea      49-year-old female states she woke up this morning started having some low back pain midline the sacrum.  States throughout the day has been getting worse.  She feels better if she bends over worse if she extends her back.  Does have some slight suprapubic abdominal tenderness to no nausea vomiting diarrhea been eating and drinking well no UTI symptoms no flank pain consistent with kidney stone.        Prior to Admission Medications   Prescriptions Last Dose Informant Patient Reported? Taking?   ALPRAZolam (XANAX) 0.25 mg tablet   Yes No   Patient not taking: Reported on 3/25/2024   Advair Diskus 250-50 MCG/ACT inhaler   Yes No   Sig: INHALE 1 PUFF 2 (TWO) TIMES A DAY. TO RINSE THROAT AFTER USE   Patient not taking: Reported on 3/25/2024   Azelastine HCl 137 MCG/SPRAY SOLN   Yes No   Si SPRAY INTO EACH NOSTRIL 2 (TWO) TIMES A DAY. USE IN EACH NOSTRIL AS DIRECTED   Cholecalciferol 1.25 MG (95466 UT) TABS   No No   Sig: Take 1 tablet (50,000 Units total) by mouth once a week x12wks and then switch to OTC Vit D 2000IU QD   Fluticasone-Salmeterol (Advair) 250-50 mcg/dose inhaler   No No   Sig: TAKE 1 PUFF BY MOUTH TWICE A DAY   Hydrocod Javier-Chlorphe Javier ER (TUSSIONEX) 10-8 mg/5 mL ER suspension   Yes No   Patient not taking: Reported on 3/25/2024   PARoxetine (PAXIL-CR) 12.5 mg 24 hr tablet   Yes No   Patient not taking: Reported on 3/25/2024   RABEprazole (Aciphex) 20 MG tablet   Yes No   Patient not taking: Reported on 3/25/2024   albuterol (2.5 mg/3 mL) 0.083 % nebulizer solution   No No   Sig: TAKE 3 ML (2.5 MG TOTAL) BY NEBULIZATION EVERY 6 HOURS AS NEEDED FOR WHEEZING OR SHORTNESS OF BREATH   albuterol (PROVENTIL HFA,VENTOLIN HFA) 90 mcg/act inhaler   No No   Sig: INHALE 2 PUFFS BY MOUTH EVERY 6 HOURS AS  NEEDED FOR WHEEZE   benzonatate (TESSALON PERLES) 100 mg capsule   No No   Sig: Take 1 capsule (100 mg total) by mouth 3 (three) times a day as needed for cough   carvedilol (COREG) 6.25 mg tablet   No No   Sig: Take 1 tablet (6.25 mg total) by mouth 2 (two) times a day with meals   darifenacin (ENABLEX) 15 MG 24 hr tablet   Yes No   Patient not taking: Reported on 3/25/2024   econazole nitrate 1 % cream   Yes No   Patient not taking: Reported on 3/25/2024   ergocalciferol (VITAMIN D2) 50,000 units   Yes No   Sig: TAKE 1 TABLET (50,000 UNITS TOTAL) BY MOUTH ONCE A WEEK FOR 12 WEEKS AND THEN SWITCH TO OTC VITAMIN D 2000IU EVERY DAY   ferrous sulfate 324 (65 Fe) mg   No No   Sig: Take 1 tablet (324 mg total) by mouth daily   fluticasone (FLONASE) 50 mcg/act nasal spray   No No   Sig: SPRAY 2 SPRAYS INTO EACH NOSTRIL DAILY   gabapentin (NEURONTIN) 400 mg capsule   No No   Sig: TAKE 2 CAPSULES (1600 MG TOTAL) BY MOUTH EVERY DAY   gabapentin (NEURONTIN) 800 mg tablet   No No   Sig: Take 2 tablets (1,600 mg total) by mouth daily   Patient not taking: Reported on 3/25/2024   lansoprazole (PREVACID) 30 mg capsule   No No   Sig: TAKE 1 CAPSULE BY MOUTH EVERY DAY   lisinopril (ZESTRIL) 5 mg tablet   No No   Sig: Take 1 tablet (5 mg total) by mouth daily   ondansetron (ZOFRAN-ODT) 4 mg disintegrating tablet   Yes No   Patient not taking: Reported on 3/25/2024   rosuvastatin (CRESTOR) 5 mg tablet   No No   Sig: Take 1 tablet (5 mg total) by mouth daily      Facility-Administered Medications: None       Past Medical History:   Diagnosis Date    Anemia     Asthma     Colon polyp     Ear problems     GERD (gastroesophageal reflux disease)     Hyperlipidemia     Hypertension     Neck pain     PONV (postoperative nausea and vomiting)     Sleep apnea        Past Surgical History:   Procedure Laterality Date     SECTION      COLONOSCOPY      NOSE SURGERY      SINUS SURGERY      TUBAL LIGATION         Family History   Problem  Relation Age of Onset    Hypertension Father     Bone cancer Maternal Grandmother 81    Cancer Paternal Grandmother     Stomach cancer Paternal Grandmother 82    Hypertension Maternal Aunt     Breast cancer Neg Hx     Colon cancer Neg Hx      I have reviewed and agree with the history as documented.    E-Cigarette/Vaping    E-Cigarette Use Never User      E-Cigarette/Vaping Substances    Nicotine No     THC No     CBD No     Flavoring No     Other No     Unknown No      Social History     Tobacco Use    Smoking status: Every Day     Current packs/day: 1.00     Average packs/day: 1 pack/day for 33.0 years (33.0 ttl pk-yrs)     Types: Cigarettes    Smokeless tobacco: Never   Vaping Use    Vaping status: Never Used   Substance Use Topics    Alcohol use: No    Drug use: No       Review of Systems   Constitutional:  Negative for activity change, chills, diaphoresis and fever.   HENT:  Negative for congestion, ear pain, nosebleeds, sore throat, trouble swallowing and voice change.    Eyes:  Negative for pain, discharge and redness.   Respiratory:  Negative for apnea, cough, choking, shortness of breath, wheezing and stridor.    Cardiovascular:  Negative for chest pain and palpitations.   Gastrointestinal:  Negative for abdominal distention, abdominal pain, constipation, diarrhea, nausea and vomiting.   Endocrine: Negative for polydipsia.   Genitourinary:  Negative for difficulty urinating, dysuria, flank pain, frequency, hematuria and urgency.   Musculoskeletal:  Positive for back pain. Negative for gait problem, joint swelling, myalgias, neck pain and neck stiffness.   Skin:  Negative for pallor and rash.   Neurological:  Negative for dizziness, tremors, syncope, speech difficulty, weakness, numbness and headaches.   Hematological:  Negative for adenopathy.   Psychiatric/Behavioral:  Negative for confusion, hallucinations, self-injury and suicidal ideas. The patient is not nervous/anxious.        Physical Exam  Physical  Exam  Vitals and nursing note reviewed.   Constitutional:       General: She is not in acute distress.     Appearance: Normal appearance. She is well-developed. She is not diaphoretic.   HENT:      Head: Normocephalic and atraumatic.      Right Ear: External ear normal.      Left Ear: External ear normal.      Nose: Nose normal.   Eyes:      Conjunctiva/sclera: Conjunctivae normal.      Pupils: Pupils are equal, round, and reactive to light.   Cardiovascular:      Rate and Rhythm: Normal rate and regular rhythm.      Heart sounds: Normal heart sounds.   Pulmonary:      Effort: Pulmonary effort is normal.      Breath sounds: Normal breath sounds.   Abdominal:      General: Bowel sounds are normal.      Palpations: Abdomen is soft.      Tenderness: There is no abdominal tenderness.      Comments: Slight mild tenderness in suprapubic region   Musculoskeletal:         General: Normal range of motion.      Cervical back: Normal range of motion and neck supple.   Skin:     General: Skin is warm and dry.   Neurological:      Mental Status: She is alert and oriented to person, place, and time.      Deep Tendon Reflexes: Reflexes are normal and symmetric.         Vital Signs  ED Triage Vitals   Temperature Pulse Respirations Blood Pressure SpO2   08/29/24 1607 08/29/24 1610 08/29/24 1607 08/29/24 1610 08/29/24 1610   98.1 °F (36.7 °C) 81 18 (!) 174/112 98 %      Temp src Heart Rate Source Patient Position - Orthostatic VS BP Location FiO2 (%)   -- -- -- -- --             Pain Score       08/29/24 1607       8           Vitals:    08/29/24 1610   BP: (!) 174/112   Pulse: 81         Visual Acuity      ED Medications  Medications   sodium chloride 0.9 % bolus 1,000 mL (0 mL Intravenous Stopped 8/29/24 1840)   ketorolac (TORADOL) injection 15 mg (15 mg Intravenous Given 8/29/24 1644)   iohexol (OMNIPAQUE) 350 MG/ML injection (MULTI-DOSE) 100 mL (100 mL Intravenous Given 8/29/24 1735)   morphine injection 4 mg (4 mg Intravenous  Given 8/29/24 1849)   ondansetron (ZOFRAN) injection 4 mg (4 mg Intravenous Given 8/29/24 1849)   predniSONE tablet 40 mg (40 mg Oral Given 8/29/24 1849)       Diagnostic Studies  Results Reviewed       Procedure Component Value Units Date/Time    Comprehensive metabolic panel [[id number]] Collected: 08/29/24 1642    Lab Status: Final result Specimen: Blood from Arm, Left Updated: 08/29/24 1717     Sodium 138 mmol/L      Potassium 4.0 mmol/L      Chloride 107 mmol/L      CO2 24 mmol/L      ANION GAP 7 mmol/L      BUN 9 mg/dL      Creatinine 0.67 mg/dL      Glucose 96 mg/dL      Calcium 9.2 mg/dL      AST 17 U/L      ALT 20 U/L      Alkaline Phosphatase 65 U/L      Total Protein 7.1 g/dL      Albumin 4.3 g/dL      Total Bilirubin 0.46 mg/dL      eGFR 104 ml/min/1.73sq m     Narrative:      National [employer] guidelines for Chronic Kidney Disease (CKD):     Stage 1 with normal or high GFR (GFR > 90 mL/min/1.73 square meters)    Stage 2 Mild CKD (GFR = 60-89 mL/min/1.73 square meters)    Stage 3A Moderate CKD (GFR = 45-59 mL/min/1.73 square meters)    Stage 3B Moderate CKD (GFR = 30-44 mL/min/1.73 square meters)    Stage 4 Severe CKD (GFR = 15-29 mL/min/1.73 square meters)    Stage 5 End Stage CKD (GFR <15 mL/min/1.73 square meters)  Note: GFR calculation is accurate only with a steady state creatinine    Lipase [[id number]]  (Normal) Collected: 08/29/24 1642    Lab Status: Final result Specimen: Blood from Arm, Left Updated: 08/29/24 1717     Lipase 16 u/L     Urine Microscopic [[id number]]  (Abnormal) Collected: 08/29/24 1645    Lab Status: Final result Specimen: Urine, Clean Catch Updated: 08/29/24 1711     RBC, UA 1-2 /hpf      WBC, UA 2-4 /hpf      Epithelial Cells Moderate /hpf      Bacteria, UA Occasional /hpf      MUCUS THREADS Moderate    Protime-INR [[id number]]  (Normal) Collected: 08/29/24 1642    Lab Status: Final result Specimen: Blood from Arm, Left Updated: 08/29/24 1706     Protime 13.8  seconds      INR 1.01    Narrative:      INR Therapeutic Range    Indication                                             INR Range      Atrial Fibrillation                                               2.0-3.0  Hypercoagulable State                                    2.0.2.3  Left Ventricular Asist Device                            2.0-3.0  Mechanical Heart Valve                                  -    Aortic(with afib, MI, embolism, HF, LA enlargement,    and/or coagulopathy)                                     2.0-3.0 (2.5-3.5)     Mitral                                                             2.5-3.5  Prosthetic/Bioprosthetic Heart Valve               2.0-3.0  Venous thromboembolism (VTE: VT, PE        2.0-3.0    APTT [[id number]]  (Normal) Collected: 08/29/24 1642    Lab Status: Final result Specimen: Blood from Arm, Left Updated: 08/29/24 1706     PTT 29 seconds     UA (URINE) with reflex to Scope [[id number]]  (Abnormal) Collected: 08/29/24 1645    Lab Status: Final result Specimen: Urine, Clean Catch Updated: 08/29/24 1702     Color, UA Yellow     Clarity, UA Clear     Specific Gravity, UA >=1.030     pH, UA 5.5     Leukocytes, UA Small     Nitrite, UA Negative     Protein, UA Trace mg/dl      Glucose, UA Negative mg/dl      Ketones, UA Negative mg/dl      Urobilinogen, UA 2.0 mg/dl      Bilirubin, UA Negative     Occult Blood, UA Small    Urine culture [[id number]] Collected: 08/29/24 1645    Lab Status: In process Specimen: Urine, Clean Catch Updated: 08/29/24 1657    CBC and differential [[id number]] Collected: 08/29/24 1642    Lab Status: Final result Specimen: Blood from Arm, Left Updated: 08/29/24 1653     WBC 8.56 Thousand/uL      RBC 4.77 Million/uL      Hemoglobin 13.9 g/dL      Hematocrit 42.2 %      MCV 89 fL      MCH 29.1 pg      MCHC 32.9 g/dL      RDW 13.0 %      MPV 11.7 fL      Platelets 241 Thousands/uL      nRBC 0 /100 WBCs      Segmented % 63 %      Immature Grans % 0 %      Lymphocytes % 28  %      Monocytes % 6 %      Eosinophils Relative 2 %      Basophils Relative 1 %      Absolute Neutrophils 5.36 Thousands/µL      Absolute Immature Grans 0.03 Thousand/uL      Absolute Lymphocytes 2.43 Thousands/µL      Absolute Monocytes 0.54 Thousand/µL      Eosinophils Absolute 0.15 Thousand/µL      Basophils Absolute 0.05 Thousands/µL                    CT abdomen pelvis with contrast   Final Result by Steven León MD (08/29 1802)      No acute abdominopelvic pathology.         Workstation performed: [id number]         CT recon only lumbar spine   Final Result by Steven León MD (08/29 1805)      No acute osseous injury of the lumbar spine.            Workstation performed: [id number]                    Procedures  Procedures         ED Course                                 SBIRT 20yo+      Flowsheet Row Most Recent Value   Initial Alcohol Screen: US AUDIT-C     1. How often do you have a drink containing alcohol? 0 Filed at: 08/29/2024 1607   2. How many drinks containing alcohol do you have on a typical day you are drinking?  0 Filed at: 08/29/2024 1607   3a. Male UNDER 65: How often do you have five or more drinks on one occasion? 0 Filed at: 08/29/2024 1607   3b. FEMALE Any Age, or MALE 65+: How often do you have 4 or more drinks on one occassion? 0 Filed at: 08/29/2024 1607   Audit-C Score 0 Filed at: 08/29/2024 1607   FER: How many times in the past year have you...    Used an illegal drug or used a prescription medication for non-medical reasons? Never Filed at: 08/29/2024 1607                      Medical Decision Making  On physical exam patient did have limited flexibility in the lower extremities and pelvis.  I had the patient lie on her side flex her right knee up then extended laterally and I had her straighten her leg out which potentially can move the SI joint a little bit when she did that on the right side she had significant amount of pain to the point where she stopped moving her  leg.  Repeat the procedure on the left side not too much irritation on the left side.  Patient had no discomfort with rotation of the hip or flexion extension of the hip, although range of motion is somewhat limited    Amount and/or Complexity of Data Reviewed  Labs: ordered.  Radiology: ordered.    Risk  Prescription drug management.                 Disposition  Final diagnoses:   Muscle spasm   Sacroiliac inflammation (HCC)     Time reflects when diagnosis was documented in both MDM as applicable and the Disposition within this note       Time User Action Codes Description Comment    8/29/2024  6:45 PM Christiano Kenney [M62.838] Muscle spasm     8/29/2024  6:45 PM Christiano Kenney [M46.1] Sacroiliac inflammation (HCC)           ED Disposition       ED Disposition   Discharge    Condition   Stable    Date/Time   Thu Aug 29, 2024 1847    Comment   Cassy Urena discharge to home/self care.                   Follow-up Information       Follow up With Specialties Details Why Contact Info    Sky White MD Orthopedic Surgery Schedule an appointment as soon as possible for a visit  As needed [address], [address]  [facility] [address] [phone]              Patient's Medications   Discharge Prescriptions    CYCLOBENZAPRINE (FLEXERIL) 10 MG TABLET    Take 1 tablet (10 mg total) by mouth 2 (two) times a day as needed for muscle spasms       Start Date: 8/29/2024 End Date: --       Order Dose: 10 mg       Quantity: 10 tablet    Refills: 0    PREDNISONE 10 MG TABLET    Take 2 tablets (20 mg total) by mouth daily for 5 days       Start Date: 8/29/2024 End Date: 9/3/2024       Order Dose: 20 mg       Quantity: 10 tablet    Refills: 0           PDMP Review         Value Time User    PDMP Reviewed  Yes 12/4/2019  1:33 PM Frank Townsend MD            ED Provider  Electronically Signed by             Christiano Kenney DO  08/29/24 1848       Christiano Kenney DO  08/29/24  1854

## 2024-08-29 NOTE — Clinical Note
Cassy Urena was seen and treated in our emergency department on 8/29/2024.                Diagnosis:     Cassy  may return to work on return date.    She may return on this date: 09/02/2024         If you have any questions or concerns, please don't hesitate to call.      Christiano Kenney, DO    ______________________________           _______________          _______________  Hospital Representative                              Date                                Time

## 2024-08-30 ENCOUNTER — TELEPHONE (OUTPATIENT)
Age: 49
End: 2024-08-30

## 2024-08-30 ENCOUNTER — TELEPHONE (OUTPATIENT)
Dept: PHYSICAL THERAPY | Facility: OTHER | Age: 49
End: 2024-08-30

## 2024-08-30 NOTE — TELEPHONE ENCOUNTER
Call placed to the patient per [facility] referral.    Spoke with the Pt who declined triage for eval with Spine PT. She would like to wait for her labs and cultures to come back.     [facility] closed. Will assist if a call back is received

## 2024-08-30 NOTE — TELEPHONE ENCOUNTER
Cassy Avila    Patient is calling back, she would like to come in today for a OV if possible.  CB: [phone]

## 2024-08-31 LAB — BACTERIA UR CULT: NORMAL

## 2024-09-03 ENCOUNTER — OFFICE VISIT (OUTPATIENT)
Dept: FAMILY MEDICINE CLINIC | Facility: CLINIC | Age: 49
End: 2024-09-03
Payer: COMMERCIAL

## 2024-09-03 VITALS
SYSTOLIC BLOOD PRESSURE: 116 MMHG | HEART RATE: 80 BPM | HEIGHT: 64 IN | RESPIRATION RATE: 16 BRPM | BODY MASS INDEX: 29.53 KG/M2 | DIASTOLIC BLOOD PRESSURE: 80 MMHG | OXYGEN SATURATION: 99 % | WEIGHT: 173 LBS

## 2024-09-03 DIAGNOSIS — Z23 ENCOUNTER FOR IMMUNIZATION: ICD-10-CM

## 2024-09-03 DIAGNOSIS — Z23 NEED FOR PNEUMOCOCCAL 20-VALENT CONJUGATE VACCINATION: ICD-10-CM

## 2024-09-03 DIAGNOSIS — R79.0 LOW FERRITIN: ICD-10-CM

## 2024-09-03 DIAGNOSIS — E61.1 LOW SERUM IRON: ICD-10-CM

## 2024-09-03 DIAGNOSIS — R73.03 PREDIABETES: ICD-10-CM

## 2024-09-03 DIAGNOSIS — E55.9 VITAMIN D DEFICIENCY: ICD-10-CM

## 2024-09-03 DIAGNOSIS — E78.00 HYPERCHOLESTEROLEMIA: ICD-10-CM

## 2024-09-03 DIAGNOSIS — M54.41 ACUTE BILATERAL LOW BACK PAIN WITH BILATERAL SCIATICA: Primary | ICD-10-CM

## 2024-09-03 DIAGNOSIS — I10 ESSENTIAL HYPERTENSION: ICD-10-CM

## 2024-09-03 DIAGNOSIS — F17.200 CURRENT SMOKER: ICD-10-CM

## 2024-09-03 DIAGNOSIS — M54.42 ACUTE BILATERAL LOW BACK PAIN WITH BILATERAL SCIATICA: Primary | ICD-10-CM

## 2024-09-03 DIAGNOSIS — Z12.31 ENCOUNTER FOR SCREENING MAMMOGRAM FOR MALIGNANT NEOPLASM OF BREAST: ICD-10-CM

## 2024-09-03 PROCEDURE — 90471 IMMUNIZATION ADMIN: CPT | Performed by: FAMILY MEDICINE

## 2024-09-03 PROCEDURE — 90677 PCV20 VACCINE IM: CPT | Performed by: FAMILY MEDICINE

## 2024-09-03 PROCEDURE — 99214 OFFICE O/P EST MOD 30 MIN: CPT | Performed by: FAMILY MEDICINE

## 2024-09-03 RX ORDER — MELOXICAM 15 MG/1
15 TABLET ORAL DAILY
COMMUNITY
Start: 2024-05-14 | End: 2025-05-14

## 2024-09-03 NOTE — PROGRESS NOTES
Assessment/Plan:   Diagnoses and all orders for this visit:    Acute bilateral low back pain with bilateral sciatica  -     Ambulatory Referral to Physical Therapy; Future    Prediabetes  -     Hemoglobin A1C; Future  -     Comprehensive metabolic panel; Future  -     TSH, 3rd generation with Free T4 reflex; Future  -     Albumin / creatinine urine ratio; Future  - A1c = 5.7 <-- 6.0   - diet/exercise  - caution with carbs  - received PCV20 in the office today   - RTO in mid-Oct for annual Flu vaccine   - smoking cessation   - RTO in 6months, with labs, for f/u and annual exam - pt aware and agreeable     Essential hypertension  -     Albumin / creatinine urine ratio; Future  - BP stable in office  - follows with Cardio   - cont current regimen   - received PCV20 in the office today   - RTO in mid-Oct for annual Flu vaccine   - smoking cessation   Current smoker  Need for pneumococcal 20-valent conjugate vaccination  Encounter for immunization  -     Pneumococcal Conjugate Vaccine 20-valent (Pcv20)    Vitamin D deficiency  -     Vitamin D 25 hydroxy; Future    Low serum iron  -     CBC and differential; Future  -     Iron Panel (Includes Ferritin, Iron Sat%, Iron, and TIBC); Future  Low ferritin  -     CBC and differential; Future  -     Iron Panel (Includes Ferritin, Iron Sat%, Iron, and TIBC); Future    Hypercholesterolemia  -     Lipid panel; Future    Encounter for screening mammogram for malignant neoplasm of breast   - has script and advised to schedule     Other orders  -     meloxicam (MOBIC) 15 mg tablet; Take 15 mg by mouth daily          Subjective:    Patient ID: Cassy Urena is a 49 y.o. female.  HPI  50yo F presents to the office for ER f/u   - was in [facility] on 8/29/2024 for eval of abdominal pain - was given pred burst and Flexeril   - still with midline low back pain with radiation down b/l thighs   - has to schedule Mammo   - reviewed labs done 8/12/2024   - still smoking the same amount and not  "ready to quit   - interested in getting PCV20   - denies F/C/N/V/CP/palpitations/SOB/wheezing/abd pain/LE edema       The following portions of the patient's history were reviewed and updated as appropriate: allergies, current medications, past family history, past medical history, past social history, past surgical history and problem list.    Review of Systems  as per HPI    Objective:  /80 (BP Location: Right arm, Patient Position: Sitting, Cuff Size: Standard)   Pulse 80   Resp 16   Ht 5' 4\" (1.626 m)   Wt 78.5 kg (173 lb)   SpO2 99%   BMI 29.70 kg/m²    Physical Exam  Vitals reviewed.   Constitutional:       General: She is not in acute distress.     Appearance: Normal appearance. She is not ill-appearing, toxic-appearing or diaphoretic.   HENT:      Head: Normocephalic and atraumatic.      Right Ear: External ear normal.      Left Ear: External ear normal.      Nose: Nose normal.   Eyes:      General: No scleral icterus.        Right eye: No discharge.         Left eye: No discharge.      Extraocular Movements: Extraocular movements intact.      Conjunctiva/sclera: Conjunctivae normal.   Cardiovascular:      Rate and Rhythm: Normal rate and regular rhythm.      Heart sounds: Normal heart sounds.   Pulmonary:      Effort: Pulmonary effort is normal.      Breath sounds: Normal breath sounds.   Abdominal:      Palpations: Abdomen is soft.   Musculoskeletal:         General: Tenderness present. Normal range of motion.      Cervical back: Normal range of motion.      Right lower leg: No edema.      Left lower leg: No edema.   Skin:     General: Skin is warm.   Neurological:      General: No focal deficit present.      Mental Status: She is alert and oriented to person, place, and time.   Psychiatric:         Mood and Affect: Mood normal.         Behavior: Behavior normal.         "

## 2024-09-30 DIAGNOSIS — J40 BRONCHITIS: ICD-10-CM

## 2024-09-30 DIAGNOSIS — K21.9 GASTROESOPHAGEAL REFLUX DISEASE WITHOUT ESOPHAGITIS: ICD-10-CM

## 2024-09-30 RX ORDER — LANSOPRAZOLE 30 MG/1
CAPSULE, DELAYED RELEASE ORAL
Qty: 90 CAPSULE | Refills: 1 | Status: SHIPPED | OUTPATIENT
Start: 2024-09-30

## 2024-10-01 RX ORDER — FLUTICASONE PROPIONATE AND SALMETEROL 250; 50 UG/1; UG/1
POWDER RESPIRATORY (INHALATION)
Qty: 180 BLISTER | Refills: 1 | Status: SHIPPED | OUTPATIENT
Start: 2024-10-01

## 2024-10-28 ENCOUNTER — APPOINTMENT (OUTPATIENT)
Dept: LAB | Facility: HOSPITAL | Age: 49
End: 2024-10-28
Payer: COMMERCIAL

## 2024-10-28 DIAGNOSIS — E61.1 LOW SERUM IRON: ICD-10-CM

## 2024-10-28 LAB
ALBUMIN SERPL BCG-MCNC: 4.1 G/DL (ref 3.5–5)
ALP SERPL-CCNC: 61 U/L (ref 34–104)
ALT SERPL W P-5'-P-CCNC: 17 U/L (ref 7–52)
ANION GAP SERPL CALCULATED.3IONS-SCNC: 5 MMOL/L (ref 4–13)
AST SERPL W P-5'-P-CCNC: 16 U/L (ref 13–39)
BASOPHILS # BLD AUTO: 0.06 THOUSANDS/ΜL (ref 0–0.1)
BASOPHILS NFR BLD AUTO: 1 % (ref 0–1)
BILIRUB SERPL-MCNC: 0.39 MG/DL (ref 0.2–1)
BUN SERPL-MCNC: 10 MG/DL (ref 5–25)
CALCIUM SERPL-MCNC: 8.8 MG/DL (ref 8.4–10.2)
CHLORIDE SERPL-SCNC: 104 MMOL/L (ref 96–108)
CO2 SERPL-SCNC: 27 MMOL/L (ref 21–32)
CREAT SERPL-MCNC: 0.68 MG/DL (ref 0.6–1.3)
EOSINOPHIL # BLD AUTO: 0.16 THOUSAND/ΜL (ref 0–0.61)
EOSINOPHIL NFR BLD AUTO: 3 % (ref 0–6)
ERYTHROCYTE [DISTWIDTH] IN BLOOD BY AUTOMATED COUNT: 13.2 % (ref 11.6–15.1)
FERRITIN SERPL-MCNC: 21 NG/ML (ref 11–307)
GFR SERPL CREATININE-BSD FRML MDRD: 103 ML/MIN/1.73SQ M
GLUCOSE P FAST SERPL-MCNC: 110 MG/DL (ref 65–99)
HCT VFR BLD AUTO: 42.7 % (ref 34.8–46.1)
HGB BLD-MCNC: 14 G/DL (ref 11.5–15.4)
IMM GRANULOCYTES # BLD AUTO: 0.02 THOUSAND/UL (ref 0–0.2)
IMM GRANULOCYTES NFR BLD AUTO: 0 % (ref 0–2)
IRON SATN MFR SERPL: 36 % (ref 15–50)
IRON SERPL-MCNC: 89 UG/DL (ref 50–212)
LYMPHOCYTES # BLD AUTO: 1.65 THOUSANDS/ΜL (ref 0.6–4.47)
LYMPHOCYTES NFR BLD AUTO: 29 % (ref 14–44)
MCH RBC QN AUTO: 28.7 PG (ref 26.8–34.3)
MCHC RBC AUTO-ENTMCNC: 32.8 G/DL (ref 31.4–37.4)
MCV RBC AUTO: 88 FL (ref 82–98)
MONOCYTES # BLD AUTO: 0.43 THOUSAND/ΜL (ref 0.17–1.22)
MONOCYTES NFR BLD AUTO: 8 % (ref 4–12)
NEUTROPHILS # BLD AUTO: 3.37 THOUSANDS/ΜL (ref 1.85–7.62)
NEUTS SEG NFR BLD AUTO: 59 % (ref 43–75)
NRBC BLD AUTO-RTO: 0 /100 WBCS
PLATELET # BLD AUTO: 223 THOUSANDS/UL (ref 149–390)
PMV BLD AUTO: 11.3 FL (ref 8.9–12.7)
POTASSIUM SERPL-SCNC: 4.3 MMOL/L (ref 3.5–5.3)
PROT SERPL-MCNC: 6.8 G/DL (ref 6.4–8.4)
RBC # BLD AUTO: 4.88 MILLION/UL (ref 3.81–5.12)
SODIUM SERPL-SCNC: 136 MMOL/L (ref 135–147)
TIBC SERPL-MCNC: 245 UG/DL (ref 250–450)
UIBC SERPL-MCNC: 156 UG/DL (ref 155–355)
WBC # BLD AUTO: 5.69 THOUSAND/UL (ref 4.31–10.16)

## 2024-10-28 PROCEDURE — 36415 COLL VENOUS BLD VENIPUNCTURE: CPT

## 2024-10-28 PROCEDURE — 80053 COMPREHEN METABOLIC PANEL: CPT

## 2024-10-28 PROCEDURE — 83550 IRON BINDING TEST: CPT

## 2024-10-28 PROCEDURE — 83540 ASSAY OF IRON: CPT

## 2024-10-28 PROCEDURE — 82728 ASSAY OF FERRITIN: CPT

## 2024-10-28 PROCEDURE — 85025 COMPLETE CBC W/AUTO DIFF WBC: CPT

## 2024-10-29 ENCOUNTER — TELEPHONE (OUTPATIENT)
Dept: HEMATOLOGY ONCOLOGY | Facility: MEDICAL CENTER | Age: 49
End: 2024-10-29

## 2024-11-04 ENCOUNTER — OFFICE VISIT (OUTPATIENT)
Dept: HEMATOLOGY ONCOLOGY | Facility: MEDICAL CENTER | Age: 49
End: 2024-11-04
Payer: COMMERCIAL

## 2024-11-04 VITALS
HEIGHT: 64 IN | TEMPERATURE: 98.4 F | HEART RATE: 83 BPM | RESPIRATION RATE: 18 BRPM | BODY MASS INDEX: 29.71 KG/M2 | SYSTOLIC BLOOD PRESSURE: 108 MMHG | OXYGEN SATURATION: 99 % | DIASTOLIC BLOOD PRESSURE: 72 MMHG | WEIGHT: 174 LBS

## 2024-11-04 DIAGNOSIS — E61.1 LOW SERUM IRON: Primary | ICD-10-CM

## 2024-11-04 DIAGNOSIS — R53.82 CHRONIC FATIGUE: ICD-10-CM

## 2024-11-04 PROCEDURE — 99213 OFFICE O/P EST LOW 20 MIN: CPT | Performed by: PHYSICIAN ASSISTANT

## 2024-11-04 NOTE — PROGRESS NOTES
[facility] H[facility]ATOLOGY ONCOL[facility] SPECIALISTS [facility] [id number]  Hematology Ambulatory Follow-Up  Cassy Urena, 1975, [id number]  11/4/2024      Assessment and Plan   Iron deficiency:  Patient presents today in follow-up of iron deficiency anemia.    She had lab work on 1/9/2024.  At that time she had hemoglobin 13.3, hematocrit 39.2, ferritin 24.5 and percent saturation 19.  She was unable to tolerate oral iron in the past due to constipation.  She preferred to try IV iron.      She received venofer 200mg weekly x 3 from 3/18/2024- 4/1/2024.    She had repeat lab work on 10/28/2024.  WBC 5.69, hemoglobin 14.0, platelets 223,000.  Ferritin is 21 and iron saturation is 36%.    Her lab work is excellent.  Her hemoglobin has improved.  Iron studies have improved.  She does not need any additional IV iron.    She continues to have menstrual periods, not especially heavy.  She is up-to-date with her colonoscopy.  No obvious bleeding other than menstrual periods.    Patient will have her primary care physician follow her blood counts.  We would be happy to see her again in the future if needed.  Barrier(s) to care: None.   The patient is able to self care.      Subjective     Chief Complaint   Patient presents with    Follow-up     History of present illness: Patient presents today for follow-up of iron-deficiency anemia.  The patient reports that she has been taking oral iron 1 tablet daily for several years.  She had repeat lab work on 1/9/2024.  At that time she had hemoglobin 13.3, hematocrit 39.2, ferritin 24.5 and percent saturation 19.      She received venofer 200mg weekly x 3 from 3/18/2024- 4/1/2024.    Interval History:  Cassy presents today in follow-up. She admits to fatigue. She denies any current complaints otherwise. She denies bleeding. No nausea, vomiting. No chest pain, SOB.  She says that she is overdue for her mammogram but is up-to-date  with her routine health maintenance otherwise.    Review of Systems   Constitutional:  Positive for fatigue. Negative for appetite change, fever and unexpected weight change.   HENT:  Negative for nosebleeds.    Respiratory:  Negative for cough, choking and shortness of breath.         Negative hemoptysis.   Cardiovascular:  Negative for chest pain, palpitations and leg swelling.   Gastrointestinal: Negative.  Negative for abdominal distention, abdominal pain, anal bleeding, blood in stool, constipation, diarrhea, nausea and vomiting.   Endocrine: Negative.  Negative for cold intolerance.   Genitourinary: Negative.  Negative for hematuria, menstrual problem, vaginal bleeding, vaginal discharge and vaginal pain.   Musculoskeletal: Negative.  Negative for arthralgias, myalgias, neck pain and neck stiffness.   Skin: Negative.  Negative for color change, pallor and rash.   Allergic/Immunologic: Negative.  Negative for immunocompromised state.   Neurological: Negative.  Negative for weakness and headaches.   Hematological:  Negative for adenopathy. Does not bruise/bleed easily.   All other systems reviewed and are negative.      Patient Active Problem List   Diagnosis    Syncope    Cellulitis of left hand    Current smoker    Cervical disc disorder with radiculopathy of mid-cervical region    Greater trochanteric bursitis of both hips    Arthropathy of cervical facet joint    Leukocytosis    Back pain    Anemia of chronic disease    Anxiety    Degeneration of lumbar intervertebral disc    Gastroesophageal reflux disease    General unsteadiness    Hypercholesterolemia    Neuropathic pain    Opioid dependence (HCC)    Vitamin D deficiency    Simple chronic bronchitis (HCC)    Hypertension    Sleep apnea    Excessive daytime sleepiness    Prediabetes    Low serum iron    Low ferritin     Past Medical History:   Diagnosis Date    Anemia     Asthma     Colon polyp     Ear problems     GERD (gastroesophageal reflux disease)      Hyperlipidemia     Hypertension     Neck pain     PONV (postoperative nausea and vomiting)     Sleep apnea      Past Surgical History:   Procedure Laterality Date     SECTION      COLONOSCOPY      NOSE SURGERY      SINUS SURGERY      TUBAL LIGATION       Family History   Problem Relation Age of Onset    Hypertension Father     Bone cancer Maternal Grandmother 81    Cancer Paternal Grandmother     Stomach cancer Paternal Grandmother 82    Hypertension Maternal Aunt     Breast cancer Neg Hx     Colon cancer Neg Hx      Social History     Socioeconomic History    Marital status: /Civil Union     Spouse name: Not on file    Number of children: Not on file    Years of education: Not on file    Highest education level: Not on file   Occupational History    Not on file   Tobacco Use    Smoking status: Every Day     Current packs/day: 1.00     Average packs/day: 1 pack/day for 33.0 years (33.0 ttl pk-yrs)     Types: Cigarettes    Smokeless tobacco: Never   Vaping Use    Vaping status: Never Used   Substance and Sexual Activity    Alcohol use: No    Drug use: No    Sexual activity: Yes     Partners: Male     Birth control/protection: Female Sterilization   Other Topics Concern    Not on file   Social History Narrative    Not on file     Social Determinants of Health     Financial Resource Strain: Not on file   Food Insecurity: Not on file   Transportation Needs: Not on file   Physical Activity: Not on file   Stress: Not on file   Social Connections: Not on file   Intimate Partner Violence: Not on file   Housing Stability: Not on file       Current Outpatient Medications:     Advair Diskus 250-50 MCG/ACT inhaler, INHALE 1 PUFF 2 (TWO) TIMES A DAY. TO RINSE THROAT AFTER USE (Patient not taking: Reported on 3/25/2024), Disp: , Rfl:     albuterol (2.5 mg/3 mL) 0.083 % nebulizer solution, TAKE 3 ML (2.5 MG TOTAL) BY NEBULIZATION EVERY 6 HOURS AS NEEDED FOR WHEEZING OR SHORTNESS OF BREATH, Disp: 150 mL, Rfl: 0     albuterol (PROVENTIL HFA,VENTOLIN HFA) 90 mcg/act inhaler, INHALE 2 PUFFS BY MOUTH EVERY 6 HOURS AS NEEDED FOR WHEEZE, Disp: 18 g, Rfl: 5    ALPRAZolam (XANAX) 0.25 mg tablet, , Disp: , Rfl:     Azelastine HCl 137 MCG/SPRAY SOLN, 1 SPRAY INTO EACH NOSTRIL 2 (TWO) TIMES A DAY. USE IN EACH NOSTRIL AS DIRECTED, Disp: , Rfl:     benzonatate (TESSALON PERLES) 100 mg capsule, Take 1 capsule (100 mg total) by mouth 3 (three) times a day as needed for cough, Disp: 20 capsule, Rfl: 0    carvedilol (COREG) 6.25 mg tablet, Take 1 tablet (6.25 mg total) by mouth 2 (two) times a day with meals, Disp: 180 tablet, Rfl: 2    Cholecalciferol 1.25 MG (16215 UT) TABS, Take 1 tablet (50,000 Units total) by mouth once a week x12wks and then switch to OTC Vit D 2000IU QD, Disp: 12 tablet, Rfl: 0    cyclobenzaprine (FLEXERIL) 10 mg tablet, Take 1 tablet (10 mg total) by mouth 2 (two) times a day as needed for muscle spasms, Disp: 10 tablet, Rfl: 0    darifenacin (ENABLEX) 15 MG 24 hr tablet, , Disp: , Rfl:     econazole nitrate 1 % cream, , Disp: , Rfl:     ergocalciferol (VITAMIN D2) 50,000 units, TAKE 1 TABLET (50,000 UNITS TOTAL) BY MOUTH ONCE A WEEK FOR 12 WEEKS AND THEN SWITCH TO OTC VITAMIN D 2000IU EVERY DAY, Disp: , Rfl:     ferrous sulfate 324 (65 Fe) mg, Take 1 tablet (324 mg total) by mouth daily, Disp: 90 tablet, Rfl: 1    fluticasone (FLONASE) 50 mcg/act nasal spray, SPRAY 2 SPRAYS INTO EACH NOSTRIL DAILY, Disp: 48 g, Rfl: 0    Fluticasone-Salmeterol (Advair) 250-50 mcg/dose inhaler, TAKE 1 PUFF BY MOUTH TWICE A DAY, Disp: 180 blister, Rfl: 1    gabapentin (NEURONTIN) 400 mg capsule, TAKE 2 CAPSULES (1600 MG TOTAL) BY MOUTH EVERY DAY, Disp: 60 capsule, Rfl: 5    gabapentin (NEURONTIN) 800 mg tablet, Take 2 tablets (1,600 mg total) by mouth daily (Patient not taking: Reported on 9/3/2024), Disp: 60 tablet, Rfl: 5    Hydrocod Javier-Chlorphe Javier ER (TUSSIONEX) 10-8 mg/5 mL ER suspension, , Disp: , Rfl:     lansoprazole  (PREVACID) 30 mg capsule, TAKE 1 CAPSULE BY MOUTH EVERY DAY, Disp: 90 capsule, Rfl: 1    lisinopril (ZESTRIL) 5 mg tablet, Take 1 tablet (5 mg total) by mouth daily, Disp: 90 tablet, Rfl: 2    meloxicam (MOBIC) 15 mg tablet, Take 15 mg by mouth daily, Disp: , Rfl:     ondansetron (ZOFRAN-ODT) 4 mg disintegrating tablet, , Disp: , Rfl:     PARoxetine (PAXIL-CR) 12.5 mg 24 hr tablet, , Disp: , Rfl:     RABEprazole (Aciphex) 20 MG tablet, , Disp: , Rfl:     rosuvastatin (CRESTOR) 5 mg tablet, Take 1 tablet (5 mg total) by mouth daily, Disp: 90 tablet, Rfl: 2  Allergies   Allergen Reactions    Amoxicillin Hives    Ciprofloxacin Nausea Only       Objective     Vitals:    11/04/24 1019   BP: 108/72   Pulse: 83   Resp: 18   Temp: 98.4 °F (36.9 °C)   SpO2: 99%     Constitutional:       General: She is not in acute distress.     Appearance: She is well-developed and normal weight.   HENT:      Head: Normocephalic and atraumatic.      Mouth/Throat:      Mouth: Mucous membranes are moist.   Eyes:      General: No scleral icterus.     Conjunctiva/sclera: Conjunctivae normal.   Cardiovascular:      Rate and Rhythm: Normal rate and regular rhythm.   Pulmonary:      Effort: Pulmonary effort is normal. No respiratory distress.      Breath sounds: Normal breath sounds.   Abdominal:      General: Abdomen is flat. There is no distension.      Palpations: Abdomen is soft.   Musculoskeletal:         General: No swelling or tenderness.   Skin:     General: Skin is warm.      Coloration: Skin is not pale.      Findings: No rash.   Neurological:      Mental Status: She is alert and oriented to person, place, and time.   Psychiatric:         Mood and Affect: Mood normal.         Thought Content: Thought content normal.         Judgment: Judgment normal.   Extremities: No lower extremity edema bilaterally.     Result Review  Labs:   She had repeat lab work on 10/28/2024.  WBC 5.69, hemoglobin 14.0, platelets 223,000.  Ferritin is 21 and iron  saturation is 36%.    She had repeat lab work on 2024.  WBC 6.27, hemoglobin 13.7, platelets 208.  Ferritin is 81 and iron saturation is 23%.    2024 WBC 6.0, hemoglobin 13.3, hematocrit 39.2, platelet count 195.  Percent iron saturation 19, ferritin 24.5, BUN 11, creatinine 0.63, sodium 142, potassium 3.9, LFTs within normal limits.     Imagin/15/2023 CXR: IMPRESSION:     No acute cardiopulmonary disease.    Please note:  This report has been generated by a voice recognition software system. Therefore there may be syntax, spelling, and/or grammatical errors. Please call if you have any questions.

## 2024-12-18 ENCOUNTER — APPOINTMENT (OUTPATIENT)
Dept: RADIOLOGY | Facility: CLINIC | Age: 49
End: 2024-12-18
Payer: COMMERCIAL

## 2024-12-18 ENCOUNTER — OFFICE VISIT (OUTPATIENT)
Dept: FAMILY MEDICINE CLINIC | Facility: CLINIC | Age: 49
End: 2024-12-18
Payer: COMMERCIAL

## 2024-12-18 ENCOUNTER — TELEPHONE (OUTPATIENT)
Age: 49
End: 2024-12-18

## 2024-12-18 VITALS
OXYGEN SATURATION: 100 % | HEART RATE: 91 BPM | WEIGHT: 170 LBS | BODY MASS INDEX: 29.02 KG/M2 | TEMPERATURE: 98.8 F | SYSTOLIC BLOOD PRESSURE: 120 MMHG | HEIGHT: 64 IN | DIASTOLIC BLOOD PRESSURE: 70 MMHG

## 2024-12-18 DIAGNOSIS — F17.200 CURRENT SMOKER: ICD-10-CM

## 2024-12-18 DIAGNOSIS — J41.0 SIMPLE CHRONIC BRONCHITIS (HCC): ICD-10-CM

## 2024-12-18 DIAGNOSIS — J40 BRONCHITIS: ICD-10-CM

## 2024-12-18 DIAGNOSIS — J01.40 ACUTE NON-RECURRENT PANSINUSITIS: Primary | ICD-10-CM

## 2024-12-18 PROCEDURE — 71046 X-RAY EXAM CHEST 2 VIEWS: CPT

## 2024-12-18 PROCEDURE — 99214 OFFICE O/P EST MOD 30 MIN: CPT | Performed by: FAMILY MEDICINE

## 2024-12-18 RX ORDER — PREDNISONE 20 MG/1
40 TABLET ORAL DAILY
Qty: 10 TABLET | Refills: 0 | Status: SHIPPED | OUTPATIENT
Start: 2024-12-18 | End: 2024-12-23

## 2024-12-18 RX ORDER — BENZONATATE 100 MG/1
100 CAPSULE ORAL 3 TIMES DAILY PRN
Qty: 20 CAPSULE | Refills: 0 | Status: SHIPPED | OUTPATIENT
Start: 2024-12-18

## 2024-12-18 RX ORDER — ALBUTEROL SULFATE 90 UG/1
2 INHALANT RESPIRATORY (INHALATION) EVERY 4 HOURS PRN
Qty: 18 G | Refills: 5 | Status: SHIPPED | OUTPATIENT
Start: 2024-12-18

## 2024-12-18 RX ORDER — CEFUROXIME AXETIL 500 MG/1
500 TABLET ORAL EVERY 12 HOURS SCHEDULED
Qty: 14 TABLET | Refills: 0 | Status: SHIPPED | OUTPATIENT
Start: 2024-12-18 | End: 2024-12-25

## 2024-12-18 NOTE — TELEPHONE ENCOUNTER
Patient is calling in, because she needs replacement parts for her C-PAP machine.  She is requesting that a prescription be sent to [facility] to order.      Number on machine: [phone] Dougie     Needs:     Mask  Mouthpiece  Tubing     Please advise.

## 2024-12-18 NOTE — PROGRESS NOTES
"Assessment/Plan:   Diagnoses and all orders for this visit:    Acute non-recurrent pansinusitis  -     cefuroxime (CEFTIN) 500 mg tablet; Take 1 tablet (500 mg total) by mouth every 12 (twelve) hours for 7 days  - pt has tolerated Ceftin in the past - eRx sent  - Pred burst - 40mg QD x5days (advised against using Advair at this time)   - Albuterol Q4 standing   - Tessalon Perles for cough   - will check STAT CXR  - f/u as needed - pt aware and agreeable     Bronchitis  -     albuterol (PROVENTIL HFA,VENTOLIN HFA) 90 mcg/act inhaler; Inhale 2 puffs every 4 (four) hours as needed for wheezing  -     XR chest pa and lateral; Future  -     cefuroxime (CEFTIN) 500 mg tablet; Take 1 tablet (500 mg total) by mouth every 12 (twelve) hours for 7 days  -     predniSONE 20 mg tablet; Take 2 tablets (40 mg total) by mouth daily for 5 days  -     benzonatate (TESSALON PERLES) 100 mg capsule; Take 1 capsule (100 mg total) by mouth 3 (three) times a day as needed for cough  Simple chronic bronchitis (HCC)    Current smoker  - (+) current smoker - 3/4PPD - advised cessation           Subjective:    Patient ID: Cassy Urena is a 49 y.o. female.  HPI  50yo F presents to the office for eval of cold s/s   - started a few weeks ago   - daughter = sick contact  - (+) nausea, congestion, runny nose, cough, fatigue, sinus HA, wheezing   - denies F/C/V/ear pain/sore throat  - (+) diminished appetite   - (+) current smoker - 3/4PPD   - follows with Pulm for h/o chronic bronchitis - Adviar BID, Flonase after nasal saline and smoking cessation       The following portions of the patient's history were reviewed and updated as appropriate: allergies, current medications, past family history, past medical history, past social history, past surgical history and problem list.    Review of Systems  as per HPI    Objective:  /70   Pulse 91   Temp 98.8 °F (37.1 °C)   Ht 5' 4\" (1.626 m)   Wt 77.1 kg (170 lb)   SpO2 100%   BMI 29.18 kg/m² "    Physical Exam  Vitals reviewed.   Constitutional:       General: She is not in acute distress.     Appearance: She is ill-appearing. She is not toxic-appearing or diaphoretic.   HENT:      Head: Normocephalic and atraumatic.      Right Ear: External ear normal. There is no impacted cerumen.      Left Ear: External ear normal. There is no impacted cerumen.      Nose: Congestion present.      Right Sinus: Maxillary sinus tenderness and frontal sinus tenderness present.      Left Sinus: Maxillary sinus tenderness and frontal sinus tenderness present.      Mouth/Throat:      Mouth: Mucous membranes are moist.      Pharynx: Oropharynx is clear. Posterior oropharyngeal erythema present. No oropharyngeal exudate.   Eyes:      General: No scleral icterus.        Right eye: No discharge.         Left eye: No discharge.      Extraocular Movements: Extraocular movements intact.      Conjunctiva/sclera: Conjunctivae normal.   Cardiovascular:      Rate and Rhythm: Normal rate and regular rhythm.      Heart sounds: Normal heart sounds.   Pulmonary:      Effort: No respiratory distress.      Breath sounds: Decreased air movement present. Decreased breath sounds present. No wheezing.   Abdominal:      Palpations: Abdomen is soft.   Musculoskeletal:         General: Normal range of motion.      Cervical back: Normal range of motion.   Skin:     General: Skin is warm.   Neurological:      General: No focal deficit present.      Mental Status: She is alert and oriented to person, place, and time.   Psychiatric:         Mood and Affect: Mood normal.         Behavior: Behavior normal.

## 2024-12-19 DIAGNOSIS — R11.0 NAUSEA: Primary | ICD-10-CM

## 2024-12-19 LAB
DME PARACHUTE DELIVERY DATE REQUESTED: NORMAL
DME PARACHUTE DELIVERY DATE REQUESTED: NORMAL
DME PARACHUTE ITEM DESCRIPTION: NORMAL
DME PARACHUTE ORDER STATUS: NORMAL
DME PARACHUTE ORDER STATUS: NORMAL
DME PARACHUTE SUPPLIER NAME: NORMAL
DME PARACHUTE SUPPLIER NAME: NORMAL
DME PARACHUTE SUPPLIER PHONE: NORMAL
DME PARACHUTE SUPPLIER PHONE: NORMAL

## 2024-12-19 RX ORDER — ONDANSETRON 4 MG/1
4 TABLET, FILM COATED ORAL EVERY 8 HOURS PRN
Qty: 20 TABLET | Refills: 0 | Status: SHIPPED | OUTPATIENT
Start: 2024-12-19

## 2024-12-19 NOTE — TELEPHONE ENCOUNTER
Pt called she received a text from [employer] about her cpap supplies, she does have a cpap machine.  She needs mouth piece and tubing for her nebulizer.

## 2024-12-20 NOTE — TELEPHONE ENCOUNTER
[facility] [employer] called the patient and stated that the wrong codes were put on the order for the CPAP tubing and mask.    Needs to be changed in order for insurance to cover.    Please advise.

## 2024-12-23 DIAGNOSIS — G62.9 NEUROPATHY: ICD-10-CM

## 2024-12-24 RX ORDER — GABAPENTIN 400 MG/1
CAPSULE ORAL
Qty: 60 CAPSULE | Refills: 5 | Status: SHIPPED | OUTPATIENT
Start: 2024-12-24

## 2024-12-26 ENCOUNTER — TELEPHONE (OUTPATIENT)
Age: 49
End: 2024-12-26

## 2024-12-26 NOTE — TELEPHONE ENCOUNTER
Patient called and stated she is on need a nebulizer mask from [employer] is requesting a code for Asthmatic Bronchitis to submit to the patients insurance.

## 2025-01-02 DIAGNOSIS — R09.89 LABILE HYPERTENSION: ICD-10-CM

## 2025-01-02 DIAGNOSIS — E78.00 HYPERCHOLESTEROLEMIA: ICD-10-CM

## 2025-01-02 DIAGNOSIS — I10 ESSENTIAL HYPERTENSION: ICD-10-CM

## 2025-01-03 RX ORDER — CARVEDILOL 6.25 MG/1
6.25 TABLET ORAL 2 TIMES DAILY WITH MEALS
Qty: 180 TABLET | Refills: 1 | Status: SHIPPED | OUTPATIENT
Start: 2025-01-03

## 2025-01-03 RX ORDER — ROSUVASTATIN CALCIUM 5 MG/1
5 TABLET, COATED ORAL DAILY
Qty: 90 TABLET | Refills: 1 | Status: SHIPPED | OUTPATIENT
Start: 2025-01-03

## 2025-01-03 RX ORDER — LISINOPRIL 5 MG/1
5 TABLET ORAL DAILY
Qty: 90 TABLET | Refills: 1 | Status: SHIPPED | OUTPATIENT
Start: 2025-01-03

## 2025-01-22 ENCOUNTER — OFFICE VISIT (OUTPATIENT)
Dept: FAMILY MEDICINE CLINIC | Facility: CLINIC | Age: 50
End: 2025-01-22
Payer: COMMERCIAL

## 2025-01-22 ENCOUNTER — APPOINTMENT (OUTPATIENT)
Dept: RADIOLOGY | Facility: CLINIC | Age: 50
End: 2025-01-22
Payer: COMMERCIAL

## 2025-01-22 VITALS
DIASTOLIC BLOOD PRESSURE: 60 MMHG | HEART RATE: 68 BPM | SYSTOLIC BLOOD PRESSURE: 110 MMHG | WEIGHT: 172 LBS | BODY MASS INDEX: 29.37 KG/M2 | OXYGEN SATURATION: 98 % | HEIGHT: 64 IN | TEMPERATURE: 99 F

## 2025-01-22 DIAGNOSIS — J20.9 ACUTE BRONCHITIS, UNSPECIFIED ORGANISM: ICD-10-CM

## 2025-01-22 DIAGNOSIS — H93.8X3 EAR PRESSURE, BILATERAL: ICD-10-CM

## 2025-01-22 DIAGNOSIS — H69.93 ETD (EUSTACHIAN TUBE DYSFUNCTION), BILATERAL: ICD-10-CM

## 2025-01-22 DIAGNOSIS — J20.9 ACUTE BRONCHITIS, UNSPECIFIED ORGANISM: Primary | ICD-10-CM

## 2025-01-22 DIAGNOSIS — J41.0 SIMPLE CHRONIC BRONCHITIS (HCC): ICD-10-CM

## 2025-01-22 DIAGNOSIS — R09.81 NASAL CONGESTION: ICD-10-CM

## 2025-01-22 PROBLEM — F11.20 OPIOID DEPENDENCE (HCC): Status: RESOLVED | Noted: 2019-10-28 | Resolved: 2025-01-22

## 2025-01-22 PROCEDURE — 71046 X-RAY EXAM CHEST 2 VIEWS: CPT

## 2025-01-22 PROCEDURE — 99213 OFFICE O/P EST LOW 20 MIN: CPT | Performed by: FAMILY MEDICINE

## 2025-01-22 RX ORDER — DOXYCYCLINE HYCLATE 100 MG
100 TABLET ORAL 2 TIMES DAILY
Qty: 14 TABLET | Refills: 0 | Status: SHIPPED | OUTPATIENT
Start: 2025-01-22 | End: 2025-01-29

## 2025-01-22 RX ORDER — PREDNISONE 20 MG/1
TABLET ORAL
Qty: 11 TABLET | Refills: 0 | Status: SHIPPED | OUTPATIENT
Start: 2025-01-22 | End: 2025-01-31

## 2025-01-22 NOTE — PROGRESS NOTES
Name: Cassy Urena      : 1975      MRN: [id number]  Encounter Provider: Vahe Orlando DO  Encounter Date: 2025   Encounter department: [facility]  :  Assessment & Plan  Acute bronchitis, unspecified organism  Patient presents today with typical symptoms of acute bronchitis.  She has been having wheezing, chest tightness, and overall feeling poor.  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  She is exposed to multiple sick individuals on a daily basis.  Patient's lung sounds were minimal and she was very tight with her breathing.  She would benefit from steroids, although I did inform her that multiple doses of steroids in close proximity can lead to other complications and issues.  She is to also use albuterol every 4 hours while awake over the next 2 days, she can then decrease it to every 6 and then as needed afterwards.  I do not feel that this patient has a significant lobar pneumonia, but due to the patient's multiple contacts with pneumonia in the office, she states that she does not get better unless she is on both steroids and an antibiotic.  A chest x-ray was ordered to determine if there is evidence of pneumonia.  Orders:    doxycycline hyclate (VIBRA-TABS) 100 mg tablet; Take 1 tablet (100 mg total) by mouth 2 (two) times a day for 7 days    predniSONE 20 mg tablet; Take 2 tablets (40 mg total) by mouth daily for 3 days, THEN 1 tablet (20 mg total) daily for 3 days, THEN 0.5 tablets (10 mg total) daily for 3 days.    XR chest pa and lateral; Future    Nasal congestion  With the patient's prednisone dosing, she would likely improve her nasal congestion and ETD due to a reduction in sinus swelling.  This will then hopefully improve drainage of both middle ear and sinuses.  Orders:    doxycycline hyclate (VIBRA-TABS) 100 mg tablet; Take 1 tablet (100 mg  total) by mouth 2 (two) times a day for 7 days    predniSONE 20 mg tablet; Take 2 tablets (40 mg total) by mouth daily for 3 days, THEN 1 tablet (20 mg total) daily for 3 days, THEN 0.5 tablets (10 mg total) daily for 3 days.    XR chest pa and lateral; Future    Ear pressure, bilateral    Orders:    doxycycline hyclate (VIBRA-TABS) 100 mg tablet; Take 1 tablet (100 mg total) by mouth 2 (two) times a day for 7 days    predniSONE 20 mg tablet; Take 2 tablets (40 mg total) by mouth daily for 3 days, THEN 1 tablet (20 mg total) daily for 3 days, THEN 0.5 tablets (10 mg total) daily for 3 days.    XR chest pa and lateral; Future    ETD (Eustachian tube dysfunction), bilateral    Orders:    doxycycline hyclate (VIBRA-TABS) 100 mg tablet; Take 1 tablet (100 mg total) by mouth 2 (two) times a day for 7 days    predniSONE 20 mg tablet; Take 2 tablets (40 mg total) by mouth daily for 3 days, THEN 1 tablet (20 mg total) daily for 3 days, THEN 0.5 tablets (10 mg total) daily for 3 days.    XR chest pa and lateral; Future    Simple chronic bronchitis (HCC)  Patient is a smoker and has a history of simple chronic bronchitis according to her chart.  She is on Advair discus as well as albuterol as needed.  It may be time to discuss use of Trelegy or berztri due to the frequency of her exacerbations and use of prednisone.  It is also recommended that she quit smoking.  She is to discuss this with PCP or pulmonologist.  Orders:    doxycycline hyclate (VIBRA-TABS) 100 mg tablet; Take 1 tablet (100 mg total) by mouth 2 (two) times a day for 7 days    predniSONE 20 mg tablet; Take 2 tablets (40 mg total) by mouth daily for 3 days, THEN 1 tablet (20 mg total) daily for 3 days, THEN 0.5 tablets (10 mg total) daily for 3 days.    XR chest pa and lateral; Future           History of Present Illness   Patient presents today after a week of increasing symptoms.  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  Many of which are positive for pneumonia or other respiratory illnesses.  The patient was recently seen in December 2024 for this issue and was given antibiotic and steroids.  She had an improvement in symptoms, and only over the last week has had an exacerbation.  She has a history of simple chronic bronchitis/emphysema.  She is following with pulmonology.  Currently on Advair and albuterol, although she has only used the albuterol intermittently.      Review of Systems   Constitutional:  Positive for chills and fever.   HENT:  Positive for congestion, ear pain, postnasal drip, rhinorrhea, sinus pressure, sinus pain and sore throat. Negative for ear discharge.    Respiratory:  Positive for cough and wheezing. Negative for chest tightness and shortness of breath.    Cardiovascular:  Negative for chest pain and palpitations.   Gastrointestinal:  Positive for constipation. Negative for abdominal pain, diarrhea, nausea and vomiting.   Neurological:  Positive for headaches. Negative for dizziness and light-headedness.       Objective   /60   Pulse 68   Temp 99 °F (37.2 °C)   Ht 5' 4\" (1.626 m)   Wt 78 kg (172 lb)   SpO2 98%   BMI 29.52 kg/m²      Physical Exam  Constitutional:       General: She is not in acute distress.     Appearance: Normal appearance. She is ill-appearing (tired, fatigued). She is not toxic-appearing or diaphoretic.   HENT:      Head: Normocephalic and atraumatic.      Right Ear: Tympanic membrane, ear canal and external ear normal.      Left Ear: Tympanic membrane, ear canal and external ear normal.      Ears:      Comments: Serous effusion bilaterally     Nose: Congestion and rhinorrhea present.      Mouth/Throat:      Mouth: Mucous membranes are moist.      Pharynx: Oropharynx is clear. Posterior oropharyngeal erythema (Mild erythema on tonsillar pillars and pharynx.) present. No oropharyngeal exudate.   Eyes:      General:         Right eye: No discharge.         Left eye: No " discharge.      Pupils: Pupils are equal, round, and reactive to light.   Cardiovascular:      Rate and Rhythm: Normal rate and regular rhythm.      Heart sounds: Normal heart sounds. No murmur heard.     No friction rub. No gallop.   Pulmonary:      Effort: Prolonged expiration present.      Breath sounds: Decreased air movement present. Examination of the right-upper field reveals wheezing. Examination of the left-upper field reveals wheezing. Examination of the right-middle field reveals wheezing. Examination of the left-middle field reveals wheezing. Examination of the right-lower field reveals decreased breath sounds. Examination of the left-lower field reveals decreased breath sounds. Decreased breath sounds and wheezing present. No rhonchi or rales.   Abdominal:      General: Bowel sounds are normal. There is no distension.      Palpations: Abdomen is soft.   Musculoskeletal:      Cervical back: Neck supple. No tenderness.   Lymphadenopathy:      Cervical: No cervical adenopathy.   Skin:     Capillary Refill: Capillary refill takes less than 2 seconds.

## 2025-01-22 NOTE — ASSESSMENT & PLAN NOTE
Patient is a smoker and has a history of simple chronic bronchitis according to her chart.  She is on Advair discus as well as albuterol as needed.  It may be time to discuss use of Trelegy or berztri due to the frequency of her exacerbations and use of prednisone.  It is also recommended that she quit smoking.  She is to discuss this with PCP or pulmonologist.  Orders:    doxycycline hyclate (VIBRA-TABS) 100 mg tablet; Take 1 tablet (100 mg total) by mouth 2 (two) times a day for 7 days    predniSONE 20 mg tablet; Take 2 tablets (40 mg total) by mouth daily for 3 days, THEN 1 tablet (20 mg total) daily for 3 days, THEN 0.5 tablets (10 mg total) daily for 3 days.    XR chest pa and lateral; Future

## 2025-01-23 ENCOUNTER — TELEPHONE (OUTPATIENT)
Age: 50
End: 2025-01-23

## 2025-01-23 NOTE — TELEPHONE ENCOUNTER
Patient called asking if results were in for her CXR done last night . Aware the report in not final but when it is and the PCP reviews the results, we will call her.

## 2025-01-24 ENCOUNTER — RESULTS FOLLOW-UP (OUTPATIENT)
Dept: FAMILY MEDICINE CLINIC | Facility: CLINIC | Age: 50
End: 2025-01-24

## 2025-02-03 NOTE — PROGRESS NOTES
Progress Note - Cardiology Office  [facility]    Cassy Urena 50 y.o. female MRN: [id number]  : 1975  Encounter: [id number]      Assessment & Plan  Primary hypertension  Is well-controlled at 104/68 mmHg  Hypercholesterolemia  Ordered repeat lipid and hepatic function panel  History of chest pain  Had normal pharmacologic stress test  Peripheral arterial disease (HCC)       ASSESSMENT:  Pre diabetes:    Diet controlled     Iron deficiency anemia  Receives iron infusion     History of Chest pain     Pharmacologic stress, 2021:    Normal, EF 67%     Hypertension  BP today is 104/68 mmHg with heart rate of 88/min     TTE, 2021  EF 50%, mild LVH, mild MR, trace TR     History of celiac artery stenosis/compression  Abdominal ultrasound, 2017:  Impression  The aorta is patent and normal in caliber.  The celiac, splenic and hepatic arteries are patent.  There is evidence of celiac artery compression, velocities are within normal  range with deep inspiration (140cm/s) and are in stenotic range with expiration  (349 cm/s).  The superior and inferior mesenteric arteries are normal and patent in a  fasting state.     Hyperlipidemia  2021:  LDL 67, triglycerides 79, HDL 60, normal liver enzymes  2022:  LDL 61, TG 76, HDL 52  Currently on Crestor 5 mg daily  2023: LDL 58, TG 77, HDL 56, normal AST and ALT       RECOMMENDATIONS:  Continue lisinopril to 5 mg daily, Coreg 6.25 mg twice daily and Crestor 5 mg daily  Lipid profile and hepatic function panel  Low-salt and low-cholesterol diet  Regular cardiovascular exercise and weight loss      Please call [phone] if any questions.    HPI :     Cassy Urena is a 50 y.o. year old female who came for follow up.  He generally feels well and she is recovering from upper respiratory infections.  Her blood pressure is well-controlled.  She has not had lipid profile done for almost 2 years which I  reordered    REVIEW OF SYSTEMS:  Denies any new or acute cardiac symptoms.  Denies chest pain, dyspnea, palpitations or syncope      Historical Information   Past Medical History:   Diagnosis Date    Anemia     Asthma     Colon polyp     Ear problems     GERD (gastroesophageal reflux disease)     Hyperlipidemia     Hypertension     Neck pain     PONV (postoperative nausea and vomiting)     Sleep apnea      Past Surgical History:   Procedure Laterality Date     SECTION      COLONOSCOPY      NOSE SURGERY      SINUS SURGERY      TUBAL LIGATION       Social History     Substance and Sexual Activity   Alcohol Use No     Social History     Substance and Sexual Activity   Drug Use No     Social History     Tobacco Use   Smoking Status Every Day    Current packs/day: 1.00    Average packs/day: 1 pack/day for 33.0 years (33.0 ttl pk-yrs)    Types: Cigarettes   Smokeless Tobacco Never     Family History:   Family History   Problem Relation Age of Onset    Hypertension Father     Bone cancer Maternal Grandmother 81    Cancer Paternal Grandmother     Stomach cancer Paternal Grandmother 82    Hypertension Maternal Aunt     Breast cancer Neg Hx     Colon cancer Neg Hx        Meds/Allergies     Allergies   Allergen Reactions    Amoxicillin Hives    Ciprofloxacin Nausea Only       Current Outpatient Medications:     albuterol (2.5 mg/3 mL) 0.083 % nebulizer solution, TAKE 3 ML (2.5 MG TOTAL) BY NEBULIZATION EVERY 6 HOURS AS NEEDED FOR WHEEZING OR SHORTNESS OF BREATH, Disp: 150 mL, Rfl: 0    albuterol (PROVENTIL HFA,VENTOLIN HFA) 90 mcg/act inhaler, Inhale 2 puffs every 4 (four) hours as needed for wheezing, Disp: 18 g, Rfl: 5    Azelastine HCl 137 MCG/SPRAY SOLN, 1 SPRAY INTO EACH NOSTRIL 2 (TWO) TIMES A DAY. USE IN EACH NOSTRIL AS DIRECTED, Disp: , Rfl:     benzonatate (TESSALON PERLES) 100 mg capsule, Take 1 capsule (100 mg total) by mouth 3 (three) times a day as needed for cough, Disp: 20 capsule, Rfl: 0    carvedilol  (COREG) 6.25 mg tablet, TAKE 1 TABLET (6.25 MG TOTAL) BY MOUTH 2 (TWO) TIMES A DAY WITH MEALS, Disp: 180 tablet, Rfl: 1    Cholecalciferol 1.25 MG (97952 UT) TABS, Take 1 tablet (50,000 Units total) by mouth once a week x12wks and then switch to OTC Vit D 2000IU QD, Disp: 12 tablet, Rfl: 0    cyclobenzaprine (FLEXERIL) 10 mg tablet, Take 1 tablet (10 mg total) by mouth 2 (two) times a day as needed for muscle spasms, Disp: 10 tablet, Rfl: 0    ferrous sulfate 324 (65 Fe) mg, Take 1 tablet (324 mg total) by mouth daily, Disp: 90 tablet, Rfl: 1    fluticasone (FLONASE) 50 mcg/act nasal spray, SPRAY 2 SPRAYS INTO EACH NOSTRIL DAILY, Disp: 48 g, Rfl: 0    Fluticasone-Salmeterol (Advair) 250-50 mcg/dose inhaler, TAKE 1 PUFF BY MOUTH TWICE A DAY, Disp: 180 blister, Rfl: 1    gabapentin (NEURONTIN) 400 mg capsule, TAKE 2 CAPSULES (1600 MG TOTAL) BY MOUTH EVERY DAY, Disp: 60 capsule, Rfl: 5    lansoprazole (PREVACID) 30 mg capsule, TAKE 1 CAPSULE BY MOUTH EVERY DAY, Disp: 90 capsule, Rfl: 1    lisinopril (ZESTRIL) 5 mg tablet, TAKE 1 TABLET (5 MG TOTAL) BY MOUTH DAILY, Disp: 90 tablet, Rfl: 1    rosuvastatin (CRESTOR) 5 mg tablet, TAKE 1 TABLET (5 MG TOTAL) BY MOUTH DAILY, Disp: 90 tablet, Rfl: 1    Advair Diskus 250-50 MCG/ACT inhaler, INHALE 1 PUFF 2 (TWO) TIMES A DAY. TO RINSE THROAT AFTER USE (Patient not taking: Reported on 1/22/2025), Disp: , Rfl:     ALPRAZolam (XANAX) 0.25 mg tablet, , Disp: , Rfl:     darifenacin (ENABLEX) 15 MG 24 hr tablet, , Disp: , Rfl:     econazole nitrate 1 % cream, , Disp: , Rfl:     ergocalciferol (VITAMIN D2) 50,000 units, TAKE 1 TABLET (50,000 UNITS TOTAL) BY MOUTH ONCE A WEEK FOR 12 WEEKS AND THEN SWITCH TO OTC VITAMIN D 2000IU EVERY DAY (Patient not taking: Reported on 2/4/2025), Disp: , Rfl:     gabapentin (NEURONTIN) 800 mg tablet, Take 2 tablets (1,600 mg total) by mouth daily (Patient not taking: Reported on 9/3/2024), Disp: 60 tablet, Rfl: 5    Hydrocod Javier-Chlorphe Javier ER  "(TUSSIONEX) 10-8 mg/5 mL ER suspension, , Disp: , Rfl:     meloxicam (MOBIC) 15 mg tablet, Take 15 mg by mouth daily (Patient not taking: Reported on 2/4/2025), Disp: , Rfl:     ondansetron (ZOFRAN) 4 mg tablet, Take 1 tablet (4 mg total) by mouth every 8 (eight) hours as needed for nausea or vomiting (Patient not taking: Reported on 2/4/2025), Disp: 20 tablet, Rfl: 0    ondansetron (ZOFRAN-ODT) 4 mg disintegrating tablet, , Disp: , Rfl:     PARoxetine (PAXIL-CR) 12.5 mg 24 hr tablet, , Disp: , Rfl:     RABEprazole (Aciphex) 20 MG tablet, , Disp: , Rfl:     Vitals: Blood pressure 104/68, pulse 89, height 5' 4\" (1.626 m), weight 77.1 kg (170 lb), SpO2 98%.    Body mass index is 29.18 kg/m².  Vitals:    02/04/25 0923   Weight: 77.1 kg (170 lb)     BP Readings from Last 3 Encounters:   02/04/25 104/68   01/22/25 110/60   12/18/24 120/70       Physical Exam:  Physical Exam    Neurologic:  Alert & oriented x 3, no new focal deficits, Not in any acute distress,  Constitutional: Overweight  Eyes:  Pupil equal and reacting to light, conjunctiva normal,   HENT:  Atraumatic, oropharynx moist, Neck- normal range of motion, no tenderness,  Neck supple, No JVP, No LNP   Respiratory:  Bilateral air entry, mostly clear to auscultation  Cardiovascular: S1-S2 regular with a I/VI systolic murmur   GI:  Soft, nondistended, normal bowel sounds, nontender, no hepatosplenomegaly appreciated.  Musculoskeletal:  No tenderness, no deformities.   Skin:  Well hydrated, no rash   Lymphatic:  No lymphadenopathy noted   Extremities:  No edema and distal pulses are present        Diagnostic Studies Review Cardio:      EKG: Normal sinus rhythm, heart rate 89/min    Cardiac testing:   Results for orders placed during the hospital encounter of 08/06/21    Echo complete with contrast if indicated    Narrative  [address] [address] [phone]    Transthoracic Echocardiogram  2D, M-mode, Doppler, " and Color Doppler    Study date:  06-Aug-2021    Patient: JESSICA RADER  MR number: [id number]  Account number: [id number]  : 1975  Age: 46 years  Gender: Female  Status: Outpatient  Location: Echo lab  Height: 63 in  Weight: 155.8 lb  BP: 130/ 88 mmHg    Indications: HTN    Diagnoses: I11.9 - Hypertensive heart disease without heart failure    Sonographer:  IRLANDA Dozier  Primary Physician:  James Johnson MD  Referring Physician:  Hong Franco MD  Group:  [facility]  Interpreting Physician:  Gail Crow MD    SUMMARY    PROCEDURE INFORMATION:  This was a technically difficult study.  Echocardiographic views were limited due to poor acoustic window availability, decreased penetration, and lung interference.    LEFT VENTRICLE:  Systolic function was at the lower limits of normal. Ejection fraction was estimated to be around 50 %.  This study was inadequate for the evaluation of regional wall motion.  Wall thickness was mildly increased.  There was mild concentric hypertrophy.    MITRAL VALVE:  There was mild regurgitation.    TRICUSPID VALVE:  There was trace regurgitation.    HISTORY: PRIOR HISTORY: Tobacco abuse,HTN.    PROCEDURE: The procedure was performed in the echo lab. This was a routine study. The transthoracic approach was used. The study included complete 2D imaging, M-mode, complete spectral Doppler, and color Doppler. The heart rate was 73 bpm,  at the start of the study. Images were obtained from the parasternal, apical, subcostal, and suprasternal notch acoustic windows. Echocardiographic views were limited due to poor acoustic window availability, decreased penetration, and  lung interference. This was a technically difficult study.    LEFT VENTRICLE: Size was normal. Systolic function was at the lower limits of normal. Ejection fraction was estimated to be around 50 %. This study was inadequate for the evaluation of regional wall motion. Wall thickness  was mildly  increased. There was mild concentric hypertrophy. DOPPLER: Left ventricular diastolic function parameters were normal for the patient's age.    RIGHT VENTRICLE: The size was normal. Systolic function was normal. Wall thickness was normal.    LEFT ATRIUM: Size was at the upper limits of normal.    RIGHT ATRIUM: Size was normal.    MITRAL VALVE: There was normal leaflet separation. DOPPLER: The transmitral velocity was within the normal range. There was no evidence for stenosis. There was mild regurgitation.    AORTIC VALVE: The valve was not well visualized. DOPPLER: There was no evidence for stenosis. There was no significant regurgitation.    TRICUSPID VALVE: DOPPLER: There was trace regurgitation. The tricuspid jet envelope definition was inadequate for estimation of RV systolic pressure.    PULMONIC VALVE: DOPPLER: There was no significant regurgitation.    PERICARDIUM: There was no thickening or calcification. There was no pericardial effusion.    AORTA: The root exhibited normal size.    SYSTEMIC VEINS: IVC: The inferior vena cava was not well visualized.    SYSTEM MEASUREMENT TABLES    2D  EF (Teich): 48.51 %  %FS: 24.12 %  EDV(Teich): 72.77 ml  ESV(Teich): 37.47 ml  IVSd: 0.97 cm  LA Area: 18.06 cm2  LA Diam: 3.61 cm  LVEDV MOD A4C: 114.35 ml  LVEF MOD A4C: 43.41 %  LVESV MOD A4C: 64.71 ml  LVIDd: 4.07 cm  LVIDs: 3.08 cm  LVLd A4C: 8.63 cm  LVLs A4C: 7.27 cm  LVOT Diam: 1.95 cm  LVPWd: 0.95 cm  RA Area: 15.53 cm2  RVIDd: 3.21 cm  SV (Teich): 35.29 ml  SV MOD A4C: 49.63 ml    CW  AV Vmax: 0.81 m/s  AV maxP.63 mmHg    MM  TAPSE: 2.24 cm    PW  MV E/A Ratio: 1.23  E' Sept: 0.11 m/s  E/E' Sept: 5.88  LVOT Vmax: 0.63 m/s  LVOT maxP.57 mmHg  MV A Ortega: 0.53 m/s  MV Dec Coffey: 3.56 m/s2  MV DecT: 183.96 ms  MV E Ortega: 0.66 m/s  MV PHT: 53.35 ms  MVA By PHT: 4.12 cm2    [employer] Accredited Echocardiography Laboratory    Prepared and electronically signed by    Gail Crow,  MD  Signed 06-Aug-2021 13:15:27      Results for orders placed during the hospital encounter of 21    NM Myocardial Perfusion Spect (Pharmacological Induced Stress and/or Rest)    [address] [place] [address] [phone]    Rest/Stress Gated SPECT Myocardial Perfusion Imaging After Regadenoson    Patient: JESSICA RADER  MR number: [id number]  Account number: [id number]  : 1975  Age: 46 years  Gender: Female  Status: Outpatient  Location: Stress lab  Height: 63 in  Weight: 155 lb  BP: 130/ 82 mmHg    Allergies: AMOXICILLIN, CIPROFLOXACIN    Diagnosis: R07.9 - Chest pain, unspecified    Primary Physician:  James Johnson MD  RN:  Eli Sanon RN  Interpreting Physician:  Drew Nina MD  Referring Physician:  Val Jefferson MD  Technician:  Julee Gasca  Group:  [facility]  Report Prepared By::  Eli Sanon RN    INDICATIONS: Coronary artery disease.    HISTORY: The patient is a 46 year old  female. Chest pain status: chest pain. Coronary artery disease risk factors: smoking. Cardiovascular history: arrhythmia/tachycardia Co-morbidity: history of lung  disease/COVID,asthma,pneumonia. Medications: no cardiac drugs. Acute coronary syndrome: acute myocardial infarction recently ruled out.    PHYSICAL EXAM: Baseline physical exam screening: no wheezes audible.    REST ECG: Normal sinus rhythm. Normal baseline ECG.    PROCEDURE: The study was performed in the the Stress lab. A regadenoson infusion pharmacologic stress test was performed. Gated SPECT myocardial perfusion imaging was performed after stress. Systolic blood pressure was 130 mmHg, at the  start of the study. Diastolic blood pressure was 82 mmHg, at the start of the study. The heart rate was 85 bpm, at the start of the study. IV double checked.  Regadenoson protocol:  HR bpm SBP mmHg DBP mmHg Symptoms  Baseline 85 130 82 none  Immediate 130 146 78 mild  dyspnea, dizziness, nausea  1 min 112 126 74 subsiding  2 min -- -- -- none  No medications or fluids given. The patient also performed low level exercise.    STRESS SUMMARY: Duration of pharmacologic stress was 3 min. Maximal heart rate during stress was 130 bpm. The rate-pressure product for the peak heart rate and blood pressure was 87277. There was no chest pain during stress. The stress  test was terminated due to protocol completion. Pre oxygen saturation: 100 %. Peak oxygen saturation: 100 %. The stress ECG was negative for ischemia and normal. There were no stress arrhythmias or conduction abnormalities.    ISOTOPE ADMINISTRATION:  Resting isotope administration Stress isotope administration  Agent Tetrofosmin Tetrofosmin  Dose 11 mCi 32.1 mCi  Date 05/26/2021 05/26/2021  Injection time 07:58 09:00  Imaging time 08:34 09:47  Injection-image interval 36 min 47 min    The radiopharmaceutical was injected at the peak effect of pharmacologic stress.    MYOCARDIAL PERFUSION IMAGING:  The image quality was good. Left ventricular size was normal. The TID ratio was 0.98.    PERFUSION DEFECTS:  -  There were no perfusion defects.    GATED SPECT:  The calculated left ventricular ejection fraction was 67 %. Left ventricular ejection fraction was within normal limits by visual estimate. There was no left ventricular regional abnormality.    SUMMARY:  -  Stress results: There was no chest pain during stress.  -  ECG conclusions: The stress ECG was negative for ischemia and normal. There were no stress arrhythmias or conduction abnormalities.  -  Perfusion imaging: There were no perfusion defects.  -  Gated SPECT: The calculated left ventricular ejection fraction was 67 %. Left ventricular ejection fraction was within normal limits by visual estimate. There was no left ventricular regional abnormality.    IMPRESSIONS: Normal study after vasodilation and low level exercise without reproduction of symptoms. The  "cardiovascular risk is low. Myocardial perfusion imaging was normal at rest and with stress.    Prepared and signed by    Drew Nina MD  Signed 05/26/2021 13:32:39    No results found for this or any previous visit.      Imaging:  Chest X-Ray:   XR chest pa and lateral  Result Date: 1/23/2025  Impression No acute cardiopulmonary disease. Workstation performed: [id number]     XR chest pa and lateral  Result Date: 12/18/2024  Impression No acute cardiopulmonary disease. Workstation performed: [id number]       CT-scan of the chest:     No CTA results available for this patient.  Lab Review   Lab Results   Component Value Date    WBC 5.69 10/28/2024    HGB 14.0 10/28/2024    HCT 42.7 10/28/2024    MCV 88 10/28/2024    RDW 13.2 10/28/2024     10/28/2024     BMP:  Lab Results   Component Value Date    SODIUM 136 10/28/2024    K 4.3 10/28/2024     10/28/2024    CO2 27 10/28/2024    ANIONGAP 11 10/05/2015    BUN 10 10/28/2024    CREATININE 0.68 10/28/2024    GLUC 96 08/29/2024    GLUF 110 (H) 10/28/2024    CALCIUM 8.8 10/28/2024    EGFR 103 10/28/2024    MG 2.0 02/08/2017     LFT:  Lab Results   Component Value Date    AST 16 10/28/2024    ALT 17 10/28/2024    ALKPHOS 61 10/28/2024    TP 6.8 10/28/2024    ALB 4.1 10/28/2024      No components found for: \"TSH3\"  Lab Results   Component Value Date    [id number] 2.290 11/02/2022     Lab Results   Component Value Date    HGBA1C 5.7 08/12/2024     Lipid Profile:   Lab Results   Component Value Date    CHOLESTEROL 128 04/11/2022    HDL 52 04/11/2022    LDLCALC 61 04/11/2022    TRIG 76 04/11/2022     Lab Results   Component Value Date    CHOLESTEROL 128 04/11/2022    CHOLESTEROL 195 02/08/2017     Lab Results   Component Value Date    CKTOTAL 91 05/25/2021    TROPONINI <0.02 08/26/2021     Lab Results   Component Value Date    NTBNP 81 05/10/2021      No results found for this or any previous visit (from the past 4 weeks).          Dr. Hong Franco MD, " "FACC      \"This note has been constructed using a voice recognition system.Therefore there may be syntax, spelling, and/or grammatical errors. Please call if you have any questions. \"  "

## 2025-02-04 ENCOUNTER — OFFICE VISIT (OUTPATIENT)
Dept: CARDIOLOGY CLINIC | Facility: CLINIC | Age: 50
End: 2025-02-04
Payer: COMMERCIAL

## 2025-02-04 VITALS
HEIGHT: 64 IN | OXYGEN SATURATION: 98 % | WEIGHT: 170 LBS | HEART RATE: 89 BPM | BODY MASS INDEX: 29.02 KG/M2 | DIASTOLIC BLOOD PRESSURE: 68 MMHG | SYSTOLIC BLOOD PRESSURE: 104 MMHG

## 2025-02-04 DIAGNOSIS — E78.00 HYPERCHOLESTEROLEMIA: ICD-10-CM

## 2025-02-04 DIAGNOSIS — I73.9 PERIPHERAL ARTERIAL DISEASE (HCC): ICD-10-CM

## 2025-02-04 DIAGNOSIS — Z87.898 HISTORY OF CHEST PAIN: ICD-10-CM

## 2025-02-04 DIAGNOSIS — I10 PRIMARY HYPERTENSION: Primary | ICD-10-CM

## 2025-02-04 PROCEDURE — 99214 OFFICE O/P EST MOD 30 MIN: CPT | Performed by: INTERNAL MEDICINE

## 2025-02-04 PROCEDURE — 93000 ELECTROCARDIOGRAM COMPLETE: CPT | Performed by: INTERNAL MEDICINE

## 2025-03-03 ENCOUNTER — APPOINTMENT (OUTPATIENT)
Dept: LAB | Facility: HOSPITAL | Age: 50
End: 2025-03-03
Payer: COMMERCIAL

## 2025-03-03 DIAGNOSIS — I10 ESSENTIAL HYPERTENSION: ICD-10-CM

## 2025-03-03 DIAGNOSIS — E61.1 LOW SERUM IRON: ICD-10-CM

## 2025-03-03 DIAGNOSIS — R79.0 LOW FERRITIN: ICD-10-CM

## 2025-03-03 DIAGNOSIS — E55.9 VITAMIN D DEFICIENCY: ICD-10-CM

## 2025-03-03 DIAGNOSIS — E78.00 HYPERCHOLESTEROLEMIA: ICD-10-CM

## 2025-03-03 DIAGNOSIS — R73.03 PREDIABETES: ICD-10-CM

## 2025-03-03 LAB
25(OH)D3 SERPL-MCNC: 22.7 NG/ML (ref 30–100)
ALBUMIN SERPL BCG-MCNC: 4.3 G/DL (ref 3.5–5)
ALP SERPL-CCNC: 66 U/L (ref 34–104)
ALT SERPL W P-5'-P-CCNC: 12 U/L (ref 7–52)
ANION GAP SERPL CALCULATED.3IONS-SCNC: 9 MMOL/L (ref 4–13)
AST SERPL W P-5'-P-CCNC: 13 U/L (ref 13–39)
BASOPHILS # BLD AUTO: 0.07 THOUSANDS/ÂΜL (ref 0–0.1)
BASOPHILS NFR BLD AUTO: 1 % (ref 0–1)
BILIRUB SERPL-MCNC: 0.52 MG/DL (ref 0.2–1)
BUN SERPL-MCNC: 14 MG/DL (ref 5–25)
CALCIUM SERPL-MCNC: 8.9 MG/DL (ref 8.4–10.2)
CHLORIDE SERPL-SCNC: 104 MMOL/L (ref 96–108)
CHOLEST SERPL-MCNC: 140 MG/DL (ref ?–200)
CO2 SERPL-SCNC: 24 MMOL/L (ref 21–32)
CREAT SERPL-MCNC: 0.65 MG/DL (ref 0.6–1.3)
CREAT UR-MCNC: 153.5 MG/DL
EOSINOPHIL # BLD AUTO: 0.1 THOUSAND/ÂΜL (ref 0–0.61)
EOSINOPHIL NFR BLD AUTO: 2 % (ref 0–6)
ERYTHROCYTE [DISTWIDTH] IN BLOOD BY AUTOMATED COUNT: 13.2 % (ref 11.6–15.1)
EST. AVERAGE GLUCOSE BLD GHB EST-MCNC: 128 MG/DL
FERRITIN SERPL-MCNC: 13 NG/ML (ref 11–307)
GFR SERPL CREATININE-BSD FRML MDRD: 104 ML/MIN/1.73SQ M
GLUCOSE P FAST SERPL-MCNC: 98 MG/DL (ref 65–99)
HBA1C MFR BLD: 6.1 %
HCT VFR BLD AUTO: 42 % (ref 34.8–46.1)
HDLC SERPL-MCNC: 56 MG/DL
HGB BLD-MCNC: 13.8 G/DL (ref 11.5–15.4)
IMM GRANULOCYTES # BLD AUTO: 0.03 THOUSAND/UL (ref 0–0.2)
IMM GRANULOCYTES NFR BLD AUTO: 1 % (ref 0–2)
IRON SATN MFR SERPL: 42 % (ref 15–50)
IRON SERPL-MCNC: 159 UG/DL (ref 50–212)
LDLC SERPL CALC-MCNC: 69 MG/DL (ref 0–100)
LYMPHOCYTES # BLD AUTO: 1.8 THOUSANDS/ÂΜL (ref 0.6–4.47)
LYMPHOCYTES NFR BLD AUTO: 28 % (ref 14–44)
MCH RBC QN AUTO: 28.5 PG (ref 26.8–34.3)
MCHC RBC AUTO-ENTMCNC: 32.9 G/DL (ref 31.4–37.4)
MCV RBC AUTO: 87 FL (ref 82–98)
MICROALBUMIN UR-MCNC: 13.6 MG/L
MICROALBUMIN/CREAT 24H UR: 9 MG/G CREATININE (ref 0–30)
MONOCYTES # BLD AUTO: 0.37 THOUSAND/ÂΜL (ref 0.17–1.22)
MONOCYTES NFR BLD AUTO: 6 % (ref 4–12)
NEUTROPHILS # BLD AUTO: 4.01 THOUSANDS/ÂΜL (ref 1.85–7.62)
NEUTS SEG NFR BLD AUTO: 62 % (ref 43–75)
NONHDLC SERPL-MCNC: 84 MG/DL
NRBC BLD AUTO-RTO: 0 /100 WBCS
PLATELET # BLD AUTO: 247 THOUSANDS/UL (ref 149–390)
PMV BLD AUTO: 12.5 FL (ref 8.9–12.7)
POTASSIUM SERPL-SCNC: 4.1 MMOL/L (ref 3.5–5.3)
PROT SERPL-MCNC: 6.8 G/DL (ref 6.4–8.4)
RBC # BLD AUTO: 4.84 MILLION/UL (ref 3.81–5.12)
SODIUM SERPL-SCNC: 137 MMOL/L (ref 135–147)
TIBC SERPL-MCNC: 378 UG/DL (ref 250–450)
TRANSFERRIN SERPL-MCNC: 270 MG/DL (ref 203–362)
TRIGL SERPL-MCNC: 74 MG/DL (ref ?–150)
TSH SERPL DL<=0.05 MIU/L-ACNC: 1.58 UIU/ML (ref 0.45–4.5)
UIBC SERPL-MCNC: 219 UG/DL (ref 155–355)
WBC # BLD AUTO: 6.38 THOUSAND/UL (ref 4.31–10.16)

## 2025-03-03 PROCEDURE — 80053 COMPREHEN METABOLIC PANEL: CPT

## 2025-03-03 PROCEDURE — 82728 ASSAY OF FERRITIN: CPT

## 2025-03-03 PROCEDURE — 82570 ASSAY OF URINE CREATININE: CPT

## 2025-03-03 PROCEDURE — 83036 HEMOGLOBIN GLYCOSYLATED A1C: CPT

## 2025-03-03 PROCEDURE — 36415 COLL VENOUS BLD VENIPUNCTURE: CPT

## 2025-03-03 PROCEDURE — 84443 ASSAY THYROID STIM HORMONE: CPT

## 2025-03-03 PROCEDURE — 82306 VITAMIN D 25 HYDROXY: CPT

## 2025-03-03 PROCEDURE — 83540 ASSAY OF IRON: CPT

## 2025-03-03 PROCEDURE — 83550 IRON BINDING TEST: CPT

## 2025-03-03 PROCEDURE — 80061 LIPID PANEL: CPT

## 2025-03-03 PROCEDURE — 82043 UR ALBUMIN QUANTITATIVE: CPT

## 2025-03-03 PROCEDURE — 85025 COMPLETE CBC W/AUTO DIFF WBC: CPT

## 2025-03-04 ENCOUNTER — RA CDI HCC (OUTPATIENT)
Dept: OTHER | Facility: HOSPITAL | Age: 50
End: 2025-03-04

## 2025-03-04 ENCOUNTER — RESULTS FOLLOW-UP (OUTPATIENT)
Dept: FAMILY MEDICINE CLINIC | Facility: CLINIC | Age: 50
End: 2025-03-04

## 2025-03-04 ENCOUNTER — TELEPHONE (OUTPATIENT)
Age: 50
End: 2025-03-04

## 2025-03-04 NOTE — PROGRESS NOTES
HCC coding opportunities       Chart reviewed, no opportunity found: CHART REVIEWED, NO OPPORTUNITY FOUND        Patients Insurance        Commercial Insurance: [employer]

## 2025-03-11 ENCOUNTER — OFFICE VISIT (OUTPATIENT)
Dept: FAMILY MEDICINE CLINIC | Facility: CLINIC | Age: 50
End: 2025-03-11
Payer: COMMERCIAL

## 2025-03-11 ENCOUNTER — TELEPHONE (OUTPATIENT)
Dept: FAMILY MEDICINE CLINIC | Facility: CLINIC | Age: 50
End: 2025-03-11

## 2025-03-11 ENCOUNTER — TELEPHONE (OUTPATIENT)
Age: 50
End: 2025-03-11

## 2025-03-11 VITALS
WEIGHT: 175 LBS | BODY MASS INDEX: 29.88 KG/M2 | HEIGHT: 64 IN | DIASTOLIC BLOOD PRESSURE: 80 MMHG | OXYGEN SATURATION: 97 % | SYSTOLIC BLOOD PRESSURE: 122 MMHG | HEART RATE: 84 BPM

## 2025-03-11 DIAGNOSIS — F17.200 CURRENT SMOKER: ICD-10-CM

## 2025-03-11 DIAGNOSIS — Z12.4 CERVICAL CANCER SCREENING: ICD-10-CM

## 2025-03-11 DIAGNOSIS — R79.0 LOW FERRITIN: ICD-10-CM

## 2025-03-11 DIAGNOSIS — E55.9 VITAMIN D DEFICIENCY: ICD-10-CM

## 2025-03-11 DIAGNOSIS — K21.9 GASTROESOPHAGEAL REFLUX DISEASE WITHOUT ESOPHAGITIS: ICD-10-CM

## 2025-03-11 DIAGNOSIS — R73.03 PREDIABETES: ICD-10-CM

## 2025-03-11 DIAGNOSIS — E66.9 OBESITY (BMI 30-39.9): ICD-10-CM

## 2025-03-11 DIAGNOSIS — Z00.00 ANNUAL PHYSICAL EXAM: Primary | ICD-10-CM

## 2025-03-11 DIAGNOSIS — Z12.31 ENCOUNTER FOR SCREENING MAMMOGRAM FOR BREAST CANCER: ICD-10-CM

## 2025-03-11 DIAGNOSIS — E78.00 HYPERCHOLESTEROLEMIA: ICD-10-CM

## 2025-03-11 DIAGNOSIS — I10 ESSENTIAL HYPERTENSION: ICD-10-CM

## 2025-03-11 DIAGNOSIS — J41.0 SIMPLE CHRONIC BRONCHITIS (HCC): ICD-10-CM

## 2025-03-11 DIAGNOSIS — R53.83 OTHER FATIGUE: ICD-10-CM

## 2025-03-11 DIAGNOSIS — R73.03 PREDIABETES: Primary | ICD-10-CM

## 2025-03-11 LAB
DME PARACHUTE DELIVERY DATE REQUESTED: NORMAL
DME PARACHUTE ITEM DESCRIPTION: NORMAL
DME PARACHUTE ORDER STATUS: NORMAL
DME PARACHUTE SUPPLIER NAME: NORMAL
DME PARACHUTE SUPPLIER PHONE: NORMAL

## 2025-03-11 PROCEDURE — 99214 OFFICE O/P EST MOD 30 MIN: CPT | Performed by: FAMILY MEDICINE

## 2025-03-11 PROCEDURE — 99396 PREV VISIT EST AGE 40-64: CPT | Performed by: FAMILY MEDICINE

## 2025-03-11 RX ORDER — METFORMIN HYDROCHLORIDE 500 MG/1
500 TABLET, EXTENDED RELEASE ORAL
Qty: 90 TABLET | Refills: 1 | Status: SHIPPED | OUTPATIENT
Start: 2025-03-11

## 2025-03-11 NOTE — ASSESSMENT & PLAN NOTE
- Ferritin = 13 <-- 21  - (+) low energy, fatigue, napping more   - has benefited from IV Fe infusions in the past - advised to reach out to Heme/Onc to schedule - pt aware and agreeable

## 2025-03-11 NOTE — ASSESSMENT & PLAN NOTE
Orders:    Nebulizer Supplies    Respiratory Therapy Supplies (Nebulizer Mask Adult) MISC; Use 1 Units daily as needed (when using Nebulizer)

## 2025-03-11 NOTE — ASSESSMENT & PLAN NOTE
- A1c = 6.1     - UTD with annual Flu vaccine  - UTD with PCV20   - diet/exercise  - advised to limit carb intake   - pt wants to try weight loss medications - erx for Wegovy sent and pt referred to Weight Management   - if Wegovy not covered - t/c starting pt on Metformin - pt aware  - RTO in 3months, with labs, for f/u - pt aware and agreeable   Orders:    Ambulatory Referral to Weight Management; Future    Comprehensive metabolic panel; Future    Hemoglobin A1C; Future

## 2025-03-11 NOTE — TELEPHONE ENCOUNTER
Reason for call:   [x] Refill   [] Prior Auth  [] Other:     Office:   [x] PCP/Provider - Jose  [] Specialty/Provider -     Medication:   Lansoprazole 30 mg, 1 qd, 90    Pharmacy:   [facility]    Local Pharmacy   Does the patient have enough for 3 days?   [x] Yes   [] No - Send as HP to POD    Mail Away Pharmacy   Does the patient have enough for 10 days?   [] Yes   [] No - Send as HP to POD

## 2025-03-11 NOTE — PATIENT INSTRUCTIONS
"Patient Education     Routine physical for adults   The Basics   Written by the doctors and editors at [facility]   What is a physical? -- A physical is a routine visit, or \"check-up,\" with your doctor. You might also hear it called a \"wellness visit\" or \"preventive visit.\"  During each visit, the doctor will:   Ask about your physical and mental health   Ask about your habits, behaviors, and lifestyle   Do an exam   Give you vaccines if needed   Talk to you about any medicines you take   Give advice about your health   Answer your questions  Getting regular check-ups is an important part of taking care of your health. It can help your doctor find and treat any problems you have. But it's also important for preventing health problems.  A routine physical is different from a \"sick visit.\" A sick visit is when you see a doctor because of a health concern or problem. Since physicals are scheduled ahead of time, you can think about what you want to ask the doctor.  How often should I get a physical? -- It depends on your age and health. In general, for people age 21 years and older:   If you are younger than 50 years, you might be able to get a physical every 3 years.   If you are 50 years or older, your doctor might recommend a physical every year.  If you have an ongoing health condition, like diabetes or high blood pressure, your doctor will probably want to see you more often.  What happens during a physical? -- In general, each visit will include:   Physical exam - The doctor or nurse will check your height, weight, heart rate, and blood pressure. They will also look at your eyes and ears. They will ask about how you are feeling and whether you have any symptoms that bother you.   Medicines - It's a good idea to bring a list of all the medicines you take to each doctor visit. Your doctor will talk to you about your medicines and answer any questions. Tell them if you are having any side effects that bother you. You " "should also tell them if you are having trouble paying for any of your medicines.   Habits and behaviors - This includes:   Your diet   Your exercise habits   Whether you smoke, drink alcohol, or use drugs   Whether you are sexually active   Whether you feel safe at home  Your doctor will talk to you about things you can do to improve your health and lower your risk of health problems. They will also offer help and support. For example, if you want to quit smoking, they can give you advice and might prescribe medicines. If you want to improve your diet or get more physical activity, they can help you with this, too.   Lab tests, if needed - The tests you get will depend on your age and situation. For example, your doctor might want to check your:   Cholesterol   Blood sugar   Iron level   Vaccines - The recommended vaccines will depend on your age, health, and what vaccines you already had. Vaccines are very important because they can prevent certain serious or deadly infections.   Discussion of screening - \"Screening\" means checking for diseases or other health problems before they cause symptoms. Your doctor can recommend screening based on your age, risk, and preferences. This might include tests to check for:   Cancer, such as breast, prostate, cervical, ovarian, colorectal, prostate, lung, or skin cancer   Sexually transmitted infections, such as chlamydia and gonorrhea   Mental health conditions like depression and anxiety  Your doctor will talk to you about the different types of screening tests. They can help you decide which screenings to have. They can also explain what the results might mean.   Answering questions - The physical is a good time to ask the doctor or nurse questions about your health. If needed, they can refer you to other doctors or specialists, too.  Adults older than 65 years often need other care, too. As you get older, your doctor will talk to you about:   How to prevent falling at " home   Hearing or vision tests   Memory testing   How to take your medicines safely   Making sure that you have the help and support you need at home  All topics are updated as new evidence becomes available and our peer review process is complete.  This topic retrieved from [facility] on: May 02, 2024.  Topic 002205 Version 1.0  Release: 32.4.3 - C32.122  © 2024 UpToDate, Inc. and/or its affiliates. All rights reserved.  Consumer Information Use and Disclaimer   Disclaimer: This generalized information is a limited summary of diagnosis, treatment, and/or medication information. It is not meant to be comprehensive and should be used as a tool to help the user understand and/or assess potential diagnostic and treatment options. It does NOT include all information about conditions, treatments, medications, side effects, or risks that may apply to a specific patient. It is not intended to be medical advice or a substitute for the medical advice, diagnosis, or treatment of a health care provider based on the health care provider's examination and assessment of a patient's specific and unique circumstances. Patients must speak with a health care provider for complete information about their health, medical questions, and treatment options, including any risks or benefits regarding use of medications. This information does not endorse any treatments or medications as safe, effective, or approved for treating a specific patient. UpToDate, Inc. and its affiliates disclaim any warranty or liability relating to this information or the use thereof.The use of this information is governed by the Terms of Use, available at https://www.woltersPoseuwer.com/en/know/clinical-effectiveness-terms. 2024© UpToDate, Inc. and its affiliates and/or licensors. All rights reserved.  Copyright   © 2024 UpToDate, Inc. and/or its affiliates. All rights reserved.    Patient Education     Routine physical for adults   The Basics   Written by the  "doctors and editors at [facility]   What is a physical? -- A physical is a routine visit, or \"check-up,\" with your doctor. You might also hear it called a \"wellness visit\" or \"preventive visit.\"  During each visit, the doctor will:   Ask about your physical and mental health   Ask about your habits, behaviors, and lifestyle   Do an exam   Give you vaccines if needed   Talk to you about any medicines you take   Give advice about your health   Answer your questions  Getting regular check-ups is an important part of taking care of your health. It can help your doctor find and treat any problems you have. But it's also important for preventing health problems.  A routine physical is different from a \"sick visit.\" A sick visit is when you see a doctor because of a health concern or problem. Since physicals are scheduled ahead of time, you can think about what you want to ask the doctor.  How often should I get a physical? -- It depends on your age and health. In general, for people age 21 years and older:   If you are younger than 50 years, you might be able to get a physical every 3 years.   If you are 50 years or older, your doctor might recommend a physical every year.  If you have an ongoing health condition, like diabetes or high blood pressure, your doctor will probably want to see you more often.  What happens during a physical? -- In general, each visit will include:   Physical exam - The doctor or nurse will check your height, weight, heart rate, and blood pressure. They will also look at your eyes and ears. They will ask about how you are feeling and whether you have any symptoms that bother you.   Medicines - It's a good idea to bring a list of all the medicines you take to each doctor visit. Your doctor will talk to you about your medicines and answer any questions. Tell them if you are having any side effects that bother you. You should also tell them if you are having trouble paying for any of your " "medicines.   Habits and behaviors - This includes:   Your diet   Your exercise habits   Whether you smoke, drink alcohol, or use drugs   Whether you are sexually active   Whether you feel safe at home  Your doctor will talk to you about things you can do to improve your health and lower your risk of health problems. They will also offer help and support. For example, if you want to quit smoking, they can give you advice and might prescribe medicines. If you want to improve your diet or get more physical activity, they can help you with this, too.   Lab tests, if needed - The tests you get will depend on your age and situation. For example, your doctor might want to check your:   Cholesterol   Blood sugar   Iron level   Vaccines - The recommended vaccines will depend on your age, health, and what vaccines you already had. Vaccines are very important because they can prevent certain serious or deadly infections.   Discussion of screening - \"Screening\" means checking for diseases or other health problems before they cause symptoms. Your doctor can recommend screening based on your age, risk, and preferences. This might include tests to check for:   Cancer, such as breast, prostate, cervical, ovarian, colorectal, prostate, lung, or skin cancer   Sexually transmitted infections, such as chlamydia and gonorrhea   Mental health conditions like depression and anxiety  Your doctor will talk to you about the different types of screening tests. They can help you decide which screenings to have. They can also explain what the results might mean.   Answering questions - The physical is a good time to ask the doctor or nurse questions about your health. If needed, they can refer you to other doctors or specialists, too.  Adults older than 65 years often need other care, too. As you get older, your doctor will talk to you about:   How to prevent falling at home   Hearing or vision tests   Memory testing   How to take your " medicines safely   Making sure that you have the help and support you need at home  All topics are updated as new evidence becomes available and our peer review process is complete.  This topic retrieved from [employer] on: May 02, 2024.  Topic 819809 Version 1.0  Release: 32.4.3 - C32.122  © 2024 UpToDate, Inc. and/or its affiliates. All rights reserved.  Consumer Information Use and Disclaimer   Disclaimer: This generalized information is a limited summary of diagnosis, treatment, and/or medication information. It is not meant to be comprehensive and should be used as a tool to help the user understand and/or assess potential diagnostic and treatment options. It does NOT include all information about conditions, treatments, medications, side effects, or risks that may apply to a specific patient. It is not intended to be medical advice or a substitute for the medical advice, diagnosis, or treatment of a health care provider based on the health care provider's examination and assessment of a patient's specific and unique circumstances. Patients must speak with a health care provider for complete information about their health, medical questions, and treatment options, including any risks or benefits regarding use of medications. This information does not endorse any treatments or medications as safe, effective, or approved for treating a specific patient. UpToDate, Inc. and its affiliates disclaim any warranty or liability relating to this information or the use thereof.The use of this information is governed by the Terms of Use, available at https://www.woltersTextual Analytics Solutionsuwer.com/en/know/clinical-effectiveness-terms. 2024© UpToDate, Inc. and its affiliates and/or licensors. All rights reserved.  Copyright   © 2024 UpToDate, Inc. and/or its affiliates. All rights reserved.

## 2025-03-11 NOTE — TELEPHONE ENCOUNTER
Patient called the RX Refill Line. Message is being forwarded to the office.     Patient called stating the wegovy is not covered by her insurance she is asking for the doctor to send a script for metformin instead.     [facility]

## 2025-03-11 NOTE — ASSESSMENT & PLAN NOTE
Orders:    Cholecalciferol 1.25 MG (78345 UT) TABS; Take 1 tablet (50,000 Units total) by mouth once a week x12wks and then switch to OTC Vit D 2000IU QD

## 2025-03-11 NOTE — PROGRESS NOTES
Adult Annual Physical  Name: Cassy Urena      : 1975      MRN: [id number]  Encounter Provider: Mei Garcia DO  Encounter Date: 3/11/2025   Encounter department: [facility]    Assessment & Plan  Annual physical exam  - reviewed PMHx, PSHx, meds, allergies, FHx, Soc Hx and Sexual Hx  - received J&J COVID IMM but declined Boosters   - UTD with Tdap   - UTD with PCV20   - UTD with annual Flu vaccine (received it at work - [facility])   - last annual GYN exam and PAP (2022) - overdue - referral given and advised to schedule - pt aware and agreeable   - last Mammo was in 2022 - annual screening advised - overdue - script given   - last Cscope was 2022 - 5yr recall ()   - reviewed labs done 3/3/2025 - see below   - declined STD screening in the office today   - UTD with Ophtho   - overdue for Dental - advised to schedule   - RTO in 1yr for annual exam - pt aware and agreeable        Encounter for screening mammogram for breast cancer  - last Mammo was in 2022 - annual screening advised - overdue - script given   Orders:    Mammo screening bilateral w 3d and cad; Future    Cervical cancer screening  - last annual GYN exam and PAP (2022) - overdue - referral given and advised to schedule - pt aware and agreeable   Orders:    Ambulatory Referral to Obstetrics / Gynecology; Future    Low ferritin  - Ferritin = 13 <-- 21  - (+) low energy, fatigue, napping more   - has benefited from IV Fe infusions in the past - advised to reach out to Heme/Onc to schedule - pt aware and agreeable        Other fatigue         Vitamin D deficiency    Orders:    Cholecalciferol 1.25 MG (03800 UT) TABS; Take 1 tablet (50,000 Units total) by mouth once a week x12wks and then switch to OTC Vit D 2000IU QD    Prediabetes  - A1c = 6.1     - UTD with annual Flu vaccine  - UTD with PCV20   - diet/exercise  - advised to limit carb intake   - pt wants to try weight loss medications - erx for  Wegovy sent and pt referred to Weight Management   - if Wegovy not covered - t/c starting pt on Metformin - pt aware  - RTO in 3months, with labs, for f/u - pt aware and agreeable   Orders:    Ambulatory Referral to Weight Management; Future    Comprehensive metabolic panel; Future    Hemoglobin A1C; Future    Obesity (BMI 30-39.9)  Prior Authorization Clinical Questions for Weight Management Pharmacotherapy    1. Does the patient have a contrainidcation to medication prescribed for weight management?: No  2. Does the patient have a diagnosis of obesity, confirmed by a BMI greater than or equal to 30 kg/m^2?: Yes  3. Does the patient have a BMI of greater than or equal to 27 kg/m^2 with at least one weight-related comorbidity/risk factor/complication (e.g. diabetes, dyslipidemia, coronary artery disease)?: Yes  4. Weight-related co-morbidities/risk factors: prediabetes, dyslipidemia, hypertension, GERD, asthma/reactive airway disease  5. WEGOVY CVA Indication: Does patient have established documented cardiovascular disease (history of a prior heart attack (myocardial infarction), stroke, or symptomatic peripheral arterial disease (PAD)?: No  6. ZEPBOUND JJ Indication: Does patient have documented JJ diagnosed via sleep study (insurance will require copy of sleep study results for approval)?: No  7. Has the patient been on a weight loss regimen of low-calorie diet, increased physical activity, and lifestyle modifications for a minimum of 6 months?: Yes  8. Has the patient completed a comprehensive weight loss program (ie, Weight Watchers, Noom, Bariatrics, other lynne on phone)? If so, what?: No  9. Does the patient have a history of type 2 diabetes?: No  10. Has the member tried and failed other weight loss medication within the past 12 months?: No  11. Will the member use requested medication in combination with another GLP agonist or weight loss drug?: No  12. Is the medication a controlled substance?: No      Baseline weight (in pounds): 175 lbs         Orders:    Semaglutide-Weight Management (WEGOVY) 0.25 MG/0.5ML; Inject 0.5 mL (0.25 mg total) under the skin once a week for 4 doses    Ambulatory Referral to Weight Management; Future    Simple chronic bronchitis (HCC)    Orders:    Nebulizer Supplies    Respiratory Therapy Supplies (Nebulizer Mask Adult) MISC; Use 1 Units daily as needed (when using Nebulizer)    Current smoker  - 1PPD/37yrs (contemplating cutting back but not ready to quit)       Essential hypertension  - BP stable in office  - stable renal function and urine microalbumin   - follows with Cardio - last OV was 2/2025 - no med changes and advised 6month f/u   - cont current regimen   - UTD with annual Flu vaccine  - UTD with PCV20   - smoking cessation advised  - caution with NSAIDs  - limit Na to 2g/day   - The 10-year ASCVD risk score (Cheryl GUERRA, et al., 2019) is: 2.4%    Values used to calculate the score:      Age: 50 years      Sex: Female      Is Non- : No      Diabetic: No      Tobacco smoker: Yes      Systolic Blood Pressure: 122 mmHg      Is BP treated: Yes      HDL Cholesterol: 56 mg/dL      Total Cholesterol: 140 mg/dL         Hypercholesterolemia  - LDL 69  - cont statin            Immunizations:  - Immunizations due: Hepatitis A         History of Present Illness     Adult Annual Physical:  Patient presents for annual physical.   1) Annual   - reviewed labs done 3/3/2025 - see below   - Specialists: Cardio (last OV was 2/4/2025 - no changes - 6month f/u), GI, Rheum, Pain Management, Pulm (has appt scheduled for 4/8/2025)   - PMHx: GERD, colon polyps, DJD, Tobacco abuse, Chronic Bronchitis, Anemia of chronic disease, HL, Vit D deficiency, Neuropathic pain  - allergies: Amox and Cipro   - Meds: see med rec   - PSHx: nose/sinus surgery, Csection and tubal ligation   - FHx: F (HTN), M (Epilepsy), MGM (Bone Ca), PGM (Stomach Ca), denies FHx of Colon/Breast Ca   -  "Immunizations: J&J COVID IMM and no Boosters, UTD with Tdap, UTD with PCV20, gets Flu vaccine at work ([employer])   - GYN Hx: last annual GYN exam and PAP (4/2022)   - Hm: last Mammo was in 5/12/2022 - annual screening advised, last Cscope was 9/30/2022 - 5yr recall (2027)   - diet/exercise: walks, diet: balanced   - social: (+) tob - 1PPD/37yrs (contemplating cutting back but not ready to quit), denies tob/illicts   - sexual Hx: active with spouse, s/p tubal ligation, denied STD screening in the office today   - last vision: reading glasses, goes annually   - last dental: last OV was >4yrs ago   2) HTN   - BP in the office 122/80   - nml renal function and urine microalbumin on labs done 3/2025   - follows with Cardio - last OV was 2/5/2025 - no med changes - f/u in 6months   - pt denies F/C/N/V/HA/visual changes/CP/palpitations/SOB/wheezing/abd pain/D/LE edema  3) Low Ferritin   - Ferritin = 13 <-- 21  - (+) low energy, fatigue, napping more .     Diet and Physical Activity:  - Diet/Nutrition: well balanced diet.  - Exercise: walking.    Depression Screening:  - PHQ-2 Score: 0    General Health:  - Sleep: sleeps well.  - Hearing: normal hearing bilateral ears.  - Vision: vision problems.  - Dental: no dental visits for > 1 year.    /GYN Health:  - Follows with GYN: no.   - Last menstrual cycle: 2/14/2025.   - History of STDs: no  - Contraception: tubal ligation.      Advanced Care Planning:  - Has an advanced directive?: no    - Has a durable medical POA?: no    - ACP document given to patient?: no      Review of Systems as per HPI       Objective   /80   Pulse 84   Ht 5' 4\" (1.626 m)   Wt 79.4 kg (175 lb)   SpO2 97%   BMI 30.04 kg/m²     Physical Exam  Vitals reviewed.   Constitutional:       General: She is not in acute distress.     Appearance: Normal appearance. She is not ill-appearing, toxic-appearing or diaphoretic.   HENT:      Head: Normocephalic and atraumatic.      Right Ear: External " ear normal.      Left Ear: External ear normal.      Nose: Nose normal.   Eyes:      General: No scleral icterus.        Right eye: No discharge.         Left eye: No discharge.      Extraocular Movements: Extraocular movements intact.      Conjunctiva/sclera: Conjunctivae normal.   Cardiovascular:      Rate and Rhythm: Normal rate and regular rhythm.      Heart sounds: Normal heart sounds.   Pulmonary:      Effort: Pulmonary effort is normal. No respiratory distress.      Breath sounds: Normal breath sounds. No stridor. No wheezing, rhonchi or rales.   Abdominal:      Palpations: Abdomen is soft.   Musculoskeletal:         General: Normal range of motion.      Cervical back: Normal range of motion.      Right lower leg: No edema.      Left lower leg: No edema.   Skin:     General: Skin is warm.   Neurological:      General: No focal deficit present.      Mental Status: She is alert and oriented to person, place, and time.   Psychiatric:         Mood and Affect: Mood normal.         Behavior: Behavior normal.

## 2025-03-12 ENCOUNTER — TELEPHONE (OUTPATIENT)
Age: 50
End: 2025-03-12

## 2025-03-12 RX ORDER — LANSOPRAZOLE 30 MG/1
30 CAPSULE, DELAYED RELEASE ORAL DAILY
Qty: 90 CAPSULE | Refills: 1 | Status: SHIPPED | OUTPATIENT
Start: 2025-03-12

## 2025-03-12 NOTE — TELEPHONE ENCOUNTER
PA for cholecalciferol 1.25mg SUBMITTED to "[employer]" [employer]    via    []CMM-KEY:   [x]Surescripts-Case ID #   []Availity-Auth ID # NDC #   []Faxed to plan   []Other website   []Phone call Case ID #     [x]PA sent as URGENT    All office notes, labs and other pertaining documents and studies sent. Clinical questions answered. Awaiting determination from insurance company.     Turnaround time for your insurance to make a decision on your Prior Authorization can take 7-21 business days.

## 2025-03-13 ENCOUNTER — PATIENT MESSAGE (OUTPATIENT)
Dept: FAMILY MEDICINE CLINIC | Facility: CLINIC | Age: 50
End: 2025-03-13

## 2025-03-13 DIAGNOSIS — E61.1 LOW SERUM IRON: ICD-10-CM

## 2025-03-13 RX ORDER — FERROUS SULFATE 324(65)MG
324 TABLET, DELAYED RELEASE (ENTERIC COATED) ORAL DAILY
Qty: 90 TABLET | Refills: 1 | Status: SHIPPED | OUTPATIENT
Start: 2025-03-13

## 2025-03-13 NOTE — TELEPHONE ENCOUNTER
PA for cholecalciferol 1.25 DENIED    Reason:(Screenshot if applicable)        Message sent to office clinical pool Yes    Denial letter scanned into Media Yes    Appeal started No (Provider will need to decide if appeal is warranted and send clinical documentation to Prior Authorization Team for initiation.)    **Please follow up with your patient regarding denial and next steps**

## 2025-03-14 ENCOUNTER — TELEPHONE (OUTPATIENT)
Age: 50
End: 2025-03-14

## 2025-03-14 NOTE — TELEPHONE ENCOUNTER
PA for ferrous sulfate 324 (65 Fe) mg SUBMITTED to [employer]    via    []CMM-KEY:   [x]Surescripts-Case ID #   []Availity-Auth ID # NDC #   []Faxed to plan   []Other website   []Phone call Case ID #     []PA sent as URGENT    All office notes, labs and other pertaining documents and studies sent. Clinical questions answered. Awaiting determination from insurance company.     Turnaround time for your insurance to make a decision on your Prior Authorization can take 7-21 business days.

## 2025-03-17 NOTE — TELEPHONE ENCOUNTER
PA for  ferrous sulfate 324 (65 Fe) mg  EXCLUDED from plan       Reason:(Screenshot if applicable)        Message sent to office clinical pool Yes

## 2025-05-01 DIAGNOSIS — J40 BRONCHITIS: ICD-10-CM

## 2025-05-02 RX ORDER — FLUTICASONE PROPIONATE AND SALMETEROL 250; 50 UG/1; UG/1
POWDER RESPIRATORY (INHALATION)
Qty: 180 BLISTER | Refills: 1 | Status: SHIPPED | OUTPATIENT
Start: 2025-05-02

## 2025-05-11 ENCOUNTER — HOSPITAL ENCOUNTER (EMERGENCY)
Facility: HOSPITAL | Age: 50
Discharge: HOME | End: 2025-05-11
Attending: EMERGENCY MEDICINE
Payer: COMMERCIAL

## 2025-05-11 VITALS
TEMPERATURE: 97.7 F | SYSTOLIC BLOOD PRESSURE: 137 MMHG | HEART RATE: 89 BPM | OXYGEN SATURATION: 96 % | RESPIRATION RATE: 16 BRPM | DIASTOLIC BLOOD PRESSURE: 90 MMHG

## 2025-05-11 DIAGNOSIS — N30.01 ACUTE CYSTITIS WITH HEMATURIA: Primary | ICD-10-CM

## 2025-05-11 LAB
BACTERIA UR QL AUTO: ABNORMAL /HPF
BILIRUB UR QL STRIP: NEGATIVE
CLARITY UR: ABNORMAL
COLOR UR: YELLOW
GLUCOSE UR STRIP-MCNC: NEGATIVE MG/DL
HGB UR QL STRIP.AUTO: ABNORMAL
KETONES UR STRIP-MCNC: NEGATIVE MG/DL
LEUKOCYTE ESTERASE UR QL STRIP: ABNORMAL
MUCOUS THREADS UR QL AUTO: ABNORMAL
NITRITE UR QL STRIP: NEGATIVE
NON-SQ EPI CELLS URNS QL MICRO: ABNORMAL /HPF
PH UR STRIP.AUTO: 7.5 [PH]
PROT UR STRIP-MCNC: ABNORMAL MG/DL
RBC #/AREA URNS AUTO: ABNORMAL /HPF
SP GR UR STRIP.AUTO: 1.02 (ref 1–1.03)
UROBILINOGEN UR QL STRIP.AUTO: 0.2 E.U./DL
WBC #/AREA URNS AUTO: ABNORMAL /HPF

## 2025-05-11 PROCEDURE — 81001 URINALYSIS AUTO W/SCOPE: CPT

## 2025-05-11 PROCEDURE — 99283 EMERGENCY DEPT VISIT LOW MDM: CPT

## 2025-05-11 PROCEDURE — 99284 EMERGENCY DEPT VISIT MOD MDM: CPT | Performed by: EMERGENCY MEDICINE

## 2025-05-11 PROCEDURE — 87086 URINE CULTURE/COLONY COUNT: CPT

## 2025-05-11 PROCEDURE — 87077 CULTURE AEROBIC IDENTIFY: CPT

## 2025-05-11 PROCEDURE — 87186 SC STD MICRODIL/AGAR DIL: CPT

## 2025-05-11 RX ORDER — PHENAZOPYRIDINE HYDROCHLORIDE 100 MG/1
200 TABLET, FILM COATED ORAL ONCE
Status: COMPLETED | OUTPATIENT
Start: 2025-05-11 | End: 2025-05-11

## 2025-05-11 RX ADMIN — CEPHALEXIN 500 MG: 250 CAPSULE ORAL at 23:07

## 2025-05-11 RX ADMIN — PHENAZOPYRIDINE 200 MG: 100 TABLET ORAL at 23:15

## 2025-05-12 NOTE — ED PROVIDER NOTES
Time reflects when diagnosis was documented in both MDM as applicable and the Disposition within this note       Time User Action Codes Description Comment    2025 11:14 PM Jai Farnsworth Add [N30.01] Acute cystitis with hematuria           ED Disposition       ED Disposition   Discharge    Condition   Stable    Date/Time   Sun May 11, 2025 11:14 PM    Comment   Cassy Urena discharge to home/self care.                   Assessment & Plan       Medical Decision Making  Acute cystitis: Patient treated with Keflex.  I discussed risk of cross-reactivity with amoxicillin.  I discussed risk of treatment failure with antibiotics and indications to return to Emergency Department.  Patient not clinically septic at this time.  No back pain to suggest pyelonephritis.    Amount and/or Complexity of Data Reviewed  External Data Reviewed: notes.     Details: Reviewed chart from 2017 in which patient developed hives from amoxicillin.  No angioedema or anaphylaxis at that time.  Labs: ordered. Decision-making details documented in ED Course.    Risk  Prescription drug management.             Medications   phenazopyridine (PYRIDIUM) tablet 200 mg (200 mg Oral Given 25 2315)   cephalexin (KEFLEX) capsule 500 mg (500 mg Oral Given 25 2307)       ED Risk Strat Scores                    No data recorded                            History of Present Illness       Chief Complaint   Patient presents with    Possible UTI     Pt presents to the ED c/o urinary frequency, burning, and feeling like she is not emptying her bladder completely        Past Medical History:   Diagnosis Date    Anemia     Asthma     Colon polyp     Ear problems     GERD (gastroesophageal reflux disease)     Hyperlipidemia     Hypertension     Neck pain     PONV (postoperative nausea and vomiting)     Sleep apnea       Past Surgical History:   Procedure Laterality Date     SECTION      COLONOSCOPY      NOSE SURGERY      SINUS SURGERY       TUBAL LIGATION        Family History   Problem Relation Age of Onset    Hypertension Father     Bone cancer Maternal Grandmother 81    Cancer Paternal Grandmother     Stomach cancer Paternal Grandmother 82    Hypertension Maternal Aunt     Breast cancer Neg Hx     Colon cancer Neg Hx       Social History     Tobacco Use    Smoking status: Every Day     Current packs/day: 1.00     Average packs/day: 1 pack/day for 37.0 years (37.0 ttl pk-yrs)     Types: Cigarettes    Smokeless tobacco: Never   Vaping Use    Vaping status: Never Used   Substance Use Topics    Alcohol use: No    Drug use: No      E-Cigarette/Vaping    E-Cigarette Use Never User       E-Cigarette/Vaping Substances    Nicotine No     THC No     CBD No     Flavoring No     Other No     Unknown No       I have reviewed and agree with the history as documented.     Patient reports 1 day history of feeling the urge to urinate every 15 minutes but only having small amounts of urine.  She also reports burning with urination.  She reports she had a urinary tract infection 23 years ago and this feels similar.  No back pain.  No fevers.          Review of Systems   All other systems reviewed and are negative.          Objective       ED Triage Vitals [05/11/25 2244]   Temperature Pulse Blood Pressure Respirations SpO2 Patient Position - Orthostatic VS   97.7 °F (36.5 °C) 89 137/90 16 96 % Sitting      Temp Source Heart Rate Source BP Location FiO2 (%) Pain Score    Oral Monitor Left arm -- --      Vitals      Date and Time Temp Pulse SpO2 Resp BP Pain Score FACES Pain Rating User   05/11/25 2244 97.7 °F (36.5 °C) 89 96 % 16 137/90 -- -- JM            Physical Exam  Vitals and nursing note reviewed.   Constitutional:       General: She is not in acute distress.     Appearance: She is well-developed.   HENT:      Head: Normocephalic and atraumatic.   Eyes:      Conjunctiva/sclera: Conjunctivae normal.   Cardiovascular:      Rate and Rhythm: Normal rate and regular  rhythm.      Heart sounds: No murmur heard.  Pulmonary:      Effort: Pulmonary effort is normal. No respiratory distress.      Breath sounds: Normal breath sounds.   Abdominal:      Palpations: Abdomen is soft.      Tenderness: There is no abdominal tenderness.   Musculoskeletal:         General: No swelling.      Cervical back: Neck supple.   Skin:     General: Skin is warm and dry.      Capillary Refill: Capillary refill takes less than 2 seconds.   Neurological:      Mental Status: She is alert.   Psychiatric:         Mood and Affect: Mood normal.         Results Reviewed       Procedure Component Value Units Date/Time    Urine Microscopic [[id number]]  (Abnormal) Collected: 25    Lab Status: Final result Specimen: Urine Updated: 25     RBC, UA 30-50 /hpf      WBC, UA 30-50 /hpf      Epithelial Cells Occasional /hpf      Bacteria, UA Moderate /hpf      MUCUS THREADS Occasional    Urine culture [[id number]] Collected: 25    Lab Status: In process Specimen: Urine Updated: 25    UA w Reflex to Microscopic w Reflex to Culture [[id number]]  (Abnormal) Collected: 25    Lab Status: Final result Specimen: Urine Updated: 25     Color, UA Yellow     Clarity, UA Slightly Cloudy     Specific Gravity, UA 1.020     pH, UA 7.5     Leukocytes, UA Trace     Nitrite, UA Negative     Protein, UA 1+ mg/dl      Glucose, UA Negative mg/dl      Ketones, UA Negative mg/dl      Urobilinogen, UA 0.2 E.U./dl      Bilirubin, UA Negative     Occult Blood, UA 3+            No orders to display       Procedures    ED Medication and Procedure Management   Prior to Admission Medications   Prescriptions Last Dose Informant Patient Reported? Taking?   Advair Diskus 250-50 MCG/ACT inhaler  Self Yes No   Sig: INHALE 1 PUFF 2 (TWO) TIMES A DAY. TO RINSE THROAT AFTER USE   Patient not taking: Reported on 2025   Azelastine HCl 137 MCG/SPRAY SOLN  Self Yes No   Si SPRAY INTO EACH  NOSTRIL 2 (TWO) TIMES A DAY. USE IN EACH NOSTRIL AS DIRECTED   Cholecalciferol 1.25 MG (67989 UT) TABS   No No   Sig: Take 1 tablet (50,000 Units total) by mouth once a week x12wks and then switch to OTC Vit D 2000IU QD   Fluticasone-Salmeterol (Advair) 250-50 mcg/dose inhaler   No No   Sig: TAKE 1 PUFF BY MOUTH TWICE A DAY   Hydrocod Javier-Chlorphe Javier ER (TUSSIONEX) 10-8 mg/5 mL ER suspension  Self Yes No   Patient not taking: Reported on 3/25/2024   RABEprazole (Aciphex) 20 MG tablet  Self Yes No   Patient not taking: Reported on 3/25/2024   Respiratory Therapy Supplies (Nebulizer Mask Adult) MISC   No No   Sig: Use 1 Units daily as needed (when using Nebulizer)   albuterol (2.5 mg/3 mL) 0.083 % nebulizer solution  Self No No   Sig: TAKE 3 ML (2.5 MG TOTAL) BY NEBULIZATION EVERY 6 HOURS AS NEEDED FOR WHEEZING OR SHORTNESS OF BREATH   albuterol (PROVENTIL HFA,VENTOLIN HFA) 90 mcg/act inhaler  Self No No   Sig: Inhale 2 puffs every 4 (four) hours as needed for wheezing   benzonatate (TESSALON PERLES) 100 mg capsule  Self No No   Sig: Take 1 capsule (100 mg total) by mouth 3 (three) times a day as needed for cough   carvedilol (COREG) 6.25 mg tablet  Self No No   Sig: TAKE 1 TABLET (6.25 MG TOTAL) BY MOUTH 2 (TWO) TIMES A DAY WITH MEALS   cyclobenzaprine (FLEXERIL) 10 mg tablet  Self No No   Sig: Take 1 tablet (10 mg total) by mouth 2 (two) times a day as needed for muscle spasms   darifenacin (ENABLEX) 15 MG 24 hr tablet  Self Yes No   Patient not taking: Reported on 3/25/2024   econazole nitrate 1 % cream  Self Yes No   Patient not taking: Reported on 3/25/2024   ferrous sulfate 324 (65 Fe) mg   No No   Sig: Take 1 tablet (324 mg total) by mouth daily   fluticasone (FLONASE) 50 mcg/act nasal spray  Self No No   Sig: SPRAY 2 SPRAYS INTO EACH NOSTRIL DAILY   gabapentin (NEURONTIN) 400 mg capsule  Self No No   Sig: TAKE 2 CAPSULES (1600 MG TOTAL) BY MOUTH EVERY DAY   lansoprazole (PREVACID) 30 mg capsule   No No    Sig: Take 1 capsule (30 mg total) by mouth daily   lisinopril (ZESTRIL) 5 mg tablet  Self No No   Sig: TAKE 1 TABLET (5 MG TOTAL) BY MOUTH DAILY   meloxicam (MOBIC) 15 mg tablet  Self Yes No   Sig: Take 15 mg by mouth daily   Patient not taking: Reported on 2/4/2025   metFORMIN (GLUCOPHAGE-XR) 500 mg 24 hr tablet   No No   Sig: Take 1 tablet (500 mg total) by mouth daily with breakfast   ondansetron (ZOFRAN) 4 mg tablet  Self No No   Sig: Take 1 tablet (4 mg total) by mouth every 8 (eight) hours as needed for nausea or vomiting   Patient not taking: Reported on 2/4/2025   ondansetron (ZOFRAN-ODT) 4 mg disintegrating tablet  Self Yes No   Patient not taking: Reported on 3/25/2024   rosuvastatin (CRESTOR) 5 mg tablet  Self No No   Sig: TAKE 1 TABLET (5 MG TOTAL) BY MOUTH DAILY      Facility-Administered Medications: None     Discharge Medication List as of 5/11/2025 11:15 PM        START taking these medications    Details   cephalexin (KEFLEX) 250 mg capsule Take 2 capsules (500 mg total) by mouth 4 (four) times a day for 5 days, Starting Sun 5/11/2025, Until Fri 5/16/2025, Normal           CONTINUE these medications which have NOT CHANGED    Details   !! Advair Diskus 250-50 MCG/ACT inhaler INHALE 1 PUFF 2 (TWO) TIMES A DAY. TO RINSE THROAT AFTER USE, Historical Med      albuterol (2.5 mg/3 mL) 0.083 % nebulizer solution TAKE 3 ML (2.5 MG TOTAL) BY NEBULIZATION EVERY 6 HOURS AS NEEDED FOR WHEEZING OR SHORTNESS OF BREATH, Normal      albuterol (PROVENTIL HFA,VENTOLIN HFA) 90 mcg/act inhaler Inhale 2 puffs every 4 (four) hours as needed for wheezing, Starting Wed 12/18/2024, Normal      Azelastine HCl 137 MCG/SPRAY SOLN 1 SPRAY INTO EACH NOSTRIL 2 (TWO) TIMES A DAY. USE IN EACH NOSTRIL AS DIRECTED, Historical Med      benzonatate (TESSALON PERLES) 100 mg capsule Take 1 capsule (100 mg total) by mouth 3 (three) times a day as needed for cough, Starting Wed 12/18/2024, Normal      carvedilol (COREG) 6.25 mg tablet  TAKE 1 TABLET (6.25 MG TOTAL) BY MOUTH 2 (TWO) TIMES A DAY WITH MEALS, Starting Fri 1/3/2025, Normal      Cholecalciferol 1.25 MG (21469 UT) TABS Take 1 tablet (50,000 Units total) by mouth once a week x12wks and then switch to OTC Vit D 2000IU QD, Starting Tue 3/11/2025, Normal      cyclobenzaprine (FLEXERIL) 10 mg tablet Take 1 tablet (10 mg total) by mouth 2 (two) times a day as needed for muscle spasms, Starting Thu 8/29/2024, Normal      darifenacin (ENABLEX) 15 MG 24 hr tablet Historical Med      econazole nitrate 1 % cream Historical Med      ferrous sulfate 324 (65 Fe) mg Take 1 tablet (324 mg total) by mouth daily, Starting Thu 3/13/2025, Normal      fluticasone (FLONASE) 50 mcg/act nasal spray SPRAY 2 SPRAYS INTO EACH NOSTRIL DAILY, Normal      !! Fluticasone-Salmeterol (Advair) 250-50 mcg/dose inhaler TAKE 1 PUFF BY MOUTH TWICE A DAY, Normal      gabapentin (NEURONTIN) 400 mg capsule TAKE 2 CAPSULES (1600 MG TOTAL) BY MOUTH EVERY DAY, Normal      Hydrocod Javier-Chlorphe Javier ER (TUSSIONEX) 10-8 mg/5 mL ER suspension Historical Med      lansoprazole (PREVACID) 30 mg capsule Take 1 capsule (30 mg total) by mouth daily, Starting Wed 3/12/2025, Normal      lisinopril (ZESTRIL) 5 mg tablet TAKE 1 TABLET (5 MG TOTAL) BY MOUTH DAILY, Starting Fri 1/3/2025, Normal      meloxicam (MOBIC) 15 mg tablet Take 15 mg by mouth daily, Starting Tue 5/14/2024, Until Wed 5/14/2025, Historical Med      metFORMIN (GLUCOPHAGE-XR) 500 mg 24 hr tablet Take 1 tablet (500 mg total) by mouth daily with breakfast, Starting Tue 3/11/2025, Normal      ondansetron (ZOFRAN) 4 mg tablet Take 1 tablet (4 mg total) by mouth every 8 (eight) hours as needed for nausea or vomiting, Starting Thu 12/19/2024, Normal      ondansetron (ZOFRAN-ODT) 4 mg disintegrating tablet Historical Med      RABEprazole (Aciphex) 20 MG tablet Historical Med      Respiratory Therapy Supplies (Nebulizer Mask Adult) MISC Use 1 Units daily as needed (when using  Nebulizer), Starting Tue 3/11/2025, Print      rosuvastatin (CRESTOR) 5 mg tablet TAKE 1 TABLET (5 MG TOTAL) BY MOUTH DAILY, Starting Fri 1/3/2025, Normal       !! - Potential duplicate medications found. Please discuss with provider.        No discharge procedures on file.  ED SEPSIS DOCUMENTATION   Time reflects when diagnosis was documented in both MDM as applicable and the Disposition within this note       Time User Action Codes Description Comment    5/11/2025 11:14 PM Jai Farnsworth Add [N30.01] Acute cystitis with hematuria                  Jai Farnsworth MD  05/11/25 2037

## 2025-05-14 ENCOUNTER — RESULTS FOLLOW-UP (OUTPATIENT)
Dept: EMERGENCY DEPT | Facility: HOSPITAL | Age: 50
End: 2025-05-14

## 2025-05-14 LAB — BACTERIA UR CULT: ABNORMAL

## 2025-05-31 DIAGNOSIS — E55.9 VITAMIN D DEFICIENCY: Primary | ICD-10-CM

## 2025-06-04 DIAGNOSIS — R73.03 PREDIABETES: ICD-10-CM

## 2025-06-04 RX ORDER — ERGOCALCIFEROL 1.25 MG/1
CAPSULE, LIQUID FILLED ORAL
Qty: 12 CAPSULE | Refills: 0 | Status: SHIPPED | OUTPATIENT
Start: 2025-06-04

## 2025-06-05 RX ORDER — METFORMIN HYDROCHLORIDE 500 MG/1
500 TABLET, EXTENDED RELEASE ORAL
Qty: 90 TABLET | Refills: 0 | Status: SHIPPED | OUTPATIENT
Start: 2025-06-05

## 2025-06-17 ENCOUNTER — RA CDI HCC (OUTPATIENT)
Dept: OTHER | Facility: HOSPITAL | Age: 50
End: 2025-06-17

## 2025-06-17 ENCOUNTER — APPOINTMENT (OUTPATIENT)
Dept: LAB | Facility: HOSPITAL | Age: 50
End: 2025-06-17
Attending: INTERNAL MEDICINE
Payer: COMMERCIAL

## 2025-06-17 ENCOUNTER — RESULTS FOLLOW-UP (OUTPATIENT)
Dept: CARDIOLOGY CLINIC | Facility: CLINIC | Age: 50
End: 2025-06-17

## 2025-06-17 ENCOUNTER — RESULTS FOLLOW-UP (OUTPATIENT)
Dept: FAMILY MEDICINE CLINIC | Facility: CLINIC | Age: 50
End: 2025-06-17

## 2025-06-17 DIAGNOSIS — E78.00 HYPERCHOLESTEROLEMIA: ICD-10-CM

## 2025-06-17 DIAGNOSIS — R73.03 PREDIABETES: ICD-10-CM

## 2025-06-17 LAB
ALBUMIN SERPL BCG-MCNC: 4.1 G/DL (ref 3.5–5)
ALP SERPL-CCNC: 62 U/L (ref 34–104)
ALT SERPL W P-5'-P-CCNC: 12 U/L (ref 7–52)
ANION GAP SERPL CALCULATED.3IONS-SCNC: 10 MMOL/L (ref 4–13)
AST SERPL W P-5'-P-CCNC: 12 U/L (ref 13–39)
BILIRUB DIRECT SERPL-MCNC: 0.1 MG/DL (ref 0–0.2)
BILIRUB SERPL-MCNC: 0.5 MG/DL (ref 0.2–1)
BUN SERPL-MCNC: 11 MG/DL (ref 5–25)
CALCIUM SERPL-MCNC: 8.9 MG/DL (ref 8.4–10.2)
CHLORIDE SERPL-SCNC: 105 MMOL/L (ref 96–108)
CHOLEST SERPL-MCNC: 142 MG/DL (ref ?–200)
CO2 SERPL-SCNC: 22 MMOL/L (ref 21–32)
CREAT SERPL-MCNC: 0.68 MG/DL (ref 0.6–1.3)
EST. AVERAGE GLUCOSE BLD GHB EST-MCNC: 131 MG/DL
GFR SERPL CREATININE-BSD FRML MDRD: 103 ML/MIN/1.73SQ M
GLUCOSE P FAST SERPL-MCNC: 101 MG/DL (ref 65–99)
HBA1C MFR BLD: 6.2 %
HDLC SERPL-MCNC: 53 MG/DL
LDLC SERPL CALC-MCNC: 72 MG/DL (ref 0–100)
POTASSIUM SERPL-SCNC: 4.1 MMOL/L (ref 3.5–5.3)
PROT SERPL-MCNC: 6.6 G/DL (ref 6.4–8.4)
SODIUM SERPL-SCNC: 137 MMOL/L (ref 135–147)
TRIGL SERPL-MCNC: 87 MG/DL (ref ?–150)

## 2025-06-17 PROCEDURE — 80061 LIPID PANEL: CPT

## 2025-06-17 PROCEDURE — 83036 HEMOGLOBIN GLYCOSYLATED A1C: CPT

## 2025-06-17 PROCEDURE — 80053 COMPREHEN METABOLIC PANEL: CPT

## 2025-06-17 PROCEDURE — 36415 COLL VENOUS BLD VENIPUNCTURE: CPT

## 2025-06-17 PROCEDURE — 82248 BILIRUBIN DIRECT: CPT

## 2025-06-17 NOTE — PROGRESS NOTES
HCC coding opportunities       Chart reviewed, no opportunity found: CHART REVIEWED, NO OPPORTUNITY FOUND        Patients Insurance        Commercial Insurance: [employer]

## 2025-06-24 ENCOUNTER — OFFICE VISIT (OUTPATIENT)
Dept: FAMILY MEDICINE CLINIC | Facility: CLINIC | Age: 50
End: 2025-06-24
Payer: COMMERCIAL

## 2025-06-24 VITALS
SYSTOLIC BLOOD PRESSURE: 116 MMHG | OXYGEN SATURATION: 98 % | BODY MASS INDEX: 29.19 KG/M2 | WEIGHT: 171 LBS | DIASTOLIC BLOOD PRESSURE: 80 MMHG | HEART RATE: 86 BPM | HEIGHT: 64 IN

## 2025-06-24 DIAGNOSIS — R73.03 PREDIABETES: ICD-10-CM

## 2025-06-24 DIAGNOSIS — Z12.31 ENCOUNTER FOR SCREENING MAMMOGRAM FOR MALIGNANT NEOPLASM OF BREAST: ICD-10-CM

## 2025-06-24 DIAGNOSIS — F17.200 CURRENT SMOKER: Primary | ICD-10-CM

## 2025-06-24 DIAGNOSIS — E78.00 HYPERCHOLESTEROLEMIA: ICD-10-CM

## 2025-06-24 DIAGNOSIS — I10 ESSENTIAL HYPERTENSION: ICD-10-CM

## 2025-06-24 DIAGNOSIS — G62.9 NEUROPATHY: ICD-10-CM

## 2025-06-24 PROCEDURE — 99214 OFFICE O/P EST MOD 30 MIN: CPT | Performed by: FAMILY MEDICINE

## 2025-06-24 RX ORDER — METFORMIN HYDROCHLORIDE 500 MG/1
1000 TABLET, EXTENDED RELEASE ORAL
Qty: 180 TABLET | Refills: 0 | Status: SHIPPED | OUTPATIENT
Start: 2025-06-24

## 2025-06-24 RX ORDER — GABAPENTIN 400 MG/1
400 CAPSULE ORAL
Qty: 90 CAPSULE | Refills: 1 | Status: SHIPPED | OUTPATIENT
Start: 2025-06-24

## 2025-06-24 NOTE — ASSESSMENT & PLAN NOTE
- A1c = 6.2 <-- 6.1   - stable renal function   - UTD with PCV20   - Wegovy was not covered by insurance and pt was started on Metformin 500mg QD - some diarrhea in the AM but tolerating it well -- advised to increase to Metformin 1000mg QD   - Nutritionist was also not covered by insurance   - diet has not been the best -- encouraged lifestyle changes   - RTO in 3months, with labs, for f/u - pt aware and agreeable   Orders:    metFORMIN (GLUCOPHAGE-XR) 500 mg 24 hr tablet; Take 2 tablets (1,000 mg total) by mouth daily with breakfast    Hemoglobin A1C; Future    Comprehensive metabolic panel; Future

## 2025-06-24 NOTE — ASSESSMENT & PLAN NOTE
- LDL 72  - cont statin   - The 10-year ASCVD risk score (Cheryl GUERRA, et al., 2019) is: 2.4%    Values used to calculate the score:      Age: 50 years      Clincally relevant sex: Female      Is Non- : No      Diabetic: No      Tobacco smoker: Yes      Systolic Blood Pressure: 116 mmHg      Is BP treated: Yes      HDL Cholesterol: 53 mg/dL      Total Cholesterol: 142 mg/dL

## 2025-06-24 NOTE — PROGRESS NOTES
Name: Cassy Urena      : 1975      MRN: [id number]  Encounter Provider: Mei Garcia DO  Encounter Date: 2025   Encounter department: [facility]  :  Assessment & Plan  Prediabetes  - A1c = 6.2 <-- 6.1   - stable renal function   - UTD with PCV20   - Wegovy was not covered by insurance and pt was started on Metformin 500mg QD - some diarrhea in the AM but tolerating it well -- advised to increase to Metformin 1000mg QD   - Nutritionist was also not covered by insurance   - diet has not been the best -- encouraged lifestyle changes   - RTO in 3months, with labs, for f/u - pt aware and agreeable   Orders:    metFORMIN (GLUCOPHAGE-XR) 500 mg 24 hr tablet; Take 2 tablets (1,000 mg total) by mouth daily with breakfast    Hemoglobin A1C; Future    Comprehensive metabolic panel; Future    Neuropathy  - stable with Gabapentin - cont for now   Orders:    gabapentin (NEURONTIN) 400 mg capsule; Take 1 capsule (400 mg total) by mouth daily at bedtime    Current smoker  - not ready to quit   - UTD with PCV20   - declined RSV IMM in the office today        Essential hypertension  - BP stable in office   - follows with Cardio - cont current regimen        Hypercholesterolemia  - LDL 72  - cont statin   - The 10-year ASCVD risk score (Cheryl GUERRA, et al., 2019) is: 2.4%    Values used to calculate the score:      Age: 50 years      Clincally relevant sex: Female      Is Non- : No      Diabetic: No      Tobacco smoker: Yes      Systolic Blood Pressure: 116 mmHg      Is BP treated: Yes      HDL Cholesterol: 53 mg/dL      Total Cholesterol: 142 mg/dL         Encounter for screening mammogram for malignant neoplasm of breast  - insurance maybe running out soon   - overdue for Mammo and annual GYN exam - advised to schedule soon              History of Present Illness   HPI  51yo F presents to the office for f/u   - A1c = 6.2 <-- 6.1   - UTD with PCV20   - declined RSV IMM in  "the office today   - Wegovy was not covered by insurance and pt was started on Metformin 500mg QD - some diarrhea in the AM but tolerating it well   - Nutritionist was also not covered by insurance   - diet has not been the best   - insurance maybe running out soon   - overdue for Mammo and annual GYN exam  - follows with Cardio - stable labs and advised to cont current regimen   - denies F/C/N/V/CP/palpitations/SOB/wheezing/abd pain/LE edema       Review of Systems as per HPI     Objective   /80   Pulse 86   Ht 5' 4\" (1.626 m)   Wt 77.6 kg (171 lb)   SpO2 98%   BMI 29.35 kg/m²      Physical Exam  Vitals reviewed.   Constitutional:       General: She is not in acute distress.     Appearance: Normal appearance. She is not ill-appearing, toxic-appearing or diaphoretic.   HENT:      Head: Normocephalic and atraumatic.      Right Ear: External ear normal.      Left Ear: External ear normal.      Nose: Nose normal.     Eyes:      General: No scleral icterus.        Right eye: No discharge.         Left eye: No discharge.      Extraocular Movements: Extraocular movements intact.      Conjunctiva/sclera: Conjunctivae normal.       Cardiovascular:      Rate and Rhythm: Normal rate and regular rhythm.      Heart sounds: Normal heart sounds.   Pulmonary:      Effort: Pulmonary effort is normal. No respiratory distress.      Breath sounds: Normal breath sounds. No stridor. No wheezing, rhonchi or rales.   Abdominal:      Palpations: Abdomen is soft.     Musculoskeletal:         General: Normal range of motion.      Cervical back: Normal range of motion.      Right lower leg: No edema.      Left lower leg: No edema.     Skin:     General: Skin is warm.      Comments: (+) sunburn on b/l shoulders      Neurological:      General: No focal deficit present.      Mental Status: She is alert and oriented to person, place, and time.     Psychiatric:         Mood and Affect: Mood normal.         Behavior: Behavior normal. "

## 2025-06-26 NOTE — TELEPHONE ENCOUNTER
Patient returned the call today. I gave her the message from Dr Franco regarding blood work results. She verbalized understanding.

## 2025-06-30 DIAGNOSIS — J30.9 ALLERGIC RHINITIS, UNSPECIFIED SEASONALITY, UNSPECIFIED TRIGGER: ICD-10-CM

## 2025-06-30 DIAGNOSIS — I10 ESSENTIAL HYPERTENSION: ICD-10-CM

## 2025-07-01 RX ORDER — LISINOPRIL 5 MG/1
5 TABLET ORAL DAILY
Qty: 90 TABLET | Refills: 1 | Status: SHIPPED | OUTPATIENT
Start: 2025-07-01

## 2025-07-02 RX ORDER — FLUTICASONE PROPIONATE 50 MCG
SPRAY, SUSPENSION (ML) NASAL
Qty: 48 G | Refills: 0 | Status: SHIPPED | OUTPATIENT
Start: 2025-07-02

## 2025-07-17 DIAGNOSIS — G62.9 NEUROPATHY: ICD-10-CM

## 2025-07-17 RX ORDER — GABAPENTIN 400 MG/1
400 CAPSULE ORAL
Qty: 90 CAPSULE | Refills: 0 | Status: CANCELLED | OUTPATIENT
Start: 2025-07-17

## 2025-07-18 NOTE — TELEPHONE ENCOUNTER
Patient is out of the medication. Patient states that she has always taking 2 tablets daily of the gabapentin.   The dose was changed to one a day during the office visit. 06/24/25.   Patient is asking why the dose had changed and is asking if it would be changed back to the 2 tablets daily.     Please review.       [facility] - [place] - [address]

## 2025-07-22 DIAGNOSIS — G62.9 NEUROPATHY: ICD-10-CM

## 2025-07-22 RX ORDER — GABAPENTIN 400 MG/1
400 CAPSULE ORAL 2 TIMES DAILY
Qty: 180 CAPSULE | Refills: 1 | Status: SHIPPED | OUTPATIENT
Start: 2025-07-22

## 2025-07-22 NOTE — TELEPHONE ENCOUNTER
Patient called the RX Refill Line. Message is being forwarded to the office.     Patient is requesting an update on her medication refill. She states a new script for Gabapentin needs to be sent to the pharmacy. She states she takes 2 capsules daily. Patient is out of the medication.     Please contact patient at [phone]

## 2025-07-28 DIAGNOSIS — R73.03 PREDIABETES: ICD-10-CM

## 2025-07-28 DIAGNOSIS — R09.89 LABILE HYPERTENSION: ICD-10-CM

## 2025-07-28 DIAGNOSIS — E78.00 HYPERCHOLESTEROLEMIA: ICD-10-CM

## 2025-07-29 RX ORDER — METFORMIN HYDROCHLORIDE 500 MG/1
1000 TABLET, EXTENDED RELEASE ORAL
Qty: 180 TABLET | Refills: 1 | Status: SHIPPED | OUTPATIENT
Start: 2025-07-29

## 2025-07-30 RX ORDER — CARVEDILOL 6.25 MG/1
6.25 TABLET ORAL 2 TIMES DAILY WITH MEALS
Qty: 180 TABLET | Refills: 1 | Status: SHIPPED | OUTPATIENT
Start: 2025-07-30

## 2025-07-30 RX ORDER — ROSUVASTATIN CALCIUM 5 MG/1
5 TABLET, COATED ORAL DAILY
Qty: 90 TABLET | Refills: 1 | Status: SHIPPED | OUTPATIENT
Start: 2025-07-30

## 2025-07-30 RX ORDER — ROSUVASTATIN CALCIUM 5 MG/1
5 TABLET, COATED ORAL DAILY
Qty: 90 TABLET | Refills: 0 | OUTPATIENT
Start: 2025-07-30

## 2025-08-15 ENCOUNTER — OFFICE VISIT (OUTPATIENT)
Dept: CARDIOLOGY CLINIC | Facility: CLINIC | Age: 50
End: 2025-08-15
Payer: COMMERCIAL